# Patient Record
Sex: FEMALE | Race: WHITE | URBAN - METROPOLITAN AREA
[De-identification: names, ages, dates, MRNs, and addresses within clinical notes are randomized per-mention and may not be internally consistent; named-entity substitution may affect disease eponyms.]

---

## 2017-01-09 ENCOUNTER — HOSPITAL ENCOUNTER (OUTPATIENT)
Dept: WOUND CARE | Age: 67
Discharge: HOME | End: 2017-01-09
Attending: SURGERY | Admitting: SURGERY

## 2017-01-09 VITALS
TEMPERATURE: 97.5 F | HEART RATE: 105 BPM | SYSTOLIC BLOOD PRESSURE: 126 MMHG | DIASTOLIC BLOOD PRESSURE: 80 MMHG | RESPIRATION RATE: 20 BRPM

## 2017-01-09 DIAGNOSIS — L97.929 CHRONIC VENOUS HYPERTENSION (IDIOPATHIC) WITH ULCER AND INFLAMMATION OF BILATERAL LOWER EXTREMITY (HCC): ICD-10-CM

## 2017-01-09 DIAGNOSIS — I70.242 ATHEROSCLEROSIS OF NATIVE ARTERY OF BOTH LOWER EXTREMITIES WITH BILATERAL ULCERATION OF CALVES (HCC): ICD-10-CM

## 2017-01-09 DIAGNOSIS — I87.333 CHRONIC VENOUS HYPERTENSION (IDIOPATHIC) WITH ULCER AND INFLAMMATION OF BILATERAL LOWER EXTREMITY (HCC): ICD-10-CM

## 2017-01-09 DIAGNOSIS — L97.919 CHRONIC VENOUS HYPERTENSION (IDIOPATHIC) WITH ULCER AND INFLAMMATION OF BILATERAL LOWER EXTREMITY (HCC): ICD-10-CM

## 2017-01-09 DIAGNOSIS — L03.119 CELLULITIS OF LOWER EXTREMITY, UNSPECIFIED LATERALITY: Primary | ICD-10-CM

## 2017-01-09 DIAGNOSIS — I70.232 ATHEROSCLEROSIS OF NATIVE ARTERY OF BOTH LOWER EXTREMITIES WITH BILATERAL ULCERATION OF CALVES (HCC): ICD-10-CM

## 2017-01-09 PROCEDURE — 11042 DBRDMT SUBQ TIS 1ST 20SQCM/<: CPT | Performed by: SURGERY

## 2017-01-09 PROCEDURE — 11045 DBRDMT SUBQ TISS EACH ADDL: CPT | Performed by: SURGERY

## 2017-01-09 RX ORDER — LIDOCAINE 50 MG/G
OINTMENT TOPICAL ONCE
Status: DISCONTINUED | OUTPATIENT
Start: 2017-01-09 | End: 2017-01-10 | Stop reason: HOSPADM

## 2017-01-16 ENCOUNTER — HOSPITAL ENCOUNTER (OUTPATIENT)
Dept: WOUND CARE | Age: 67
Discharge: HOME | End: 2017-01-16
Attending: NURSE PRACTITIONER | Admitting: NURSE PRACTITIONER

## 2017-01-16 VITALS
DIASTOLIC BLOOD PRESSURE: 75 MMHG | HEART RATE: 102 BPM | SYSTOLIC BLOOD PRESSURE: 134 MMHG | TEMPERATURE: 98.1 F | RESPIRATION RATE: 20 BRPM

## 2017-01-16 DIAGNOSIS — L03.115 CELLULITIS OF BOTH LOWER EXTREMITIES: Primary | ICD-10-CM

## 2017-01-16 DIAGNOSIS — L03.116 CELLULITIS OF BOTH LOWER EXTREMITIES: Primary | ICD-10-CM

## 2017-01-16 PROCEDURE — 99213 OFFICE O/P EST LOW 20 MIN: CPT | Performed by: NURSE PRACTITIONER

## 2017-01-16 RX ORDER — LIDOCAINE 50 MG/G
OINTMENT TOPICAL ONCE
Status: DISCONTINUED | OUTPATIENT
Start: 2017-01-16 | End: 2017-01-17 | Stop reason: HOSPADM

## 2017-01-16 ASSESSMENT — PAIN SCALES - GENERAL: PAINLEVEL_OUTOF10: 10

## 2017-01-16 ASSESSMENT — PAIN DESCRIPTION - PAIN TYPE: TYPE: ACUTE PAIN

## 2017-02-06 ENCOUNTER — HOSPITAL ENCOUNTER (OUTPATIENT)
Dept: WOUND CARE | Age: 67
Discharge: HOME | End: 2017-02-06
Attending: NURSE PRACTITIONER | Admitting: NURSE PRACTITIONER

## 2017-02-06 VITALS
RESPIRATION RATE: 18 BRPM | BODY MASS INDEX: 40.21 KG/M2 | WEIGHT: 191.58 LBS | TEMPERATURE: 98 F | HEIGHT: 58 IN | HEART RATE: 91 BPM | DIASTOLIC BLOOD PRESSURE: 74 MMHG | SYSTOLIC BLOOD PRESSURE: 124 MMHG

## 2017-02-06 DIAGNOSIS — L03.115 CELLULITIS OF BOTH LOWER EXTREMITIES: ICD-10-CM

## 2017-02-06 DIAGNOSIS — I70.232 ATHEROSCLEROSIS OF NATIVE ARTERY OF BOTH LOWER EXTREMITIES WITH BILATERAL ULCERATION OF CALVES (HCC): ICD-10-CM

## 2017-02-06 DIAGNOSIS — L97.929 CHRONIC VENOUS HYPERTENSION (IDIOPATHIC) WITH ULCER AND INFLAMMATION OF BILATERAL LOWER EXTREMITY (HCC): Primary | ICD-10-CM

## 2017-02-06 DIAGNOSIS — L03.116 CELLULITIS OF BOTH LOWER EXTREMITIES: ICD-10-CM

## 2017-02-06 DIAGNOSIS — L97.919 CHRONIC VENOUS HYPERTENSION (IDIOPATHIC) WITH ULCER AND INFLAMMATION OF BILATERAL LOWER EXTREMITY (HCC): Primary | ICD-10-CM

## 2017-02-06 DIAGNOSIS — I70.242 ATHEROSCLEROSIS OF NATIVE ARTERY OF BOTH LOWER EXTREMITIES WITH BILATERAL ULCERATION OF CALVES (HCC): ICD-10-CM

## 2017-02-06 DIAGNOSIS — I87.333 CHRONIC VENOUS HYPERTENSION (IDIOPATHIC) WITH ULCER AND INFLAMMATION OF BILATERAL LOWER EXTREMITY (HCC): Primary | ICD-10-CM

## 2017-02-06 PROCEDURE — 11042 DBRDMT SUBQ TIS 1ST 20SQCM/<: CPT | Performed by: NURSE PRACTITIONER

## 2017-02-06 RX ORDER — BETAMETHASONE DIPROPIONATE 0.05 %
OINTMENT (GRAM) TOPICAL
Qty: 1 TUBE | Refills: 0 | Status: SHIPPED | OUTPATIENT
Start: 2017-02-06 | End: 2018-01-01 | Stop reason: ALTCHOICE

## 2017-02-06 RX ORDER — LIDOCAINE 50 MG/G
OINTMENT TOPICAL PRN
Status: DISCONTINUED | OUTPATIENT
Start: 2017-02-06 | End: 2017-02-07 | Stop reason: HOSPADM

## 2017-02-06 ASSESSMENT — PAIN SCALES - GENERAL: PAINLEVEL_OUTOF10: 0

## 2017-02-13 ENCOUNTER — HOSPITAL ENCOUNTER (OUTPATIENT)
Dept: WOUND CARE | Age: 67
Discharge: HOME | End: 2017-02-13
Attending: SURGERY | Admitting: SURGERY

## 2017-02-13 VITALS
RESPIRATION RATE: 18 BRPM | TEMPERATURE: 99.1 F | HEART RATE: 88 BPM | SYSTOLIC BLOOD PRESSURE: 117 MMHG | DIASTOLIC BLOOD PRESSURE: 71 MMHG

## 2017-02-13 DIAGNOSIS — L97.929 CHRONIC VENOUS HYPERTENSION (IDIOPATHIC) WITH ULCER AND INFLAMMATION OF BILATERAL LOWER EXTREMITY (HCC): Primary | ICD-10-CM

## 2017-02-13 DIAGNOSIS — I87.333 CHRONIC VENOUS HYPERTENSION (IDIOPATHIC) WITH ULCER AND INFLAMMATION OF BILATERAL LOWER EXTREMITY (HCC): Primary | ICD-10-CM

## 2017-02-13 DIAGNOSIS — I70.242 ATHEROSCLEROSIS OF NATIVE ARTERY OF BOTH LOWER EXTREMITIES WITH BILATERAL ULCERATION OF CALVES (HCC): ICD-10-CM

## 2017-02-13 DIAGNOSIS — I70.232 ATHEROSCLEROSIS OF NATIVE ARTERY OF BOTH LOWER EXTREMITIES WITH BILATERAL ULCERATION OF CALVES (HCC): ICD-10-CM

## 2017-02-13 DIAGNOSIS — L97.919 CHRONIC VENOUS HYPERTENSION (IDIOPATHIC) WITH ULCER AND INFLAMMATION OF BILATERAL LOWER EXTREMITY (HCC): Primary | ICD-10-CM

## 2017-02-13 PROCEDURE — 11042 DBRDMT SUBQ TIS 1ST 20SQCM/<: CPT | Performed by: SURGERY

## 2017-02-13 RX ORDER — LIDOCAINE 50 MG/G
OINTMENT TOPICAL PRN
Status: DISCONTINUED | OUTPATIENT
Start: 2017-02-13 | End: 2017-02-14 | Stop reason: HOSPADM

## 2017-02-20 ENCOUNTER — HOSPITAL ENCOUNTER (OUTPATIENT)
Dept: WOUND CARE | Age: 67
Discharge: HOME | End: 2017-02-20
Attending: SURGERY | Admitting: SURGERY

## 2017-02-20 VITALS
TEMPERATURE: 98.3 F | HEART RATE: 89 BPM | DIASTOLIC BLOOD PRESSURE: 77 MMHG | RESPIRATION RATE: 18 BRPM | SYSTOLIC BLOOD PRESSURE: 169 MMHG

## 2017-02-20 DIAGNOSIS — L97.919 CHRONIC VENOUS HYPERTENSION (IDIOPATHIC) WITH ULCER AND INFLAMMATION OF BILATERAL LOWER EXTREMITY (HCC): Primary | ICD-10-CM

## 2017-02-20 DIAGNOSIS — L97.929 CHRONIC VENOUS HYPERTENSION (IDIOPATHIC) WITH ULCER AND INFLAMMATION OF BILATERAL LOWER EXTREMITY (HCC): Primary | ICD-10-CM

## 2017-02-20 DIAGNOSIS — I87.333 CHRONIC VENOUS HYPERTENSION (IDIOPATHIC) WITH ULCER AND INFLAMMATION OF BILATERAL LOWER EXTREMITY (HCC): Primary | ICD-10-CM

## 2017-02-20 PROCEDURE — 99213 OFFICE O/P EST LOW 20 MIN: CPT | Performed by: SURGERY

## 2017-02-27 ENCOUNTER — PROCEDURE VISIT (OUTPATIENT)
Dept: SURGERY | Age: 67
End: 2017-02-27

## 2017-02-27 DIAGNOSIS — I87.333 CHRONIC VENOUS HYPERTENSION (IDIOPATHIC) WITH ULCER AND INFLAMMATION OF BILATERAL LOWER EXTREMITY (HCC): ICD-10-CM

## 2017-02-27 DIAGNOSIS — L97.929 CHRONIC VENOUS HYPERTENSION (IDIOPATHIC) WITH ULCER AND INFLAMMATION OF BILATERAL LOWER EXTREMITY (HCC): ICD-10-CM

## 2017-02-27 DIAGNOSIS — I70.232 ATHEROSCLEROSIS OF NATIVE ARTERY OF BOTH LOWER EXTREMITIES WITH BILATERAL ULCERATION OF CALVES (HCC): Primary | ICD-10-CM

## 2017-02-27 DIAGNOSIS — I70.242 ATHEROSCLEROSIS OF NATIVE ARTERY OF BOTH LOWER EXTREMITIES WITH BILATERAL ULCERATION OF CALVES (HCC): Primary | ICD-10-CM

## 2017-02-27 DIAGNOSIS — L97.919 CHRONIC VENOUS HYPERTENSION (IDIOPATHIC) WITH ULCER AND INFLAMMATION OF BILATERAL LOWER EXTREMITY (HCC): ICD-10-CM

## 2017-02-27 PROCEDURE — 93930 UPPER EXTREMITY STUDY: CPT | Performed by: SURGERY

## 2017-02-27 PROCEDURE — 93970 EXTREMITY STUDY: CPT | Performed by: SURGERY

## 2017-03-10 ENCOUNTER — OFFICE VISIT (OUTPATIENT)
Dept: SURGERY | Age: 67
End: 2017-03-10

## 2017-03-10 VITALS
HEART RATE: 129 BPM | WEIGHT: 185 LBS | BODY MASS INDEX: 38.83 KG/M2 | SYSTOLIC BLOOD PRESSURE: 136 MMHG | HEIGHT: 58 IN | DIASTOLIC BLOOD PRESSURE: 66 MMHG

## 2017-03-10 DIAGNOSIS — I70.232 ATHEROSCLEROSIS OF NATIVE ARTERY OF BOTH LOWER EXTREMITIES WITH BILATERAL ULCERATION OF CALVES (HCC): ICD-10-CM

## 2017-03-10 DIAGNOSIS — I70.242 ATHEROSCLEROSIS OF NATIVE ARTERY OF BOTH LOWER EXTREMITIES WITH BILATERAL ULCERATION OF CALVES (HCC): ICD-10-CM

## 2017-03-10 DIAGNOSIS — E11.8 TYPE 2 DIABETES MELLITUS WITH COMPLICATION, UNSPECIFIED LONG TERM INSULIN USE STATUS: ICD-10-CM

## 2017-03-10 DIAGNOSIS — I10 ESSENTIAL HYPERTENSION: Primary | ICD-10-CM

## 2017-03-10 PROCEDURE — 99213 OFFICE O/P EST LOW 20 MIN: CPT | Performed by: SURGERY

## 2017-03-10 ASSESSMENT — ENCOUNTER SYMPTOMS
GASTROINTESTINAL NEGATIVE: 1
RESPIRATORY NEGATIVE: 1
EYES NEGATIVE: 1

## 2017-03-13 ENCOUNTER — HOSPITAL ENCOUNTER (OUTPATIENT)
Dept: WOUND CARE | Age: 67
Discharge: HOME | End: 2017-03-13
Attending: SURGERY | Admitting: SURGERY

## 2017-03-13 VITALS
SYSTOLIC BLOOD PRESSURE: 144 MMHG | RESPIRATION RATE: 18 BRPM | TEMPERATURE: 98.1 F | DIASTOLIC BLOOD PRESSURE: 78 MMHG | HEART RATE: 98 BPM

## 2017-03-13 DIAGNOSIS — L97.929 CHRONIC VENOUS HYPERTENSION (IDIOPATHIC) WITH ULCER AND INFLAMMATION OF BILATERAL LOWER EXTREMITY (HCC): Primary | ICD-10-CM

## 2017-03-13 DIAGNOSIS — L97.919 CHRONIC VENOUS HYPERTENSION (IDIOPATHIC) WITH ULCER AND INFLAMMATION OF BILATERAL LOWER EXTREMITY (HCC): Primary | ICD-10-CM

## 2017-03-13 DIAGNOSIS — I87.333 CHRONIC VENOUS HYPERTENSION (IDIOPATHIC) WITH ULCER AND INFLAMMATION OF BILATERAL LOWER EXTREMITY (HCC): Primary | ICD-10-CM

## 2017-03-13 PROCEDURE — 11042 DBRDMT SUBQ TIS 1ST 20SQCM/<: CPT | Performed by: SURGERY

## 2017-03-13 PROCEDURE — 11045 DBRDMT SUBQ TISS EACH ADDL: CPT | Performed by: SURGERY

## 2017-03-13 RX ORDER — LIDOCAINE 50 MG/G
OINTMENT TOPICAL PRN
Status: DISCONTINUED | OUTPATIENT
Start: 2017-03-13 | End: 2017-03-14 | Stop reason: HOSPADM

## 2017-03-13 ASSESSMENT — PAIN SCALES - GENERAL: PAINLEVEL_OUTOF10: 9

## 2017-03-13 ASSESSMENT — PAIN DESCRIPTION - PAIN TYPE: TYPE: CHRONIC PAIN

## 2017-03-13 ASSESSMENT — PAIN DESCRIPTION - DESCRIPTORS: DESCRIPTORS: ACHING

## 2017-03-13 ASSESSMENT — PAIN DESCRIPTION - LOCATION: LOCATION: LEG

## 2017-03-13 ASSESSMENT — PAIN DESCRIPTION - ORIENTATION: ORIENTATION: RIGHT

## 2017-03-20 ENCOUNTER — HOSPITAL ENCOUNTER (OUTPATIENT)
Dept: WOUND CARE | Age: 67
Discharge: HOME | End: 2017-03-20
Attending: SURGERY | Admitting: SURGERY

## 2017-03-20 VITALS
TEMPERATURE: 98.6 F | RESPIRATION RATE: 20 BRPM | SYSTOLIC BLOOD PRESSURE: 138 MMHG | HEART RATE: 89 BPM | DIASTOLIC BLOOD PRESSURE: 65 MMHG

## 2017-03-20 DIAGNOSIS — I87.333 CHRONIC VENOUS HYPERTENSION (IDIOPATHIC) WITH ULCER AND INFLAMMATION OF BILATERAL LOWER EXTREMITY (HCC): Primary | ICD-10-CM

## 2017-03-20 DIAGNOSIS — L97.929 CHRONIC VENOUS HYPERTENSION (IDIOPATHIC) WITH ULCER AND INFLAMMATION OF BILATERAL LOWER EXTREMITY (HCC): Primary | ICD-10-CM

## 2017-03-20 DIAGNOSIS — I70.232 ATHEROSCLEROSIS OF NATIVE ARTERY OF BOTH LOWER EXTREMITIES WITH BILATERAL ULCERATION OF CALVES (HCC): ICD-10-CM

## 2017-03-20 DIAGNOSIS — I70.242 ATHEROSCLEROSIS OF NATIVE ARTERY OF BOTH LOWER EXTREMITIES WITH BILATERAL ULCERATION OF CALVES (HCC): ICD-10-CM

## 2017-03-20 DIAGNOSIS — L97.919 CHRONIC VENOUS HYPERTENSION (IDIOPATHIC) WITH ULCER AND INFLAMMATION OF BILATERAL LOWER EXTREMITY (HCC): Primary | ICD-10-CM

## 2017-03-20 PROCEDURE — 11042 DBRDMT SUBQ TIS 1ST 20SQCM/<: CPT | Performed by: SURGERY

## 2017-03-20 RX ORDER — LIDOCAINE 50 MG/G
OINTMENT TOPICAL ONCE
Status: DISCONTINUED | OUTPATIENT
Start: 2017-03-20 | End: 2017-03-21 | Stop reason: HOSPADM

## 2017-03-27 ENCOUNTER — HOSPITAL ENCOUNTER (OUTPATIENT)
Dept: WOUND CARE | Age: 67
Discharge: HOME | End: 2017-03-27
Attending: SURGERY | Admitting: SURGERY

## 2017-03-27 VITALS
RESPIRATION RATE: 18 BRPM | HEART RATE: 72 BPM | TEMPERATURE: 98.5 F | DIASTOLIC BLOOD PRESSURE: 66 MMHG | SYSTOLIC BLOOD PRESSURE: 110 MMHG

## 2017-03-27 DIAGNOSIS — I70.232 ATHEROSCLEROSIS OF NATIVE ARTERY OF BOTH LOWER EXTREMITIES WITH BILATERAL ULCERATION OF CALVES (HCC): ICD-10-CM

## 2017-03-27 DIAGNOSIS — L97.929 CHRONIC VENOUS HYPERTENSION (IDIOPATHIC) WITH ULCER AND INFLAMMATION OF BILATERAL LOWER EXTREMITY (HCC): Primary | ICD-10-CM

## 2017-03-27 DIAGNOSIS — L97.919 CHRONIC VENOUS HYPERTENSION (IDIOPATHIC) WITH ULCER AND INFLAMMATION OF BILATERAL LOWER EXTREMITY (HCC): Primary | ICD-10-CM

## 2017-03-27 DIAGNOSIS — I87.333 CHRONIC VENOUS HYPERTENSION (IDIOPATHIC) WITH ULCER AND INFLAMMATION OF BILATERAL LOWER EXTREMITY (HCC): Primary | ICD-10-CM

## 2017-03-27 DIAGNOSIS — I70.242 ATHEROSCLEROSIS OF NATIVE ARTERY OF BOTH LOWER EXTREMITIES WITH BILATERAL ULCERATION OF CALVES (HCC): ICD-10-CM

## 2017-03-27 PROCEDURE — 11042 DBRDMT SUBQ TIS 1ST 20SQCM/<: CPT | Performed by: SURGERY

## 2017-03-27 RX ORDER — LIDOCAINE 50 MG/G
OINTMENT TOPICAL PRN
Status: DISCONTINUED | OUTPATIENT
Start: 2017-03-27 | End: 2017-03-28 | Stop reason: HOSPADM

## 2017-03-27 ASSESSMENT — PAIN SCALES - GENERAL: PAINLEVEL_OUTOF10: 0

## 2017-04-03 ENCOUNTER — HOSPITAL ENCOUNTER (OUTPATIENT)
Dept: WOUND CARE | Age: 67
Discharge: HOME | End: 2017-04-03
Attending: SURGERY | Admitting: SURGERY

## 2017-04-03 VITALS
SYSTOLIC BLOOD PRESSURE: 151 MMHG | RESPIRATION RATE: 20 BRPM | TEMPERATURE: 98 F | HEART RATE: 85 BPM | DIASTOLIC BLOOD PRESSURE: 76 MMHG | WEIGHT: 183.86 LBS | BODY MASS INDEX: 38.43 KG/M2

## 2017-04-03 DIAGNOSIS — L97.929 CHRONIC VENOUS HYPERTENSION (IDIOPATHIC) WITH ULCER AND INFLAMMATION OF BILATERAL LOWER EXTREMITY (HCC): Primary | ICD-10-CM

## 2017-04-03 DIAGNOSIS — L97.919 CHRONIC VENOUS HYPERTENSION (IDIOPATHIC) WITH ULCER AND INFLAMMATION OF BILATERAL LOWER EXTREMITY (HCC): Primary | ICD-10-CM

## 2017-04-03 DIAGNOSIS — I87.333 CHRONIC VENOUS HYPERTENSION (IDIOPATHIC) WITH ULCER AND INFLAMMATION OF BILATERAL LOWER EXTREMITY (HCC): Primary | ICD-10-CM

## 2017-04-03 PROCEDURE — 11042 DBRDMT SUBQ TIS 1ST 20SQCM/<: CPT | Performed by: SURGERY

## 2017-04-03 RX ORDER — LIDOCAINE 50 MG/G
OINTMENT TOPICAL PRN
Status: DISCONTINUED | OUTPATIENT
Start: 2017-04-03 | End: 2017-04-04 | Stop reason: HOSPADM

## 2017-04-03 ASSESSMENT — PAIN SCALES - GENERAL: PAINLEVEL_OUTOF10: 4

## 2017-04-03 ASSESSMENT — PAIN DESCRIPTION - PROGRESSION: CLINICAL_PROGRESSION: NOT CHANGED

## 2017-04-03 ASSESSMENT — PAIN DESCRIPTION - PAIN TYPE: TYPE: CHRONIC PAIN

## 2017-04-03 ASSESSMENT — PAIN DESCRIPTION - ORIENTATION: ORIENTATION: RIGHT

## 2017-04-03 ASSESSMENT — PAIN DESCRIPTION - FREQUENCY: FREQUENCY: INTERMITTENT

## 2017-04-03 ASSESSMENT — PAIN DESCRIPTION - LOCATION: LOCATION: LEG

## 2017-04-03 ASSESSMENT — PAIN DESCRIPTION - DESCRIPTORS: DESCRIPTORS: ACHING

## 2017-04-03 ASSESSMENT — PAIN DESCRIPTION - ONSET: ONSET: ON-GOING

## 2017-04-10 ENCOUNTER — HOSPITAL ENCOUNTER (OUTPATIENT)
Dept: WOUND CARE | Age: 67
Discharge: HOME | End: 2017-04-10
Attending: SURGERY | Admitting: SURGERY

## 2017-04-13 ENCOUNTER — HOSPITAL ENCOUNTER (OUTPATIENT)
Dept: WOUND CARE | Age: 67
Discharge: HOME | End: 2017-04-13
Attending: SURGERY | Admitting: SURGERY

## 2017-04-13 VITALS
DIASTOLIC BLOOD PRESSURE: 81 MMHG | TEMPERATURE: 98.1 F | SYSTOLIC BLOOD PRESSURE: 174 MMHG | RESPIRATION RATE: 20 BRPM | HEART RATE: 84 BPM

## 2017-04-13 DIAGNOSIS — L97.919 CHRONIC VENOUS HYPERTENSION (IDIOPATHIC) WITH ULCER AND INFLAMMATION OF BILATERAL LOWER EXTREMITY (HCC): ICD-10-CM

## 2017-04-13 DIAGNOSIS — I87.333 CHRONIC VENOUS HYPERTENSION (IDIOPATHIC) WITH ULCER AND INFLAMMATION OF BILATERAL LOWER EXTREMITY (HCC): ICD-10-CM

## 2017-04-13 DIAGNOSIS — I70.242 ATHEROSCLEROSIS OF NATIVE ARTERY OF BOTH LOWER EXTREMITIES WITH BILATERAL ULCERATION OF CALVES (HCC): Primary | ICD-10-CM

## 2017-04-13 DIAGNOSIS — I70.232 ATHEROSCLEROSIS OF NATIVE ARTERY OF BOTH LOWER EXTREMITIES WITH BILATERAL ULCERATION OF CALVES (HCC): Primary | ICD-10-CM

## 2017-04-13 DIAGNOSIS — L97.929 CHRONIC VENOUS HYPERTENSION (IDIOPATHIC) WITH ULCER AND INFLAMMATION OF BILATERAL LOWER EXTREMITY (HCC): ICD-10-CM

## 2017-04-13 PROCEDURE — 99212 OFFICE O/P EST SF 10 MIN: CPT | Performed by: SURGERY

## 2017-04-13 RX ORDER — LIDOCAINE 50 MG/G
OINTMENT TOPICAL PRN
Status: DISCONTINUED | OUTPATIENT
Start: 2017-04-13 | End: 2017-04-14 | Stop reason: HOSPADM

## 2017-04-20 ENCOUNTER — HOSPITAL ENCOUNTER (OUTPATIENT)
Dept: WOUND CARE | Age: 67
Discharge: HOME | End: 2018-02-23
Attending: SURGERY | Admitting: SURGERY

## 2017-04-24 ENCOUNTER — HOSPITAL ENCOUNTER (OUTPATIENT)
Dept: WOUND CARE | Age: 67
Discharge: HOME | End: 2017-04-25
Attending: SURGERY | Admitting: SURGERY

## 2017-04-24 VITALS — RESPIRATION RATE: 18 BRPM | SYSTOLIC BLOOD PRESSURE: 182 MMHG | HEART RATE: 103 BPM | DIASTOLIC BLOOD PRESSURE: 80 MMHG

## 2017-04-24 DIAGNOSIS — L97.929 CHRONIC VENOUS HYPERTENSION (IDIOPATHIC) WITH ULCER AND INFLAMMATION OF BILATERAL LOWER EXTREMITY (HCC): Primary | ICD-10-CM

## 2017-04-24 DIAGNOSIS — I87.333 CHRONIC VENOUS HYPERTENSION (IDIOPATHIC) WITH ULCER AND INFLAMMATION OF BILATERAL LOWER EXTREMITY (HCC): Primary | ICD-10-CM

## 2017-04-24 DIAGNOSIS — L97.919 CHRONIC VENOUS HYPERTENSION (IDIOPATHIC) WITH ULCER AND INFLAMMATION OF BILATERAL LOWER EXTREMITY (HCC): Primary | ICD-10-CM

## 2017-04-24 PROCEDURE — 11042 DBRDMT SUBQ TIS 1ST 20SQCM/<: CPT | Performed by: SURGERY

## 2017-04-24 RX ORDER — LIDOCAINE 50 MG/G
OINTMENT TOPICAL PRN
Status: DISCONTINUED | OUTPATIENT
Start: 2017-04-24 | End: 2017-04-26 | Stop reason: HOSPADM

## 2017-04-24 ASSESSMENT — PAIN SCALES - GENERAL: PAINLEVEL_OUTOF10: 0

## 2017-05-03 ENCOUNTER — TELEPHONE (OUTPATIENT)
Dept: WOUND CARE | Age: 67
End: 2017-05-03

## 2017-05-08 ENCOUNTER — HOSPITAL ENCOUNTER (OUTPATIENT)
Dept: WOUND CARE | Age: 67
Discharge: HOME | End: 2017-05-08
Attending: SURGERY | Admitting: SURGERY

## 2017-05-08 VITALS
HEART RATE: 87 BPM | SYSTOLIC BLOOD PRESSURE: 152 MMHG | TEMPERATURE: 97.4 F | DIASTOLIC BLOOD PRESSURE: 80 MMHG | RESPIRATION RATE: 18 BRPM

## 2017-05-08 DIAGNOSIS — L97.919 CHRONIC VENOUS HYPERTENSION (IDIOPATHIC) WITH ULCER AND INFLAMMATION OF BILATERAL LOWER EXTREMITY (HCC): Primary | ICD-10-CM

## 2017-05-08 DIAGNOSIS — I87.333 CHRONIC VENOUS HYPERTENSION (IDIOPATHIC) WITH ULCER AND INFLAMMATION OF BILATERAL LOWER EXTREMITY (HCC): Primary | ICD-10-CM

## 2017-05-08 DIAGNOSIS — L97.929 CHRONIC VENOUS HYPERTENSION (IDIOPATHIC) WITH ULCER AND INFLAMMATION OF BILATERAL LOWER EXTREMITY (HCC): Primary | ICD-10-CM

## 2017-05-08 PROCEDURE — 99213 OFFICE O/P EST LOW 20 MIN: CPT | Performed by: SURGERY

## 2017-07-14 ENCOUNTER — HOSPITAL ENCOUNTER (OUTPATIENT)
Dept: WOUND CARE | Age: 67
Discharge: HOME | End: 2017-07-14
Attending: NURSE PRACTITIONER | Admitting: NURSE PRACTITIONER

## 2017-07-14 VITALS
HEIGHT: 58 IN | BODY MASS INDEX: 37.11 KG/M2 | HEART RATE: 99 BPM | RESPIRATION RATE: 18 BRPM | TEMPERATURE: 98.7 F | WEIGHT: 176.81 LBS | SYSTOLIC BLOOD PRESSURE: 149 MMHG | DIASTOLIC BLOOD PRESSURE: 84 MMHG

## 2017-07-14 DIAGNOSIS — L97.911 ULCER OF RIGHT LOWER LEG, LIMITED TO BREAKDOWN OF SKIN (HCC): ICD-10-CM

## 2017-07-14 DIAGNOSIS — M79.89 LEG SWELLING: ICD-10-CM

## 2017-07-14 DIAGNOSIS — I87.333 CHRONIC VENOUS HYPERTENSION (IDIOPATHIC) WITH ULCER AND INFLAMMATION OF BILATERAL LOWER EXTREMITY (HCC): Primary | ICD-10-CM

## 2017-07-14 DIAGNOSIS — L97.909: ICD-10-CM

## 2017-07-14 DIAGNOSIS — L97.919 CHRONIC VENOUS HYPERTENSION (IDIOPATHIC) WITH ULCER AND INFLAMMATION OF BILATERAL LOWER EXTREMITY (HCC): Primary | ICD-10-CM

## 2017-07-14 DIAGNOSIS — L97.929 CHRONIC VENOUS HYPERTENSION (IDIOPATHIC) WITH ULCER AND INFLAMMATION OF BILATERAL LOWER EXTREMITY (HCC): Primary | ICD-10-CM

## 2017-07-14 DIAGNOSIS — L97.921 ULCER OF LEFT LOWER LEG, LIMITED TO BREAKDOWN OF SKIN (HCC): ICD-10-CM

## 2017-07-14 PROCEDURE — 97597 DBRDMT OPN WND 1ST 20 CM/<: CPT | Performed by: NURSE PRACTITIONER

## 2017-07-14 RX ORDER — LIDOCAINE HYDROCHLORIDE 40 MG/ML
SOLUTION TOPICAL ONCE
Status: DISCONTINUED | OUTPATIENT
Start: 2017-07-14 | End: 2017-07-15 | Stop reason: HOSPADM

## 2017-07-14 ASSESSMENT — PAIN DESCRIPTION - LOCATION: LOCATION: LEG

## 2017-07-14 ASSESSMENT — PAIN DESCRIPTION - DESCRIPTORS: DESCRIPTORS: BURNING

## 2017-07-14 ASSESSMENT — PAIN DESCRIPTION - PAIN TYPE: TYPE: ACUTE PAIN

## 2017-07-14 ASSESSMENT — PAIN SCALES - GENERAL: PAINLEVEL_OUTOF10: 9

## 2017-07-14 ASSESSMENT — PAIN DESCRIPTION - FREQUENCY: FREQUENCY: INTERMITTENT

## 2017-07-14 ASSESSMENT — PAIN DESCRIPTION - ORIENTATION: ORIENTATION: RIGHT;LEFT

## 2017-07-24 ENCOUNTER — HOSPITAL ENCOUNTER (OUTPATIENT)
Dept: WOUND CARE | Age: 67
Discharge: HOME | End: 2017-07-24
Attending: SURGERY | Admitting: SURGERY

## 2017-07-24 VITALS
TEMPERATURE: 97.8 F | HEART RATE: 73 BPM | DIASTOLIC BLOOD PRESSURE: 84 MMHG | SYSTOLIC BLOOD PRESSURE: 136 MMHG | RESPIRATION RATE: 18 BRPM

## 2017-07-24 DIAGNOSIS — L97.929 CHRONIC VENOUS HYPERTENSION (IDIOPATHIC) WITH ULCER AND INFLAMMATION OF BILATERAL LOWER EXTREMITY (HCC): Primary | ICD-10-CM

## 2017-07-24 DIAGNOSIS — L97.919 CHRONIC VENOUS HYPERTENSION (IDIOPATHIC) WITH ULCER AND INFLAMMATION OF BILATERAL LOWER EXTREMITY (HCC): Primary | ICD-10-CM

## 2017-07-24 DIAGNOSIS — I87.333 CHRONIC VENOUS HYPERTENSION (IDIOPATHIC) WITH ULCER AND INFLAMMATION OF BILATERAL LOWER EXTREMITY (HCC): Primary | ICD-10-CM

## 2017-07-24 PROCEDURE — 99213 OFFICE O/P EST LOW 20 MIN: CPT | Performed by: SURGERY

## 2017-09-05 ENCOUNTER — HOSPITAL ENCOUNTER (OUTPATIENT)
Dept: WOUND CARE | Age: 67
Discharge: HOME | End: 2017-09-05
Attending: INTERNAL MEDICINE | Admitting: INTERNAL MEDICINE

## 2017-09-05 VITALS
HEIGHT: 59 IN | TEMPERATURE: 98.9 F | WEIGHT: 182.1 LBS | DIASTOLIC BLOOD PRESSURE: 77 MMHG | BODY MASS INDEX: 36.71 KG/M2 | SYSTOLIC BLOOD PRESSURE: 142 MMHG | RESPIRATION RATE: 16 BRPM | HEART RATE: 88 BPM

## 2017-09-05 DIAGNOSIS — S80.811A EXCORIATION OF RIGHT LOWER LEG, INITIAL ENCOUNTER: ICD-10-CM

## 2017-09-05 DIAGNOSIS — S80.812A EXCORIATION OF LEFT LOWER LEG, INITIAL ENCOUNTER: ICD-10-CM

## 2017-09-05 DIAGNOSIS — L29.9 PRURITIC CONDITION: ICD-10-CM

## 2017-09-05 ASSESSMENT — PAIN DESCRIPTION - FREQUENCY: FREQUENCY: INTERMITTENT

## 2017-09-05 ASSESSMENT — PAIN SCALES - GENERAL: PAINLEVEL_OUTOF10: 8

## 2017-09-05 ASSESSMENT — PAIN DESCRIPTION - LOCATION: LOCATION: LEG

## 2017-09-05 ASSESSMENT — PAIN DESCRIPTION - PAIN TYPE: TYPE: ACUTE PAIN

## 2017-09-05 ASSESSMENT — PAIN DESCRIPTION - DESCRIPTORS: DESCRIPTORS: SORE

## 2017-09-05 ASSESSMENT — PAIN DESCRIPTION - ORIENTATION: ORIENTATION: RIGHT;LEFT

## 2017-09-12 ENCOUNTER — HOSPITAL ENCOUNTER (OUTPATIENT)
Dept: WOUND CARE | Age: 67
Discharge: HOME | End: 2017-09-12
Attending: INTERNAL MEDICINE | Admitting: INTERNAL MEDICINE

## 2017-09-12 VITALS
HEART RATE: 80 BPM | DIASTOLIC BLOOD PRESSURE: 83 MMHG | TEMPERATURE: 98 F | RESPIRATION RATE: 16 BRPM | SYSTOLIC BLOOD PRESSURE: 177 MMHG

## 2017-09-12 ASSESSMENT — PAIN DESCRIPTION - ORIENTATION: ORIENTATION: RIGHT;LEFT

## 2017-09-12 ASSESSMENT — PAIN DESCRIPTION - ONSET: ONSET: ON-GOING

## 2017-09-12 ASSESSMENT — PAIN SCALES - GENERAL: PAINLEVEL_OUTOF10: 5

## 2017-09-12 ASSESSMENT — PAIN DESCRIPTION - FREQUENCY: FREQUENCY: INTERMITTENT

## 2017-09-12 ASSESSMENT — PAIN DESCRIPTION - PAIN TYPE: TYPE: ACUTE PAIN

## 2017-09-12 ASSESSMENT — PAIN DESCRIPTION - PROGRESSION: CLINICAL_PROGRESSION: NOT CHANGED

## 2017-09-12 ASSESSMENT — PAIN DESCRIPTION - DESCRIPTORS: DESCRIPTORS: ACHING

## 2017-09-12 ASSESSMENT — PAIN DESCRIPTION - LOCATION: LOCATION: LEG

## 2017-09-19 ENCOUNTER — HOSPITAL ENCOUNTER (OUTPATIENT)
Dept: WOUND CARE | Age: 67
Discharge: HOME | End: 2017-09-19
Attending: INTERNAL MEDICINE | Admitting: INTERNAL MEDICINE

## 2017-09-19 VITALS
TEMPERATURE: 98.6 F | RESPIRATION RATE: 18 BRPM | SYSTOLIC BLOOD PRESSURE: 153 MMHG | HEART RATE: 68 BPM | DIASTOLIC BLOOD PRESSURE: 84 MMHG

## 2017-09-19 ASSESSMENT — PAIN SCALES - GENERAL: PAINLEVEL_OUTOF10: 0

## 2017-09-29 ENCOUNTER — HOSPITAL ENCOUNTER (OUTPATIENT)
Dept: WOUND CARE | Age: 67
Discharge: HOME | End: 2017-09-29
Attending: NURSE PRACTITIONER | Admitting: NURSE PRACTITIONER

## 2017-09-29 VITALS
DIASTOLIC BLOOD PRESSURE: 84 MMHG | RESPIRATION RATE: 16 BRPM | TEMPERATURE: 97.9 F | HEART RATE: 96 BPM | SYSTOLIC BLOOD PRESSURE: 157 MMHG

## 2017-09-29 DIAGNOSIS — L97.911 ULCER OF RIGHT LOWER LEG, LIMITED TO BREAKDOWN OF SKIN (HCC): ICD-10-CM

## 2017-09-29 DIAGNOSIS — M79.89 LEG SWELLING: ICD-10-CM

## 2017-09-29 DIAGNOSIS — L03.115 CELLULITIS OF BOTH LOWER EXTREMITIES: Primary | ICD-10-CM

## 2017-09-29 DIAGNOSIS — L03.116 CELLULITIS OF BOTH LOWER EXTREMITIES: Primary | ICD-10-CM

## 2017-09-29 DIAGNOSIS — L97.921 ULCER OF LEFT LOWER LEG, LIMITED TO BREAKDOWN OF SKIN (HCC): ICD-10-CM

## 2017-09-29 PROCEDURE — 99212 OFFICE O/P EST SF 10 MIN: CPT | Performed by: NURSE PRACTITIONER

## 2017-09-29 ASSESSMENT — PAIN DESCRIPTION - PAIN TYPE: TYPE: ACUTE PAIN

## 2017-09-29 ASSESSMENT — PAIN DESCRIPTION - PROGRESSION: CLINICAL_PROGRESSION: NOT CHANGED

## 2017-09-29 ASSESSMENT — PAIN SCALES - GENERAL: PAINLEVEL_OUTOF10: 10

## 2017-09-29 ASSESSMENT — PAIN DESCRIPTION - ORIENTATION: ORIENTATION: RIGHT;LEFT

## 2017-09-29 ASSESSMENT — PAIN DESCRIPTION - ONSET: ONSET: ON-GOING

## 2017-09-29 ASSESSMENT — PAIN DESCRIPTION - DESCRIPTORS: DESCRIPTORS: SORE;ACHING

## 2017-09-29 ASSESSMENT — PAIN DESCRIPTION - LOCATION: LOCATION: LEG

## 2017-09-29 ASSESSMENT — PAIN DESCRIPTION - FREQUENCY: FREQUENCY: INTERMITTENT

## 2017-10-10 ENCOUNTER — HOSPITAL ENCOUNTER (OUTPATIENT)
Dept: WOUND CARE | Age: 67
Discharge: HOME | End: 2017-10-10
Attending: INTERNAL MEDICINE | Admitting: INTERNAL MEDICINE

## 2017-10-10 VITALS
TEMPERATURE: 97.6 F | RESPIRATION RATE: 18 BRPM | DIASTOLIC BLOOD PRESSURE: 77 MMHG | HEART RATE: 88 BPM | SYSTOLIC BLOOD PRESSURE: 155 MMHG

## 2017-10-10 RX ORDER — LIDOCAINE 50 MG/G
OINTMENT TOPICAL ONCE
Status: DISCONTINUED | OUTPATIENT
Start: 2017-10-10 | End: 2017-10-11 | Stop reason: HOSPADM

## 2017-10-10 ASSESSMENT — PAIN DESCRIPTION - LOCATION: LOCATION: LEG

## 2017-10-10 ASSESSMENT — PAIN DESCRIPTION - PROGRESSION: CLINICAL_PROGRESSION: NOT CHANGED

## 2017-10-10 ASSESSMENT — PAIN DESCRIPTION - ONSET: ONSET: ON-GOING

## 2017-10-10 ASSESSMENT — PAIN DESCRIPTION - DESCRIPTORS: DESCRIPTORS: ACHING

## 2017-10-10 ASSESSMENT — PAIN SCALES - GENERAL: PAINLEVEL_OUTOF10: 9

## 2017-10-10 ASSESSMENT — PAIN DESCRIPTION - PAIN TYPE: TYPE: ACUTE PAIN

## 2017-10-10 ASSESSMENT — PAIN DESCRIPTION - ORIENTATION: ORIENTATION: RIGHT;LEFT

## 2017-10-10 ASSESSMENT — PAIN DESCRIPTION - FREQUENCY: FREQUENCY: INTERMITTENT

## 2017-10-10 NOTE — PLAN OF CARE
[employer] Application   Below Knee    NAME:  Sofia Edwards  YOB: 1950  MEDICAL RECORD NUMBER:  [id number]  DATE:  10/10/2017     [] Removed old Gerarda Hoit boot if indicated and wash leg with mild soap and water.  [x] Applied moisturizing agent to dry skin as needed.  [x] Appied primary and secondary dressing as ordered     [x] Applied Unna roll from toes to knee overlapping each time.  [x] Applied ace wrap or coban from toes to below the knee.  [x] Secured with tape and/or metal clips covered with tape.  [x] Instructed patient/caregiver to keep dressing dry and intact. DO NOT REMOVE DRESSING.  [x] Instructed pt/family/caregiver to report excessive draining, loose bandage, wet dressing, severe pain or tingling in toes.  [x] Applied [employer] dressing below the knee to Bilateral lower leg(s)        Unna Boot(s) were applied per  Guidelines.      Electronically signed by Cricket Manriquez RN on 10/10/2017 at 10:33 AM

## 2017-10-10 NOTE — PROGRESS NOTES
[facility]   Progress Note and Procedure Note      Sahil Kelsey RECORD NUMBER:  [id number]  AGE: 75 y.o. GENDER: female  : 1950  EPISODE DATE:  10/10/2017    Subjective:     Chief Complaint   Patient presents with    Wound Check     follow up wounds bilateral lower extremities         HISTORY of PRESENT ILLNESS HPI     Jorge Woody is a 75 y.o. female who presents today for wound/ulcer evaluation. History of Wound Context: Jorge Woody presents to wound care center for follow up evaluation of wound. Patient has not had any complications since last visit. There are no new complaints of increasing pain, fever, chills, increased drainage, rash, odor, swelling or other constitutional symptoms. Patient has been compliant with wound care instructions.         PAST MEDICAL HISTORY        Diagnosis Date    Acid reflux     Arthritis     Balance problem     Blood circulation, collateral     Cellulitis of both lower extremities     CHF (congestive heart failure) (HCC)     Chronic kidney disease     Chronic renal failure    Chronic venous hypertension (idiopathic) with ulcer and inflammation of bilateral lower extremity 2017    Depression     Diabetes mellitus (HCC)     GERD (gastroesophageal reflux disease)     Hyperlipidemia     Hypertension     Movement disorder     Murmur     Neuromuscular disorder (HCC)     Restless leg syndrome     Urinary frequency     Urinary incontinence        PAST SURGICAL HISTORY    Past Surgical History:   Procedure Laterality Date    APPENDECTOMY      BLADDER SUSPENSION      CHOLECYSTECTOMY      COLONOSCOPY      CYSTOSCOPY      EYE SURGERY      HYSTERECTOMY         FAMILY HISTORY    Family History   Problem Relation Age of Onset    Heart Disease Mother     Heart Disease Father     Diabetes Father     Kidney Disease Father        SOCIAL HISTORY    Social History   Substance Use Topics    Smoking status: Former Smoker     Packs/day: 0.50     Years: 10.00     Types: Cigarettes     Quit date: 3/21/1990    Smokeless tobacco: Never Used    Alcohol use No       ALLERGIES    Allergies   Allergen Reactions    Benadryl [Diphenhydramine] Itching       MEDICATIONS    Current Outpatient Prescriptions on File Prior to Encounter   Medication Sig Dispense Refill    hydrOXYzine (ATARAX) 10 MG/5ML syrup Take 5 mLs by mouth 4 times daily as needed for Itching 1 Bottle 0    sodium bicarbonate 650 MG tablet Take 1 tablet by mouth 2 times daily 120 tablet 2    linagliptin (TRADJENTA) 5 MG tablet Take 5 mg by mouth daily      betamethasone dipropionate (DIPROLENE) 0.05 % ointment Apply topically daily. 1 Tube 0    insulin glargine (LANTUS) 100 UNIT/ML injection vial Inject 25 Units into the skin nightly (Patient taking differently: Inject 40 Units into the skin nightly ) 1 vial 3    docusate sodium (COLACE, DULCOLAX) 100 MG CAPS Take 100 mg by mouth daily 30 capsule 0    oxyCODONE-acetaminophen (PERCOCET) 5-325 MG per tablet   Take 1 tablet by mouth every 4 hours as needed for Pain (max 5/day)       hydrALAZINE (APRESOLINE) 25 MG tablet Take 25 mg by mouth 3 times daily      traZODone (DESYREL) 50 MG tablet Take 25-50 mg by mouth nightly as needed for Sleep      oxybutynin (DITROPAN-XL) 5 MG CR tablet Take 5 mg by mouth every evening      rOPINIRole (REQUIP) 1 MG tablet Take 1 mg by mouth nightly      dicyclomine (BENTYL) 20 MG tablet Take 10-20 mg by mouth every 6 hours as needed (spasms)      famotidine (PEPCID) 20 MG tablet   Take 20 mg by mouth daily       simvastatin (ZOCOR) 40 MG tablet   Take 20 mg by mouth nightly       ferrous sulfate 325 (65 FE) MG tablet Take 325 mg by mouth 2 times daily.  aspirin 81 MG tablet Take 81 mg by mouth daily.       diphenhydrAMINE (BENADRYL) 25 MG capsule Take 1 capsule by mouth nightly as needed for Itching 30 capsule 1     No current facility-administered medications on file Non-staged Wound Description Partial thickness 10/10/2017  9:45 AM   [place]%Wound Bed 20 9/29/2017 10:49 AM   Red%Wound Bed 60 9/29/2017 10:49 AM   Yellow%Wound Bed 20 9/29/2017 10:49 AM   Black%Wound Bed 100 10/10/2017  9:45 AM   Drainage Amount None 10/10/2017  9:45 AM   Odor None 10/10/2017  9:45 AM   Culture Taken No 9/29/2017  3:14 PM   Op First Treatment Date 09/29/17 9/29/2017 10:49 AM   Number of days: 11       Percent of Wound/Ulcer Debrided: 0%    Excoriations on legs with edema and chronic inflammatory changes. Will wrap legs with calamine on skin. Follow up 1 week. Plan:     Treatment Note please see attached Discharge Instructions    In my professional opinion this patient would benefit from HBO Therapy: No    Written patient dismissal instructions given to patient and signed by patient or POA. Discharge Instructions            [facility] Physician Orders and Discharge [place] [address]  [address] [address], [place] [address]  Telephone: [phone]      FAX [phone]      NAME: Gil Sotomayor  DATE OF BIRTH:  1950  MEDICAL RECORD NUMBER:  [id number]  DATE:  10/10/2017      Wound Cleansing:   Do not scrub or use excessive force. Cleanse wound prior to applying a clean dressing with:  [] Normal Saline                      [x] Keep Wound Dry in Shower              Topical Treatments:  Do not apply lotions, creams, or ointments to wound bed unless directed. [] Apply moisturizing lotion to skin surrounding the wound prior to dressing change.  [] Apply antifungal ointment to skin surrounding the wound prior to dressing change.  [] Apply thin film of moisture barrier ointment to skin immediately around wound.   [] Other:       Dressings:                            Wound Location BILATERAL LOWER LEGS                   [x] Apply Primary Dressing:                                                                                      [x] Foam with Silver   [] Cover and Secure with:                                           [] Gauze           [] Carol             [] Kerlix                        [] Ace Wrap                 [] Cover Roll Tape                   [] ABD                                                            [] Other:                             Avoid contact of tape with skin.      [] Change dressing:                                                               [x] Do Not Change Dressing                             Edema Control:  Apply:            [] Compression Stocking                     []Right Leg                   []Left Leg                        [] Tubigrip                    []Right Leg Double Layer                    []Left Leg Double Layer                                                                                          []Right Leg Single Layer                     []Left Leg Single Layer                        [] SpandaGrip              []Right Leg                   []Left Leg                                                                    []Low compression 5-10 mm/Hg                                                                                         []Medium compression 10-20 mm/Hg                                                                    []High compression  20-30 mm/Hg                        every morning immediately when getting up should be applied to affected leg(s) from mid foot to knee making sure to cover the heel.  Remove every night before going to bed.                        [x] Elevate leg(s) above the level of the heart when sitting.                                        [x] Avoid prolonged standing in one place.          Compression: CALAMINE CO-FLEX  Apply:            [x] Multilayer Compression Wrap Applied in Clinic                  [x]RightLeg                    [x]Left Leg                        [x] Multi-layer compression.  Do not get leg(s) with wrap wet.  If wraps become too tight information contained in the After Visit Summary has been reviewed with me, the patient and/or responsible adult, by my health care provider(s).  I had the opportunity to ask questions regarding this information.  I have elected to receive;          Patient Signature:_______________________  Danne Epley  Date: ___________ Time:  ____________  Danne Epley  [] Patient unable to sign Discharge Instructions given to ECF/Transportation/NIDIA Edwards  []  After Visit Summary  [x]  Comprehensive Discharge Instruction          Electronically signed by Trisha Bautista MD on 10/10/2017 at 11:13 AM

## 2017-10-17 ENCOUNTER — HOSPITAL ENCOUNTER (OUTPATIENT)
Dept: WOUND CARE | Age: 67
Discharge: HOME | End: 2017-10-17
Attending: INTERNAL MEDICINE | Admitting: INTERNAL MEDICINE

## 2017-10-17 VITALS
DIASTOLIC BLOOD PRESSURE: 81 MMHG | SYSTOLIC BLOOD PRESSURE: 149 MMHG | RESPIRATION RATE: 18 BRPM | HEART RATE: 88 BPM | TEMPERATURE: 98.5 F

## 2017-10-17 ASSESSMENT — PAIN DESCRIPTION - FREQUENCY: FREQUENCY: INTERMITTENT

## 2017-10-17 ASSESSMENT — PAIN DESCRIPTION - ONSET: ONSET: ON-GOING

## 2017-10-17 ASSESSMENT — PAIN SCALES - GENERAL: PAINLEVEL_OUTOF10: 8

## 2017-10-17 ASSESSMENT — PAIN DESCRIPTION - DESCRIPTORS: DESCRIPTORS: THROBBING

## 2017-10-17 ASSESSMENT — PAIN DESCRIPTION - PAIN TYPE: TYPE: ACUTE PAIN

## 2017-10-17 ASSESSMENT — PAIN DESCRIPTION - LOCATION: LOCATION: LEG

## 2017-10-17 ASSESSMENT — PAIN DESCRIPTION - PROGRESSION: CLINICAL_PROGRESSION: NOT CHANGED

## 2017-10-17 ASSESSMENT — PAIN DESCRIPTION - ORIENTATION: ORIENTATION: RIGHT;LEFT

## 2017-10-17 NOTE — PROGRESS NOTES
Use Topics    Smoking status: Former Smoker     Packs/day: 0.50     Years: 10.00     Types: Cigarettes     Quit date: 3/21/1990    Smokeless tobacco: Never Used    Alcohol use No       ALLERGIES    Allergies   Allergen Reactions    Benadryl [Diphenhydramine] Itching       MEDICATIONS    Current Outpatient Prescriptions on File Prior to Encounter   Medication Sig Dispense Refill    diphenhydrAMINE (BENADRYL) 25 MG capsule Take 1 capsule by mouth nightly as needed for Itching 30 capsule 1    hydrOXYzine (ATARAX) 10 MG/5ML syrup Take 5 mLs by mouth 4 times daily as needed for Itching 1 Bottle 0    sodium bicarbonate 650 MG tablet Take 1 tablet by mouth 2 times daily 120 tablet 2    linagliptin (TRADJENTA) 5 MG tablet Take 5 mg by mouth daily      betamethasone dipropionate (DIPROLENE) 0.05 % ointment Apply topically daily. 1 Tube 0    insulin glargine (LANTUS) 100 UNIT/ML injection vial Inject 25 Units into the skin nightly (Patient taking differently: Inject 40 Units into the skin nightly ) 1 vial 3    docusate sodium (COLACE, DULCOLAX) 100 MG CAPS Take 100 mg by mouth daily 30 capsule 0    oxyCODONE-acetaminophen (PERCOCET) 5-325 MG per tablet   Take 1 tablet by mouth every 4 hours as needed for Pain (max 5/day)       hydrALAZINE (APRESOLINE) 25 MG tablet Take 25 mg by mouth 3 times daily      traZODone (DESYREL) 50 MG tablet Take 25-50 mg by mouth nightly as needed for Sleep      oxybutynin (DITROPAN-XL) 5 MG CR tablet Take 5 mg by mouth every evening      rOPINIRole (REQUIP) 1 MG tablet Take 1 mg by mouth nightly      dicyclomine (BENTYL) 20 MG tablet Take 10-20 mg by mouth every 6 hours as needed (spasms)      famotidine (PEPCID) 20 MG tablet   Take 20 mg by mouth daily       simvastatin (ZOCOR) 40 MG tablet   Take 20 mg by mouth nightly       ferrous sulfate 325 (65 FE) MG tablet Take 325 mg by mouth 2 times daily.  aspirin 81 MG tablet Take 81 mg by mouth daily.        No current Dressing/Treatment Other (Comment) 10/10/2017 10:30 AM   Wound Cleansed Rinsed/Irrigated with saline 9/19/2017 11:03 AM   Wound Length (cm) 0 cm 10/17/2017 10:01 AM   Wound Width (cm) 0 cm 10/17/2017 10:01 AM   Wound Depth (cm)  0 10/17/2017 10:01 AM   Calculated Wound Size (cm^2) (l*w) 0 cm^2 10/17/2017 10:01 AM   Change in Wound Size % (l*w) 100 10/17/2017 10:01 AM   Wound Assessment Epithelialization 10/17/2017 10:01 AM   Margins Undefined edges 10/10/2017  9:45 AM   Carol-wound Assessment Edema;Fragile;Pink 10/10/2017  9:45 AM   Non-staged Wound Description Full thickness 10/10/2017  9:45 AM   [place]%Wound Bed 95 10/10/2017  9:45 AM   Red%Wound Bed 95 9/29/2017 10:49 AM   Yellow%Wound Bed 5 10/10/2017  9:45 AM   Drainage Amount None 10/10/2017  9:45 AM   Drainage Description Serosanguinous 9/12/2017 11:01 AM   Odor None 10/10/2017  9:45 AM   Op First Treatment Date 09/05/17 9/5/2017 11:07 AM   Number of days: 41       Wound 09/05/17 Leg Right; Lower #9 noted 8-30-17 (Active)   Wound Type Wound 10/10/2017  9:50 AM   Wound Venous 10/10/2017  9:50 AM   Dressing Status Other (Comment) 9/29/2017 10:49 AM   Dressing Changed Changed/New 10/10/2017 10:30 AM   Dressing/Treatment Other (Comment) 10/10/2017 10:30 AM   Wound Cleansed Rinsed/Irrigated with saline 9/19/2017 11:03 AM   Wound Length (cm) 0 cm 10/17/2017 10:01 AM   Wound Width (cm) 0 cm 10/17/2017 10:01 AM   Wound Depth (cm)  0 10/17/2017 10:01 AM   Calculated Wound Size (cm^2) (l*w) 0 cm^2 10/17/2017 10:01 AM   Change in Wound Size % (l*w) 100 10/17/2017 10:01 AM   Wound Assessment Epithelialization 10/17/2017 10:01 AM   Margins Undefined edges 9/29/2017 10:49 AM   Carol-wound Assessment Dry;Edema;Fragile 10/10/2017  9:45 AM   Non-staged Wound Description Full thickness 10/10/2017  9:45 AM   Red%Wound Bed 20 10/10/2017  9:45 AM   Yellow%Wound Bed 5 9/29/2017 10:49 AM   Black%Wound Bed 80 10/10/2017  9:45 AM   Drainage Amount LING 9/29/2017 10:49 AM   Drainage Dry;Edema 10/10/2017  9:45 AM   Non-staged Wound Description Partial thickness 10/10/2017  9:45 AM   [place]%Wound Bed 20 9/29/2017 10:49 AM   Red%Wound Bed 60 9/29/2017 10:49 AM   Yellow%Wound Bed 20 9/29/2017 10:49 AM   Black%Wound Bed 100 10/10/2017  9:45 AM   Drainage Amount None 10/10/2017  9:45 AM   Odor None 10/10/2017  9:45 AM   Culture Taken No 9/29/2017  3:14 PM   Op First Treatment Date 09/29/17 9/29/2017 10:49 AM   Number of days: 17       Percent of Wound/Ulcer Debrided: 0%  Wounds have healed    Plan:     Treatment Note please see attached Discharge Instructions    In my professional opinion this patient would benefit from HBO Therapy: No    Written patient dismissal instructions given to patient and signed by patient or POA. Discharge Instructions         [address]  Physician Orders   [place] [address], [place] [address]  Telephone: [phone]    NAME:  Sofia Edwards  YOB: 1950  MEDICAL RECORD NUMBER:  [id number]  DATE:  10/17/2017    Congratulations!! You have completed your treatment. 1. Return to your Primary Care Physician for all your health issues. 2. Resume your ordinary activities as tolerated. 3. Take your medications as prescribed by your primary care physician. 4. Check your skin daily for cracks, bruises, sores, or dryness. Use a moisturizer as needed. 5. Clean and dry your skin, using mild soap and warm water (not hot). 6. Avoid alcohol and caffeine and do not smoke. 7. Maintain a nutritious diet; take a multiple vitamin/mineral.   8. Avoid pressure on your wound site. Keep your legs elevated above the level of the heart whenever possible. 9. Continue to use wraps/stockings/compression as prescribed. 10. Replace compression stockings every four to six months as needed to ensure proper fit. 11. Wear well-fitting shoes and leg garments.  THANK YOU FOR ALLOWING US TO SERVE YOU.  PLEASE CALL IF YOU DEVELOP ANOTHER WOUND. [phone])    **SCRIPT GIVEN FOR CIRC-AID COMPRESSION **    Electronically signed by Niranjan Jean RN on 10/17/2017 at 10:21 AM                         Electronically signed by Marycarmen Sorto MD on 10/17/2017 at 10:24 AM

## 2017-10-30 ENCOUNTER — HOSPITAL ENCOUNTER (OUTPATIENT)
Dept: WOUND CARE | Age: 67
Discharge: HOME | End: 2017-10-30
Attending: SURGERY | Admitting: SURGERY

## 2017-10-30 VITALS
RESPIRATION RATE: 20 BRPM | BODY MASS INDEX: 36.44 KG/M2 | SYSTOLIC BLOOD PRESSURE: 110 MMHG | HEART RATE: 64 BPM | TEMPERATURE: 98.1 F | WEIGHT: 180.78 LBS | HEIGHT: 59 IN | DIASTOLIC BLOOD PRESSURE: 71 MMHG

## 2017-10-30 DIAGNOSIS — I70.242 ATHEROSCLEROSIS OF NATIVE ARTERY OF BOTH LOWER EXTREMITIES WITH BILATERAL ULCERATION OF CALVES (HCC): Primary | ICD-10-CM

## 2017-10-30 DIAGNOSIS — I70.232 ATHEROSCLEROSIS OF NATIVE ARTERY OF BOTH LOWER EXTREMITIES WITH BILATERAL ULCERATION OF CALVES (HCC): Primary | ICD-10-CM

## 2017-10-30 PROCEDURE — 11042 DBRDMT SUBQ TIS 1ST 20SQCM/<: CPT | Performed by: SURGERY

## 2017-10-30 PROCEDURE — 11045 DBRDMT SUBQ TISS EACH ADDL: CPT | Performed by: SURGERY

## 2017-10-30 PROCEDURE — 99213 OFFICE O/P EST LOW 20 MIN: CPT | Performed by: SURGERY

## 2017-10-30 RX ORDER — LIDOCAINE HYDROCHLORIDE 40 MG/ML
SOLUTION TOPICAL ONCE
Status: DISCONTINUED | OUTPATIENT
Start: 2017-10-30 | End: 2017-10-31 | Stop reason: HOSPADM

## 2017-10-30 ASSESSMENT — PAIN SCALES - GENERAL: PAINLEVEL_OUTOF10: 8

## 2017-10-30 ASSESSMENT — PAIN DESCRIPTION - FREQUENCY: FREQUENCY: CONTINUOUS

## 2017-10-30 ASSESSMENT — PAIN DESCRIPTION - DESCRIPTORS: DESCRIPTORS: ITCHING

## 2017-10-30 ASSESSMENT — PAIN DESCRIPTION - ORIENTATION: ORIENTATION: RIGHT;LEFT

## 2017-10-30 ASSESSMENT — PAIN DESCRIPTION - PAIN TYPE: TYPE: ACUTE PAIN

## 2017-10-30 ASSESSMENT — PAIN DESCRIPTION - LOCATION: LOCATION: LEG

## 2017-10-30 ASSESSMENT — PAIN DESCRIPTION - PROGRESSION: CLINICAL_PROGRESSION: GRADUALLY WORSENING

## 2017-10-30 NOTE — PLAN OF CARE
[employer] Application   Below Knee    NAME:  Esmer Santiago  YOB: 1950  MEDICAL RECORD NUMBER:  [id number]  DATE:  10/30/2017     [] Removed old Jose Crytsal boot if indicated and wash leg with mild soap and water.  [x] Applied moisturizing agent to dry skin as needed.  [x] Appied primary and secondary dressing as ordered     [x] Applied Unna roll from toes to knee overlapping each time.  [x] Applied ace wrap or coban from toes to below the knee.  [x] Secured with tape and/or metal clips covered with tape.  [x] Instructed patient/caregiver to keep dressing dry and intact. DO NOT REMOVE DRESSING.  [x] Instructed pt/family/caregiver to report excessive draining, loose bandage, wet dressing, severe pain or tingling in toes.  [x] Applied [employer] dressing below the knee to Bilateral lower leg(s)        Unna Boot(s) were applied per  Guidelines.      Electronically signed by Zara Coreas RN on 10/30/2017 at 3:29 PM

## 2017-10-30 NOTE — PLAN OF CARE
Problem: Wound:  Goal: Will show signs of wound healing; wound closure and no evidence of infection  Will show signs of wound healing; wound closure and no evidence of infection  Outcome: Ongoing      Problem: Venous:  Goal: Signs of wound healing will improve  Signs of wound healing will improve  Outcome: Ongoing      Problem: Falls - Risk of:  Goal: Will remain free from falls  Will remain free from falls  Outcome: Ongoing      Problem: Blood Glucose:  Goal: Ability to maintain appropriate glucose levels will improve  Ability to maintain appropriate glucose levels will improve  Outcome: Ongoing

## 2017-10-31 NOTE — PROGRESS NOTES
Elicia Irwin [facility]   Progress Note and Procedure Note      Sahil Kelsey RECORD NUMBER:  [id number]  AGE: 75 y.o. GENDER: female  : 1950  EPISODE DATE:  10/30/2017    Subjective:     Chief Complaint   Patient presents with    Wound Check     Retuning patient- wounds to bilateral legs. States legs were itchy, then wounds opened as a result of scratching         HISTORY of PRESENT ILLNESS HPI     Esmer Santiago is a 75 y.o. female who presents today for wound/ulcer evaluation.    History of Wound Context: bilateral calf ulcers  Wound/Ulcer Pain Timing/Severity: mild  Quality of pain: aching  Severity:  3 / 10   Modifying Factors: Pain is relieved/improved with rest  Associated Signs/Symptoms: none    Ulcer Identification:  Ulcer Type: venous and arterial  Contributing Factors: venous stasis    Wound: N/A        PAST MEDICAL HISTORY        Diagnosis Date    Acid reflux     Arthritis     Balance problem     Blood circulation, collateral     Cellulitis of both lower extremities     CHF (congestive heart failure) (HCC)     Chronic kidney disease     Chronic renal failure    Chronic venous hypertension (idiopathic) with ulcer and inflammation of bilateral lower extremity (HCC) 2017    Depression     Diabetes mellitus (HCC)     GERD (gastroesophageal reflux disease)     Hyperlipidemia     Hypertension     Movement disorder     Murmur     Neuromuscular disorder (HCC)     Restless leg syndrome     Urinary frequency     Urinary incontinence        PAST SURGICAL HISTORY    Past Surgical History:   Procedure Laterality Date    APPENDECTOMY      BLADDER SUSPENSION      CHOLECYSTECTOMY      COLONOSCOPY      CYSTOSCOPY      EYE SURGERY      HYSTERECTOMY         FAMILY HISTORY    Family History   Problem Relation Age of Onset    Heart Disease Mother     Heart Disease Father     Diabetes Father     Kidney Disease Father        SOCIAL HISTORY    Social History Substance Use Topics    Smoking status: Former Smoker     Packs/day: 0.50     Years: 10.00     Types: Cigarettes     Quit date: 3/21/1990    Smokeless tobacco: Never Used    Alcohol use No       ALLERGIES    Allergies   Allergen Reactions    Benadryl [Diphenhydramine] Itching       MEDICATIONS    Current Outpatient Prescriptions on File Prior to Encounter   Medication Sig Dispense Refill    hydrOXYzine (ATARAX) 10 MG/5ML syrup Take 5 mLs by mouth 4 times daily as needed for Itching 1 Bottle 0    sodium bicarbonate 650 MG tablet Take 1 tablet by mouth 2 times daily 120 tablet 2    linagliptin (TRADJENTA) 5 MG tablet Take 5 mg by mouth daily      betamethasone dipropionate (DIPROLENE) 0.05 % ointment Apply topically daily. 1 Tube 0    insulin glargine (LANTUS) 100 UNIT/ML injection vial Inject 25 Units into the skin nightly (Patient taking differently: Inject 40 Units into the skin nightly ) 1 vial 3    oxyCODONE-acetaminophen (PERCOCET) 5-325 MG per tablet   Take 1 tablet by mouth every 4 hours as needed for Pain (max 5/day)       hydrALAZINE (APRESOLINE) 25 MG tablet Take 25 mg by mouth 3 times daily      traZODone (DESYREL) 50 MG tablet Take 25-50 mg by mouth nightly as needed for Sleep      oxybutynin (DITROPAN-XL) 5 MG CR tablet Take 5 mg by mouth every evening      rOPINIRole (REQUIP) 1 MG tablet Take 1 mg by mouth nightly Take 2 tablets nightly      dicyclomine (BENTYL) 20 MG tablet Take 10-20 mg by mouth every 6 hours as needed (spasms)      famotidine (PEPCID) 20 MG tablet   Take 20 mg by mouth daily       simvastatin (ZOCOR) 40 MG tablet   Take 20 mg by mouth nightly       ferrous sulfate 325 (65 FE) MG tablet Take 325 mg by mouth 2 times daily.  aspirin 81 MG tablet Take 81 mg by mouth daily.  docusate sodium (COLACE, DULCOLAX) 100 MG CAPS Take 100 mg by mouth daily 30 capsule 0     No current facility-administered medications on file prior to encounter.         REVIEW OF SYSTEMS    A comprehensive review of systems was negative. Objective:      /71   Pulse 64   Temp 98.1 °F (36.7 °C) (Oral)   Resp 20   Ht 4' 11\" (1.499 m)   Wt 180 lb 12.4 oz (82 kg)   BMI 36.51 kg/m²     Wt Readings from Last 3 Encounters:   10/30/17 180 lb 12.4 oz (82 kg)   09/29/17 182 lb 5.1 oz (82.7 kg)   09/05/17 182 lb 1.6 oz (82.6 kg)       PHYSICAL EXAM    General Appearance: alert and oriented to person, place and time, well developed and well- nourished, in no acute distress  Skin: warm and dry, no rash or erythema  Head: normocephalic and atraumatic  Eyes: pupils equal, round, and reactive to light, extraocular eye movements intact, conjunctivae normal  ENT: , external ear normal bilaterally, nose without deformity  Neck: supple and non-tender without mass, no thyromegaly or thyroid nodules, no cervical lymphadenopathy  Pulmonary/Chest:  normal air movement, no respiratory distress  Cardiovascular: normal rate, regular rhythm,  distal pulses intact  Abdomen: soft, non-tender, non-distended  Extremities: no cyanosis, 2+ leg edema  Musculoskeletal: normal range of motion, no joint swelling, deformity or tenderness  Neurologic: reflexes normal and symmetric, no cranial nerve deficit, gait, coordination and speech normal      Assessment:      Patient Active Problem List   Diagnosis Code    Diastolic CHF ([facility]) F58.04    Cellulitis L03.90    Chest pain R07.9    CKD (chronic kidney disease) stage 4, GFR 15-29 ml/min ([facility]) N18.4    DM (diabetes mellitus) ([facility]) E11.9    HTN (hypertension) I10    Cellulitis of both lower extremities L03.115, L03. 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 ([facility]) I70.232, I70.242    Chronic venous hypertension (idiopathic) with ulcer and inflammation of bilateral lower extremity ([facility]) I87.333    Ulcer of left lower leg ([facility]) L97.929    Ulcer of right lower leg ([facility]) L97.919    Leg swelling M79.89    Pruritic condition L29.9    Excoriation of left lower leg S80.812A    Excoriation of right lower leg S80.811A    Venous stasis dermatitis of both lower extremities I87.2    Lower leg edema R60.0        Procedure Note  Indications:  Based on my examination of this patient's wound(s)/ulcer(s) today, debridement is required to promote healing and evaluate the wound base. Performed by: Jeison Turcios MD    Consent obtained:  Yes    Time out taken:  Yes    Pain Control: Anesthetic  Anesthetic: 4% Topical Xylocaine (7.5ml)       Debridement:Excisional Debridement    Using curette the wound(s)/ulcer(s) was/were sharply debrided down through and including the removal of epidermis, dermis and subcutaneous tissue. Devitalized Tissue Debrided:  fibrin, biofilm and slough    Pre Debridement Measurements:  Are located in the [place]  Documentation Flow Sheet    Wound/Ulcer #: 12 and 13    Post Debridement Measurements:  Wound/Ulcer Descriptions are Pre Debridement except measurements:    Wound 10/30/17 Leg Left; Lower #12 ( states since 10/23/17) (Active)   Wound Image    10/30/2017  2:37 PM   Wound Type Wound 10/30/2017  2:37 PM   Wound Venous 10/30/2017  2:37 PM   Dressing Status Other (Comment) 10/30/2017  2:37 PM   Dressing Changed Changed/New 10/30/2017  3:27 PM   Dressing/Treatment Other (Comment) 10/30/2017  3:27 PM   Wound Length (cm) 13.5 cm 10/30/2017  2:37 PM   Wound Width (cm) 14 cm 10/30/2017  2:37 PM   Wound Depth (cm)  0.2 10/30/2017  2:37 PM   Calculated Wound Size (cm^2) (l*w) 189 cm^2 10/30/2017  2:37 PM   Wound Assessment Black;Granulation tissue 10/30/2017  2:37 PM   Margins Undefined edges 10/30/2017  2:37 PM   Carol-wound Assessment Pink 10/30/2017  2:37 PM   Non-staged Wound Description Full thickness 10/30/2017  2:37 PM   Red%Wound Bed 95 10/30/2017  2:37 PM   Black%Wound Bed 5 10/30/2017  2:37 PM   Odor None 10/30/2017  2:37 PM RECORD NUMBER:  [id number]  DATE:  10/30/2017    Wound Cleansing:   Do not scrub or use excessive force. Cleanse wound prior to applying a clean dressing with:  [x] Normal Saline [x] Keep Wound Dry in Shower    [] Wound Cleanser   [] Cleanse wound with Mild Soap & Water  [] May Shower at Discharge   [] Other:       Topical Treatments:  Do not apply lotions, creams, or ointments to wound bed unless directed. [x] Apply moisturizing lotion to skin surrounding the wound prior to dressing change.  [] Apply antifungal ointment to skin surrounding the wound prior to dressing change.  [] Apply thin film of moisture barrier ointment to skin immediately around wound. [] Other:      Dressings:           Wound Location Left and right lower legs    [x] Apply Primary Dressing:          [x] Silver with Foam           [x] Cover and Secure with:     [x] Gauze [x] Carol [] Kerlix   [] Ace Wrap [] Cover Roll Tape [] ABD     [] Other:    Avoid contact of tape with skin. [x] Change dressing: [] Daily    [] Every Other Day [] Three times per week   [] Once a week [x] Do Not Change Dressing   [] Other:      Edema Control:  Apply: [] Compression Stocking []Right Leg []Left Leg   [] Tubigrip []Right Leg Double Layer []Left Leg Double Layer       []Right Leg Single Layer []Left Leg Single Layer   [] SpandaGrip []Right Leg  []Left Leg      []Low compression 5-10 mm/Hg      []Medium compression 10-20 mm/Hg     []High compression  20-30 mm/Hg   every morning immediately when getting up should be applied to affected leg(s) from mid foot to knee making sure to cover the heel. Remove every night before going to bed. [] Elevate leg(s) above the level of the heart when sitting. [] Avoid prolonged standing in one place. Compression: CoFlex Calamine Lite  Apply: [x] Multilayer Compression Wrap Applied in Clinic [x]RightLeg [x]Left Leg   [x] Multi-layer compression. Do not get leg(s) with wrap wet.   If wraps become too tight call the

## 2017-11-06 ENCOUNTER — HOSPITAL ENCOUNTER (OUTPATIENT)
Dept: WOUND CARE | Age: 67
Discharge: HOME | End: 2017-11-06
Attending: SURGERY | Admitting: SURGERY

## 2017-11-06 VITALS
DIASTOLIC BLOOD PRESSURE: 73 MMHG | RESPIRATION RATE: 20 BRPM | TEMPERATURE: 98.4 F | BODY MASS INDEX: 35 KG/M2 | WEIGHT: 173.28 LBS | SYSTOLIC BLOOD PRESSURE: 133 MMHG

## 2017-11-06 DIAGNOSIS — L97.919 CHRONIC VENOUS HYPERTENSION (IDIOPATHIC) WITH ULCER AND INFLAMMATION OF BILATERAL LOWER EXTREMITY (HCC): Primary | ICD-10-CM

## 2017-11-06 DIAGNOSIS — L97.929 CHRONIC VENOUS HYPERTENSION (IDIOPATHIC) WITH ULCER AND INFLAMMATION OF BILATERAL LOWER EXTREMITY (HCC): Primary | ICD-10-CM

## 2017-11-06 DIAGNOSIS — I87.333 CHRONIC VENOUS HYPERTENSION (IDIOPATHIC) WITH ULCER AND INFLAMMATION OF BILATERAL LOWER EXTREMITY (HCC): Primary | ICD-10-CM

## 2017-11-06 PROCEDURE — 11045 DBRDMT SUBQ TISS EACH ADDL: CPT | Performed by: SURGERY

## 2017-11-06 PROCEDURE — 11042 DBRDMT SUBQ TIS 1ST 20SQCM/<: CPT | Performed by: SURGERY

## 2017-11-06 RX ORDER — LIDOCAINE HYDROCHLORIDE 40 MG/ML
SOLUTION TOPICAL ONCE
Status: DISCONTINUED | OUTPATIENT
Start: 2017-11-06 | End: 2017-11-07 | Stop reason: HOSPADM

## 2017-11-06 ASSESSMENT — PAIN DESCRIPTION - ORIENTATION: ORIENTATION: RIGHT;ANTERIOR

## 2017-11-06 ASSESSMENT — PAIN SCALES - GENERAL: PAINLEVEL_OUTOF10: 8

## 2017-11-06 ASSESSMENT — PAIN DESCRIPTION - ONSET: ONSET: ON-GOING

## 2017-11-06 ASSESSMENT — PAIN DESCRIPTION - FREQUENCY: FREQUENCY: CONTINUOUS

## 2017-11-06 ASSESSMENT — PAIN DESCRIPTION - PAIN TYPE: TYPE: ACUTE PAIN

## 2017-11-06 ASSESSMENT — PAIN DESCRIPTION - LOCATION: LOCATION: LEG

## 2017-11-06 NOTE — PROGRESS NOTES
Elicia Irwin [facility]   Progress Note and Procedure Note      Sahil Kelsey RECORD NUMBER:  [id number]  AGE: 75 y.o. GENDER: female  : 1950  EPISODE DATE:  2017    Subjective:     Chief Complaint   Patient presents with    Wound Check     Follow up visit for right lower leg wound         HISTORY of PRESENT ILLNESS CANDY Betts is a 75 y.o. female who presents today for wound/ulcer evaluation.    History of Wound Context: calf ulcers  Wound/Ulcer Pain Timing/Severity: mild  Quality of pain: dull  Severity:  3 / 10   Modifying Factors: Pain is relieved/improved with rest  Associated Signs/Symptoms: edema    Ulcer Identification:  Ulcer Type: venous  Contributing Factors: venous stasis    Wound: N/A        PAST MEDICAL HISTORY        Diagnosis Date    Acid reflux     Arthritis     Balance problem     Blood circulation, collateral     Cellulitis of both lower extremities     CHF (congestive heart failure) (HCC)     Chronic kidney disease     Chronic renal failure    Chronic venous hypertension (idiopathic) with ulcer and inflammation of bilateral lower extremity ([facility]) 2017    Depression     Diabetes mellitus (HCC)     GERD (gastroesophageal reflux disease)     Hyperlipidemia     Hypertension     Movement disorder     Murmur     Neuromuscular disorder (HCC)     Restless leg syndrome     Urinary frequency     Urinary incontinence        PAST SURGICAL HISTORY    Past Surgical History:   Procedure Laterality Date    APPENDECTOMY      BLADDER SUSPENSION      CHOLECYSTECTOMY      COLONOSCOPY      CYSTOSCOPY      EYE SURGERY      HYSTERECTOMY         FAMILY HISTORY    Family History   Problem Relation Age of Onset    Heart Disease Mother     Heart Disease Father     Diabetes Father     Kidney Disease Father        SOCIAL HISTORY    Social History   Substance Use Topics    Smoking status: Former Smoker     Packs/day: 0.50     Years: 10.00     Types: MD    Consent obtained:  Yes    Time out taken:  Yes    Pain Control: Anesthetic  Anesthetic: 4% Topical Xylocaine (5 ml)       Debridement:Excisional Debridement    Using curette the wound(s)/ulcer(s) was/were sharply debrided down through and including the removal of epidermis, dermis and subcutaneous tissue. Devitalized Tissue Debrided:  fibrin, biofilm and slough    Pre Debridement Measurements:  Are located in the [place]  Documentation Flow Sheet    Wound/Ulcer #: 12 and 13    Post Debridement Measurements:  Wound/Ulcer Descriptions are Pre Debridement except measurements:    Wound 10/30/17 Leg Left; Lower #12 ( states since 10/23/17) (Active)   Wound Image    10/30/2017  2:37 PM   Wound Type Wound 11/6/2017  2:18 PM   Wound Venous 11/6/2017  2:18 PM   Dressing Status Other (Comment) 10/30/2017  2:37 PM   Dressing Changed Changed/New 10/30/2017  3:27 PM   Dressing/Treatment Other (Comment) 10/30/2017  3:27 PM   Wound Length (cm) 10 cm 11/6/2017  2:18 PM   Wound Width (cm) 14 cm 11/6/2017  2:18 PM   Wound Depth (cm)  0.1 11/6/2017  2:18 PM   Calculated Wound Size (cm^2) (l*w) 140 cm^2 11/6/2017  2:18 PM   Change in Wound Size % (l*w) 25.93 11/6/2017  2:18 PM   Wound Assessment Black;Granulation tissue 11/6/2017  2:18 PM   Margins Undefined edges 11/6/2017  2:18 PM   Carol-wound Assessment Dry;Pink 11/6/2017  2:18 PM   Non-staged Wound Description Full thickness 11/6/2017  2:18 PM   Red%Wound Bed 95 11/6/2017  2:18 PM   Black%Wound Bed 5 11/6/2017  2:18 PM   Odor None 11/6/2017  2:18 PM   Op First Treatment Date 10/30/17 10/30/2017  2:37 PM   Number of days: 7       Wound 10/30/17 Leg Right; Lower;Medial #13 ( since 10/23/17 ) (Active)   Wound Image   10/30/2017  2:37 PM   Wound Type Wound 11/6/2017  2:18 PM   Wound Venous 11/6/2017  2:18 PM   Dressing Status Other (Comment) 10/30/2017  2:37 PM   Dressing Changed Changed/New 10/30/2017  3:27 PM   Dressing/Treatment Other (Comment) 10/30/2017  3:27 PM [] Wound Cleanser   [] Cleanse wound with Mild Soap & Water  [] May Shower at Discharge   [] Other:        Topical Treatments:  Do not apply lotions, creams, or ointments to wound bed unless directed. [x] Apply Calamine to skin surrounding the wound prior to dressing change.  [] Apply antifungal ointment to skin surrounding the wound prior to dressing change.  [] Apply thin film of moisture barrier ointment to skin immediately around wound. [] Other:                           Dressings:                            Wound Location Left and right lower legs                       [x] Apply Primary Dressing:                                                                                                              [x] Silver with Foam                                               [x] Cover and Secure with:                                           [x] Gauze           [x] Carol             [] Kerlix                        [] Ace Wrap                 [] Cover Roll Tape                   [] ABD                                                            [] Other:                         Avoid contact of tape with skin.   [x] Change dressing:                 [] Daily                         [] Every Other Day                  [] Three times per week                        [] Once a week            [x] Do Not Change Dressing                   [] Other:        Edema Control:  Apply:            [] Compression Stocking                     []Right Leg                   []Left Leg                        [] Tubigrip                    []Right Leg Double Layer                    []Left Leg Double Layer                                                                                          []Right Leg Single Layer                     []Left Leg Single Layer                        [] SpandaGrip              []Right Leg                   []Left Leg                                                                    []Low compression 5-10 mm/Hg                                                                                         []Medium compression 10-20 mm/Hg                                                                    []High compression  20-30 mm/Hg                        every morning immediately when getting up should be applied to affected leg(s) from mid foot to knee making sure to cover the heel. Remove every night before going to bed. [] Elevate leg(s) above the level of the heart when sitting. [] Avoid prolonged standing in one place.                                                   Compression: CoFlex Calamine Lite  Apply:            [x] Multilayer Compression Wrap Applied in Clinic                  [x]RightLeg                    [x]Left Leg                        [x] Multi-layer compression. Do not get leg(s) with wrap wet. If wraps become too tight call the center or completely remove the wrap. [x] Elevate leg(s) above the level of the heart when sitting.                                         [x] Avoid prolonged standing in one place.     Off-Loading:   [] Off-loading when                  [] walking                     [] in bed           [] sitting  [] Total non-weight bearing  [] Right Leg  [] Left Leg                    [] Assistive Device                   [] Walker                      [] Cane             [] Wheelchair                [] Crutches                        [] Surgical shoe    [] Podus Boot(s)   [] Foam Boot(s)  [] Roll About                        [] Cast Boot                 [] CROW Boot  [] Other:                             Dietary:  [x] Diet as tolerated:                  [] Calorie Diabetic Diet:           [] No Added Salt:  [] Increase Protein:                  [] Other:             Activity:  [x] Activity as tolerated:  [x] Patient has no activity restrictions     [] Strict Bedrest:   [] Remain off Work:     [] May return to full duty work:   [] Return to work with restrictions:                    Return Appointment:  [] Wound and dressing supply provider:   [] ECF or Home Healthcare:  [] Nurse visit:               [] Physician or NP scheduled for Nurse Visit:   [x] Return Appointment: With Dr Lamonte Atkins  in  [facility])  [] Ordered tests:      Nurse Case Manger:  Jojo Gama  Electronically signed by Antonio Hoffmann RN on 11/6/2017 at 2:34 PM  [address] [place] [address] Information: Should you experience any significant changes in your wound(s) or have questions about your wound care, please contact the [address] [place] [address] at [address] 8:30 am - 4:30 pm and Friday 8:30 am - 1:00 pm.  If you need help with your wound outside these hours and cannot wait until we are again available, contact your PCP or go to the hospital emergency room.      Nurse Signature:_______________________     Date: ___________ Time:  ____________        Discharge Nurse Signature        PLEASE NOTE: IF YOU ARE UNABLE TO OBTAIN WOUND SUPPLIES, CONTINUE TO USE THE SUPPLIES YOU HAVE AVAILABLE UNTIL YOU ARE ABLE TO [facility]. IT IS MOST IMPORTANT TO KEEP THE WOUND COVERED AT ALL TIMES. Physician Signature:_______________________     Date: ___________ Time:  ____________                              [] Dr Norman Burrows    [] Dr Walt Parsons   [] Ralston Burkitt CNP                           [x] Dr Onelia Prakash  [] Dr Sammy Meyer   [] Dr Alonso Quintana                     [] Dr Debi Garcia                          [] Dr Lisa Mooney   [] Nikita Ronquillo NP    [] Dr. Darci Dunlap        The information contained in the After Visit Summary has been reviewed with me, the patient and/or responsible adult, by my health care provider(s). I had the opportunity to ask questions regarding this information.   I have elected to receive;        Patient Signature:_______________________     Date: ___________ Time:  ____________     [] Patient unable to sign Discharge Instructions given to ECF/Transportation/POA        [x]  After Visit Summary  []  Comprehensive Discharge Instruction        Electronically signed by Carlos Ugarte MD on 11/6/2017 at 2:42 PM

## 2017-11-13 ENCOUNTER — HOSPITAL ENCOUNTER (OUTPATIENT)
Dept: WOUND CARE | Age: 67
Discharge: HOME | End: 2017-11-13
Attending: SURGERY | Admitting: SURGERY

## 2017-11-13 DIAGNOSIS — I70.242 ATHEROSCLEROSIS OF NATIVE ARTERY OF BOTH LOWER EXTREMITIES WITH BILATERAL ULCERATION OF CALVES (HCC): Primary | ICD-10-CM

## 2017-11-13 DIAGNOSIS — I70.232 ATHEROSCLEROSIS OF NATIVE ARTERY OF BOTH LOWER EXTREMITIES WITH BILATERAL ULCERATION OF CALVES (HCC): Primary | ICD-10-CM

## 2017-11-13 PROCEDURE — 11042 DBRDMT SUBQ TIS 1ST 20SQCM/<: CPT | Performed by: SURGERY

## 2017-11-13 RX ORDER — LIDOCAINE HYDROCHLORIDE 40 MG/ML
SOLUTION TOPICAL ONCE
Status: DISCONTINUED | OUTPATIENT
Start: 2017-11-13 | End: 2017-11-14 | Stop reason: HOSPADM

## 2017-11-14 NOTE — PROGRESS NOTES
Elicia Irwin 36   Progress Note and Procedure Note      Sahil Kelsey RECORD NUMBER:  [id number]  AGE: 75 y.o. GENDER: female  : 1950  EPISODE DATE:  2017    Subjective:     Chief Complaint   Patient presents with    Wound Check     Bilateral lower legs         HISTORY of PRESENT ILLNESS CANDY Keller is a 75 y.o. female who presents today for wound/ulcer evaluation.    History of Wound Context: calf ulcers  Wound/Ulcer Pain Timing/Severity: mild  Quality of pain: dull  Severity:  2 / 10   Modifying Factors: Pain is relieved/improved with rest  Associated Signs/Symptoms: none    Ulcer Identification:  Ulcer Type: venous  Contributing Factors: venous stasis    Wound: N/A        PAST MEDICAL HISTORY        Diagnosis Date    Acid reflux     Arthritis     Balance problem     Blood circulation, collateral     Cellulitis of both lower extremities     CHF (congestive heart failure) (HCC)     Chronic kidney disease     Chronic renal failure    Chronic venous hypertension (idiopathic) with ulcer and inflammation of bilateral lower extremity ([facility]) 2017    Depression     Diabetes mellitus (HCC)     GERD (gastroesophageal reflux disease)     Hyperlipidemia     Hypertension     Movement disorder     Murmur     Neuromuscular disorder (HCC)     Restless leg syndrome     Urinary frequency     Urinary incontinence        PAST SURGICAL HISTORY    Past Surgical History:   Procedure Laterality Date    APPENDECTOMY      BLADDER SUSPENSION      CHOLECYSTECTOMY      COLONOSCOPY      CYSTOSCOPY      EYE SURGERY      HYSTERECTOMY         FAMILY HISTORY    Family History   Problem Relation Age of Onset    Heart Disease Mother     Heart Disease Father     Diabetes Father     Kidney Disease Father        SOCIAL HISTORY    Social History   Substance Use Topics    Smoking status: Former Smoker     Packs/day: 0.50     Years: 10.00     Types: Cigarettes     Quit [] Other:        Topical Treatments:  Do not apply lotions, creams, or ointments to wound bed unless directed. [x] Apply Calamine to skin surrounding the wound prior to dressing change.  [] Apply antifungal ointment to skin surrounding the wound prior to dressing change.  [] Apply thin film of moisture barrier ointment to skin immediately around wound. [] Other:      Dressings:                            Wound Location Left lower leg                       [x] Apply Primary Dressing:                                                                                         [x] Silver with Foam                          [x] Cover and Secure with:                                           [x] Gauze           [x] Carol             [] Kerlix                        [] Ace Wrap                 [] Cover Roll Tape                   [] ABD                                                            [] Other:                         Avoid contact of tape with skin.   [x] Change dressing:                 [] Daily                         [] Every Other Day                  [] Three times per week                        [] Once a week            [x] Do Not Change Dressing                   [] Other:        Edema Control:  Apply:            [] Compression Stocking                     []Right Leg                   []Left Leg                        [] Tubigrip                    []Right Leg Double Layer                    []Left Leg Double Layer                                                                                          []Right Leg Single Layer                     []Left Leg Single Layer                        [] SpandaGrip              []Right Leg                   []Left Leg                                                                    []Low compression 5-10 mm/Hg                                                                                         []Medium compression 10-20 mm/Hg                                                                    []High compression  20-30 mm/Hg                        every morning immediately when getting up should be applied to affected leg(s) from mid foot to knee making sure to cover the heel.  Remove every night before going to bed.                        [] Elevate leg(s) above the level of the heart when sitting.                                        [] Avoid prolonged standing in one place.        Compression: CoFlex Calamine Lite  Apply:            [x] Multilayer Compression Wrap Applied in Clinic                  [x]RightLeg                    [x]Left Leg                        [x] Multi-layer compression.  Do not get leg(s) with wrap wet.  If wraps become too tight call the center or completely remove the wrap.                                     [x] Elevate leg(s) above the level of the heart when sitting.                                        [x] Avoid prolonged standing in one place.     Off-Loading:   [] Off-loading when                  [] walking                     [] in bed           [] sitting  [] Total non-weight bearing  [] Right Leg  [] Left Leg                    [] Assistive Device                   [] Walker                      [] Cane             [] Wheelchair                [] Crutches                        [] Surgical shoe    [] Podus Boot(s)   [] Foam Boot(s)  [] Roll About                        [] Cast Boot                 [] CROW Boot  [] Other:        Dietary:  [x] Diet as tolerated:                  [] Calorie Diabetic Diet:           [] No Added Salt:  [] Increase Protein:                  [] Other:             Activity:  [x] Activity as tolerated:  [x] Patient has no activity restrictions     [] Strict Bedrest:   [] Remain off Work:     [] May return to full duty work:   [] Return to work with [employer]     Return Appointment:  [] Wound and dressing supply provider:   [] ECF or Home Healthcare:  [] Nurse visit:               [] Physician or NP scheduled for Nurse Visit:   [x] Return Appointment: With Dr Daphne Huggins [facility])  [] Ordered tests:      Nurse Case Manger: Tanmartir Harris  Electronically signed by Shila Pate RN on 11/13/2017 at 2:39 PM   [id number] [facility]  N Information: Should you experience any significant changes in your wound(s) or have questions about your wound care, please contact the [address] [place] [address] [phone] [facility] - THURSDAY 8:30 am - 4:30 pm and Friday 8:30 am - 1:00 pm.  If you need help with your wound outside these hours and cannot wait until we are again available, contact your PCP or go to the hospital emergency room.      Nurse Signature:_______________________     Date: ___________ Time:  ____________        Discharge Nurse Signature        PLEASE NOTE: IF YOU ARE UNABLE TO OBTAIN WOUND SUPPLIES, CONTINUE TO USE THE SUPPLIES YOU HAVE AVAILABLE UNTIL YOU ARE ABLE TO [facility].  IT IS MOST IMPORTANT TO KEEP THE WOUND COVERED AT ALL TIMES.     Physician Signature:_______________________     Date: ___________ Time:  ____________                              [] Dr Yazmin Benz    [] Dr Albin Rodriguez   [] Pam Best CNP                           [x] Dr Manolo Dexter  [] Dr Faviola Mccarty   [] Dr Lianet Cueva [id number] [facility]                          [] Dr Mahnaz Mcgill NP    [] Dr. Vinny Ribera        The information contained in the After Visit Summary has been reviewed with me, the patient and/or responsible adult, by my health care provider(s).  I had the opportunity to ask questions regarding this information.  I have elected to receive;        Patient Signature:_______________________     Date: ___________ Time:  ____________     [] Patient unable to sign Discharge Instructions given to ECF/Transportation/POA        [x]  After Visit Summary  []  Comprehensive Discharge Instruction        Electronically signed by Manolo Gonzalez Ena Casanova MD on 11/14/2017 at 6:43 PM

## 2017-11-15 NOTE — PLAN OF CARE
[employer] Application   Below Knee    NAME:  Parviz Murray  YOB: 1950  MEDICAL RECORD NUMBER:  [id number]  DATE:  11/13/2017     [x] Removed old Laura Curia boot if indicated and wash leg with mild soap and water.  [x] Applied moisturizing agent to dry skin as needed.  [x] Appied primary and secondary dressing as ordered     [x] Applied Unna roll from toes to knee overlapping each time.  [x] Applied ace wrap or coban from toes to below the knee.  [x] Secured with tape and/or metal clips covered with tape.  [x] Instructed patient/caregiver to keep dressing dry and intact. DO NOT REMOVE DRESSING.  [x] Instructed pt/family/caregiver to report excessive draining, loose bandage, wet dressing, severe pain or tingling in toes.  [x] Applied [employer] dressing below the knee to Bilateral lower leg(s)        Unna Boot(s) were applied per  Guidelines.      Electronically signed by Link Walker RN on 11/15/2017 at 12:46 PM

## 2017-11-20 ENCOUNTER — HOSPITAL ENCOUNTER (OUTPATIENT)
Dept: WOUND CARE | Age: 67
Discharge: HOME | End: 2017-11-20
Attending: SURGERY | Admitting: SURGERY

## 2017-11-20 VITALS
TEMPERATURE: 98 F | SYSTOLIC BLOOD PRESSURE: 128 MMHG | DIASTOLIC BLOOD PRESSURE: 78 MMHG | HEART RATE: 86 BPM | RESPIRATION RATE: 18 BRPM

## 2017-11-20 DIAGNOSIS — L97.911 SKIN ULCER OF RIGHT LOWER LEG, LIMITED TO BREAKDOWN OF SKIN (HCC): Primary | ICD-10-CM

## 2017-11-20 DIAGNOSIS — L97.921 SKIN ULCER OF LEFT LOWER LEG, LIMITED TO BREAKDOWN OF SKIN (HCC): ICD-10-CM

## 2017-11-20 PROCEDURE — 11042 DBRDMT SUBQ TIS 1ST 20SQCM/<: CPT | Performed by: SURGERY

## 2017-11-20 RX ORDER — LIDOCAINE HYDROCHLORIDE 40 MG/ML
SOLUTION TOPICAL ONCE
Status: DISCONTINUED | OUTPATIENT
Start: 2017-11-20 | End: 2017-11-21 | Stop reason: HOSPADM

## 2017-11-20 ASSESSMENT — PAIN DESCRIPTION - PROGRESSION: CLINICAL_PROGRESSION: NOT CHANGED

## 2017-11-20 ASSESSMENT — PAIN SCALES - GENERAL: PAINLEVEL_OUTOF10: 10

## 2017-11-20 ASSESSMENT — PAIN DESCRIPTION - DESCRIPTORS: DESCRIPTORS: ACHING;DISCOMFORT

## 2017-11-20 ASSESSMENT — PAIN DESCRIPTION - PAIN TYPE: TYPE: ACUTE PAIN

## 2017-11-20 ASSESSMENT — PAIN DESCRIPTION - ORIENTATION: ORIENTATION: LEFT;RIGHT

## 2017-11-20 ASSESSMENT — PAIN DESCRIPTION - ONSET: ONSET: ON-GOING

## 2017-11-20 ASSESSMENT — PAIN DESCRIPTION - LOCATION: LOCATION: LEG

## 2017-11-20 ASSESSMENT — PAIN DESCRIPTION - FREQUENCY: FREQUENCY: CONTINUOUS

## 2017-11-22 PROBLEM — L97.911 SKIN ULCER OF RIGHT LOWER LEG, LIMITED TO BREAKDOWN OF SKIN (HCC): Status: ACTIVE | Noted: 2017-07-14

## 2017-11-22 NOTE — PROGRESS NOTES
Cigarettes     Quit date: 3/21/1990    Smokeless tobacco: Never Used    Alcohol use No       ALLERGIES    Allergies   Allergen Reactions    Benadryl [Diphenhydramine] Itching       MEDICATIONS    Current Outpatient Prescriptions on File Prior to Encounter   Medication Sig Dispense Refill    sodium bicarbonate 650 MG tablet Take 1 tablet by mouth 2 times daily 120 tablet 2    linagliptin (TRADJENTA) 5 MG tablet Take 5 mg by mouth daily      betamethasone dipropionate (DIPROLENE) 0.05 % ointment Apply topically daily. 1 Tube 0    insulin glargine (LANTUS) 100 UNIT/ML injection vial Inject 25 Units into the skin nightly (Patient taking differently: Inject 40 Units into the skin nightly ) 1 vial 3    docusate sodium (COLACE, DULCOLAX) 100 MG CAPS Take 100 mg by mouth daily 30 capsule 0    oxyCODONE-acetaminophen (PERCOCET) 5-325 MG per tablet   Take 1 tablet by mouth every 4 hours as needed for Pain (max 5/day)       hydrALAZINE (APRESOLINE) 25 MG tablet Take 25 mg by mouth 3 times daily      traZODone (DESYREL) 50 MG tablet Take 25-50 mg by mouth nightly as needed for Sleep      oxybutynin (DITROPAN-XL) 5 MG CR tablet Take 5 mg by mouth every evening      rOPINIRole (REQUIP) 1 MG tablet Take 1 mg by mouth nightly Take 2 tablets nightly      famotidine (PEPCID) 20 MG tablet   Take 20 mg by mouth daily       simvastatin (ZOCOR) 40 MG tablet   Take 20 mg by mouth nightly       ferrous sulfate 325 (65 FE) MG tablet Take 325 mg by mouth 2 times daily.  aspirin 81 MG tablet Take 81 mg by mouth daily.  hydrOXYzine (ATARAX) 10 MG/5ML syrup Take 5 mLs by mouth 4 times daily as needed for Itching 1 Bottle 0    dicyclomine (BENTYL) 20 MG tablet Take 10-20 mg by mouth every 6 hours as needed (spasms)       No current facility-administered medications on file prior to encounter. REVIEW OF SYSTEMS    A comprehensive review of systems was negative.     Objective:      /78   Pulse 86   Temp 98 °F (36.7 °C) (Oral)   Resp 18     Wt Readings from Last 3 Encounters:   11/06/17 173 lb 4.5 oz (78.6 kg)   10/30/17 180 lb 12.4 oz (82 kg)   09/29/17 182 lb 5.1 oz (82.7 kg)       PHYSICAL EXAM    General Appearance: alert and oriented to person, place and time, well developed and well- nourished, in no acute distress  Head: normocephalic and atraumatic  Eyes: pupils equal, round, and reactive to light, extraocular eye movements intact, conjunctivae normal  Neck: supple and non-tender without mass, no thyromegaly or thyroid nodules, no cervical lymphadenopathy  Musculoskeletal: normal range of motion, no joint swelling, deformity or tenderness  Neurologic: reflexes normal and symmetric, no cranial nerve deficit, gait, coordination and speech normal      Assessment:      Patient Active Problem List   Diagnosis Code    Diastolic CHF ([facility] R87.92    Cellulitis L03.90    Chest pain R07.9    CKD (chronic kidney disease) stage 4, GFR 15-29 ml/min ([facility]) N18.4    DM (diabetes mellitus) ([facility] E11.9    HTN (hypertension) I10    Cellulitis of both lower extremities L03.115, L03. 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 ([facility]) I70.232, I70.242    Chronic venous hypertension (idiopathic) with ulcer and inflammation of bilateral lower extremity ([facility]) I87.333    Ulcer of left lower leg ([facility]) L97.929    Ulcer of right lower leg ([facility]) L97.919    Leg swelling M79.89    Pruritic condition L29.9    Excoriation of left lower leg S80.812A    Excoriation of right lower leg S80.811A    Venous stasis dermatitis of both lower extremities I87.2    Lower leg edema R60.0        Procedure Note  Indications:  Based on my examination of this patient's wound(s)/ulcer(s) today, debridement is required to promote healing and evaluate the wound base. Performed by:  Shade Cuellar MD    Consent obtained:  Yes    Time out taken:  Yes    Pain Control: Anesthetic  Anesthetic: 4% Lidocaine Liquid Topical (2.5 ml)       Debridement:Excisional Debridement    Using curette the wound(s)/ulcer(s) was/were sharply debrided down through and including the removal of epidermis, dermis and subcutaneous tissue. Devitalized Tissue Debrided:  fibrin, biofilm and slough    Pre Debridement Measurements:  Are located in the [facility]  Documentation Flow Sheet    Wound/Ulcer #: 12 and 13    Post Debridement Measurements:  Wound/Ulcer Descriptions are Pre Debridement except measurements:    Wound 10/30/17 Leg Left; Lower #12 ( states since 10/23/17) (Active)   Wound Image    10/30/2017  2:37 PM   Wound Type Wound 11/20/2017  2:41 PM   Wound Venous 11/20/2017  2:41 PM   Dressing Status Old drainage 11/20/2017  1:57 PM   Dressing Changed Changed/New 10/30/2017  3:27 PM   Dressing/Treatment Other (Comment) 11/20/2017  3:03 PM   Wound Length (cm) 1.9 cm 11/20/2017  2:41 PM   Wound Width (cm) 1.5 cm 11/20/2017  2:41 PM   Wound Depth (cm)  0.1 11/20/2017  2:41 PM   Calculated Wound Size (cm^2) (l*w) 2.85 cm^2 11/20/2017  2:41 PM   Change in Wound Size % (l*w) 98.49 11/20/2017  2:41 PM   Wound Assessment Granulation tissue;Slough 11/20/2017  1:57 PM   Drainage Amount Scant 11/20/2017  1:57 PM   Drainage Description Yellow;Serous 11/20/2017  1:57 PM   Odor None 11/20/2017  1:57 PM   Margins Attached edges 11/20/2017  1:57 PM   Carol-wound Assessment Dry;Pink 11/20/2017  1:57 PM   Non-staged Wound Description Full thickness 11/20/2017  1:57 PM   [place]%Wound Bed 95 11/20/2017  1:57 PM   Red%Wound Bed 90 11/13/2017  2:16 PM   Yellow%Wound Bed 5 11/20/2017  1:57 PM   Black%Wound Bed 5 11/6/2017  2:18 PM   Op First Treatment Date 10/30/17 10/30/2017  2:37 PM   Number of days: 22       Wound 10/30/17 Leg Right; Lower;Medial #13 ( since 10/23/17 ) (Active)   Wound Image   10/30/2017  2:37 PM   Wound Type Wound 11/6/2017  2:18 PM   Wound Venous 11/6/2017  2:18 PM   Dressing Status Other (Comment) 10/30/2017  2:37 PM   Dressing Changed Changed/New 10/30/2017  3:27 PM   Dressing/Treatment Other (Comment) 11/6/2017  3:11 PM   Wound Length (cm) 0 cm 11/13/2017  2:16 PM   Wound Width (cm) 0 cm 11/13/2017  2:16 PM   Wound Depth (cm)  0 11/13/2017  2:16 PM   Calculated Wound Size (cm^2) (l*w) 0 cm^2 11/13/2017  2:16 PM   Change in Wound Size % (l*w) 100 11/13/2017  2:16 PM   Wound Assessment Epithelialization 11/13/2017  2:16 PM   Drainage Amount Small 11/6/2017  2:18 PM   Drainage Description Serosanguinous 11/6/2017  2:18 PM   Odor None 11/6/2017  2:18 PM   Margins Undefined edges 11/6/2017  2:18 PM   Carol-wound Assessment Pink 11/6/2017  2:18 PM   Non-staged Wound Description Full thickness 11/6/2017  2:18 PM   Red%Wound Bed 80 11/6/2017  2:18 PM   Yellow%Wound Bed 10 11/6/2017  2:18 PM   Black%Wound Bed 10 11/6/2017  2:18 PM   Op First Treatment Date 10/30/17 10/30/2017  2:37 PM   Number of days: 22       Percent of Wound(s)/Ulcer(s) Debrided: 100%    Total Surface Area Debrided:  2.85 sq cm       Diabetic/Pressure/Non Pressure Ulcers only:  Ulcer: Non-Pressure ulcer, fat layer exposed      Estimated Blood Loss:  Minimal    Hemostasis Achieved:  not needed    Procedural Pain:  3  / 10     Post Procedural Pain:  0 / 10     Response to treatment:  Well tolerated by patient. Plan:     Treatment Note please see attached Discharge Instructions    Written patient dismissal instructions given to patient and signed by patient or POA. Discharge Instructions       Wound Clinic Physician Orders and Discharge Instructions  [address]   [address] [place] [address], [place] [address]  Telephone: [phone]      FAX [phone]     NAME: Saulo Smith  DATE OF BIRTH:  1950  MEDICAL RECORD NUMBER:  [id number]  DATE:  11/20/2017     Wound Cleansing:   Do not scrub or use excessive force.   Cleanse wound prior to applying a clean dressing with:  [x] Normal Saline                      [x] Keep Wound Dry in Shower    [] Wound Cleanser   [] Cleanse wound with Mild Soap & Water  [] May Shower at Discharge   [] Other:        Topical Treatments:  Do not apply lotions, creams, or ointments to wound bed unless directed. [x] Apply Calamine to skin surrounding the wound prior to dressing change.  [] Apply antifungal ointment to skin surrounding the wound prior to dressing change.  [] Apply thin film of moisture barrier ointment to skin immediately around wound. [] Other:      Dressings:                            Wound Location Left lower leg                       [x] Apply Primary Dressing:                                                                                         [x] Silver with Foam                          [x] Cover and Secure with:                                           [x] Gauze           [x] Carol             [] Kerlix                        [] Ace Wrap                 [] Cover Roll Tape                   [] ABD                                                            [] Other:                         Avoid contact of tape with skin.   [x] Change dressing:                 [] Daily                         [] Every Other Day                  [] Three times per week                        [] Once a week            [x] Do Not Change Dressing                   [] Other:        Edema Control:  Apply:            [] Compression Stocking                     []Right Leg                   []Left Leg                        [] Tubigrip                    []Right Leg Double Layer                    []Left Leg Double Layer                                                                                          []Right Leg Single Layer                     []Left Leg Single Layer                        [] SpandaGrip              []Right Leg                   []Left Leg                                                                    []Low ECF/Transportation/POA        [x]  After Visit Summary  []  Comprehensive Discharge Instruction        Electronically signed by Jessica Saucedo MD on 11/22/2017 at 12:28 PM

## 2017-11-27 ENCOUNTER — HOSPITAL ENCOUNTER (OUTPATIENT)
Dept: WOUND CARE | Age: 67
Discharge: HOME | End: 2017-11-27
Attending: SURGERY | Admitting: SURGERY

## 2017-11-27 VITALS
SYSTOLIC BLOOD PRESSURE: 100 MMHG | HEART RATE: 80 BPM | DIASTOLIC BLOOD PRESSURE: 59 MMHG | TEMPERATURE: 97.8 F | RESPIRATION RATE: 18 BRPM

## 2017-11-27 DIAGNOSIS — L97.929 CHRONIC VENOUS HYPERTENSION (IDIOPATHIC) WITH ULCER AND INFLAMMATION OF BILATERAL LOWER EXTREMITY (HCC): Primary | ICD-10-CM

## 2017-11-27 DIAGNOSIS — I87.333 CHRONIC VENOUS HYPERTENSION (IDIOPATHIC) WITH ULCER AND INFLAMMATION OF BILATERAL LOWER EXTREMITY (HCC): Primary | ICD-10-CM

## 2017-11-27 DIAGNOSIS — L97.919 CHRONIC VENOUS HYPERTENSION (IDIOPATHIC) WITH ULCER AND INFLAMMATION OF BILATERAL LOWER EXTREMITY (HCC): Primary | ICD-10-CM

## 2017-11-27 PROCEDURE — 99213 OFFICE O/P EST LOW 20 MIN: CPT | Performed by: SURGERY

## 2017-11-27 ASSESSMENT — PAIN SCALES - GENERAL: PAINLEVEL_OUTOF10: 0

## 2017-11-27 NOTE — PROGRESS NOTES
(36.6 °C) (Oral)   Resp 18     Wt Readings from Last 3 Encounters:   11/06/17 173 lb 4.5 oz (78.6 kg)   10/30/17 180 lb 12.4 oz (82 kg)   09/29/17 182 lb 5.1 oz (82.7 kg)       PHYSICAL EXAM    General Appearance: alert and oriented to person, place and time, well developed and well- nourished, in no acute distress  Head: normocephalic and atraumatic  Eyes: pupils equal, round, and reactive to light, extraocular eye movements intact, conjunctivae normal  Neck: supple and non-tender without mass, no thyromegaly or thyroid nodules, no cervical lymphadenopathy  Extremities: no cyanosis, clubbing or edema  Musculoskeletal: normal range of motion, no joint swelling, deformity or tenderness  Neurologic: reflexes normal and symmetric, no cranial nerve deficit, gait, coordination and speech normal      Assessment:      Patient Active Problem List   Diagnosis Code    Diastolic CHF ([facility] X18.47    Cellulitis L03.90    Chest pain R07.9    CKD (chronic kidney disease) stage 4, GFR 15-29 ml/min ([facility]) N18.4    DM (diabetes mellitus) ([facility] E11.9    HTN (hypertension) I10    Cellulitis of both lower extremities L03.115, L03. 80    Morbid obesity due to excess calories ([facility]) E66.01    Atherosclerosis of native artery of both lower extremities with bilateral ulceration of calves ([facility]) I70.232, I70.242    Chronic venous hypertension (idiopathic) with ulcer and inflammation of bilateral lower extremity ([facility]) I87.333    Skin ulcer of right lower leg, limited to breakdown of skin ([facility] L97.911    Ulcer of right lower leg ([facility]) L97.919    Leg swelling M79.89    Pruritic condition L29.9    Excoriation of left lower leg S80.812A    Excoriation of right lower leg S80.811A    Venous stasis dermatitis of both lower extremities I87.2    Lower leg edema R60.0          Wound 10/30/17 Leg Left; Lower #12 ( states since 10/23/17) (Active)   Wound Image    10/30/2017  2:37 PM   Wound Type Wound 11/20/2017  2:41 PM   Wound Venous 11/20/2017  2:41 PM   Dressing Status Dry; Intact 11/27/2017  2:38 PM   Dressing Changed Changed/New 10/30/2017  3:27 PM   Dressing/Treatment Other (Comment) 11/20/2017  3:03 PM   Wound Cleansed Rinsed/Irrigated with saline 11/27/2017  2:38 PM   Wound Length (cm) 0 cm 11/27/2017  2:38 PM   Wound Width (cm) 0 cm 11/27/2017  2:38 PM   Wound Depth (cm)  0 11/27/2017  2:38 PM   Calculated Wound Size (cm^2) (l*w) 0 cm^2 11/27/2017  2:38 PM   Change in Wound Size % (l*w) 100 11/27/2017  2:38 PM   Wound Assessment Epithelialization 11/27/2017  2:38 PM   Drainage Amount None 11/27/2017  2:38 PM   Drainage Description Yellow;Serous 11/20/2017  1:57 PM   Odor None 11/20/2017  1:57 PM   Margins Attached edges 11/20/2017  1:57 PM   Carol-wound Assessment Dry 11/27/2017  2:38 PM   Non-staged Wound Description Full thickness 11/20/2017  1:57 PM   Troutman%Wound Bed 95 11/20/2017  1:57 PM   Red%Wound Bed 90 11/13/2017  2:16 PM   Yellow%Wound Bed 5 11/20/2017  1:57 PM   Black%Wound Bed 5 11/6/2017  2:18 PM   Op First Treatment Date 10/30/17 10/30/2017  2:37 PM   Number of days: 28       Wound 10/30/17 Leg Right; Lower;Medial #13 ( since 10/23/17 ) (Active)   Wound Image   10/30/2017  2:37 PM   Wound Type Wound 11/6/2017  2:18 PM   Wound Venous 11/6/2017  2:18 PM   Dressing Status Other (Comment) 10/30/2017  2:37 PM   Dressing Changed Changed/New 10/30/2017  3:27 PM   Dressing/Treatment Other (Comment) 11/6/2017  3:11 PM   Wound Length (cm) 0 cm 11/13/2017  2:16 PM   Wound Width (cm) 0 cm 11/13/2017  2:16 PM   Wound Depth (cm)  0 11/13/2017  2:16 PM   Calculated Wound Size (cm^2) (l*w) 0 cm^2 11/13/2017  2:16 PM   Change in Wound Size % (l*w) 100 11/13/2017  2:16 PM   Wound Assessment Epithelialization 11/13/2017  2:16 PM   Drainage Amount Small 11/6/2017  2:18 PM   Drainage Description Serosanguinous 11/6/2017  2:18 PM   Odor None 11/6/2017  2:18 PM   Margins Undefined edges 11/6/2017  2:18 PM   Carol-wound Assessment Pink 11/6/2017  2:18 PM   Non-staged Wound Description Full thickness 11/6/2017  2:18 PM   Red%Wound Bed 80 11/6/2017  2:18 PM   Yellow%Wound Bed 10 11/6/2017  2:18 PM   Black%Wound Bed 10 11/6/2017  2:18 PM   Op First Treatment Date 10/30/17 10/30/2017  2:37 PM   Number of days: 28         Plan:     Rx given for new support stockings providing 15-20 mm Hg. Patient to elevate leg(s) with the ankle at or above the level of the heart as needed to relieve leg pain and swelling. Patient to participate in exercise as tolerated with focus on the leg(s) including, daily walking, repetitive toe pointing, and calve muscle pumping/stretching as tolerated. Treatment Note please see attached Discharge Instructions    Written patient dismissal instructions given to patient and signed by patient or POA. Discharge [facility] Discharge Instructions  [facility]  [address] [place] [address] [place] [address], [place] [address]                                   Telephone: [phone]  NAME:  Carlo Keller  YOB: 1950  MEDICAL RECORD NUMBER:  [id number]  DATE:  11/27/2017    Congratulations! You have completed your treatment. 1. Return to your Primary Care Physician for all your health issues. 2. Resume your ordinary activities as tolerated. 3. Take your medications as prescribed by your primary care physician. 4. Check your skin daily for cracks, bruises, sores, or dryness. Use a moisturizer as needed. 5. Clean and dry your skin, using mild soap and warm water (not hot). 6. Avoid alcohol and caffeine and do not smoke. 7. Maintain a nutritious diet. [x] Avoid pressure on your wound site. Keep your legs elevated above the level of the heart whenever possible. [x] Continue to use wraps/stockings/compression as prescribed. [x] Replace compression stockings every four to six months as needed to ensure proper fit.  Script for stockings   [x] Wear well-fitting shoes and leg garments. THANK YOU FOR ALLOWING US TO SERVE YOU.   PLEASE CALL IF YOU DEVELOP ANOTHER WOUND. [phone])     Electronically signed by Isaac Drake RN on 11/27/2017 at 2:51 PM         Electronically signed by Raegan Granados MD on 11/27/2017 at 2:58 PM

## 2017-12-11 ENCOUNTER — HOSPITAL ENCOUNTER (OUTPATIENT)
Dept: WOUND CARE | Age: 67
Discharge: HOME | End: 2017-12-11
Attending: SURGERY | Admitting: SURGERY

## 2017-12-11 VITALS
HEART RATE: 99 BPM | TEMPERATURE: 98 F | DIASTOLIC BLOOD PRESSURE: 80 MMHG | SYSTOLIC BLOOD PRESSURE: 147 MMHG | RESPIRATION RATE: 20 BRPM

## 2017-12-11 DIAGNOSIS — L97.919 CHRONIC VENOUS HYPERTENSION (IDIOPATHIC) WITH ULCER AND INFLAMMATION OF BILATERAL LOWER EXTREMITY (HCC): Primary | ICD-10-CM

## 2017-12-11 DIAGNOSIS — I87.333 CHRONIC VENOUS HYPERTENSION (IDIOPATHIC) WITH ULCER AND INFLAMMATION OF BILATERAL LOWER EXTREMITY (HCC): Primary | ICD-10-CM

## 2017-12-11 DIAGNOSIS — L97.929 CHRONIC VENOUS HYPERTENSION (IDIOPATHIC) WITH ULCER AND INFLAMMATION OF BILATERAL LOWER EXTREMITY (HCC): Primary | ICD-10-CM

## 2017-12-11 PROCEDURE — 99213 OFFICE O/P EST LOW 20 MIN: CPT | Performed by: SURGERY

## 2017-12-11 ASSESSMENT — PAIN SCALES - GENERAL: PAINLEVEL_OUTOF10: 8

## 2017-12-11 ASSESSMENT — PAIN DESCRIPTION - PROGRESSION: CLINICAL_PROGRESSION: NOT CHANGED

## 2017-12-11 ASSESSMENT — PAIN DESCRIPTION - FREQUENCY: FREQUENCY: CONTINUOUS

## 2017-12-11 ASSESSMENT — PAIN DESCRIPTION - DESCRIPTORS: DESCRIPTORS: ACHING;DISCOMFORT;ITCHING

## 2017-12-11 ASSESSMENT — PAIN DESCRIPTION - ONSET: ONSET: ON-GOING

## 2017-12-11 ASSESSMENT — PAIN DESCRIPTION - LOCATION: LOCATION: LEG

## 2017-12-11 ASSESSMENT — PAIN DESCRIPTION - PAIN TYPE: TYPE: ACUTE PAIN

## 2017-12-11 ASSESSMENT — PAIN DESCRIPTION - ORIENTATION: ORIENTATION: LEFT;RIGHT

## 2017-12-11 NOTE — PROGRESS NOTES
extremities I87.2    Lower leg edema R60.0          Wound 10/30/17 Leg Right; Lower;Medial #13 ( since 10/23/17 ) (Active)   Wound Image   10/30/2017  2:37 PM   Wound Type Wound 11/6/2017  2:18 PM   Wound Venous 11/6/2017  2:18 PM   Dressing Status Other (Comment) 10/30/2017  2:37 PM   Dressing Changed Changed/New 10/30/2017  3:27 PM   Dressing/Treatment Other (Comment) 11/6/2017  3:11 PM   Wound Length (cm) 0 cm 11/13/2017  2:16 PM   Wound Width (cm) 0 cm 11/13/2017  2:16 PM   Wound Depth (cm)  0 11/13/2017  2:16 PM   Calculated Wound Size (cm^2) (l*w) 0 cm^2 11/13/2017  2:16 PM   Change in Wound Size % (l*w) 100 11/13/2017  2:16 PM   Wound Assessment Epithelialization 11/13/2017  2:16 PM   Drainage Amount Small 11/6/2017  2:18 PM   Drainage Description Serosanguinous 11/6/2017  2:18 PM   Odor None 11/6/2017  2:18 PM   Margins Undefined edges 11/6/2017  2:18 PM   Carol-wound Assessment Pink 11/6/2017  2:18 PM   Non-staged Wound Description Full thickness 11/6/2017  2:18 PM   Red%Wound Bed 80 11/6/2017  2:18 PM   Yellow%Wound Bed 10 11/6/2017  2:18 PM   Black%Wound Bed 10 11/6/2017  2:18 PM   Op First Treatment Date 10/30/17 10/30/2017  2:37 PM   Number of days: 42       Plan:     Treatment Note please see attached Discharge Instructions    Written patient dismissal instructions given to patient and signed by patient or POA. Discharge Instructions         [address] [facility] AT [address] [address], [facility] 24  Telephone: [phone]    NAME:  Jorge Woody  YOB: 1950  MEDICAL RECORD NUMBER:  [id number]  DATE:  12/11/2017    Congratulations!! You have completed your treatment. 1. Return to your Primary Care Physician for all your health issues. 2. Resume your ordinary activities as tolerated. 3. Take your medications as prescribed by your primary care physician.    4. Check your skin

## 2018-01-01 ENCOUNTER — HOSPITAL ENCOUNTER (OUTPATIENT)
Dept: WOUND CARE | Age: 68
Discharge: HOME | End: 2018-07-23
Attending: SURGERY | Admitting: SURGERY

## 2018-01-01 ENCOUNTER — HOSPITAL ENCOUNTER (INPATIENT)
Age: 68
LOS: 3 days | Discharge: ANOTHER HOSPITAL | DRG: 689 | End: 2018-11-12
Attending: EMERGENCY MEDICINE | Admitting: INTERNAL MEDICINE
Payer: MEDICARE

## 2018-01-01 ENCOUNTER — HOSPITAL ENCOUNTER (OUTPATIENT)
Dept: WOUND CARE | Age: 68
Discharge: HOME | End: 2018-09-05
Attending: PODIATRIST | Admitting: PODIATRIST

## 2018-01-01 ENCOUNTER — APPOINTMENT (OUTPATIENT)
Dept: GENERAL RADIOLOGY | Age: 68
DRG: 689 | End: 2018-01-01
Payer: MEDICARE

## 2018-01-01 ENCOUNTER — APPOINTMENT (OUTPATIENT)
Dept: GENERAL RADIOLOGY | Age: 68
DRG: 640 | End: 2018-01-01
Payer: MEDICARE

## 2018-01-01 ENCOUNTER — HOSPITAL ENCOUNTER (OUTPATIENT)
Dept: WOUND CARE | Age: 68
Discharge: HOME | End: 2018-10-08
Payer: MEDICARE

## 2018-01-01 ENCOUNTER — APPOINTMENT (OUTPATIENT)
Dept: CT IMAGING | Age: 68
DRG: 689 | End: 2018-01-01
Payer: MEDICARE

## 2018-01-01 ENCOUNTER — APPOINTMENT (OUTPATIENT)
Dept: GENERAL RADIOLOGY | Age: 68
DRG: 100 | End: 2018-01-01
Attending: INTERNAL MEDICINE
Payer: MEDICARE

## 2018-01-01 ENCOUNTER — SCHEDULED TELEPHONE ENCOUNTER (OUTPATIENT)
Dept: PHARMACY | Age: 68
End: 2018-01-01

## 2018-01-01 ENCOUNTER — HOSPITAL ENCOUNTER (OUTPATIENT)
Dept: OTHER | Age: 68
Discharge: HOME | End: 2018-09-01
Attending: INTERNAL MEDICINE | Admitting: INTERNAL MEDICINE

## 2018-01-01 ENCOUNTER — HOSPITAL ENCOUNTER (OUTPATIENT)
Dept: WOUND CARE | Age: 68
Discharge: HOME | End: 2018-08-28
Admitting: NURSE PRACTITIONER

## 2018-01-01 ENCOUNTER — HOSPITAL ENCOUNTER (OUTPATIENT)
Dept: OTHER | Age: 68
Discharge: HOME | End: 2018-08-31
Attending: INTERNAL MEDICINE | Admitting: INTERNAL MEDICINE

## 2018-01-01 ENCOUNTER — HOSPITAL ENCOUNTER (OUTPATIENT)
Dept: WOUND CARE | Age: 68
Discharge: HOME | End: 2018-07-09
Attending: SURGERY | Admitting: SURGERY

## 2018-01-01 ENCOUNTER — HOSPITAL ENCOUNTER (OUTPATIENT)
Dept: WOUND CARE | Age: 68
Discharge: HOME | End: 2018-09-24
Payer: MEDICARE

## 2018-01-01 ENCOUNTER — APPOINTMENT (OUTPATIENT)
Dept: GENERAL RADIOLOGY | Age: 68
End: 2018-01-01
Payer: MEDICARE

## 2018-01-01 ENCOUNTER — HOSPITAL ENCOUNTER (OUTPATIENT)
Dept: WOUND CARE | Age: 68
Discharge: HOME | End: 2018-06-25
Attending: SURGERY | Admitting: SURGERY

## 2018-01-01 ENCOUNTER — HOSPITAL ENCOUNTER (OUTPATIENT)
Dept: WOUND CARE | Age: 68
Discharge: HOME | End: 2018-08-20
Attending: NURSE PRACTITIONER | Admitting: NURSE PRACTITIONER

## 2018-01-01 ENCOUNTER — OFFICE VISIT (OUTPATIENT)
Dept: CARDIOLOGY CLINIC | Age: 68
End: 2018-01-01
Payer: MEDICARE

## 2018-01-01 ENCOUNTER — TELEPHONE (OUTPATIENT)
Dept: PHARMACY | Facility: CLINIC | Age: 68
End: 2018-01-01

## 2018-01-01 ENCOUNTER — APPOINTMENT (OUTPATIENT)
Dept: CT IMAGING | Age: 68
DRG: 100 | End: 2018-01-01
Attending: INTERNAL MEDICINE
Payer: MEDICARE

## 2018-01-01 ENCOUNTER — HOSPITAL ENCOUNTER (INPATIENT)
Age: 68
LOS: 4 days | Discharge: NURSING, REHAB OR LONG-TERM CARE | DRG: 640 | End: 2018-11-28
Attending: EMERGENCY MEDICINE | Admitting: INTERNAL MEDICINE
Payer: MEDICARE

## 2018-01-01 ENCOUNTER — HOSPITAL ENCOUNTER (OUTPATIENT)
Dept: WOUND CARE | Age: 68
Discharge: HOME | End: 2018-11-12

## 2018-01-01 ENCOUNTER — TELEPHONE (OUTPATIENT)
Dept: OTHER | Facility: CLINIC | Age: 68
End: 2018-01-01

## 2018-01-01 ENCOUNTER — APPOINTMENT (OUTPATIENT)
Dept: MRI IMAGING | Age: 68
DRG: 689 | End: 2018-01-01
Payer: MEDICARE

## 2018-01-01 ENCOUNTER — SCHEDULED TELEPHONE ENCOUNTER (OUTPATIENT)
Dept: PHARMACY | Age: 68
End: 2018-01-01
Payer: MEDICARE

## 2018-01-01 ENCOUNTER — TELEPHONE (OUTPATIENT)
Dept: CARDIOLOGY CLINIC | Age: 68
End: 2018-01-01

## 2018-01-01 ENCOUNTER — HOSPITAL ENCOUNTER (OUTPATIENT)
Dept: WOUND CARE | Age: 68
Discharge: HOME | End: 2018-07-17
Attending: SURGERY | Admitting: SURGERY

## 2018-01-01 ENCOUNTER — HOSPITAL ENCOUNTER (OUTPATIENT)
Dept: WOUND CARE | Age: 68
Discharge: HOME | End: 2018-12-31
Payer: MEDICARE

## 2018-01-01 ENCOUNTER — APPOINTMENT (OUTPATIENT)
Dept: CT IMAGING | Age: 68
End: 2018-01-01
Payer: MEDICARE

## 2018-01-01 ENCOUNTER — HOSPITAL ENCOUNTER (OUTPATIENT)
Dept: WOUND CARE | Age: 68
Discharge: HOME | End: 2018-10-01
Payer: MEDICARE

## 2018-01-01 ENCOUNTER — HOSPITAL ENCOUNTER (OUTPATIENT)
Dept: WOUND CARE | Age: 68
Discharge: HOME | End: 2018-08-27
Attending: NURSE PRACTITIONER | Admitting: NURSE PRACTITIONER

## 2018-01-01 ENCOUNTER — OFFICE VISIT (OUTPATIENT)
Dept: CARDIOLOGY CLINIC | Age: 68
End: 2018-01-01

## 2018-01-01 ENCOUNTER — HOSPITAL ENCOUNTER (EMERGENCY)
Age: 68
Discharge: HOME | End: 2018-11-23
Attending: EMERGENCY MEDICINE
Payer: MEDICARE

## 2018-01-01 ENCOUNTER — APPOINTMENT (OUTPATIENT)
Dept: MRI IMAGING | Age: 68
DRG: 100 | End: 2018-01-01
Attending: INTERNAL MEDICINE
Payer: MEDICARE

## 2018-01-01 ENCOUNTER — HOSPITAL ENCOUNTER (OUTPATIENT)
Dept: WOUND CARE | Age: 68
Discharge: HOME | End: 2018-10-15
Payer: MEDICARE

## 2018-01-01 ENCOUNTER — HOSPITAL ENCOUNTER (OUTPATIENT)
Dept: WOUND CARE | Age: 68
Discharge: HOME | End: 2018-07-30
Attending: SURGERY | Admitting: SURGERY

## 2018-01-01 ENCOUNTER — HOSPITAL ENCOUNTER (OUTPATIENT)
Dept: WOUND CARE | Age: 68
Discharge: HOME | End: 2018-08-13
Attending: NURSE PRACTITIONER | Admitting: NURSE PRACTITIONER

## 2018-01-01 ENCOUNTER — HOSPITAL ENCOUNTER (OUTPATIENT)
Dept: WOUND CARE | Age: 68
Discharge: HOME | End: 2018-07-02
Attending: NURSE PRACTITIONER | Admitting: NURSE PRACTITIONER

## 2018-01-01 ENCOUNTER — HOSPITAL ENCOUNTER (OUTPATIENT)
Dept: WOUND CARE | Age: 68
Discharge: HOME | End: 2018-09-10
Attending: NURSE PRACTITIONER | Admitting: NURSE PRACTITIONER

## 2018-01-01 ENCOUNTER — HOSPITAL ENCOUNTER (OUTPATIENT)
Dept: WOUND CARE | Age: 68
Discharge: HOME | DRG: 689 | End: 2018-11-06
Payer: MEDICARE

## 2018-01-01 ENCOUNTER — HOSPITAL ENCOUNTER (OUTPATIENT)
Dept: WOUND CARE | Age: 68
Discharge: HOME | End: 2018-08-06
Attending: SURGERY | Admitting: SURGERY

## 2018-01-01 ENCOUNTER — HOSPITAL ENCOUNTER (OUTPATIENT)
Dept: WOUND CARE | Age: 68
Discharge: HOME | End: 2018-06-19
Attending: SURGERY | Admitting: SURGERY

## 2018-01-01 ENCOUNTER — HOSPITAL ENCOUNTER (OUTPATIENT)
Dept: WOUND CARE | Age: 68
Discharge: HOME | End: 2018-09-17
Attending: NURSE PRACTITIONER | Admitting: NURSE PRACTITIONER

## 2018-01-01 ENCOUNTER — APPOINTMENT (OUTPATIENT)
Dept: CT IMAGING | Age: 68
DRG: 640 | End: 2018-01-01
Payer: MEDICARE

## 2018-01-01 ENCOUNTER — HOSPITAL ENCOUNTER (INPATIENT)
Age: 68
LOS: 9 days | Discharge: NURSING, REHAB OR LONG-TERM CARE | DRG: 100 | End: 2018-11-21
Attending: INTERNAL MEDICINE | Admitting: INTERNAL MEDICINE
Payer: MEDICARE

## 2018-01-01 ENCOUNTER — OFFICE VISIT (OUTPATIENT)
Dept: PHARMACY | Facility: CLINIC | Age: 68
End: 2018-01-01

## 2018-01-01 ENCOUNTER — HOSPITAL ENCOUNTER (OUTPATIENT)
Dept: WOUND CARE | Age: 68
Discharge: HOME | End: 2018-12-10

## 2018-01-01 ENCOUNTER — HOSPITAL ENCOUNTER (OUTPATIENT)
Dept: WOUND CARE | Age: 68
Discharge: HOME | End: 2018-10-22
Payer: MEDICARE

## 2018-01-01 ENCOUNTER — HOSPITAL ENCOUNTER (OUTPATIENT)
Dept: WOUND CARE | Age: 68
Discharge: HOME | End: 2018-07-12
Attending: PODIATRIST | Admitting: PODIATRIST

## 2018-01-01 VITALS
HEART RATE: 96 BPM | DIASTOLIC BLOOD PRESSURE: 75 MMHG | OXYGEN SATURATION: 95 % | RESPIRATION RATE: 19 BRPM | TEMPERATURE: 99 F | SYSTOLIC BLOOD PRESSURE: 157 MMHG

## 2018-01-01 VITALS
RESPIRATION RATE: 18 BRPM | HEART RATE: 87 BPM | TEMPERATURE: 98 F | DIASTOLIC BLOOD PRESSURE: 79 MMHG | SYSTOLIC BLOOD PRESSURE: 161 MMHG

## 2018-01-01 VITALS
DIASTOLIC BLOOD PRESSURE: 58 MMHG | BODY MASS INDEX: 33.08 KG/M2 | TEMPERATURE: 97.9 F | HEIGHT: 59 IN | RESPIRATION RATE: 18 BRPM | SYSTOLIC BLOOD PRESSURE: 109 MMHG | HEART RATE: 74 BPM | OXYGEN SATURATION: 95 % | WEIGHT: 164.1 LBS

## 2018-01-01 VITALS
RESPIRATION RATE: 20 BRPM | HEART RATE: 76 BPM | TEMPERATURE: 98.4 F | SYSTOLIC BLOOD PRESSURE: 147 MMHG | DIASTOLIC BLOOD PRESSURE: 80 MMHG

## 2018-01-01 VITALS
WEIGHT: 177.25 LBS | DIASTOLIC BLOOD PRESSURE: 73 MMHG | HEART RATE: 87 BPM | SYSTOLIC BLOOD PRESSURE: 150 MMHG | BODY MASS INDEX: 37.05 KG/M2 | RESPIRATION RATE: 18 BRPM | TEMPERATURE: 97.8 F

## 2018-01-01 VITALS
RESPIRATION RATE: 18 BRPM | SYSTOLIC BLOOD PRESSURE: 143 MMHG | HEART RATE: 93 BPM | TEMPERATURE: 99.1 F | RESPIRATION RATE: 18 BRPM | DIASTOLIC BLOOD PRESSURE: 73 MMHG | SYSTOLIC BLOOD PRESSURE: 176 MMHG | TEMPERATURE: 98.4 F | DIASTOLIC BLOOD PRESSURE: 71 MMHG | HEART RATE: 89 BPM

## 2018-01-01 VITALS
HEART RATE: 84 BPM | DIASTOLIC BLOOD PRESSURE: 91 MMHG | OXYGEN SATURATION: 99 % | WEIGHT: 176.81 LBS | TEMPERATURE: 98.9 F | BODY MASS INDEX: 34.71 KG/M2 | HEIGHT: 60 IN | RESPIRATION RATE: 16 BRPM | SYSTOLIC BLOOD PRESSURE: 170 MMHG

## 2018-01-01 VITALS
HEART RATE: 84 BPM | DIASTOLIC BLOOD PRESSURE: 73 MMHG | SYSTOLIC BLOOD PRESSURE: 121 MMHG | RESPIRATION RATE: 18 BRPM | TEMPERATURE: 98.5 F

## 2018-01-01 VITALS
HEART RATE: 81 BPM | DIASTOLIC BLOOD PRESSURE: 78 MMHG | TEMPERATURE: 97.7 F | RESPIRATION RATE: 18 BRPM | SYSTOLIC BLOOD PRESSURE: 124 MMHG

## 2018-01-01 VITALS
HEART RATE: 97 BPM | TEMPERATURE: 98.4 F | WEIGHT: 195.77 LBS | HEIGHT: 59 IN | DIASTOLIC BLOOD PRESSURE: 74 MMHG | SYSTOLIC BLOOD PRESSURE: 156 MMHG | BODY MASS INDEX: 39.47 KG/M2 | RESPIRATION RATE: 18 BRPM

## 2018-01-01 VITALS
RESPIRATION RATE: 18 BRPM | TEMPERATURE: 98 F | DIASTOLIC BLOOD PRESSURE: 68 MMHG | HEART RATE: 78 BPM | SYSTOLIC BLOOD PRESSURE: 125 MMHG

## 2018-01-01 VITALS
BODY MASS INDEX: 36.93 KG/M2 | RESPIRATION RATE: 18 BRPM | TEMPERATURE: 98.8 F | WEIGHT: 175.93 LBS | HEART RATE: 77 BPM | DIASTOLIC BLOOD PRESSURE: 75 MMHG | HEIGHT: 58 IN | SYSTOLIC BLOOD PRESSURE: 141 MMHG

## 2018-01-01 VITALS
DIASTOLIC BLOOD PRESSURE: 69 MMHG | RESPIRATION RATE: 18 BRPM | TEMPERATURE: 98.5 F | HEART RATE: 69 BPM | SYSTOLIC BLOOD PRESSURE: 126 MMHG

## 2018-01-01 VITALS
DIASTOLIC BLOOD PRESSURE: 80 MMHG | TEMPERATURE: 98.3 F | RESPIRATION RATE: 20 BRPM | HEART RATE: 81 BPM | SYSTOLIC BLOOD PRESSURE: 164 MMHG

## 2018-01-01 VITALS
TEMPERATURE: 98.7 F | SYSTOLIC BLOOD PRESSURE: 149 MMHG | DIASTOLIC BLOOD PRESSURE: 82 MMHG | HEART RATE: 82 BPM | BODY MASS INDEX: 40.55 KG/M2 | RESPIRATION RATE: 20 BRPM | WEIGHT: 194 LBS

## 2018-01-01 VITALS
SYSTOLIC BLOOD PRESSURE: 184 MMHG | TEMPERATURE: 97.6 F | HEART RATE: 97 BPM | DIASTOLIC BLOOD PRESSURE: 99 MMHG | RESPIRATION RATE: 20 BRPM

## 2018-01-01 VITALS
SYSTOLIC BLOOD PRESSURE: 146 MMHG | DIASTOLIC BLOOD PRESSURE: 83 MMHG | WEIGHT: 192.24 LBS | TEMPERATURE: 99.2 F | HEART RATE: 121 BPM | HEIGHT: 59 IN | RESPIRATION RATE: 20 BRPM | OXYGEN SATURATION: 96 % | BODY MASS INDEX: 38.76 KG/M2

## 2018-01-01 VITALS
HEART RATE: 92 BPM | SYSTOLIC BLOOD PRESSURE: 181 MMHG | TEMPERATURE: 98.2 F | WEIGHT: 198.85 LBS | BODY MASS INDEX: 40.16 KG/M2 | DIASTOLIC BLOOD PRESSURE: 90 MMHG | RESPIRATION RATE: 20 BRPM

## 2018-01-01 VITALS
HEART RATE: 80 BPM | OXYGEN SATURATION: 98 % | SYSTOLIC BLOOD PRESSURE: 129 MMHG | BODY MASS INDEX: 37.54 KG/M2 | DIASTOLIC BLOOD PRESSURE: 70 MMHG | WEIGHT: 179.6 LBS

## 2018-01-01 VITALS
OXYGEN SATURATION: 96 % | DIASTOLIC BLOOD PRESSURE: 74 MMHG | HEART RATE: 74 BPM | SYSTOLIC BLOOD PRESSURE: 140 MMHG | HEIGHT: 58 IN | WEIGHT: 186 LBS | BODY MASS INDEX: 39.04 KG/M2

## 2018-01-01 VITALS
SYSTOLIC BLOOD PRESSURE: 184 MMHG | RESPIRATION RATE: 18 BRPM | DIASTOLIC BLOOD PRESSURE: 70 MMHG | TEMPERATURE: 97.3 F | HEART RATE: 79 BPM

## 2018-01-01 VITALS
BODY MASS INDEX: 40.87 KG/M2 | HEIGHT: 58 IN | HEART RATE: 72 BPM | SYSTOLIC BLOOD PRESSURE: 118 MMHG | OXYGEN SATURATION: 99 % | DIASTOLIC BLOOD PRESSURE: 58 MMHG

## 2018-01-01 VITALS
RESPIRATION RATE: 18 BRPM | SYSTOLIC BLOOD PRESSURE: 143 MMHG | TEMPERATURE: 97.5 F | DIASTOLIC BLOOD PRESSURE: 75 MMHG | HEART RATE: 82 BPM

## 2018-01-01 VITALS
TEMPERATURE: 98.3 F | HEART RATE: 89 BPM | RESPIRATION RATE: 18 BRPM | SYSTOLIC BLOOD PRESSURE: 133 MMHG | DIASTOLIC BLOOD PRESSURE: 85 MMHG

## 2018-01-01 DIAGNOSIS — L97.921 ULCER OF LEFT LOWER LEG, LIMITED TO BREAKDOWN OF SKIN (HCC): ICD-10-CM

## 2018-01-01 DIAGNOSIS — L97.911 SKIN ULCER OF RIGHT LOWER LEG, LIMITED TO BREAKDOWN OF SKIN (HCC): Primary | ICD-10-CM

## 2018-01-01 DIAGNOSIS — L97.921 ULCER OF LEFT LOWER LEG, LIMITED TO BREAKDOWN OF SKIN (HCC): Primary | ICD-10-CM

## 2018-01-01 DIAGNOSIS — L97.929 CHRONIC VENOUS HYPERTENSION (IDIOPATHIC) WITH ULCER AND INFLAMMATION OF BILATERAL LOWER EXTREMITY (HCC): ICD-10-CM

## 2018-01-01 DIAGNOSIS — S80.811D EXCORIATION OF RIGHT LOWER LEG, SUBSEQUENT ENCOUNTER: Primary | ICD-10-CM

## 2018-01-01 DIAGNOSIS — L97.909 VENOUS ULCER (HCC): ICD-10-CM

## 2018-01-01 DIAGNOSIS — L97.919 CHRONIC VENOUS HYPERTENSION (IDIOPATHIC) WITH ULCER AND INFLAMMATION OF BILATERAL LOWER EXTREMITY (HCC): Primary | ICD-10-CM

## 2018-01-01 DIAGNOSIS — I87.333 CHRONIC VENOUS HYPERTENSION (IDIOPATHIC) WITH ULCER AND INFLAMMATION OF BILATERAL LOWER EXTREMITY (HCC): Primary | ICD-10-CM

## 2018-01-01 DIAGNOSIS — L97.929 CHRONIC VENOUS HYPERTENSION (IDIOPATHIC) WITH ULCER AND INFLAMMATION OF BILATERAL LOWER EXTREMITY (HCC): Primary | ICD-10-CM

## 2018-01-01 DIAGNOSIS — L97.911 SKIN ULCER OF RIGHT LOWER LEG, LIMITED TO BREAKDOWN OF SKIN (HCC): ICD-10-CM

## 2018-01-01 DIAGNOSIS — I50.32 CHRONIC DIASTOLIC CONGESTIVE HEART FAILURE (HCC): Primary | ICD-10-CM

## 2018-01-01 DIAGNOSIS — R60.0 LOWER LEG EDEMA: ICD-10-CM

## 2018-01-01 DIAGNOSIS — N18.30 CKD (CHRONIC KIDNEY DISEASE), STAGE III (HCC): ICD-10-CM

## 2018-01-01 DIAGNOSIS — E86.0 DEHYDRATION: ICD-10-CM

## 2018-01-01 DIAGNOSIS — I83.009 VENOUS ULCER (HCC): ICD-10-CM

## 2018-01-01 DIAGNOSIS — I50.32 CHRONIC DIASTOLIC HF (HEART FAILURE) (HCC): Primary | ICD-10-CM

## 2018-01-01 DIAGNOSIS — R06.00 DYSPNEA, UNSPECIFIED TYPE: Primary | ICD-10-CM

## 2018-01-01 DIAGNOSIS — S80.821A: ICD-10-CM

## 2018-01-01 DIAGNOSIS — I87.333 CHRONIC VENOUS HYPERTENSION (IDIOPATHIC) WITH ULCER AND INFLAMMATION OF BILATERAL LOWER EXTREMITY (HCC): ICD-10-CM

## 2018-01-01 DIAGNOSIS — E78.5 DYSLIPIDEMIA: ICD-10-CM

## 2018-01-01 DIAGNOSIS — I10 ESSENTIAL HYPERTENSION: ICD-10-CM

## 2018-01-01 DIAGNOSIS — S80.811A EXCORIATION OF RIGHT LOWER LEG, INITIAL ENCOUNTER: ICD-10-CM

## 2018-01-01 DIAGNOSIS — L03.90 CELLULITIS, UNSPECIFIED CELLULITIS SITE: ICD-10-CM

## 2018-01-01 DIAGNOSIS — D50.9 IRON DEFICIENCY ANEMIA, UNSPECIFIED IRON DEFICIENCY ANEMIA TYPE: ICD-10-CM

## 2018-01-01 DIAGNOSIS — R77.8 ELEVATED TROPONIN: ICD-10-CM

## 2018-01-01 DIAGNOSIS — G40.901 STATUS EPILEPTICUS (HCC): Primary | ICD-10-CM

## 2018-01-01 DIAGNOSIS — L97.919 CHRONIC VENOUS HYPERTENSION (IDIOPATHIC) WITH ULCER AND INFLAMMATION OF BILATERAL LOWER EXTREMITY (HCC): ICD-10-CM

## 2018-01-01 DIAGNOSIS — N10 ACUTE PYELONEPHRITIS: ICD-10-CM

## 2018-01-01 DIAGNOSIS — R41.82 ALTERED MENTAL STATUS, UNSPECIFIED ALTERED MENTAL STATUS TYPE: Primary | ICD-10-CM

## 2018-01-01 DIAGNOSIS — N10 ACUTE PYELONEPHRITIS: Primary | ICD-10-CM

## 2018-01-01 DIAGNOSIS — R41.82 ALTERED MENTAL STATUS, UNSPECIFIED ALTERED MENTAL STATUS TYPE: ICD-10-CM

## 2018-01-01 LAB
A/G RATIO: 0.7 (ref 1.1–2.2)
A/G RATIO: 0.8 (ref 1.1–2.2)
ACETAMINOPHEN LEVEL: <5 UG/ML (ref 10–30)
ALBUMIN CSF: 28.8 MG/DL (ref 10–30)
ALBUMIN SERPL-MCNC: 2.5 G/DL (ref 3.4–5)
ALBUMIN SERPL-MCNC: 2.7 G/DL (ref 3.4–5)
ALBUMIN SERPL-MCNC: 2.8 G/DL (ref 3.4–5)
ALBUMIN SERPL-MCNC: 2.8 G/DL (ref 3.4–5)
ALBUMIN SERPL-MCNC: 2.9 G/DL (ref 3.4–5)
ALBUMIN SERPL-MCNC: 2.9 G/DL (ref 3.4–5)
ALBUMIN SERPL-MCNC: 2903 MG/DL (ref 3400–5000)
ALBUMIN SERPL-MCNC: 3 G/DL (ref 3.4–5)
ALBUMIN SERPL-MCNC: 3.3 G/DL (ref 3.4–5)
ALBUMIN SERPL-MCNC: 3.4 G/DL (ref 3.4–5)
ALBUMIN SERPL-MCNC: 3.5 G/DL (ref 3.4–5)
ALBUMIN SERPL-MCNC: 3.8 G/DL (ref 3.4–5)
ALBUMIN SERPL-MCNC: 4.1 G/DL (ref 3.4–5)
ALP BLD-CCNC: 115 U/L (ref 40–129)
ALP BLD-CCNC: 71 U/L (ref 40–129)
ALP BLD-CCNC: 83 U/L (ref 40–129)
ALP BLD-CCNC: 93 U/L (ref 40–129)
ALT SERPL-CCNC: 11 U/L (ref 10–40)
ALT SERPL-CCNC: 13 U/L (ref 10–40)
ALT SERPL-CCNC: 16 U/L (ref 10–40)
ALT SERPL-CCNC: 9 U/L (ref 10–40)
AMMONIA: 20 UMOL/L (ref 11–51)
AMMONIA: 27 UMOL/L (ref 11–51)
AMMONIA: 47 UMOL/L (ref 11–51)
AMPHETAMINE SCREEN, URINE: NORMAL
ANION GAP SERPL CALCULATED.3IONS-SCNC: 10 MMOL/L (ref 3–16)
ANION GAP SERPL CALCULATED.3IONS-SCNC: 11 MMOL/L (ref 3–16)
ANION GAP SERPL CALCULATED.3IONS-SCNC: 12 MMOL/L (ref 3–16)
ANION GAP SERPL CALCULATED.3IONS-SCNC: 13 MMOL/L (ref 3–16)
ANION GAP SERPL CALCULATED.3IONS-SCNC: 14 MMOL/L (ref 3–16)
ANION GAP SERPL CALCULATED.3IONS-SCNC: 15 MMOL/L (ref 3–16)
ANION GAP SERPL CALCULATED.3IONS-SCNC: 17 MMOL/L (ref 3–16)
ANION GAP SERPL CALCULATED.3IONS-SCNC: 17 MMOL/L (ref 3–16)
ANION GAP SERPL CALCULATED.3IONS-SCNC: 18 MMOL/L (ref 3–16)
ANION GAP SERPL CALCULATED.3IONS-SCNC: 20 MMOL/L (ref 3–16)
APPEARANCE CSF: CLEAR
AST SERPL-CCNC: 19 U/L (ref 15–37)
AST SERPL-CCNC: 21 U/L (ref 15–37)
AST SERPL-CCNC: 23 U/L (ref 15–37)
AST SERPL-CCNC: 43 U/L (ref 15–37)
BACTERIA: ABNORMAL /HPF
BARBITURATE SCREEN URINE: NORMAL
BASE EXCESS ARTERIAL: -6.2 MMOL/L (ref -3–3)
BASE EXCESS ARTERIAL: -7.3 MMOL/L (ref -3–3)
BASE EXCESS ARTERIAL: -8 MMOL/L (ref -3–3)
BASE EXCESS ARTERIAL: 0.9 MMOL/L (ref -3–3)
BASE EXCESS ARTERIAL: 1.3 MMOL/L (ref -3–3)
BASE EXCESS ARTERIAL: 1.5 MMOL/L (ref -3–3)
BASE EXCESS VENOUS: -0.7 MMOL/L
BASOPHILS ABSOLUTE: 0 K/UL (ref 0–0.2)
BASOPHILS ABSOLUTE: 0.1 K/UL (ref 0–0.2)
BASOPHILS RELATIVE PERCENT: 0.3 %
BASOPHILS RELATIVE PERCENT: 0.5 %
BASOPHILS RELATIVE PERCENT: 0.6 %
BASOPHILS RELATIVE PERCENT: 0.7 %
BASOPHILS RELATIVE PERCENT: 0.7 %
BASOPHILS RELATIVE PERCENT: 0.8 %
BASOPHILS RELATIVE PERCENT: 0.9 %
BASOPHILS RELATIVE PERCENT: 1 %
BASOPHILS RELATIVE PERCENT: 1 %
BASOPHILS RELATIVE PERCENT: 1.2 %
BENZODIAZEPINE SCREEN, URINE: NORMAL
BILIRUB SERPL-MCNC: 0.3 MG/DL (ref 0–1)
BILIRUB SERPL-MCNC: 0.3 MG/DL (ref 0–1)
BILIRUB SERPL-MCNC: 0.5 MG/DL (ref 0–1)
BILIRUB SERPL-MCNC: 0.6 MG/DL (ref 0–1)
BILIRUBIN DIRECT: <0.2 MG/DL (ref 0–0.3)
BILIRUBIN DIRECT: <0.2 MG/DL (ref 0–0.3)
BILIRUBIN URINE: NEGATIVE
BILIRUBIN, INDIRECT: ABNORMAL MG/DL (ref 0–1)
BILIRUBIN, INDIRECT: NORMAL MG/DL (ref 0–1)
BLOOD, URINE: ABNORMAL
BLOOD, URINE: NEGATIVE
BUN BLDV-MCNC: 10 MG/DL (ref 7–20)
BUN BLDV-MCNC: 13 MG/DL (ref 7–20)
BUN BLDV-MCNC: 18 MG/DL (ref 7–20)
BUN BLDV-MCNC: 19 MG/DL (ref 7–20)
BUN BLDV-MCNC: 23 MG/DL (ref 7–20)
BUN BLDV-MCNC: 24 MG/DL (ref 7–20)
BUN BLDV-MCNC: 25 MG/DL (ref 7–20)
BUN BLDV-MCNC: 26 MG/DL (ref 7–20)
BUN BLDV-MCNC: 28 MG/DL (ref 7–20)
BUN BLDV-MCNC: 28 MG/DL (ref 7–20)
BUN BLDV-MCNC: 29 MG/DL (ref 7–20)
BUN BLDV-MCNC: 29 MG/DL (ref 7–20)
BUN BLDV-MCNC: 31 MG/DL (ref 7–20)
BUN BLDV-MCNC: 33 MG/DL (ref 7–20)
BUN BLDV-MCNC: 38 MG/DL (ref 7–20)
BUN BLDV-MCNC: 39 MG/DL (ref 7–20)
BUN BLDV-MCNC: 42 MG/DL (ref 7–20)
BUN BLDV-MCNC: 49 MG/DL (ref 7–20)
BUN BLDV-MCNC: 52 MG/DL (ref 7–20)
C DIFFICILE TOXIN, EIA: NORMAL
C DIFFICILE TOXIN, EIA: NORMAL
CALCIUM SERPL-MCNC: 7.6 MG/DL (ref 8.3–10.6)
CALCIUM SERPL-MCNC: 7.7 MG/DL (ref 8.3–10.6)
CALCIUM SERPL-MCNC: 8 MG/DL (ref 8.3–10.6)
CALCIUM SERPL-MCNC: 8 MG/DL (ref 8.3–10.6)
CALCIUM SERPL-MCNC: 8.1 MG/DL (ref 8.3–10.6)
CALCIUM SERPL-MCNC: 8.2 MG/DL (ref 8.3–10.6)
CALCIUM SERPL-MCNC: 8.3 MG/DL (ref 8.3–10.6)
CALCIUM SERPL-MCNC: 8.5 MG/DL (ref 8.3–10.6)
CALCIUM SERPL-MCNC: 8.5 MG/DL (ref 8.3–10.6)
CALCIUM SERPL-MCNC: 8.7 MG/DL (ref 8.3–10.6)
CALCIUM SERPL-MCNC: 8.7 MG/DL (ref 8.3–10.6)
CALCIUM SERPL-MCNC: 8.8 MG/DL (ref 8.3–10.6)
CALCIUM SERPL-MCNC: 8.8 MG/DL (ref 8.3–10.6)
CALCIUM SERPL-MCNC: 8.9 MG/DL (ref 8.3–10.6)
CALCIUM SERPL-MCNC: 8.9 MG/DL (ref 8.3–10.6)
CALCIUM SERPL-MCNC: 9 MG/DL (ref 8.3–10.6)
CALCIUM SERPL-MCNC: 9.1 MG/DL (ref 8.3–10.6)
CALCIUM SERPL-MCNC: 9.4 MG/DL (ref 8.3–10.6)
CALCIUM SERPL-MCNC: 9.5 MG/DL (ref 8.3–10.6)
CANNABINOID SCREEN URINE: NORMAL
CARBOXYHEMOGLOBIN ARTERIAL: 1.5 % (ref 0–1.5)
CARBOXYHEMOGLOBIN ARTERIAL: 1.5 % (ref 0–1.5)
CARBOXYHEMOGLOBIN ARTERIAL: 1.6 % (ref 0–1.5)
CARBOXYHEMOGLOBIN ARTERIAL: 1.6 % (ref 0–1.5)
CARBOXYHEMOGLOBIN ARTERIAL: 1.7 % (ref 0–1.5)
CARBOXYHEMOGLOBIN ARTERIAL: 2.5 % (ref 0–1.5)
CARBOXYHEMOGLOBIN: 3.6 %
CASTS: ABNORMAL /LPF
CHLORIDE BLD-SCNC: 102 MMOL/L (ref 99–110)
CHLORIDE BLD-SCNC: 104 MMOL/L (ref 99–110)
CHLORIDE BLD-SCNC: 105 MMOL/L (ref 99–110)
CHLORIDE BLD-SCNC: 106 MMOL/L (ref 99–110)
CHLORIDE BLD-SCNC: 106 MMOL/L (ref 99–110)
CHLORIDE BLD-SCNC: 108 MMOL/L (ref 99–110)
CHLORIDE BLD-SCNC: 108 MMOL/L (ref 99–110)
CHLORIDE BLD-SCNC: 109 MMOL/L (ref 99–110)
CHLORIDE BLD-SCNC: 110 MMOL/L (ref 99–110)
CHLORIDE BLD-SCNC: 111 MMOL/L (ref 99–110)
CHLORIDE BLD-SCNC: 111 MMOL/L (ref 99–110)
CHLORIDE BLD-SCNC: 112 MMOL/L (ref 99–110)
CHLORIDE BLD-SCNC: 112 MMOL/L (ref 99–110)
CHLORIDE BLD-SCNC: 113 MMOL/L (ref 99–110)
CHLORIDE BLD-SCNC: 114 MMOL/L (ref 99–110)
CLARITY: ABNORMAL
CLARITY: CLEAR
CLARITY: CLEAR
CLOSTRIDIUM DIFFICILE DNA AMPLIFICATION: NORMAL
CLOT EVALUATION CSF: ABNORMAL
CO2: 14 MMOL/L (ref 21–32)
CO2: 15 MMOL/L (ref 21–32)
CO2: 16 MMOL/L (ref 21–32)
CO2: 16 MMOL/L (ref 21–32)
CO2: 18 MMOL/L (ref 21–32)
CO2: 19 MMOL/L (ref 21–32)
CO2: 20 MMOL/L (ref 21–32)
CO2: 21 MMOL/L (ref 21–32)
CO2: 23 MMOL/L (ref 21–32)
CO2: 23 MMOL/L (ref 21–32)
CO2: 24 MMOL/L (ref 21–32)
CO2: 25 MMOL/L (ref 21–32)
CO2: 27 MMOL/L (ref 21–32)
CO2: 28 MMOL/L (ref 21–32)
CO2: 29 MMOL/L (ref 21–32)
COCAINE METABOLITE SCREEN URINE: NORMAL
COLOR CSF: COLORLESS
COLOR: ABNORMAL
COLOR: YELLOW
COMMENT UA: ABNORMAL
CREAT SERPL-MCNC: 1.2 MG/DL (ref 0.6–1.2)
CREAT SERPL-MCNC: 1.3 MG/DL (ref 0.6–1.2)
CREAT SERPL-MCNC: 1.4 MG/DL (ref 0.6–1.2)
CREAT SERPL-MCNC: 1.5 MG/DL (ref 0.6–1.2)
CREAT SERPL-MCNC: 1.6 MG/DL (ref 0.6–1.2)
CREAT SERPL-MCNC: 1.6 MG/DL (ref 0.6–1.2)
CREAT SERPL-MCNC: 1.7 MG/DL (ref 0.6–1.2)
CREAT SERPL-MCNC: 1.9 MG/DL (ref 0.6–1.2)
CREAT SERPL-MCNC: 2 MG/DL (ref 0.6–1.2)
CREAT SERPL-MCNC: 2.1 MG/DL (ref 0.6–1.2)
CREAT SERPL-MCNC: 2.3 MG/DL (ref 0.6–1.2)
CREATININE URINE: 93.3 MG/DL (ref 28–259)
CSF CULTURE: NORMAL
CSF INTERPRETATION: NORMAL
EKG ATRIAL RATE: 109 BPM
EKG ATRIAL RATE: 92 BPM
EKG ATRIAL RATE: 97 BPM
EKG DIAGNOSIS: NORMAL
EKG P AXIS: 59 DEGREES
EKG P AXIS: 68 DEGREES
EKG P AXIS: 85 DEGREES
EKG P-R INTERVAL: 154 MS
EKG P-R INTERVAL: 158 MS
EKG P-R INTERVAL: 160 MS
EKG Q-T INTERVAL: 366 MS
EKG Q-T INTERVAL: 372 MS
EKG Q-T INTERVAL: 388 MS
EKG QRS DURATION: 82 MS
EKG QRS DURATION: 84 MS
EKG QRS DURATION: 86 MS
EKG QTC CALCULATION (BAZETT): 460 MS
EKG QTC CALCULATION (BAZETT): 492 MS
EKG QTC CALCULATION (BAZETT): 492 MS
EKG R AXIS: -34 DEGREES
EKG R AXIS: -35 DEGREES
EKG R AXIS: -41 DEGREES
EKG T AXIS: 111 DEGREES
EKG T AXIS: 78 DEGREES
EKG T AXIS: 94 DEGREES
EKG VENTRICULAR RATE: 109 BPM
EKG VENTRICULAR RATE: 92 BPM
EKG VENTRICULAR RATE: 97 BPM
EOSINOPHILS ABSOLUTE: 0 K/UL (ref 0–0.6)
EOSINOPHILS ABSOLUTE: 0.1 K/UL (ref 0–0.6)
EOSINOPHILS ABSOLUTE: 0.2 K/UL (ref 0–0.6)
EOSINOPHILS ABSOLUTE: 0.2 K/UL (ref 0–0.6)
EOSINOPHILS ABSOLUTE: 0.3 K/UL (ref 0–0.6)
EOSINOPHILS ABSOLUTE: 0.4 K/UL (ref 0–0.6)
EOSINOPHILS ABSOLUTE: 0.5 K/UL (ref 0–0.6)
EOSINOPHILS RELATIVE PERCENT: 0.4 %
EOSINOPHILS RELATIVE PERCENT: 0.8 %
EOSINOPHILS RELATIVE PERCENT: 1.2 %
EOSINOPHILS RELATIVE PERCENT: 1.4 %
EOSINOPHILS RELATIVE PERCENT: 2.2 %
EOSINOPHILS RELATIVE PERCENT: 4.2 %
EOSINOPHILS RELATIVE PERCENT: 5.1 %
EOSINOPHILS RELATIVE PERCENT: 6.1 %
EOSINOPHILS RELATIVE PERCENT: 6.3 %
EOSINOPHILS RELATIVE PERCENT: 6.3 %
EOSINOPHILS RELATIVE PERCENT: 6.4 %
EOSINOPHILS RELATIVE PERCENT: 6.5 %
EOSINOPHILS RELATIVE PERCENT: 6.9 %
EOSINOPHILS RELATIVE PERCENT: 7 %
EOSINOPHILS RELATIVE PERCENT: 7.4 %
EOSINOPHILS RELATIVE PERCENT: 7.6 %
EPITHELIAL CELLS, UA: 2 /HPF (ref 0–5)
EPITHELIAL CELLS, UA: 3 /HPF (ref 0–5)
EPITHELIAL CELLS, UA: 5 /HPF (ref 0–5)
EPITHELIAL CELLS, UA: 6 /HPF (ref 0–5)
EPITHELIAL CELLS, UA: 8 /HPF (ref 0–5)
ESTIMATED AVERAGE GLUCOSE: 111.2 MG/DL
ESTIMATED AVERAGE GLUCOSE: 125.5 MG/DL
GFR AFRICAN AMERICAN: 25
GFR AFRICAN AMERICAN: 28
GFR AFRICAN AMERICAN: 30
GFR AFRICAN AMERICAN: 32
GFR AFRICAN AMERICAN: 36
GFR AFRICAN AMERICAN: 39
GFR AFRICAN AMERICAN: 39
GFR AFRICAN AMERICAN: 42
GFR AFRICAN AMERICAN: 45
GFR AFRICAN AMERICAN: 49
GFR AFRICAN AMERICAN: 54
GFR NON-AFRICAN AMERICAN: 21
GFR NON-AFRICAN AMERICAN: 23
GFR NON-AFRICAN AMERICAN: 25
GFR NON-AFRICAN AMERICAN: 26
GFR NON-AFRICAN AMERICAN: 30
GFR NON-AFRICAN AMERICAN: 32
GFR NON-AFRICAN AMERICAN: 32
GFR NON-AFRICAN AMERICAN: 34
GFR NON-AFRICAN AMERICAN: 37
GFR NON-AFRICAN AMERICAN: 41
GFR NON-AFRICAN AMERICAN: 44
GLOBULIN: 3.9 G/DL
GLOBULIN: 5 G/DL
GLUCOSE BLD-MCNC: 102 MG/DL (ref 70–99)
GLUCOSE BLD-MCNC: 103 MG/DL (ref 70–99)
GLUCOSE BLD-MCNC: 107 MG/DL (ref 70–99)
GLUCOSE BLD-MCNC: 107 MG/DL (ref 70–99)
GLUCOSE BLD-MCNC: 109 MG/DL (ref 70–99)
GLUCOSE BLD-MCNC: 109 MG/DL (ref 70–99)
GLUCOSE BLD-MCNC: 110 MG/DL (ref 70–99)
GLUCOSE BLD-MCNC: 113 MG/DL (ref 70–99)
GLUCOSE BLD-MCNC: 113 MG/DL (ref 70–99)
GLUCOSE BLD-MCNC: 114 MG/DL (ref 70–99)
GLUCOSE BLD-MCNC: 115 MG/DL (ref 70–99)
GLUCOSE BLD-MCNC: 117 MG/DL (ref 70–99)
GLUCOSE BLD-MCNC: 118 MG/DL (ref 70–99)
GLUCOSE BLD-MCNC: 118 MG/DL (ref 70–99)
GLUCOSE BLD-MCNC: 119 MG/DL (ref 70–99)
GLUCOSE BLD-MCNC: 122 MG/DL (ref 70–99)
GLUCOSE BLD-MCNC: 122 MG/DL (ref 70–99)
GLUCOSE BLD-MCNC: 124 MG/DL (ref 70–99)
GLUCOSE BLD-MCNC: 124 MG/DL (ref 70–99)
GLUCOSE BLD-MCNC: 125 MG/DL (ref 70–99)
GLUCOSE BLD-MCNC: 125 MG/DL (ref 70–99)
GLUCOSE BLD-MCNC: 126 MG/DL (ref 70–99)
GLUCOSE BLD-MCNC: 127 MG/DL (ref 70–99)
GLUCOSE BLD-MCNC: 128 MG/DL (ref 70–99)
GLUCOSE BLD-MCNC: 129 MG/DL (ref 70–99)
GLUCOSE BLD-MCNC: 130 MG/DL (ref 70–99)
GLUCOSE BLD-MCNC: 131 MG/DL (ref 70–99)
GLUCOSE BLD-MCNC: 131 MG/DL (ref 70–99)
GLUCOSE BLD-MCNC: 132 MG/DL (ref 70–99)
GLUCOSE BLD-MCNC: 132 MG/DL (ref 70–99)
GLUCOSE BLD-MCNC: 133 MG/DL (ref 70–99)
GLUCOSE BLD-MCNC: 134 MG/DL (ref 70–99)
GLUCOSE BLD-MCNC: 134 MG/DL (ref 70–99)
GLUCOSE BLD-MCNC: 136 MG/DL (ref 70–99)
GLUCOSE BLD-MCNC: 137 MG/DL (ref 70–99)
GLUCOSE BLD-MCNC: 138 MG/DL (ref 70–99)
GLUCOSE BLD-MCNC: 139 MG/DL (ref 70–99)
GLUCOSE BLD-MCNC: 139 MG/DL (ref 70–99)
GLUCOSE BLD-MCNC: 142 MG/DL (ref 70–99)
GLUCOSE BLD-MCNC: 142 MG/DL (ref 70–99)
GLUCOSE BLD-MCNC: 143 MG/DL (ref 70–99)
GLUCOSE BLD-MCNC: 144 MG/DL (ref 70–99)
GLUCOSE BLD-MCNC: 145 MG/DL (ref 70–99)
GLUCOSE BLD-MCNC: 145 MG/DL (ref 70–99)
GLUCOSE BLD-MCNC: 146 MG/DL (ref 70–99)
GLUCOSE BLD-MCNC: 147 MG/DL (ref 70–99)
GLUCOSE BLD-MCNC: 148 MG/DL (ref 70–99)
GLUCOSE BLD-MCNC: 149 MG/DL (ref 70–99)
GLUCOSE BLD-MCNC: 151 MG/DL (ref 70–99)
GLUCOSE BLD-MCNC: 152 MG/DL (ref 70–99)
GLUCOSE BLD-MCNC: 154 MG/DL (ref 70–99)
GLUCOSE BLD-MCNC: 154 MG/DL (ref 70–99)
GLUCOSE BLD-MCNC: 158 MG/DL (ref 70–99)
GLUCOSE BLD-MCNC: 160 MG/DL (ref 70–99)
GLUCOSE BLD-MCNC: 160 MG/DL (ref 70–99)
GLUCOSE BLD-MCNC: 163 MG/DL (ref 70–99)
GLUCOSE BLD-MCNC: 167 MG/DL (ref 70–99)
GLUCOSE BLD-MCNC: 173 MG/DL (ref 70–99)
GLUCOSE BLD-MCNC: 173 MG/DL (ref 70–99)
GLUCOSE BLD-MCNC: 176 MG/DL (ref 70–99)
GLUCOSE BLD-MCNC: 179 MG/DL (ref 70–99)
GLUCOSE BLD-MCNC: 179 MG/DL (ref 70–99)
GLUCOSE BLD-MCNC: 182 MG/DL (ref 70–99)
GLUCOSE BLD-MCNC: 183 MG/DL (ref 70–99)
GLUCOSE BLD-MCNC: 189 MG/DL (ref 70–99)
GLUCOSE BLD-MCNC: 195 MG/DL (ref 70–99)
GLUCOSE BLD-MCNC: 199 MG/DL (ref 70–99)
GLUCOSE BLD-MCNC: 210 MG/DL (ref 70–99)
GLUCOSE BLD-MCNC: 99 MG/DL (ref 70–99)
GLUCOSE URINE: NEGATIVE MG/DL
GLUCOSE, CSF: 93 MG/DL (ref 40–80)
GRAM STAIN RESULT: NORMAL
HBA1C MFR BLD: 5.5 %
HBA1C MFR BLD: 6 %
HCO3 ARTERIAL: 16 MMOL/L (ref 21–29)
HCO3 ARTERIAL: 16.1 MMOL/L (ref 21–29)
HCO3 ARTERIAL: 18 MMOL/L (ref 21–29)
HCO3 ARTERIAL: 25 MMOL/L (ref 21–29)
HCO3 ARTERIAL: 25 MMOL/L (ref 21–29)
HCO3 ARTERIAL: 25.4 MMOL/L (ref 21–29)
HCO3 VENOUS: 24 MMOL/L (ref 23–29)
HCT VFR BLD CALC: 24.8 % (ref 36–48)
HCT VFR BLD CALC: 25.7 % (ref 36–48)
HCT VFR BLD CALC: 25.9 % (ref 36–48)
HCT VFR BLD CALC: 27.9 % (ref 36–48)
HCT VFR BLD CALC: 28.9 % (ref 36–48)
HCT VFR BLD CALC: 29.6 % (ref 36–48)
HCT VFR BLD CALC: 29.6 % (ref 36–48)
HCT VFR BLD CALC: 29.7 % (ref 36–48)
HCT VFR BLD CALC: 30.3 % (ref 36–48)
HCT VFR BLD CALC: 30.6 % (ref 36–48)
HCT VFR BLD CALC: 31.3 % (ref 36–48)
HCT VFR BLD CALC: 31.6 % (ref 36–48)
HCT VFR BLD CALC: 32.8 % (ref 36–48)
HCT VFR BLD CALC: 33.8 % (ref 36–48)
HCT VFR BLD CALC: 34.5 % (ref 36–48)
HCT VFR BLD CALC: 35 % (ref 36–48)
HEMOGLOBIN, ART, EXTENDED: 10.6 G/DL
HEMOGLOBIN, ART, EXTENDED: 11.5 G/DL (ref 12–16)
HEMOGLOBIN, ART, EXTENDED: 8.4 G/DL
HEMOGLOBIN, ART, EXTENDED: 8.5 G/DL
HEMOGLOBIN, ART, EXTENDED: 9.5 G/DL
HEMOGLOBIN, ART, EXTENDED: 9.5 G/DL (ref 12–16)
HEMOGLOBIN: 10 G/DL (ref 12–16)
HEMOGLOBIN: 10.2 G/DL (ref 12–16)
HEMOGLOBIN: 10.3 G/DL (ref 12–16)
HEMOGLOBIN: 10.6 G/DL (ref 12–16)
HEMOGLOBIN: 11 G/DL (ref 12–16)
HEMOGLOBIN: 11.2 G/DL (ref 12–16)
HEMOGLOBIN: 11.3 G/DL (ref 12–16)
HEMOGLOBIN: 8.2 G/DL (ref 12–16)
HEMOGLOBIN: 8.6 G/DL (ref 12–16)
HEMOGLOBIN: 8.6 G/DL (ref 12–16)
HEMOGLOBIN: 9.2 G/DL (ref 12–16)
HEMOGLOBIN: 9.6 G/DL (ref 12–16)
HEMOGLOBIN: 9.7 G/DL (ref 12–16)
HEMOGLOBIN: 9.9 G/DL (ref 12–16)
HERPES SIMPLEX VIRUS BY PCR: NOT DETECTED
HSV SOURCE: NORMAL
HYALINE CASTS: 2 /LPF (ref 0–8)
HYALINE CASTS: 4 /LPF (ref 0–8)
HYALINE CASTS: 5 /LPF (ref 0–8)
IGG CSF: 7.8 MG/DL (ref 0–6)
IGG INDEX: 0.4 (ref 0.2–0.7)
IGG SYNTHESIS RATE CSF: -11.2 MG/DAY (ref -9.9–3.3)
IGG: 1966 MG/DL (ref 700–1600)
INR BLD: 1.22 (ref 0.86–1.14)
KEPPRA DOSE AMT: NORMAL
KEPPRA: 38.7 UG/ML (ref 6–46)
KETONES, URINE: 15 MG/DL
KETONES, URINE: 40 MG/DL
KETONES, URINE: ABNORMAL MG/DL
KETONES, URINE: ABNORMAL MG/DL
KETONES, URINE: NEGATIVE MG/DL
LACTATE: 0.81 MMOL/L (ref 0.4–2)
LACTIC ACID: 0.8 MMOL/L (ref 0.4–2)
LACTIC ACID: 1.1 MMOL/L (ref 0.4–2)
LACTIC ACID: 1.4 MMOL/L (ref 0.4–2)
LACTIC ACID: 1.5 MMOL/L (ref 0.4–2)
LACTIC ACID: 1.8 MMOL/L (ref 0.4–2)
LACTIC ACID: 3.4 MMOL/L (ref 0.4–2)
LEUKOCYTE ESTERASE, URINE: ABNORMAL
LEUKOCYTE ESTERASE, URINE: NEGATIVE
LEUKOCYTE ESTERASE, URINE: NEGATIVE
LYMPHOCYTES ABSOLUTE: 0.4 K/UL (ref 1–5.1)
LYMPHOCYTES ABSOLUTE: 0.6 K/UL (ref 1–5.1)
LYMPHOCYTES ABSOLUTE: 0.7 K/UL (ref 1–5.1)
LYMPHOCYTES ABSOLUTE: 0.8 K/UL (ref 1–5.1)
LYMPHOCYTES ABSOLUTE: 0.8 K/UL (ref 1–5.1)
LYMPHOCYTES ABSOLUTE: 0.9 K/UL (ref 1–5.1)
LYMPHOCYTES ABSOLUTE: 0.9 K/UL (ref 1–5.1)
LYMPHOCYTES ABSOLUTE: 1.1 K/UL (ref 1–5.1)
LYMPHOCYTES ABSOLUTE: 1.2 K/UL (ref 1–5.1)
LYMPHOCYTES ABSOLUTE: 1.2 K/UL (ref 1–5.1)
LYMPHOCYTES ABSOLUTE: 1.3 K/UL (ref 1–5.1)
LYMPHOCYTES ABSOLUTE: 1.4 K/UL (ref 1–5.1)
LYMPHOCYTES RELATIVE PERCENT: 11 %
LYMPHOCYTES RELATIVE PERCENT: 12.6 %
LYMPHOCYTES RELATIVE PERCENT: 14 %
LYMPHOCYTES RELATIVE PERCENT: 14.1 %
LYMPHOCYTES RELATIVE PERCENT: 14.5 %
LYMPHOCYTES RELATIVE PERCENT: 15.2 %
LYMPHOCYTES RELATIVE PERCENT: 17.5 %
LYMPHOCYTES RELATIVE PERCENT: 18.7 %
LYMPHOCYTES RELATIVE PERCENT: 18.8 %
LYMPHOCYTES RELATIVE PERCENT: 20.2 %
LYMPHOCYTES RELATIVE PERCENT: 20.7 %
LYMPHOCYTES RELATIVE PERCENT: 21 %
LYMPHOCYTES RELATIVE PERCENT: 21.1 %
LYMPHOCYTES RELATIVE PERCENT: 3.9 %
LYMPHOCYTES RELATIVE PERCENT: 6 %
LYMPHOCYTES RELATIVE PERCENT: 7.6 %
Lab: NORMAL
MAGNESIUM: 1.8 MG/DL (ref 1.8–2.4)
MAGNESIUM: 1.9 MG/DL (ref 1.8–2.4)
MAGNESIUM: 2 MG/DL (ref 1.8–2.4)
MAGNESIUM: 2.1 MG/DL (ref 1.8–2.4)
MAGNESIUM: 2.1 MG/DL (ref 1.8–2.4)
MAGNESIUM: 2.2 MG/DL (ref 1.8–2.4)
MAGNESIUM: 2.3 MG/DL (ref 1.8–2.4)
MCH RBC QN AUTO: 29.1 PG (ref 26–34)
MCH RBC QN AUTO: 29.3 PG (ref 26–34)
MCH RBC QN AUTO: 29.5 PG (ref 26–34)
MCH RBC QN AUTO: 29.5 PG (ref 26–34)
MCH RBC QN AUTO: 29.6 PG (ref 26–34)
MCH RBC QN AUTO: 29.7 PG (ref 26–34)
MCH RBC QN AUTO: 29.8 PG (ref 26–34)
MCH RBC QN AUTO: 29.9 PG (ref 26–34)
MCH RBC QN AUTO: 29.9 PG (ref 26–34)
MCH RBC QN AUTO: 30 PG (ref 26–34)
MCH RBC QN AUTO: 30.1 PG (ref 26–34)
MCH RBC QN AUTO: 30.5 PG (ref 26–34)
MCHC RBC AUTO-ENTMCNC: 32.3 G/DL (ref 31–36)
MCHC RBC AUTO-ENTMCNC: 32.3 G/DL (ref 31–36)
MCHC RBC AUTO-ENTMCNC: 32.4 G/DL (ref 31–36)
MCHC RBC AUTO-ENTMCNC: 32.5 G/DL (ref 31–36)
MCHC RBC AUTO-ENTMCNC: 32.7 G/DL (ref 31–36)
MCHC RBC AUTO-ENTMCNC: 32.8 G/DL (ref 31–36)
MCHC RBC AUTO-ENTMCNC: 32.9 G/DL (ref 31–36)
MCHC RBC AUTO-ENTMCNC: 32.9 G/DL (ref 31–36)
MCHC RBC AUTO-ENTMCNC: 33 G/DL (ref 31–36)
MCHC RBC AUTO-ENTMCNC: 33.1 G/DL (ref 31–36)
MCHC RBC AUTO-ENTMCNC: 33.1 G/DL (ref 31–36)
MCHC RBC AUTO-ENTMCNC: 33.6 G/DL (ref 31–36)
MCV RBC AUTO: 90 FL (ref 80–100)
MCV RBC AUTO: 90.3 FL (ref 80–100)
MCV RBC AUTO: 90.5 FL (ref 80–100)
MCV RBC AUTO: 90.6 FL (ref 80–100)
MCV RBC AUTO: 90.7 FL (ref 80–100)
MCV RBC AUTO: 90.8 FL (ref 80–100)
MCV RBC AUTO: 90.8 FL (ref 80–100)
MCV RBC AUTO: 91.1 FL (ref 80–100)
MCV RBC AUTO: 91.2 FL (ref 80–100)
MCV RBC AUTO: 91.2 FL (ref 80–100)
MCV RBC AUTO: 91.5 FL (ref 80–100)
MCV RBC AUTO: 91.5 FL (ref 80–100)
MCV RBC AUTO: 91.6 FL (ref 80–100)
MCV RBC AUTO: 91.6 FL (ref 80–100)
MENINGITIS ENCEPHALITIS PANEL: NORMAL
METHADONE SCREEN, URINE: NORMAL
METHEMOGLOBIN ARTERIAL: 0.2 % (ref 0–1.4)
METHEMOGLOBIN ARTERIAL: 0.4 % (ref 0–1.4)
METHEMOGLOBIN ARTERIAL: 0.6 % (ref 0–1.4)
METHEMOGLOBIN ARTERIAL: 0.6 % (ref 0–1.4)
METHEMOGLOBIN ARTERIAL: 0.7 %
METHEMOGLOBIN ARTERIAL: 0.9 %
METHEMOGLOBIN VENOUS: 0.8 %
MICROSCOPIC EXAMINATION: YES
MONOCYTES ABSOLUTE: 0.4 K/UL (ref 0–1.3)
MONOCYTES ABSOLUTE: 0.5 K/UL (ref 0–1.3)
MONOCYTES ABSOLUTE: 0.6 K/UL (ref 0–1.3)
MONOCYTES ABSOLUTE: 0.7 K/UL (ref 0–1.3)
MONOCYTES ABSOLUTE: 0.7 K/UL (ref 0–1.3)
MONOCYTES ABSOLUTE: 1.1 K/UL (ref 0–1.3)
MONOCYTES RELATIVE PERCENT: 10 %
MONOCYTES RELATIVE PERCENT: 10.1 %
MONOCYTES RELATIVE PERCENT: 10.9 %
MONOCYTES RELATIVE PERCENT: 11.5 %
MONOCYTES RELATIVE PERCENT: 12.2 %
MONOCYTES RELATIVE PERCENT: 12.2 %
MONOCYTES RELATIVE PERCENT: 12.6 %
MONOCYTES RELATIVE PERCENT: 12.7 %
MONOCYTES RELATIVE PERCENT: 13.2 %
MONOCYTES RELATIVE PERCENT: 13.9 %
MONOCYTES RELATIVE PERCENT: 4.2 %
MONOCYTES RELATIVE PERCENT: 6.8 %
MONOCYTES RELATIVE PERCENT: 6.9 %
MONOCYTES RELATIVE PERCENT: 7.2 %
MONOCYTES RELATIVE PERCENT: 9.1 %
MONOCYTES RELATIVE PERCENT: 9.1 %
NEUTROPHILS ABSOLUTE: 2.3 K/UL (ref 1.7–7.7)
NEUTROPHILS ABSOLUTE: 2.8 K/UL (ref 1.7–7.7)
NEUTROPHILS ABSOLUTE: 2.8 K/UL (ref 1.7–7.7)
NEUTROPHILS ABSOLUTE: 3 K/UL (ref 1.7–7.7)
NEUTROPHILS ABSOLUTE: 3.4 K/UL (ref 1.7–7.7)
NEUTROPHILS ABSOLUTE: 3.7 K/UL (ref 1.7–7.7)
NEUTROPHILS ABSOLUTE: 3.8 K/UL (ref 1.7–7.7)
NEUTROPHILS ABSOLUTE: 4 K/UL (ref 1.7–7.7)
NEUTROPHILS ABSOLUTE: 4 K/UL (ref 1.7–7.7)
NEUTROPHILS ABSOLUTE: 4.1 K/UL (ref 1.7–7.7)
NEUTROPHILS ABSOLUTE: 4.3 K/UL (ref 1.7–7.7)
NEUTROPHILS ABSOLUTE: 5.2 K/UL (ref 1.7–7.7)
NEUTROPHILS ABSOLUTE: 5.8 K/UL (ref 1.7–7.7)
NEUTROPHILS ABSOLUTE: 7.6 K/UL (ref 1.7–7.7)
NEUTROPHILS ABSOLUTE: 7.9 K/UL (ref 1.7–7.7)
NEUTROPHILS ABSOLUTE: 8.7 K/UL (ref 1.7–7.7)
NEUTROPHILS RELATIVE PERCENT: 59.6 %
NEUTROPHILS RELATIVE PERCENT: 60.7 %
NEUTROPHILS RELATIVE PERCENT: 60.8 %
NEUTROPHILS RELATIVE PERCENT: 60.8 %
NEUTROPHILS RELATIVE PERCENT: 62.1 %
NEUTROPHILS RELATIVE PERCENT: 63.6 %
NEUTROPHILS RELATIVE PERCENT: 65.2 %
NEUTROPHILS RELATIVE PERCENT: 66.7 %
NEUTROPHILS RELATIVE PERCENT: 67.1 %
NEUTROPHILS RELATIVE PERCENT: 68.3 %
NEUTROPHILS RELATIVE PERCENT: 70.1 %
NEUTROPHILS RELATIVE PERCENT: 70.4 %
NEUTROPHILS RELATIVE PERCENT: 77.2 %
NEUTROPHILS RELATIVE PERCENT: 83.7 %
NEUTROPHILS RELATIVE PERCENT: 85.3 %
NEUTROPHILS RELATIVE PERCENT: 91.2 %
NITRITE, URINE: NEGATIVE
NITRITE, URINE: POSITIVE
NITRITE, URINE: POSITIVE
NO DIFFERENTIAL CSF: ABNORMAL
O2 CONTENT ARTERIAL: 13 ML/DL
O2 CONTENT ARTERIAL: 15 ML/DL
O2 CONTENT, VEN: 15 ML/DL
O2 SAT, ARTERIAL: 100 % (ref 93–100)
O2 SAT, ARTERIAL: 96.4 %
O2 SAT, ARTERIAL: 96.8 %
O2 SAT, ARTERIAL: 97 % (ref 93–100)
O2 SAT, ARTERIAL: 99 % (ref 93–100)
O2 SAT, ARTERIAL: 99 % (ref 93–100)
O2 SAT, VEN: 98 %
O2 THERAPY: ABNORMAL
O2 THERAPY: ABNORMAL
O2 THERAPY: NORMAL
OLIGOCLONAL BANDS CSF: 0
OLIGOCLONAL BANDS: 0
OPIATE SCREEN URINE: NORMAL
ORGANISM: ABNORMAL
ORGANISM: ABNORMAL
OSMOLALITY URINE: 342 MOSM/KG (ref 390–1070)
OSMOLALITY: 325 MOSM/KG (ref 278–305)
OXYCODONE URINE: NORMAL
PCO2 ARTERIAL: 28.7 MMHG (ref 35–45)
PCO2 ARTERIAL: 29.9 MMHG (ref 35–45)
PCO2 ARTERIAL: 34.7 MMHG (ref 35–45)
PCO2 ARTERIAL: 38.7 MMHG (ref 35–45)
PCO2 ARTERIAL: 39 MMHG (ref 35–45)
PCO2 ARTERIAL: 42 MMHG (ref 35–45)
PCO2, VEN: 40.5 MMHG (ref 40–50)
PDW BLD-RTO: 15.7 % (ref 12.4–15.4)
PDW BLD-RTO: 15.7 % (ref 12.4–15.4)
PDW BLD-RTO: 15.8 % (ref 12.4–15.4)
PDW BLD-RTO: 16.1 % (ref 12.4–15.4)
PDW BLD-RTO: 16.4 % (ref 12.4–15.4)
PDW BLD-RTO: 16.5 % (ref 12.4–15.4)
PDW BLD-RTO: 16.6 % (ref 12.4–15.4)
PDW BLD-RTO: 16.7 % (ref 12.4–15.4)
PDW BLD-RTO: 16.7 % (ref 12.4–15.4)
PDW BLD-RTO: 17.5 % (ref 12.4–15.4)
PERFORMED ON: ABNORMAL
PERFORMED ON: NORMAL
PERFORMED ON: NORMAL
PH ARTERIAL: 7.34 (ref 7.35–7.45)
PH ARTERIAL: 7.35 (ref 7.35–7.45)
PH ARTERIAL: 7.38 (ref 7.35–7.45)
PH ARTERIAL: 7.4 (ref 7.35–7.45)
PH ARTERIAL: 7.43 (ref 7.35–7.45)
PH ARTERIAL: 7.43 (ref 7.35–7.45)
PH UA: 5
PH UA: 5.5
PH UA: 6
PH UA: 6.5
PH VENOUS: 7.38 (ref 7.35–7.45)
PHENCYCLIDINE SCREEN URINE: NORMAL
PHOSPHORUS: 2.8 MG/DL (ref 2.5–4.9)
PHOSPHORUS: 3 MG/DL (ref 2.5–4.9)
PHOSPHORUS: 3.1 MG/DL (ref 2.5–4.9)
PHOSPHORUS: 3.3 MG/DL (ref 2.5–4.9)
PHOSPHORUS: 3.3 MG/DL (ref 2.5–4.9)
PHOSPHORUS: 3.5 MG/DL (ref 2.5–4.9)
PHOSPHORUS: 3.5 MG/DL (ref 2.5–4.9)
PHOSPHORUS: 3.7 MG/DL (ref 2.5–4.9)
PHOSPHORUS: 4 MG/DL (ref 2.5–4.9)
PHOSPHORUS: 4.1 MG/DL (ref 2.5–4.9)
PLATELET # BLD: 127 K/UL (ref 135–450)
PLATELET # BLD: 130 K/UL (ref 135–450)
PLATELET # BLD: 135 K/UL (ref 135–450)
PLATELET # BLD: 164 K/UL (ref 135–450)
PLATELET # BLD: 173 K/UL (ref 135–450)
PLATELET # BLD: 175 K/UL (ref 135–450)
PLATELET # BLD: 188 K/UL (ref 135–450)
PLATELET # BLD: 189 K/UL (ref 135–450)
PLATELET # BLD: 198 K/UL (ref 135–450)
PLATELET # BLD: 199 K/UL (ref 135–450)
PLATELET # BLD: 202 K/UL (ref 135–450)
PLATELET # BLD: 204 K/UL (ref 135–450)
PLATELET # BLD: 207 K/UL (ref 135–450)
PLATELET # BLD: 229 K/UL (ref 135–450)
PLATELET # BLD: 242 K/UL (ref 135–450)
PLATELET # BLD: 250 K/UL (ref 135–450)
PMV BLD AUTO: 8.2 FL (ref 5–10.5)
PMV BLD AUTO: 8.3 FL (ref 5–10.5)
PMV BLD AUTO: 8.3 FL (ref 5–10.5)
PMV BLD AUTO: 8.4 FL (ref 5–10.5)
PMV BLD AUTO: 8.5 FL (ref 5–10.5)
PMV BLD AUTO: 8.6 FL (ref 5–10.5)
PMV BLD AUTO: 8.8 FL (ref 5–10.5)
PMV BLD AUTO: 8.9 FL (ref 5–10.5)
PMV BLD AUTO: 9.2 FL (ref 5–10.5)
PMV BLD AUTO: 9.4 FL (ref 5–10.5)
PMV BLD AUTO: 9.4 FL (ref 5–10.5)
PO2 ARTERIAL: 113 MMHG (ref 75–108)
PO2 ARTERIAL: 128 MMHG (ref 75–108)
PO2 ARTERIAL: 179 MMHG (ref 75–108)
PO2 ARTERIAL: 76.3 MMHG (ref 75–108)
PO2 ARTERIAL: 87.9 MMHG (ref 75–108)
PO2 ARTERIAL: 98.3 MMHG (ref 75–108)
PO2, VEN: 113 MMHG
POC SAMPLE TYPE: NORMAL
POTASSIUM REFLEX MAGNESIUM: 3.5 MMOL/L (ref 3.5–5.1)
POTASSIUM REFLEX MAGNESIUM: 4 MMOL/L (ref 3.5–5.1)
POTASSIUM REFLEX MAGNESIUM: 4.2 MMOL/L (ref 3.5–5.1)
POTASSIUM REFLEX MAGNESIUM: 4.5 MMOL/L (ref 3.5–5.1)
POTASSIUM REFLEX MAGNESIUM: 4.6 MMOL/L (ref 3.5–5.1)
POTASSIUM SERPL-SCNC: 3 MMOL/L (ref 3.5–5.1)
POTASSIUM SERPL-SCNC: 3 MMOL/L (ref 3.5–5.1)
POTASSIUM SERPL-SCNC: 3.2 MMOL/L (ref 3.5–5.1)
POTASSIUM SERPL-SCNC: 3.3 MMOL/L (ref 3.5–5.1)
POTASSIUM SERPL-SCNC: 3.5 MMOL/L (ref 3.5–5.1)
POTASSIUM SERPL-SCNC: 3.6 MMOL/L (ref 3.5–5.1)
POTASSIUM SERPL-SCNC: 3.6 MMOL/L (ref 3.5–5.1)
POTASSIUM SERPL-SCNC: 3.7 MMOL/L (ref 3.5–5.1)
POTASSIUM SERPL-SCNC: 3.8 MMOL/L (ref 3.5–5.1)
POTASSIUM SERPL-SCNC: 3.9 MMOL/L (ref 3.5–5.1)
POTASSIUM SERPL-SCNC: 4 MMOL/L (ref 3.5–5.1)
POTASSIUM SERPL-SCNC: 4 MMOL/L (ref 3.5–5.1)
POTASSIUM SERPL-SCNC: 4.1 MMOL/L (ref 3.5–5.1)
POTASSIUM SERPL-SCNC: 4.3 MMOL/L (ref 3.5–5.1)
PRO-BNP: 2725 PG/ML (ref 0–124)
PRO-BNP: 3120 PG/ML (ref 0–124)
PRO-BNP: ABNORMAL PG/ML (ref 0–124)
PROCALCITONIN: 0.09 NG/ML (ref 0–0.15)
PROPOXYPHENE SCREEN: NORMAL
PROTEIN CSF: 64 MG/DL (ref 15–45)
PROTEIN CSF: 64 MG/DL (ref 15–45)
PROTEIN UA: >=300 MG/DL
PROTHROMBIN TIME: 13.9 SEC (ref 9.8–13)
RAPID INFLUENZA  B AGN: NEGATIVE
RAPID INFLUENZA A AGN: NEGATIVE
RBC # BLD: 2.72 M/UL (ref 4–5.2)
RBC # BLD: 2.84 M/UL (ref 4–5.2)
RBC # BLD: 2.85 M/UL (ref 4–5.2)
RBC # BLD: 3.07 M/UL (ref 4–5.2)
RBC # BLD: 3.2 M/UL (ref 4–5.2)
RBC # BLD: 3.23 M/UL (ref 4–5.2)
RBC # BLD: 3.24 M/UL (ref 4–5.2)
RBC # BLD: 3.28 M/UL (ref 4–5.2)
RBC # BLD: 3.35 M/UL (ref 4–5.2)
RBC # BLD: 3.35 M/UL (ref 4–5.2)
RBC # BLD: 3.45 M/UL (ref 4–5.2)
RBC # BLD: 3.45 M/UL (ref 4–5.2)
RBC # BLD: 3.64 M/UL (ref 4–5.2)
RBC # BLD: 3.74 M/UL (ref 4–5.2)
RBC # BLD: 3.78 M/UL (ref 4–5.2)
RBC # BLD: 3.87 M/UL (ref 4–5.2)
RBC CSF: 3 /CUMM
RBC UA: 6 /HPF (ref 0–4)
RBC UA: 7 /HPF (ref 0–4)
RBC UA: >900 /HPF (ref 0–4)
RBC UA: ABNORMAL /HPF (ref 0–2)
RBC UA: ABNORMAL /HPF (ref 0–2)
REASON FOR REJECTION: NORMAL
REASON FOR REJECTION: NORMAL
REJECTED TEST: NORMAL
REJECTED TEST: NORMAL
REPORT: NORMAL
REPORT: NORMAL
SALICYLATE, SERUM: <0.3 MG/DL (ref 15–30)
SODIUM BLD-SCNC: 136 MMOL/L (ref 136–145)
SODIUM BLD-SCNC: 138 MMOL/L (ref 136–145)
SODIUM BLD-SCNC: 140 MMOL/L (ref 136–145)
SODIUM BLD-SCNC: 140 MMOL/L (ref 136–145)
SODIUM BLD-SCNC: 141 MMOL/L (ref 136–145)
SODIUM BLD-SCNC: 142 MMOL/L (ref 136–145)
SODIUM BLD-SCNC: 142 MMOL/L (ref 136–145)
SODIUM BLD-SCNC: 143 MMOL/L (ref 136–145)
SODIUM BLD-SCNC: 144 MMOL/L (ref 136–145)
SODIUM BLD-SCNC: 144 MMOL/L (ref 136–145)
SODIUM BLD-SCNC: 145 MMOL/L (ref 136–145)
SODIUM BLD-SCNC: 146 MMOL/L (ref 136–145)
SODIUM BLD-SCNC: 146 MMOL/L (ref 136–145)
SODIUM BLD-SCNC: 147 MMOL/L (ref 136–145)
SODIUM BLD-SCNC: 149 MMOL/L (ref 136–145)
SODIUM BLD-SCNC: 152 MMOL/L (ref 136–145)
SODIUM URINE: 108 MMOL/L
SPECIFIC GRAVITY UA: 1.02
TCO2 ARTERIAL: 17 MMOL/L
TCO2 ARTERIAL: 17 MMOL/L
TCO2 ARTERIAL: 19 MMOL/L
TCO2 ARTERIAL: 26 MMOL/L
TCO2 ARTERIAL: 26.6 MMOL/L
TCO2 ARTERIAL: 27 MMOL/L
TCO2 CALC VENOUS: 25 MMOL/L
TOTAL CK: 314 U/L (ref 26–192)
TOTAL CK: 552 U/L (ref 26–192)
TOTAL CK: 594 U/L (ref 26–192)
TOTAL CK: 61 U/L (ref 26–192)
TOTAL CK: 662 U/L (ref 26–192)
TOTAL CK: 809 U/L (ref 26–192)
TOTAL PROTEIN: 6.7 G/DL (ref 6.4–8.2)
TOTAL PROTEIN: 7.7 G/DL (ref 6.4–8.2)
TOTAL PROTEIN: 8.3 G/DL (ref 6.4–8.2)
TOTAL PROTEIN: 9.1 G/DL (ref 6.4–8.2)
TROPONIN: 0.09 NG/ML
TROPONIN: 0.12 NG/ML
TSH REFLEX: 0.47 UIU/ML (ref 0.27–4.2)
TUBE NUMBER CSF: ABNORMAL
URINE CULTURE, ROUTINE: ABNORMAL
URINE CULTURE, ROUTINE: NORMAL
URINE REFLEX TO CULTURE: YES
URINE TYPE: ABNORMAL
UROBILINOGEN, URINE: 0.2 E.U./DL
UROBILINOGEN, URINE: 1 E.U./DL
VALPROIC ACID LEVEL: 46.5 UG/ML (ref 50–100)
VALPROIC ACID, FREE: 8.1 UG/ML (ref 7–23)
WBC # BLD: 3.7 K/UL (ref 4–11)
WBC # BLD: 4.4 K/UL (ref 4–11)
WBC # BLD: 4.5 K/UL (ref 4–11)
WBC # BLD: 5.1 K/UL (ref 4–11)
WBC # BLD: 5.2 K/UL (ref 4–11)
WBC # BLD: 5.7 K/UL (ref 4–11)
WBC # BLD: 5.7 K/UL (ref 4–11)
WBC # BLD: 5.8 K/UL (ref 4–11)
WBC # BLD: 6.1 K/UL (ref 4–11)
WBC # BLD: 6.5 K/UL (ref 4–11)
WBC # BLD: 6.6 K/UL (ref 4–11)
WBC # BLD: 6.7 K/UL (ref 4–11)
WBC # BLD: 8.3 K/UL (ref 4–11)
WBC # BLD: 9 K/UL (ref 4–11)
WBC # BLD: 9.3 K/UL (ref 4–11)
WBC # BLD: 9.5 K/UL (ref 4–11)
WBC CSF: 1 /CUMM (ref 0–5)
WBC UA: 20 /HPF (ref 0–5)
WBC UA: 26 /HPF (ref 0–5)
WBC UA: 418 /HPF (ref 0–5)
WBC UA: 6 /HPF (ref 0–5)
WBC UA: >900 /HPF (ref 0–5)
YEAST: PRESENT /HPF

## 2018-01-01 PROCEDURE — 2700000000 HC OXYGEN THERAPY PER DAY

## 2018-01-01 PROCEDURE — 0BH17EZ INSERTION OF ENDOTRACHEAL AIRWAY INTO TRACHEA, VIA NATURAL OR ARTIFICIAL OPENING: ICD-10-PCS | Performed by: FAMILY MEDICINE

## 2018-01-01 PROCEDURE — 2580000003 HC RX 258: Performed by: INTERNAL MEDICINE

## 2018-01-01 PROCEDURE — 83519 RIA NONANTIBODY: CPT

## 2018-01-01 PROCEDURE — 1200000000 HC SEMI PRIVATE

## 2018-01-01 PROCEDURE — 2580000003 HC RX 258: Performed by: STUDENT IN AN ORGANIZED HEALTH CARE EDUCATION/TRAINING PROGRAM

## 2018-01-01 PROCEDURE — 6370000000 HC RX 637 (ALT 250 FOR IP): Performed by: INTERNAL MEDICINE

## 2018-01-01 PROCEDURE — 6360000002 HC RX W HCPCS: Performed by: STUDENT IN AN ORGANIZED HEALTH CARE EDUCATION/TRAINING PROGRAM

## 2018-01-01 PROCEDURE — 85025 COMPLETE CBC W/AUTO DIFF WBC: CPT

## 2018-01-01 PROCEDURE — 82140 ASSAY OF AMMONIA: CPT

## 2018-01-01 PROCEDURE — G0480 DRUG TEST DEF 1-7 CLASSES: HCPCS

## 2018-01-01 PROCEDURE — 99212 OFFICE O/P EST SF 10 MIN: CPT

## 2018-01-01 PROCEDURE — 84145 PROCALCITONIN (PCT): CPT

## 2018-01-01 PROCEDURE — S0028 INJECTION, FAMOTIDINE, 20 MG: HCPCS | Performed by: INTERNAL MEDICINE

## 2018-01-01 PROCEDURE — 2500000003 HC RX 250 WO HCPCS: Performed by: INTERNAL MEDICINE

## 2018-01-01 PROCEDURE — 99212 OFFICE O/P EST SF 10 MIN: CPT | Performed by: NURSE PRACTITIONER

## 2018-01-01 PROCEDURE — 6370000000 HC RX 637 (ALT 250 FOR IP): Performed by: STUDENT IN AN ORGANIZED HEALTH CARE EDUCATION/TRAINING PROGRAM

## 2018-01-01 PROCEDURE — 82040 ASSAY OF SERUM ALBUMIN: CPT

## 2018-01-01 PROCEDURE — 94664 DEMO&/EVAL PT USE INHALER: CPT

## 2018-01-01 PROCEDURE — 82803 BLOOD GASES ANY COMBINATION: CPT

## 2018-01-01 PROCEDURE — 1090F PRES/ABSN URINE INCON ASSESS: CPT | Performed by: NURSE PRACTITIONER

## 2018-01-01 PROCEDURE — 82550 ASSAY OF CK (CPK): CPT

## 2018-01-01 PROCEDURE — 29580 STRAPPING UNNA BOOT: CPT

## 2018-01-01 PROCEDURE — 1036F TOBACCO NON-USER: CPT | Performed by: NURSE PRACTITIONER

## 2018-01-01 PROCEDURE — 2500000003 HC RX 250 WO HCPCS: Performed by: STUDENT IN AN ORGANIZED HEALTH CARE EDUCATION/TRAINING PROGRAM

## 2018-01-01 PROCEDURE — 87324 CLOSTRIDIUM AG IA: CPT

## 2018-01-01 PROCEDURE — 6360000002 HC RX W HCPCS: Performed by: INTERNAL MEDICINE

## 2018-01-01 PROCEDURE — 89050 BODY FLUID CELL COUNT: CPT

## 2018-01-01 PROCEDURE — 85610 PROTHROMBIN TIME: CPT

## 2018-01-01 PROCEDURE — 94760 N-INVAS EAR/PLS OXIMETRY 1: CPT

## 2018-01-01 PROCEDURE — 71045 X-RAY EXAM CHEST 1 VIEW: CPT

## 2018-01-01 PROCEDURE — 84300 ASSAY OF URINE SODIUM: CPT

## 2018-01-01 PROCEDURE — 95951 HC EEG MONITOR/VIDEO LESS THAN 24: CPT

## 2018-01-01 PROCEDURE — 94750 HC PULMONARY COMPLIANCE STUDY: CPT

## 2018-01-01 PROCEDURE — 96365 THER/PROPH/DIAG IV INF INIT: CPT

## 2018-01-01 PROCEDURE — G8987 SELF CARE CURRENT STATUS: HCPCS

## 2018-01-01 PROCEDURE — 92526 ORAL FUNCTION THERAPY: CPT

## 2018-01-01 PROCEDURE — 83935 ASSAY OF URINE OSMOLALITY: CPT

## 2018-01-01 PROCEDURE — 74176 CT ABD & PELVIS W/O CONTRAST: CPT

## 2018-01-01 PROCEDURE — G8979 MOBILITY GOAL STATUS: HCPCS

## 2018-01-01 PROCEDURE — 71250 CT THORAX DX C-: CPT

## 2018-01-01 PROCEDURE — 3017F COLORECTAL CA SCREEN DOC REV: CPT | Performed by: NURSE PRACTITIONER

## 2018-01-01 PROCEDURE — 83036 HEMOGLOBIN GLYCOSYLATED A1C: CPT

## 2018-01-01 PROCEDURE — 36600 WITHDRAWAL OF ARTERIAL BLOOD: CPT

## 2018-01-01 PROCEDURE — 11042 DBRDMT SUBQ TIS 1ST 20SQCM/<: CPT | Performed by: SURGERY

## 2018-01-01 PROCEDURE — 62270 DX LMBR SPI PNXR: CPT | Performed by: INTERNAL MEDICINE

## 2018-01-01 PROCEDURE — 94761 N-INVAS EAR/PLS OXIMETRY MLT: CPT

## 2018-01-01 PROCEDURE — 87086 URINE CULTURE/COLONY COUNT: CPT

## 2018-01-01 PROCEDURE — 6370000000 HC RX 637 (ALT 250 FOR IP): Performed by: FAMILY MEDICINE

## 2018-01-01 PROCEDURE — 83735 ASSAY OF MAGNESIUM: CPT

## 2018-01-01 PROCEDURE — G8598 ASA/ANTIPLAT THER USED: HCPCS | Performed by: NURSE PRACTITIONER

## 2018-01-01 PROCEDURE — 83605 ASSAY OF LACTIC ACID: CPT

## 2018-01-01 PROCEDURE — 80069 RENAL FUNCTION PANEL: CPT

## 2018-01-01 PROCEDURE — S0028 INJECTION, FAMOTIDINE, 20 MG: HCPCS | Performed by: STUDENT IN AN ORGANIZED HEALTH CARE EDUCATION/TRAINING PROGRAM

## 2018-01-01 PROCEDURE — 2500000003 HC RX 250 WO HCPCS

## 2018-01-01 PROCEDURE — 94640 AIRWAY INHALATION TREATMENT: CPT

## 2018-01-01 PROCEDURE — 97166 OT EVAL MOD COMPLEX 45 MIN: CPT

## 2018-01-01 PROCEDURE — G8978 MOBILITY CURRENT STATUS: HCPCS

## 2018-01-01 PROCEDURE — 6360000002 HC RX W HCPCS

## 2018-01-01 PROCEDURE — 80048 BASIC METABOLIC PNL TOTAL CA: CPT

## 2018-01-01 PROCEDURE — 99223 1ST HOSP IP/OBS HIGH 75: CPT | Performed by: INTERNAL MEDICINE

## 2018-01-01 PROCEDURE — 74018 RADEX ABDOMEN 1 VIEW: CPT

## 2018-01-01 PROCEDURE — C9113 INJ PANTOPRAZOLE SODIUM, VIA: HCPCS | Performed by: STUDENT IN AN ORGANIZED HEALTH CARE EDUCATION/TRAINING PROGRAM

## 2018-01-01 PROCEDURE — G8417 CALC BMI ABV UP PARAM F/U: HCPCS | Performed by: NURSE PRACTITIONER

## 2018-01-01 PROCEDURE — 2000000000 HC ICU R&B

## 2018-01-01 PROCEDURE — 29581 APPL MULTLAYER CMPRN SYS LEG: CPT

## 2018-01-01 PROCEDURE — 6360000002 HC RX W HCPCS: Performed by: EMERGENCY MEDICINE

## 2018-01-01 PROCEDURE — 72125 CT NECK SPINE W/O DYE: CPT

## 2018-01-01 PROCEDURE — 97535 SELF CARE MNGMENT TRAINING: CPT

## 2018-01-01 PROCEDURE — 81001 URINALYSIS AUTO W/SCOPE: CPT

## 2018-01-01 PROCEDURE — 4040F PNEUMOC VAC/ADMIN/RCVD: CPT | Performed by: NURSE PRACTITIONER

## 2018-01-01 PROCEDURE — 93010 ELECTROCARDIOGRAM REPORT: CPT | Performed by: INTERNAL MEDICINE

## 2018-01-01 PROCEDURE — 97530 THERAPEUTIC ACTIVITIES: CPT

## 2018-01-01 PROCEDURE — 36415 COLL VENOUS BLD VENIPUNCTURE: CPT

## 2018-01-01 PROCEDURE — 80076 HEPATIC FUNCTION PANEL: CPT

## 2018-01-01 PROCEDURE — 6360000002 HC RX W HCPCS: Performed by: PSYCHIATRY & NEUROLOGY

## 2018-01-01 PROCEDURE — 1101F PT FALLS ASSESS-DOCD LE1/YR: CPT | Performed by: NURSE PRACTITIONER

## 2018-01-01 PROCEDURE — G8988 SELF CARE GOAL STATUS: HCPCS

## 2018-01-01 PROCEDURE — 6370000000 HC RX 637 (ALT 250 FOR IP): Performed by: NURSE PRACTITIONER

## 2018-01-01 PROCEDURE — 82784 ASSAY IGA/IGD/IGG/IGM EACH: CPT

## 2018-01-01 PROCEDURE — 87070 CULTURE OTHR SPECIMN AEROBIC: CPT

## 2018-01-01 PROCEDURE — 97602 WOUND(S) CARE NON-SELECTIVE: CPT | Performed by: NURSE PRACTITIONER

## 2018-01-01 PROCEDURE — 80053 COMPREHEN METABOLIC PANEL: CPT

## 2018-01-01 PROCEDURE — 94003 VENT MGMT INPAT SUBQ DAY: CPT

## 2018-01-01 PROCEDURE — 87449 NOS EACH ORGANISM AG IA: CPT

## 2018-01-01 PROCEDURE — 86255 FLUORESCENT ANTIBODY SCREEN: CPT

## 2018-01-01 PROCEDURE — 80307 DRUG TEST PRSMV CHEM ANLYZR: CPT

## 2018-01-01 PROCEDURE — 99285 EMERGENCY DEPT VISIT HI MDM: CPT

## 2018-01-01 PROCEDURE — 36556 INSERT NON-TUNNEL CV CATH: CPT

## 2018-01-01 PROCEDURE — 6360000002 HC RX W HCPCS: Performed by: NURSE PRACTITIONER

## 2018-01-01 PROCEDURE — 11042 DBRDMT SUBQ TIS 1ST 20SQCM/<: CPT | Performed by: NURSE PRACTITIONER

## 2018-01-01 PROCEDURE — 87483 CNS DNA AMP PROBE TYPE 12-25: CPT

## 2018-01-01 PROCEDURE — 94799 UNLISTED PULMONARY SVC/PX: CPT

## 2018-01-01 PROCEDURE — 95816 EEG AWAKE AND DROWSY: CPT

## 2018-01-01 PROCEDURE — 36592 COLLECT BLOOD FROM PICC: CPT

## 2018-01-01 PROCEDURE — 84443 ASSAY THYROID STIM HORMONE: CPT

## 2018-01-01 PROCEDURE — 99291 CRITICAL CARE FIRST HOUR: CPT | Performed by: INTERNAL MEDICINE

## 2018-01-01 PROCEDURE — 80165 DIPROPYLACETIC ACID FREE: CPT

## 2018-01-01 PROCEDURE — 2500000003 HC RX 250 WO HCPCS: Performed by: EMERGENCY MEDICINE

## 2018-01-01 PROCEDURE — 94770 HC ETCO2 MONITOR DAILY: CPT

## 2018-01-01 PROCEDURE — 99213 OFFICE O/P EST LOW 20 MIN: CPT | Performed by: NURSE PRACTITIONER

## 2018-01-01 PROCEDURE — 93005 ELECTROCARDIOGRAM TRACING: CPT | Performed by: EMERGENCY MEDICINE

## 2018-01-01 PROCEDURE — 6370000000 HC RX 637 (ALT 250 FOR IP): Performed by: EMERGENCY MEDICINE

## 2018-01-01 PROCEDURE — 82945 GLUCOSE OTHER FLUID: CPT

## 2018-01-01 PROCEDURE — 1111F DSCHRG MED/CURRENT MED MERGE: CPT | Performed by: NURSE PRACTITIONER

## 2018-01-01 PROCEDURE — 2580000003 HC RX 258

## 2018-01-01 PROCEDURE — 1123F ACP DISCUSS/DSCN MKR DOCD: CPT | Performed by: NURSE PRACTITIONER

## 2018-01-01 PROCEDURE — 2500000003 HC RX 250 WO HCPCS: Performed by: PSYCHIATRY & NEUROLOGY

## 2018-01-01 PROCEDURE — 99211 OFF/OP EST MAY X REQ PHY/QHP: CPT | Performed by: NURSE PRACTITIONER

## 2018-01-01 PROCEDURE — G8400 PT W/DXA NO RESULTS DOC: HCPCS | Performed by: NURSE PRACTITIONER

## 2018-01-01 PROCEDURE — 83930 ASSAY OF BLOOD OSMOLALITY: CPT

## 2018-01-01 PROCEDURE — 99231 SBSQ HOSP IP/OBS SF/LOW 25: CPT | Performed by: INTERNAL MEDICINE

## 2018-01-01 PROCEDURE — 11042 DBRDMT SUBQ TIS 1ST 20SQCM/<: CPT

## 2018-01-01 PROCEDURE — 96375 TX/PRO/DX INJ NEW DRUG ADDON: CPT

## 2018-01-01 PROCEDURE — 83916 OLIGOCLONAL BANDS: CPT

## 2018-01-01 PROCEDURE — G8996 SWALLOW CURRENT STATUS: HCPCS

## 2018-01-01 PROCEDURE — 94002 VENT MGMT INPAT INIT DAY: CPT

## 2018-01-01 PROCEDURE — 51702 INSERT TEMP BLADDER CATH: CPT

## 2018-01-01 PROCEDURE — 74230 X-RAY XM SWLNG FUNCJ C+: CPT

## 2018-01-01 PROCEDURE — 87077 CULTURE AEROBIC IDENTIFY: CPT

## 2018-01-01 PROCEDURE — 2580000003 HC RX 258: Performed by: PSYCHIATRY & NEUROLOGY

## 2018-01-01 PROCEDURE — 97162 PT EVAL MOD COMPLEX 30 MIN: CPT

## 2018-01-01 PROCEDURE — 70450 CT HEAD/BRAIN W/O DYE: CPT

## 2018-01-01 PROCEDURE — 83880 ASSAY OF NATRIURETIC PEPTIDE: CPT

## 2018-01-01 PROCEDURE — 5A1955Z RESPIRATORY VENTILATION, GREATER THAN 96 CONSECUTIVE HOURS: ICD-10-PCS | Performed by: FAMILY MEDICINE

## 2018-01-01 PROCEDURE — 2580000003 HC RX 258: Performed by: EMERGENCY MEDICINE

## 2018-01-01 PROCEDURE — 99214 OFFICE O/P EST MOD 30 MIN: CPT | Performed by: NURSE PRACTITIONER

## 2018-01-01 PROCEDURE — 87186 SC STD MICRODIL/AGAR DIL: CPT

## 2018-01-01 PROCEDURE — 97597 DBRDMT OPN WND 1ST 20 CM/<: CPT

## 2018-01-01 PROCEDURE — 99213 OFFICE O/P EST LOW 20 MIN: CPT

## 2018-01-01 PROCEDURE — 87804 INFLUENZA ASSAY W/OPTIC: CPT

## 2018-01-01 PROCEDURE — G8997 SWALLOW GOAL STATUS: HCPCS

## 2018-01-01 PROCEDURE — 2500000003 HC RX 250 WO HCPCS: Performed by: FAMILY MEDICINE

## 2018-01-01 PROCEDURE — 82042 OTHER SOURCE ALBUMIN QUAN EA: CPT

## 2018-01-01 PROCEDURE — 80164 ASSAY DIPROPYLACETIC ACD TOT: CPT

## 2018-01-01 PROCEDURE — 87205 SMEAR GRAM STAIN: CPT

## 2018-01-01 PROCEDURE — 02HV33Z INSERTION OF INFUSION DEVICE INTO SUPERIOR VENA CAVA, PERCUTANEOUS APPROACH: ICD-10-PCS | Performed by: FAMILY MEDICINE

## 2018-01-01 PROCEDURE — G8484 FLU IMMUNIZE NO ADMIN: HCPCS | Performed by: NURSE PRACTITIONER

## 2018-01-01 PROCEDURE — 97597 DBRDMT OPN WND 1ST 20 CM/<: CPT | Performed by: NURSE PRACTITIONER

## 2018-01-01 PROCEDURE — 86341 ISLET CELL ANTIBODY: CPT

## 2018-01-01 PROCEDURE — 96361 HYDRATE IV INFUSION ADD-ON: CPT

## 2018-01-01 PROCEDURE — 94150 VITAL CAPACITY TEST: CPT

## 2018-01-01 PROCEDURE — G8427 DOCREV CUR MEDS BY ELIG CLIN: HCPCS | Performed by: NURSE PRACTITIONER

## 2018-01-01 PROCEDURE — 92611 MOTION FLUOROSCOPY/SWALLOW: CPT

## 2018-01-01 PROCEDURE — 2580000003 HC RX 258: Performed by: NURSE PRACTITIONER

## 2018-01-01 PROCEDURE — 009U3ZX DRAINAGE OF SPINAL CANAL, PERCUTANEOUS APPROACH, DIAGNOSTIC: ICD-10-PCS | Performed by: INTERNAL MEDICINE

## 2018-01-01 PROCEDURE — 84484 ASSAY OF TROPONIN QUANT: CPT

## 2018-01-01 PROCEDURE — 97110 THERAPEUTIC EXERCISES: CPT

## 2018-01-01 PROCEDURE — 80177 DRUG SCRN QUAN LEVETIRACETAM: CPT

## 2018-01-01 PROCEDURE — 70551 MRI BRAIN STEM W/O DYE: CPT

## 2018-01-01 PROCEDURE — 99232 SBSQ HOSP IP/OBS MODERATE 35: CPT | Performed by: INTERNAL MEDICINE

## 2018-01-01 PROCEDURE — 92610 EVALUATE SWALLOWING FUNCTION: CPT

## 2018-01-01 PROCEDURE — 87493 C DIFF AMPLIFIED PROBE: CPT

## 2018-01-01 PROCEDURE — 84157 ASSAY OF PROTEIN OTHER: CPT

## 2018-01-01 PROCEDURE — 94762 N-INVAS EAR/PLS OXIMTRY CONT: CPT

## 2018-01-01 PROCEDURE — 82570 ASSAY OF URINE CREATININE: CPT

## 2018-01-01 RX ORDER — HYDRALAZINE HYDROCHLORIDE 50 MG/1
50 TABLET, FILM COATED ORAL EVERY 8 HOURS SCHEDULED
Status: DISCONTINUED | OUTPATIENT
Start: 2018-01-01 | End: 2018-01-01 | Stop reason: HOSPADM

## 2018-01-01 RX ORDER — LEVETIRACETAM 5 MG/ML
500 INJECTION INTRAVASCULAR ONCE
Status: COMPLETED | OUTPATIENT
Start: 2018-01-01 | End: 2018-01-01

## 2018-01-01 RX ORDER — SODIUM BICARBONATE 650 MG/1
650 TABLET ORAL 2 TIMES DAILY
Status: DISCONTINUED | OUTPATIENT
Start: 2018-01-01 | End: 2018-01-01 | Stop reason: HOSPADM

## 2018-01-01 RX ORDER — LABETALOL HYDROCHLORIDE 5 MG/ML
10 INJECTION, SOLUTION INTRAVENOUS EVERY 4 HOURS PRN
Status: DISCONTINUED | OUTPATIENT
Start: 2018-01-01 | End: 2018-01-01

## 2018-01-01 RX ORDER — ASPIRIN 81 MG/1
81 TABLET, CHEWABLE ORAL DAILY
Status: DISCONTINUED | OUTPATIENT
Start: 2018-01-01 | End: 2018-01-01 | Stop reason: HOSPADM

## 2018-01-01 RX ORDER — SODIUM CHLORIDE 0.9 % (FLUSH) 0.9 %
10 SYRINGE (ML) INJECTION EVERY 12 HOURS SCHEDULED
Status: DISCONTINUED | OUTPATIENT
Start: 2018-01-01 | End: 2018-01-01 | Stop reason: HOSPADM

## 2018-01-01 RX ORDER — HYDRALAZINE HYDROCHLORIDE 50 MG/1
50 TABLET, FILM COATED ORAL 3 TIMES DAILY
Status: DISCONTINUED | OUTPATIENT
Start: 2018-01-01 | End: 2018-01-01 | Stop reason: HOSPADM

## 2018-01-01 RX ORDER — PANTOPRAZOLE SODIUM 40 MG/1
40 TABLET, DELAYED RELEASE ORAL
Status: DISCONTINUED | OUTPATIENT
Start: 2018-01-01 | End: 2018-01-01 | Stop reason: HOSPADM

## 2018-01-01 RX ORDER — INSULIN GLARGINE 100 [IU]/ML
25 INJECTION, SOLUTION SUBCUTANEOUS NIGHTLY
Status: DISCONTINUED | OUTPATIENT
Start: 2018-01-01 | End: 2018-01-01 | Stop reason: HOSPADM

## 2018-01-01 RX ORDER — HYDRALAZINE HYDROCHLORIDE 20 MG/ML
INJECTION INTRAMUSCULAR; INTRAVENOUS
Status: COMPLETED
Start: 2018-01-01 | End: 2018-01-01

## 2018-01-01 RX ORDER — PANTOPRAZOLE SODIUM 40 MG/10ML
40 INJECTION, POWDER, LYOPHILIZED, FOR SOLUTION INTRAVENOUS DAILY
Status: DISCONTINUED | OUTPATIENT
Start: 2018-01-01 | End: 2018-01-01

## 2018-01-01 RX ORDER — SODIUM CHLORIDE 450 MG/100ML
INJECTION, SOLUTION INTRAVENOUS CONTINUOUS
Status: DISCONTINUED | OUTPATIENT
Start: 2018-01-01 | End: 2018-01-01

## 2018-01-01 RX ORDER — LIDOCAINE HYDROCHLORIDE 40 MG/ML
SOLUTION TOPICAL ONCE
Status: DISCONTINUED | OUTPATIENT
Start: 2018-01-01 | End: 2018-01-01 | Stop reason: HOSPADM

## 2018-01-01 RX ORDER — ACETAMINOPHEN 650 MG/1
650 SUPPOSITORY RECTAL EVERY 4 HOURS PRN
Status: DISCONTINUED | OUTPATIENT
Start: 2018-01-01 | End: 2018-01-01 | Stop reason: HOSPADM

## 2018-01-01 RX ORDER — CARVEDILOL 12.5 MG/1
12.5 TABLET ORAL 2 TIMES DAILY WITH MEALS
Status: DISCONTINUED | OUTPATIENT
Start: 2018-01-01 | End: 2018-01-01 | Stop reason: HOSPADM

## 2018-01-01 RX ORDER — LABETALOL HYDROCHLORIDE 5 MG/ML
20 INJECTION, SOLUTION INTRAVENOUS ONCE
Status: COMPLETED | OUTPATIENT
Start: 2018-01-01 | End: 2018-01-01

## 2018-01-01 RX ORDER — ALBUTEROL SULFATE 2.5 MG/3ML
2.5 SOLUTION RESPIRATORY (INHALATION) EVERY 4 HOURS PRN
Status: DISCONTINUED | OUTPATIENT
Start: 2018-01-01 | End: 2018-01-01 | Stop reason: HOSPADM

## 2018-01-01 RX ORDER — AMLODIPINE BESYLATE 5 MG/1
5 TABLET ORAL EVERY EVENING
Status: DISCONTINUED | OUTPATIENT
Start: 2018-01-01 | End: 2018-01-01

## 2018-01-01 RX ORDER — INSULIN GLARGINE 100 [IU]/ML
10 INJECTION, SOLUTION SUBCUTANEOUS NIGHTLY
COMMUNITY

## 2018-01-01 RX ORDER — HYDRALAZINE HYDROCHLORIDE 20 MG/ML
10 INJECTION INTRAMUSCULAR; INTRAVENOUS EVERY 4 HOURS PRN
Status: DISCONTINUED | OUTPATIENT
Start: 2018-01-01 | End: 2018-01-01 | Stop reason: HOSPADM

## 2018-01-01 RX ORDER — PROPOFOL 10 MG/ML
10 INJECTION, EMULSION INTRAVENOUS
Status: DISCONTINUED | OUTPATIENT
Start: 2018-01-01 | End: 2018-01-01

## 2018-01-01 RX ORDER — LIDOCAINE HYDROCHLORIDE 40 MG/ML
SOLUTION TOPICAL PRN
Status: DISCONTINUED | OUTPATIENT
Start: 2018-01-01 | End: 2018-01-01 | Stop reason: HOSPADM

## 2018-01-01 RX ORDER — FENTANYL CITRATE 50 UG/ML
INJECTION, SOLUTION INTRAMUSCULAR; INTRAVENOUS
Status: COMPLETED
Start: 2018-01-01 | End: 2018-01-01

## 2018-01-01 RX ORDER — HYDRALAZINE HYDROCHLORIDE 50 MG/1
50 TABLET, FILM COATED ORAL 3 TIMES DAILY
Status: DISCONTINUED | OUTPATIENT
Start: 2018-01-01 | End: 2018-01-01

## 2018-01-01 RX ORDER — FENTANYL CITRATE 50 UG/ML
50 INJECTION, SOLUTION INTRAMUSCULAR; INTRAVENOUS ONCE
Status: COMPLETED | OUTPATIENT
Start: 2018-01-01 | End: 2018-01-01

## 2018-01-01 RX ORDER — LEVETIRACETAM 500 MG/1
1000 TABLET ORAL 2 TIMES DAILY
Status: DISCONTINUED | OUTPATIENT
Start: 2018-01-01 | End: 2018-01-01 | Stop reason: HOSPADM

## 2018-01-01 RX ORDER — CHLORHEXIDINE GLUCONATE 0.12 MG/ML
15 RINSE ORAL 2 TIMES DAILY
Status: DISCONTINUED | OUTPATIENT
Start: 2018-01-01 | End: 2018-01-01 | Stop reason: HOSPADM

## 2018-01-01 RX ORDER — OLANZAPINE 10 MG/1
2.5 INJECTION, POWDER, LYOPHILIZED, FOR SOLUTION INTRAMUSCULAR
Status: DISPENSED | OUTPATIENT
Start: 2018-01-01 | End: 2018-01-01

## 2018-01-01 RX ORDER — LORAZEPAM 2 MG/ML
2 INJECTION INTRAMUSCULAR ONCE
Status: COMPLETED | OUTPATIENT
Start: 2018-01-01 | End: 2018-01-01

## 2018-01-01 RX ORDER — HYDRALAZINE HYDROCHLORIDE 20 MG/ML
10 INJECTION INTRAMUSCULAR; INTRAVENOUS ONCE
Status: COMPLETED | OUTPATIENT
Start: 2018-01-01 | End: 2018-01-01

## 2018-01-01 RX ORDER — DEXTROSE MONOHYDRATE 25 G/50ML
12.5 INJECTION, SOLUTION INTRAVENOUS PRN
Status: DISCONTINUED | OUTPATIENT
Start: 2018-01-01 | End: 2018-01-01 | Stop reason: HOSPADM

## 2018-01-01 RX ORDER — LIDOCAINE HYDROCHLORIDE 40 MG/ML
SOLUTION TOPICAL ONCE
Status: DISCONTINUED | OUTPATIENT
Start: 2018-01-01 | End: 2019-01-01 | Stop reason: HOSPADM

## 2018-01-01 RX ORDER — NICOTINE POLACRILEX 4 MG
15 LOZENGE BUCCAL PRN
Status: DISCONTINUED | OUTPATIENT
Start: 2018-01-01 | End: 2018-01-01 | Stop reason: HOSPADM

## 2018-01-01 RX ORDER — POTASSIUM CHLORIDE 7.45 MG/ML
10 INJECTION INTRAVENOUS ONCE
Status: COMPLETED | OUTPATIENT
Start: 2018-01-01 | End: 2018-01-01

## 2018-01-01 RX ORDER — METOPROLOL TARTRATE 5 MG/5ML
7.5 INJECTION INTRAVENOUS EVERY 6 HOURS
Status: DISCONTINUED | OUTPATIENT
Start: 2018-01-01 | End: 2018-01-01

## 2018-01-01 RX ORDER — POTASSIUM CHLORIDE 7.45 MG/ML
10 INJECTION INTRAVENOUS
Status: COMPLETED | OUTPATIENT
Start: 2018-01-01 | End: 2018-01-01

## 2018-01-01 RX ORDER — TORSEMIDE 20 MG/1
10 TABLET ORAL DAILY
Status: DISCONTINUED | OUTPATIENT
Start: 2018-01-01 | End: 2018-01-01

## 2018-01-01 RX ORDER — LORAZEPAM 2 MG/ML
INJECTION INTRAMUSCULAR
Status: COMPLETED
Start: 2018-01-01 | End: 2018-01-01

## 2018-01-01 RX ORDER — VALPROIC ACID 250 MG/1
500 CAPSULE, LIQUID FILLED ORAL 2 TIMES DAILY
Qty: 120 CAPSULE | Refills: 3 | Status: ON HOLD | OUTPATIENT
Start: 2018-01-01 | End: 2019-01-01

## 2018-01-01 RX ORDER — SODIUM CHLORIDE 9 MG/ML
INJECTION, SOLUTION INTRAVENOUS
Status: DISPENSED
Start: 2018-01-01 | End: 2018-01-01

## 2018-01-01 RX ORDER — SODIUM CHLORIDE, SODIUM LACTATE, POTASSIUM CHLORIDE, CALCIUM CHLORIDE 600; 310; 30; 20 MG/100ML; MG/100ML; MG/100ML; MG/100ML
INJECTION, SOLUTION INTRAVENOUS CONTINUOUS
Status: ACTIVE | OUTPATIENT
Start: 2018-01-01 | End: 2018-01-01

## 2018-01-01 RX ORDER — SODIUM CHLORIDE 9 MG/ML
INJECTION, SOLUTION INTRAVENOUS CONTINUOUS
Status: DISCONTINUED | OUTPATIENT
Start: 2018-01-01 | End: 2018-01-01

## 2018-01-01 RX ORDER — TORSEMIDE 20 MG/1
10 TABLET ORAL DAILY
Status: ON HOLD | COMMUNITY
End: 2018-01-01 | Stop reason: HOSPADM

## 2018-01-01 RX ORDER — POTASSIUM CHLORIDE 7.45 MG/ML
10 INJECTION INTRAVENOUS
Status: DISPENSED | OUTPATIENT
Start: 2018-01-01 | End: 2018-01-01

## 2018-01-01 RX ORDER — LIDOCAINE 50 MG/G
OINTMENT TOPICAL PRN
Status: DISCONTINUED | OUTPATIENT
Start: 2018-01-01 | End: 2018-01-01 | Stop reason: HOSPADM

## 2018-01-01 RX ORDER — DEXTROSE MONOHYDRATE 50 MG/ML
100 INJECTION, SOLUTION INTRAVENOUS PRN
Status: DISCONTINUED | OUTPATIENT
Start: 2018-01-01 | End: 2018-01-01 | Stop reason: HOSPADM

## 2018-01-01 RX ORDER — LORAZEPAM 2 MG/ML
4 INJECTION INTRAMUSCULAR ONCE
Status: COMPLETED | OUTPATIENT
Start: 2018-01-01 | End: 2018-01-01

## 2018-01-01 RX ORDER — SODIUM CHLORIDE 0.9 % (FLUSH) 0.9 %
10 SYRINGE (ML) INJECTION PRN
Status: DISCONTINUED | OUTPATIENT
Start: 2018-01-01 | End: 2018-01-01 | Stop reason: HOSPADM

## 2018-01-01 RX ORDER — ALBUTEROL SULFATE 2.5 MG/3ML
2.5 SOLUTION RESPIRATORY (INHALATION) 2 TIMES DAILY
Status: DISCONTINUED | OUTPATIENT
Start: 2018-01-01 | End: 2018-01-01

## 2018-01-01 RX ORDER — FAMOTIDINE 20 MG/1
20 TABLET, FILM COATED ORAL 2 TIMES DAILY
Status: DISCONTINUED | OUTPATIENT
Start: 2018-01-01 | End: 2018-01-01

## 2018-01-01 RX ORDER — RANITIDINE 300 MG/1
300 TABLET ORAL NIGHTLY
Status: ON HOLD | COMMUNITY
End: 2018-01-01 | Stop reason: HOSPADM

## 2018-01-01 RX ORDER — HALOPERIDOL 5 MG/ML
0.5 INJECTION INTRAMUSCULAR ONCE
Status: DISCONTINUED | OUTPATIENT
Start: 2018-01-01 | End: 2018-01-01

## 2018-01-01 RX ORDER — AMLODIPINE BESYLATE 10 MG/1
10 TABLET ORAL DAILY
Status: DISCONTINUED | OUTPATIENT
Start: 2018-01-01 | End: 2018-01-01

## 2018-01-01 RX ORDER — DEXTROSE MONOHYDRATE 50 MG/ML
INJECTION, SOLUTION INTRAVENOUS CONTINUOUS
Status: DISCONTINUED | OUTPATIENT
Start: 2018-01-01 | End: 2018-01-01

## 2018-01-01 RX ORDER — PREDNISONE 20 MG/1
60 TABLET ORAL ONCE
Status: DISCONTINUED | OUTPATIENT
Start: 2018-01-01 | End: 2018-01-01

## 2018-01-01 RX ORDER — VALPROIC ACID 250 MG/1
500 CAPSULE, LIQUID FILLED ORAL 2 TIMES DAILY
Qty: 120 CAPSULE | Refills: 3 | Status: ON HOLD | OUTPATIENT
Start: 2018-01-01 | End: 2018-01-01 | Stop reason: HOSPADM

## 2018-01-01 RX ORDER — ETOMIDATE 2 MG/ML
0.3 INJECTION INTRAVENOUS ONCE
Status: COMPLETED | OUTPATIENT
Start: 2018-01-01 | End: 2018-01-01

## 2018-01-01 RX ORDER — HYDRALAZINE HYDROCHLORIDE 20 MG/ML
10 INJECTION INTRAMUSCULAR; INTRAVENOUS EVERY 6 HOURS PRN
Status: DISCONTINUED | OUTPATIENT
Start: 2018-01-01 | End: 2018-01-01

## 2018-01-01 RX ORDER — METOPROLOL TARTRATE 5 MG/5ML
5 INJECTION INTRAVENOUS EVERY 6 HOURS
Status: DISCONTINUED | OUTPATIENT
Start: 2018-01-01 | End: 2018-01-01

## 2018-01-01 RX ORDER — HEPARIN SODIUM 5000 [USP'U]/ML
5000 INJECTION, SOLUTION INTRAVENOUS; SUBCUTANEOUS EVERY 8 HOURS SCHEDULED
Status: ACTIVE | OUTPATIENT
Start: 2018-01-01 | End: 2018-01-01

## 2018-01-01 RX ORDER — LEVETIRACETAM 500 MG/1
500 TABLET ORAL 2 TIMES DAILY
Status: DISCONTINUED | OUTPATIENT
Start: 2018-01-01 | End: 2018-01-01 | Stop reason: HOSPADM

## 2018-01-01 RX ORDER — LACTOBACILLUS RHAMNOSUS GG 10B CELL
2 CAPSULE ORAL 2 TIMES DAILY WITH MEALS
Status: DISCONTINUED | OUTPATIENT
Start: 2018-01-01 | End: 2018-01-01 | Stop reason: HOSPADM

## 2018-01-01 RX ORDER — HEPARIN SODIUM 5000 [USP'U]/ML
5000 INJECTION, SOLUTION INTRAVENOUS; SUBCUTANEOUS EVERY 8 HOURS SCHEDULED
Status: COMPLETED | OUTPATIENT
Start: 2018-01-01 | End: 2018-01-01

## 2018-01-01 RX ORDER — CALCIUM CARBONATE 200(500)MG
500 TABLET,CHEWABLE ORAL 3 TIMES DAILY PRN
Status: DISCONTINUED | OUTPATIENT
Start: 2018-01-01 | End: 2018-01-01

## 2018-01-01 RX ORDER — ENALAPRILAT 2.5 MG/2ML
1.25 INJECTION INTRAVENOUS EVERY 8 HOURS
Status: DISCONTINUED | OUTPATIENT
Start: 2018-01-01 | End: 2018-01-01

## 2018-01-01 RX ORDER — ALBUTEROL SULFATE 2.5 MG/3ML
2.5 SOLUTION RESPIRATORY (INHALATION) EVERY 4 HOURS PRN
Status: DISCONTINUED | OUTPATIENT
Start: 2018-01-01 | End: 2018-01-01

## 2018-01-01 RX ORDER — ONDANSETRON 2 MG/ML
4 INJECTION INTRAMUSCULAR; INTRAVENOUS EVERY 6 HOURS PRN
Status: DISCONTINUED | OUTPATIENT
Start: 2018-01-01 | End: 2018-01-01 | Stop reason: HOSPADM

## 2018-01-01 RX ORDER — FERROUS SULFATE TAB EC 324 MG (65 MG FE EQUIVALENT) 324 (65 FE) MG
325 TABLET DELAYED RESPONSE ORAL 2 TIMES DAILY
Status: DISCONTINUED | OUTPATIENT
Start: 2018-01-01 | End: 2018-01-01 | Stop reason: HOSPADM

## 2018-01-01 RX ORDER — IPRATROPIUM BROMIDE AND ALBUTEROL SULFATE 2.5; .5 MG/3ML; MG/3ML
1 SOLUTION RESPIRATORY (INHALATION) ONCE
Status: COMPLETED | OUTPATIENT
Start: 2018-01-01 | End: 2018-01-01

## 2018-01-01 RX ORDER — ACETAMINOPHEN 325 MG/1
650 TABLET ORAL EVERY 6 HOURS PRN
COMMUNITY

## 2018-01-01 RX ORDER — LEVETIRACETAM 1000 MG/1
1000 TABLET ORAL 2 TIMES DAILY
Qty: 60 TABLET | Refills: 3 | Status: SHIPPED | OUTPATIENT
Start: 2018-01-01

## 2018-01-01 RX ORDER — CARVEDILOL 6.25 MG/1
12.5 TABLET ORAL 2 TIMES DAILY WITH MEALS
Status: DISCONTINUED | OUTPATIENT
Start: 2018-01-01 | End: 2018-01-01

## 2018-01-01 RX ORDER — ETOMIDATE 2 MG/ML
INJECTION INTRAVENOUS
Status: DISPENSED
Start: 2018-01-01 | End: 2018-01-01

## 2018-01-01 RX ORDER — LABETALOL HYDROCHLORIDE 5 MG/ML
10 INJECTION, SOLUTION INTRAVENOUS EVERY 4 HOURS PRN
Status: DISCONTINUED | OUTPATIENT
Start: 2018-01-01 | End: 2018-01-01 | Stop reason: HOSPADM

## 2018-01-01 RX ORDER — 0.9 % SODIUM CHLORIDE 0.9 %
1000 INTRAVENOUS SOLUTION INTRAVENOUS ONCE
Status: COMPLETED | OUTPATIENT
Start: 2018-01-01 | End: 2018-01-01

## 2018-01-01 RX ORDER — AMLODIPINE BESYLATE 5 MG/1
5 TABLET ORAL 2 TIMES DAILY
Status: DISCONTINUED | OUTPATIENT
Start: 2018-01-01 | End: 2018-01-01 | Stop reason: HOSPADM

## 2018-01-01 RX ORDER — POLYETHYLENE GLYCOL 3350 17 G/17G
17 POWDER, FOR SOLUTION ORAL DAILY PRN
COMMUNITY

## 2018-01-01 RX ORDER — AMLODIPINE BESYLATE 5 MG/1
5 TABLET ORAL 2 TIMES DAILY
Qty: 60 TABLET | Refills: 3 | Status: ON HOLD | OUTPATIENT
Start: 2018-01-01 | End: 2019-01-01

## 2018-01-01 RX ORDER — LEVETIRACETAM 500 MG/1
500 TABLET ORAL 2 TIMES DAILY
Qty: 60 TABLET | Refills: 3 | Status: ON HOLD | OUTPATIENT
Start: 2018-01-01 | End: 2018-01-01 | Stop reason: HOSPADM

## 2018-01-01 RX ORDER — HYDRALAZINE HYDROCHLORIDE 20 MG/ML
10 INJECTION INTRAMUSCULAR; INTRAVENOUS ONCE
Status: DISCONTINUED | OUTPATIENT
Start: 2018-01-01 | End: 2018-01-01

## 2018-01-01 RX ORDER — FUROSEMIDE 10 MG/ML
10 INJECTION INTRAMUSCULAR; INTRAVENOUS ONCE
Status: COMPLETED | OUTPATIENT
Start: 2018-01-01 | End: 2018-01-01

## 2018-01-01 RX ORDER — SODIUM CHLORIDE 9 MG/ML
INJECTION, SOLUTION INTRAVENOUS
Status: COMPLETED
Start: 2018-01-01 | End: 2018-01-01

## 2018-01-01 RX ORDER — LEVETIRACETAM 500 MG/1
500 TABLET ORAL 2 TIMES DAILY
Status: DISCONTINUED | OUTPATIENT
Start: 2018-01-01 | End: 2018-01-01

## 2018-01-01 RX ORDER — DEXTROSE, SODIUM CHLORIDE, AND POTASSIUM CHLORIDE 5; .45; .15 G/100ML; G/100ML; G/100ML
INJECTION INTRAVENOUS CONTINUOUS
Status: DISCONTINUED | OUTPATIENT
Start: 2018-01-01 | End: 2018-01-01 | Stop reason: HOSPADM

## 2018-01-01 RX ORDER — SODIUM CHLORIDE, SODIUM LACTATE, POTASSIUM CHLORIDE, AND CALCIUM CHLORIDE .6; .31; .03; .02 G/100ML; G/100ML; G/100ML; G/100ML
500 INJECTION, SOLUTION INTRAVENOUS ONCE
Status: COMPLETED | OUTPATIENT
Start: 2018-01-01 | End: 2018-01-01

## 2018-01-01 RX ORDER — LEVETIRACETAM 5 MG/ML
500 INJECTION INTRAVASCULAR EVERY 12 HOURS
Status: DISCONTINUED | OUTPATIENT
Start: 2018-01-01 | End: 2018-01-01 | Stop reason: HOSPADM

## 2018-01-01 RX ORDER — VALPROIC ACID 250 MG/1
500 CAPSULE, LIQUID FILLED ORAL 2 TIMES DAILY
Status: DISCONTINUED | OUTPATIENT
Start: 2018-01-01 | End: 2018-01-01 | Stop reason: HOSPADM

## 2018-01-01 RX ORDER — LIDOCAINE HYDROCHLORIDE 20 MG/ML
5 INJECTION, SOLUTION INFILTRATION; PERINEURAL ONCE
Status: COMPLETED | OUTPATIENT
Start: 2018-01-01 | End: 2018-01-01

## 2018-01-01 RX ORDER — ETOMIDATE 2 MG/ML
INJECTION INTRAVENOUS
Status: COMPLETED
Start: 2018-01-01 | End: 2018-01-01

## 2018-01-01 RX ORDER — NIFEDIPINE 30 MG/1
30 TABLET, EXTENDED RELEASE ORAL DAILY
Status: DISCONTINUED | OUTPATIENT
Start: 2018-01-01 | End: 2018-01-01 | Stop reason: HOSPADM

## 2018-01-01 RX ORDER — POTASSIUM CHLORIDE 20 MEQ/1
40 TABLET, EXTENDED RELEASE ORAL ONCE
Status: COMPLETED | OUTPATIENT
Start: 2018-01-01 | End: 2018-01-01

## 2018-01-01 RX ORDER — ROPINIROLE 1 MG/1
2 TABLET, FILM COATED ORAL NIGHTLY
Status: DISCONTINUED | OUTPATIENT
Start: 2018-01-01 | End: 2018-01-01 | Stop reason: HOSPADM

## 2018-01-01 RX ORDER — METOPROLOL TARTRATE 5 MG/5ML
5 INJECTION INTRAVENOUS EVERY 6 HOURS
Status: DISCONTINUED | OUTPATIENT
Start: 2018-01-01 | End: 2018-01-01 | Stop reason: HOSPADM

## 2018-01-01 RX ORDER — METOPROLOL TARTRATE 5 MG/5ML
5 INJECTION INTRAVENOUS EVERY 6 HOURS PRN
Status: DISCONTINUED | OUTPATIENT
Start: 2018-01-01 | End: 2018-01-01

## 2018-01-01 RX ORDER — ONDANSETRON 4 MG/1
4-8 TABLET, FILM COATED ORAL EVERY 8 HOURS PRN
COMMUNITY
End: 2019-01-01

## 2018-01-01 RX ORDER — DIPHENHYDRAMINE HCL 25 MG
25 CAPSULE ORAL NIGHTLY PRN
COMMUNITY
End: 2018-01-01

## 2018-01-01 RX ORDER — LANOLIN ALCOHOL/MO/W.PET/CERES
6 CREAM (GRAM) TOPICAL NIGHTLY PRN
Status: DISCONTINUED | OUTPATIENT
Start: 2018-01-01 | End: 2018-01-01

## 2018-01-01 RX ORDER — TORSEMIDE 20 MG/1
10 TABLET ORAL 2 TIMES DAILY
Status: DISCONTINUED | OUTPATIENT
Start: 2018-01-01 | End: 2018-01-01

## 2018-01-01 RX ORDER — HYDRALAZINE HYDROCHLORIDE 20 MG/ML
10 INJECTION INTRAMUSCULAR; INTRAVENOUS EVERY 6 HOURS PRN
Status: DISCONTINUED | OUTPATIENT
Start: 2018-01-01 | End: 2018-01-01 | Stop reason: HOSPADM

## 2018-01-01 RX ORDER — LABETALOL HYDROCHLORIDE 5 MG/ML
10 INJECTION, SOLUTION INTRAVENOUS EVERY 6 HOURS PRN
Status: DISCONTINUED | OUTPATIENT
Start: 2018-01-01 | End: 2018-01-01 | Stop reason: HOSPADM

## 2018-01-01 RX ORDER — ALBUTEROL SULFATE 2.5 MG/3ML
2.5 SOLUTION RESPIRATORY (INHALATION) EVERY 6 HOURS PRN
Status: DISCONTINUED | OUTPATIENT
Start: 2018-01-01 | End: 2018-01-01 | Stop reason: HOSPADM

## 2018-01-01 RX ORDER — HEPARIN SODIUM 5000 [USP'U]/ML
5000 INJECTION, SOLUTION INTRAVENOUS; SUBCUTANEOUS EVERY 8 HOURS SCHEDULED
Status: DISCONTINUED | OUTPATIENT
Start: 2018-01-01 | End: 2018-01-01

## 2018-01-01 RX ORDER — OXYBUTYNIN CHLORIDE 5 MG/1
5 TABLET, EXTENDED RELEASE ORAL EVERY EVENING
Status: DISCONTINUED | OUTPATIENT
Start: 2018-01-01 | End: 2018-01-01 | Stop reason: HOSPADM

## 2018-01-01 RX ORDER — METOPROLOL TARTRATE 5 MG/5ML
INJECTION INTRAVENOUS
Status: COMPLETED
Start: 2018-01-01 | End: 2018-01-01

## 2018-01-01 RX ADMIN — DEXMEDETOMIDINE HYDROCHLORIDE 1.1 MCG/KG/HR: 100 INJECTION, SOLUTION INTRAVENOUS at 13:48

## 2018-01-01 RX ADMIN — Medication 15 ML: at 21:00

## 2018-01-01 RX ADMIN — LINAGLIPTIN 5 MG: 5 TABLET, FILM COATED ORAL at 09:47

## 2018-01-01 RX ADMIN — POTASSIUM CHLORIDE 10 MEQ: 7.46 INJECTION, SOLUTION INTRAVENOUS at 09:47

## 2018-01-01 RX ADMIN — LEVETIRACETAM 500 MG: 100 INJECTION, SOLUTION INTRAVENOUS at 10:23

## 2018-01-01 RX ADMIN — AMPICILLIN SODIUM 2 G: 2 INJECTION, POWDER, FOR SOLUTION INTRAMUSCULAR; INTRAVENOUS at 23:34

## 2018-01-01 RX ADMIN — HYDRALAZINE HYDROCHLORIDE 50 MG: 50 TABLET, FILM COATED ORAL at 09:05

## 2018-01-01 RX ADMIN — LEVETIRACETAM 500 MG: 500 TABLET, FILM COATED ORAL at 09:31

## 2018-01-01 RX ADMIN — CARVEDILOL 12.5 MG: 6.25 TABLET, FILM COATED ORAL at 08:05

## 2018-01-01 RX ADMIN — POTASSIUM CHLORIDE 10 MEQ: 7.46 INJECTION, SOLUTION INTRAVENOUS at 17:48

## 2018-01-01 RX ADMIN — FAMOTIDINE 40 MG: 10 INJECTION, SOLUTION INTRAVENOUS at 09:18

## 2018-01-01 RX ADMIN — HYDRALAZINE HYDROCHLORIDE 50 MG: 50 TABLET, FILM COATED ORAL at 08:45

## 2018-01-01 RX ADMIN — ASPIRIN 81 MG 81 MG: 81 TABLET ORAL at 09:47

## 2018-01-01 RX ADMIN — LEVETIRACETAM 500 MG: 100 INJECTION, SOLUTION INTRAVENOUS at 10:20

## 2018-01-01 RX ADMIN — INSULIN LISPRO 2 UNITS: 100 INJECTION, SOLUTION INTRAVENOUS; SUBCUTANEOUS at 11:58

## 2018-01-01 RX ADMIN — SODIUM BICARBONATE: 84 INJECTION, SOLUTION INTRAVENOUS at 08:02

## 2018-01-01 RX ADMIN — HYDRALAZINE HYDROCHLORIDE 50 MG: 50 TABLET, FILM COATED ORAL at 20:13

## 2018-01-01 RX ADMIN — AMLODIPINE BESYLATE 5 MG: 5 TABLET ORAL at 09:07

## 2018-01-01 RX ADMIN — HYDRALAZINE HYDROCHLORIDE 50 MG: 50 TABLET, FILM COATED ORAL at 14:45

## 2018-01-01 RX ADMIN — AMPICILLIN SODIUM 2 G: 2 INJECTION, POWDER, FOR SOLUTION INTRAMUSCULAR; INTRAVENOUS at 17:20

## 2018-01-01 RX ADMIN — VALPROIC ACID 500 MG: 250 CAPSULE, LIQUID FILLED ORAL at 21:40

## 2018-01-01 RX ADMIN — AMPICILLIN SODIUM 2 G: 2 INJECTION, POWDER, FOR SOLUTION INTRAMUSCULAR; INTRAVENOUS at 12:02

## 2018-01-01 RX ADMIN — HYDRALAZINE HYDROCHLORIDE 10 MG: 20 INJECTION INTRAMUSCULAR; INTRAVENOUS at 21:32

## 2018-01-01 RX ADMIN — HYDRALAZINE HYDROCHLORIDE 50 MG: 50 TABLET, FILM COATED ORAL at 20:07

## 2018-01-01 RX ADMIN — POTASSIUM CHLORIDE, DEXTROSE MONOHYDRATE AND SODIUM CHLORIDE: 150; 5; 450 INJECTION, SOLUTION INTRAVENOUS at 06:20

## 2018-01-01 RX ADMIN — ALBUTEROL SULFATE 2.5 MG: 2.5 SOLUTION RESPIRATORY (INHALATION) at 04:50

## 2018-01-01 RX ADMIN — LEVETIRACETAM 500 MG: 100 INJECTION, SOLUTION INTRAVENOUS at 22:41

## 2018-01-01 RX ADMIN — CARVEDILOL 12.5 MG: 6.25 TABLET, FILM COATED ORAL at 17:36

## 2018-01-01 RX ADMIN — VALPROATE SODIUM 1310 MG: 100 INJECTION, SOLUTION INTRAVENOUS at 08:51

## 2018-01-01 RX ADMIN — LORAZEPAM 2 MG: 2 INJECTION INTRAMUSCULAR; INTRAVENOUS at 13:39

## 2018-01-01 RX ADMIN — INSULIN GLARGINE 25 UNITS: 100 INJECTION, SOLUTION SUBCUTANEOUS at 22:23

## 2018-01-01 RX ADMIN — LABETALOL HYDROCHLORIDE 10 MG: 5 INJECTION, SOLUTION INTRAVENOUS at 08:47

## 2018-01-01 RX ADMIN — LABETALOL HYDROCHLORIDE 10 MG: 5 INJECTION, SOLUTION INTRAVENOUS at 00:22

## 2018-01-01 RX ADMIN — AMLODIPINE BESYLATE 5 MG: 5 TABLET ORAL at 20:13

## 2018-01-01 RX ADMIN — VALPROIC ACID 500 MG: 250 CAPSULE, LIQUID FILLED ORAL at 09:07

## 2018-01-01 RX ADMIN — FAMOTIDINE 20 MG: 10 INJECTION, SOLUTION INTRAVENOUS at 08:05

## 2018-01-01 RX ADMIN — METOPROLOL TARTRATE 7.5 MG: 1 INJECTION, SOLUTION INTRAVENOUS at 00:44

## 2018-01-01 RX ADMIN — LEVETIRACETAM 500 MG: 500 TABLET, FILM COATED ORAL at 09:44

## 2018-01-01 RX ADMIN — LEVETIRACETAM 500 MG: 500 TABLET, FILM COATED ORAL at 21:04

## 2018-01-01 RX ADMIN — VITAMIN D, TAB 1000IU (100/BT) 1000 UNITS: 25 TAB at 09:06

## 2018-01-01 RX ADMIN — Medication 10 ML: at 10:00

## 2018-01-01 RX ADMIN — LIDOCAINE HYDROCHLORIDE 5 ML: 20 INJECTION, SOLUTION INFILTRATION; PERINEURAL at 15:53

## 2018-01-01 RX ADMIN — CARVEDILOL 12.5 MG: 12.5 TABLET, FILM COATED ORAL at 16:04

## 2018-01-01 RX ADMIN — Medication 10 ML: at 21:51

## 2018-01-01 RX ADMIN — CARVEDILOL 12.5 MG: 12.5 TABLET, FILM COATED ORAL at 09:31

## 2018-01-01 RX ADMIN — LEVETIRACETAM 500 MG: 100 INJECTION, SOLUTION INTRAVENOUS at 21:53

## 2018-01-01 RX ADMIN — POTASSIUM CHLORIDE 10 MEQ: 7.46 INJECTION, SOLUTION INTRAVENOUS at 16:36

## 2018-01-01 RX ADMIN — Medication 10 ML: at 09:30

## 2018-01-01 RX ADMIN — Medication 10 ML: at 09:41

## 2018-01-01 RX ADMIN — Medication 10 ML: at 22:06

## 2018-01-01 RX ADMIN — HYDRALAZINE HYDROCHLORIDE 50 MG: 50 TABLET, FILM COATED ORAL at 21:39

## 2018-01-01 RX ADMIN — HYDRALAZINE HYDROCHLORIDE 50 MG: 50 TABLET, FILM COATED ORAL at 08:55

## 2018-01-01 RX ADMIN — TORSEMIDE 10 MG: 20 TABLET ORAL at 09:02

## 2018-01-01 RX ADMIN — LEVETIRACETAM 500 MG: 5 INJECTION INTRAVENOUS at 23:07

## 2018-01-01 RX ADMIN — TORSEMIDE 10 MG: 20 TABLET ORAL at 08:05

## 2018-01-01 RX ADMIN — INSULIN LISPRO 1 UNITS: 100 INJECTION, SOLUTION INTRAVENOUS; SUBCUTANEOUS at 08:56

## 2018-01-01 RX ADMIN — DEXMEDETOMIDINE HYDROCHLORIDE 0.5 MCG/KG/HR: 100 INJECTION, SOLUTION INTRAVENOUS at 10:09

## 2018-01-01 RX ADMIN — VITAMIN D, TAB 1000IU (100/BT) 1000 UNITS: 25 TAB at 08:45

## 2018-01-01 RX ADMIN — ENOXAPARIN SODIUM 40 MG: 40 INJECTION SUBCUTANEOUS at 11:00

## 2018-01-01 RX ADMIN — ROPINIROLE HYDROCHLORIDE 2 MG: 1 TABLET, FILM COATED ORAL at 20:14

## 2018-01-01 RX ADMIN — ENOXAPARIN SODIUM 40 MG: 40 INJECTION SUBCUTANEOUS at 08:45

## 2018-01-01 RX ADMIN — Medication 15 ML: at 20:12

## 2018-01-01 RX ADMIN — POTASSIUM CHLORIDE: 2 INJECTION, SOLUTION, CONCENTRATE INTRAVENOUS at 10:21

## 2018-01-01 RX ADMIN — CEFEPIME HYDROCHLORIDE 2 G: 2 INJECTION, POWDER, FOR SOLUTION INTRAVENOUS at 22:39

## 2018-01-01 RX ADMIN — HYDRALAZINE HYDROCHLORIDE 50 MG: 50 TABLET, FILM COATED ORAL at 13:46

## 2018-01-01 RX ADMIN — ASPIRIN 81 MG 81 MG: 81 TABLET ORAL at 08:45

## 2018-01-01 RX ADMIN — INSULIN LISPRO 1 UNITS: 100 INJECTION, SOLUTION INTRAVENOUS; SUBCUTANEOUS at 21:41

## 2018-01-01 RX ADMIN — Medication 10 ML: at 07:52

## 2018-01-01 RX ADMIN — LEVETIRACETAM 500 MG: 100 INJECTION, SOLUTION INTRAVENOUS at 21:31

## 2018-01-01 RX ADMIN — PROPOFOL 40 MCG/KG/MIN: 10 INJECTION, EMULSION INTRAVENOUS at 05:50

## 2018-01-01 RX ADMIN — METOPROLOL TARTRATE 7.5 MG: 1 INJECTION, SOLUTION INTRAVENOUS at 19:04

## 2018-01-01 RX ADMIN — ALBUTEROL SULFATE 2.5 MG: 2.5 SOLUTION RESPIRATORY (INHALATION) at 09:23

## 2018-01-01 RX ADMIN — CEFEPIME HYDROCHLORIDE 1 G: 1 INJECTION, POWDER, FOR SOLUTION INTRAMUSCULAR; INTRAVENOUS at 12:10

## 2018-01-01 RX ADMIN — METOPROLOL TARTRATE 5 MG: 1 INJECTION, SOLUTION INTRAVENOUS at 16:17

## 2018-01-01 RX ADMIN — FUROSEMIDE 10 MG: 10 INJECTION, SOLUTION INTRAMUSCULAR; INTRAVENOUS at 00:27

## 2018-01-01 RX ADMIN — Medication 15 ML: at 20:07

## 2018-01-01 RX ADMIN — HYDRALAZINE HYDROCHLORIDE 50 MG: 50 TABLET, FILM COATED ORAL at 14:53

## 2018-01-01 RX ADMIN — CARVEDILOL 12.5 MG: 6.25 TABLET, FILM COATED ORAL at 17:46

## 2018-01-01 RX ADMIN — VALPROATE SODIUM 1310 MG: 100 INJECTION, SOLUTION INTRAVENOUS at 13:50

## 2018-01-01 RX ADMIN — AMLODIPINE BESYLATE 5 MG: 5 TABLET ORAL at 09:31

## 2018-01-01 RX ADMIN — TORSEMIDE 10 MG: 20 TABLET ORAL at 13:33

## 2018-01-01 RX ADMIN — INSULIN LISPRO 2 UNITS: 100 INJECTION, SOLUTION INTRAVENOUS; SUBCUTANEOUS at 13:48

## 2018-01-01 RX ADMIN — HYDRALAZINE HYDROCHLORIDE 50 MG: 50 TABLET, FILM COATED ORAL at 13:33

## 2018-01-01 RX ADMIN — LEVETIRACETAM 2000 MG: 100 INJECTION, SOLUTION INTRAVENOUS at 12:31

## 2018-01-01 RX ADMIN — LEVETIRACETAM 500 MG: 100 INJECTION, SOLUTION INTRAVENOUS at 10:01

## 2018-01-01 RX ADMIN — FAMOTIDINE 20 MG: 10 INJECTION, SOLUTION INTRAVENOUS at 11:55

## 2018-01-01 RX ADMIN — SODIUM CHLORIDE: 9 INJECTION, SOLUTION INTRAVENOUS at 16:18

## 2018-01-01 RX ADMIN — PROPOFOL 10 MCG/KG/MIN: 10 INJECTION, EMULSION INTRAVENOUS at 05:13

## 2018-01-01 RX ADMIN — Medication 10 ML: at 21:32

## 2018-01-01 RX ADMIN — AMPICILLIN SODIUM 2 G: 2 INJECTION, POWDER, FOR SOLUTION INTRAMUSCULAR; INTRAVENOUS at 00:22

## 2018-01-01 RX ADMIN — SODIUM CHLORIDE 1000 ML: 9 INJECTION, SOLUTION INTRAVENOUS at 04:23

## 2018-01-01 RX ADMIN — LEVETIRACETAM 500 MG: 100 INJECTION, SOLUTION INTRAVENOUS at 10:21

## 2018-01-01 RX ADMIN — DEXTROSE MONOHYDRATE: 50 INJECTION, SOLUTION INTRAVENOUS at 15:33

## 2018-01-01 RX ADMIN — SODIUM CHLORIDE, POTASSIUM CHLORIDE, SODIUM LACTATE AND CALCIUM CHLORIDE 500 ML: 600; 310; 30; 20 INJECTION, SOLUTION INTRAVENOUS at 17:02

## 2018-01-01 RX ADMIN — Medication 10 ML: at 22:57

## 2018-01-01 RX ADMIN — AMLODIPINE BESYLATE 5 MG: 5 TABLET ORAL at 21:42

## 2018-01-01 RX ADMIN — DEXMEDETOMIDINE HYDROCHLORIDE 0.9 MCG/KG/HR: 100 INJECTION, SOLUTION INTRAVENOUS at 21:48

## 2018-01-01 RX ADMIN — VALPROIC ACID 500 MG: 250 CAPSULE, LIQUID FILLED ORAL at 23:05

## 2018-01-01 RX ADMIN — Medication 10 ML: at 09:31

## 2018-01-01 RX ADMIN — VALPROATE SODIUM 1745 MG: 100 INJECTION, SOLUTION INTRAVENOUS at 15:21

## 2018-01-01 RX ADMIN — VALPROIC ACID 500 MG: 250 CAPSULE, LIQUID FILLED ORAL at 08:44

## 2018-01-01 RX ADMIN — METOPROLOL TARTRATE 5 MG: 1 INJECTION, SOLUTION INTRAVENOUS at 04:22

## 2018-01-01 RX ADMIN — ALBUTEROL SULFATE 2.5 MG: 2.5 SOLUTION RESPIRATORY (INHALATION) at 22:30

## 2018-01-01 RX ADMIN — AMLODIPINE BESYLATE 5 MG: 5 TABLET ORAL at 12:11

## 2018-01-01 RX ADMIN — ALBUTEROL SULFATE 2.5 MG: 2.5 SOLUTION RESPIRATORY (INHALATION) at 08:29

## 2018-01-01 RX ADMIN — INSULIN LISPRO 2 UNITS: 100 INJECTION, SOLUTION INTRAVENOUS; SUBCUTANEOUS at 08:46

## 2018-01-01 RX ADMIN — VALPROIC ACID 500 MG: 250 CAPSULE, LIQUID FILLED ORAL at 21:04

## 2018-01-01 RX ADMIN — HYDRALAZINE HYDROCHLORIDE 10 MG: 20 INJECTION INTRAMUSCULAR; INTRAVENOUS at 04:45

## 2018-01-01 RX ADMIN — Medication 10 ML: at 09:19

## 2018-01-01 RX ADMIN — ENOXAPARIN SODIUM 40 MG: 40 INJECTION SUBCUTANEOUS at 09:32

## 2018-01-01 RX ADMIN — INSULIN LISPRO 1 UNITS: 100 INJECTION, SOLUTION INTRAVENOUS; SUBCUTANEOUS at 20:53

## 2018-01-01 RX ADMIN — HYDRALAZINE HYDROCHLORIDE 50 MG: 50 TABLET, FILM COATED ORAL at 14:42

## 2018-01-01 RX ADMIN — Medication 15 ML: at 10:24

## 2018-01-01 RX ADMIN — AMLODIPINE BESYLATE 5 MG: 5 TABLET ORAL at 09:22

## 2018-01-01 RX ADMIN — METOPROLOL TARTRATE 5 MG: 1 INJECTION, SOLUTION INTRAVENOUS at 12:03

## 2018-01-01 RX ADMIN — Medication 10 ML: at 09:05

## 2018-01-01 RX ADMIN — ASPIRIN 81 MG 81 MG: 81 TABLET ORAL at 09:22

## 2018-01-01 RX ADMIN — PANTOPRAZOLE SODIUM 40 MG: 40 TABLET, DELAYED RELEASE ORAL at 06:23

## 2018-01-01 RX ADMIN — VITAMIN D, TAB 1000IU (100/BT) 1000 UNITS: 25 TAB at 09:23

## 2018-01-01 RX ADMIN — ENALAPRILAT 1.25 MG: 2.5 INJECTION INTRAVENOUS at 09:39

## 2018-01-01 RX ADMIN — POTASSIUM CHLORIDE 10 MEQ: 7.46 INJECTION, SOLUTION INTRAVENOUS at 10:39

## 2018-01-01 RX ADMIN — LEVETIRACETAM 500 MG: 500 TABLET ORAL at 09:23

## 2018-01-01 RX ADMIN — ACYCLOVIR SODIUM 480 MG: 50 INJECTION, SOLUTION INTRAVENOUS at 07:52

## 2018-01-01 RX ADMIN — HYDRALAZINE HYDROCHLORIDE 50 MG: 50 TABLET, FILM COATED ORAL at 10:03

## 2018-01-01 RX ADMIN — POTASSIUM CHLORIDE 40 MEQ: 20 TABLET, EXTENDED RELEASE ORAL at 09:45

## 2018-01-01 RX ADMIN — CARVEDILOL 12.5 MG: 12.5 TABLET, FILM COATED ORAL at 08:45

## 2018-01-01 RX ADMIN — SODIUM BICARBONATE 650 MG: 650 TABLET ORAL at 08:45

## 2018-01-01 RX ADMIN — ALBUTEROL SULFATE 2.5 MG: 2.5 SOLUTION RESPIRATORY (INHALATION) at 20:15

## 2018-01-01 RX ADMIN — Medication: at 14:23

## 2018-01-01 RX ADMIN — CARVEDILOL 12.5 MG: 12.5 TABLET, FILM COATED ORAL at 09:47

## 2018-01-01 RX ADMIN — HYDRALAZINE HYDROCHLORIDE 50 MG: 50 TABLET, FILM COATED ORAL at 21:40

## 2018-01-01 RX ADMIN — Medication 10 ML: at 08:47

## 2018-01-01 RX ADMIN — PROPOFOL 40 MCG/KG/MIN: 10 INJECTION, EMULSION INTRAVENOUS at 00:40

## 2018-01-01 RX ADMIN — ENOXAPARIN SODIUM 40 MG: 40 INJECTION SUBCUTANEOUS at 09:07

## 2018-01-01 RX ADMIN — NIFEDIPINE 30 MG: 30 TABLET, FILM COATED, EXTENDED RELEASE ORAL at 11:56

## 2018-01-01 RX ADMIN — Medication 15 ML: at 08:45

## 2018-01-01 RX ADMIN — ENOXAPARIN SODIUM 40 MG: 40 INJECTION SUBCUTANEOUS at 09:47

## 2018-01-01 RX ADMIN — ENALAPRILAT 1.25 MG: 2.5 INJECTION INTRAVENOUS at 03:39

## 2018-01-01 RX ADMIN — PROPOFOL 15 MCG/KG/MIN: 10 INJECTION, EMULSION INTRAVENOUS at 18:15

## 2018-01-01 RX ADMIN — FERROUS SULFATE TAB EC 324 MG (65 MG FE EQUIVALENT) 325 MG: 324 (65 FE) TABLET DELAYED RESPONSE at 09:47

## 2018-01-01 RX ADMIN — SODIUM CHLORIDE 1000 ML: 9 INJECTION, SOLUTION INTRAVENOUS at 10:41

## 2018-01-01 RX ADMIN — PANTOPRAZOLE SODIUM 40 MG: 40 INJECTION, POWDER, FOR SOLUTION INTRAVENOUS at 08:43

## 2018-01-01 RX ADMIN — Medication 10 ML: at 21:40

## 2018-01-01 RX ADMIN — FAMOTIDINE 20 MG: 20 TABLET ORAL at 08:55

## 2018-01-01 RX ADMIN — CARVEDILOL 12.5 MG: 12.5 TABLET, FILM COATED ORAL at 17:27

## 2018-01-01 RX ADMIN — PROPOFOL 25 MCG/KG/MIN: 10 INJECTION, EMULSION INTRAVENOUS at 12:25

## 2018-01-01 RX ADMIN — LORAZEPAM 4 MG: 2 INJECTION INTRAMUSCULAR; INTRAVENOUS at 14:48

## 2018-01-01 RX ADMIN — CARVEDILOL 12.5 MG: 12.5 TABLET, FILM COATED ORAL at 18:03

## 2018-01-01 RX ADMIN — DEXTROSE MONOHYDRATE: 50 INJECTION, SOLUTION INTRAVENOUS at 04:53

## 2018-01-01 RX ADMIN — METOPROLOL TARTRATE 5 MG: 1 INJECTION, SOLUTION INTRAVENOUS at 05:06

## 2018-01-01 RX ADMIN — Medication 10 ML: at 20:07

## 2018-01-01 RX ADMIN — Medication 15 ML: at 21:31

## 2018-01-01 RX ADMIN — FAMOTIDINE 20 MG: 10 INJECTION, SOLUTION INTRAVENOUS at 10:00

## 2018-01-01 RX ADMIN — VALPROIC ACID 500 MG: 250 CAPSULE, LIQUID FILLED ORAL at 20:13

## 2018-01-01 RX ADMIN — METOPROLOL TARTRATE 5 MG: 1 INJECTION, SOLUTION INTRAVENOUS at 22:15

## 2018-01-01 RX ADMIN — HYDRALAZINE HYDROCHLORIDE 50 MG: 50 TABLET, FILM COATED ORAL at 08:06

## 2018-01-01 RX ADMIN — SODIUM BICARBONATE: 84 INJECTION, SOLUTION INTRAVENOUS at 10:16

## 2018-01-01 RX ADMIN — PROPOFOL 40 MCG/KG/MIN: 10 INJECTION, EMULSION INTRAVENOUS at 20:58

## 2018-01-01 RX ADMIN — VALPROIC ACID 500 MG: 250 CAPSULE, LIQUID FILLED ORAL at 09:22

## 2018-01-01 RX ADMIN — Medication 15 ML: at 10:20

## 2018-01-01 RX ADMIN — Medication 15 ML: at 07:46

## 2018-01-01 RX ADMIN — CARVEDILOL 12.5 MG: 6.25 TABLET, FILM COATED ORAL at 08:55

## 2018-01-01 RX ADMIN — LEVETIRACETAM 500 MG: 100 INJECTION, SOLUTION INTRAVENOUS at 23:52

## 2018-01-01 RX ADMIN — INSULIN LISPRO 2 UNITS: 100 INJECTION, SOLUTION INTRAVENOUS; SUBCUTANEOUS at 11:50

## 2018-01-01 RX ADMIN — HYDRALAZINE HYDROCHLORIDE 10 MG: 20 INJECTION INTRAMUSCULAR; INTRAVENOUS at 15:24

## 2018-01-01 RX ADMIN — AMPICILLIN SODIUM 2 G: 2 INJECTION, POWDER, FOR SOLUTION INTRAMUSCULAR; INTRAVENOUS at 05:23

## 2018-01-01 RX ADMIN — METOPROLOL TARTRATE 5 MG: 5 INJECTION, SOLUTION INTRAVENOUS at 09:32

## 2018-01-01 RX ADMIN — FOLIC ACID: 5 INJECTION, SOLUTION INTRAMUSCULAR; INTRAVENOUS; SUBCUTANEOUS at 05:33

## 2018-01-01 RX ADMIN — DEXMEDETOMIDINE HYDROCHLORIDE 0.4 MCG/KG/HR: 100 INJECTION, SOLUTION INTRAVENOUS at 16:49

## 2018-01-01 RX ADMIN — Medication 10 ML: at 08:45

## 2018-01-01 RX ADMIN — POTASSIUM CHLORIDE 10 MEQ: 7.46 INJECTION, SOLUTION INTRAVENOUS at 11:51

## 2018-01-01 RX ADMIN — AMPICILLIN SODIUM 2 G: 2 INJECTION, POWDER, FOR SOLUTION INTRAMUSCULAR; INTRAVENOUS at 11:58

## 2018-01-01 RX ADMIN — FERROUS SULFATE TAB EC 324 MG (65 MG FE EQUIVALENT) 324 MG: 324 (65 FE) TABLET DELAYED RESPONSE at 22:19

## 2018-01-01 RX ADMIN — METOPROLOL TARTRATE 5 MG: 1 INJECTION, SOLUTION INTRAVENOUS at 10:00

## 2018-01-01 RX ADMIN — INSULIN LISPRO 1 UNITS: 100 INJECTION, SOLUTION INTRAVENOUS; SUBCUTANEOUS at 17:39

## 2018-01-01 RX ADMIN — METOPROLOL TARTRATE 5 MG: 1 INJECTION, SOLUTION INTRAVENOUS at 21:31

## 2018-01-01 RX ADMIN — VALPROATE SODIUM 3490 MG: 100 INJECTION, SOLUTION INTRAVENOUS at 15:28

## 2018-01-01 RX ADMIN — CARVEDILOL 12.5 MG: 12.5 TABLET, FILM COATED ORAL at 17:01

## 2018-01-01 RX ADMIN — SODIUM CHLORIDE: 9 INJECTION, SOLUTION INTRAVENOUS at 05:52

## 2018-01-01 RX ADMIN — METOPROLOL TARTRATE 5 MG: 1 INJECTION, SOLUTION INTRAVENOUS at 17:06

## 2018-01-01 RX ADMIN — CEFEPIME HYDROCHLORIDE 2 G: 2 INJECTION, POWDER, FOR SOLUTION INTRAVENOUS at 11:56

## 2018-01-01 RX ADMIN — SODIUM BICARBONATE: 84 INJECTION, SOLUTION INTRAVENOUS at 19:56

## 2018-01-01 RX ADMIN — LORAZEPAM 2 MG: 2 INJECTION INTRAMUSCULAR; INTRAVENOUS at 12:03

## 2018-01-01 RX ADMIN — PROPOFOL 10 MCG/KG/MIN: 10 INJECTION, EMULSION INTRAVENOUS at 16:32

## 2018-01-01 RX ADMIN — Medication 10 ML: at 22:23

## 2018-01-01 RX ADMIN — TORSEMIDE 10 MG: 20 TABLET ORAL at 20:07

## 2018-01-01 RX ADMIN — SODIUM CHLORIDE 500 ML: 9 INJECTION, SOLUTION INTRAVENOUS at 10:10

## 2018-01-01 RX ADMIN — DEXMEDETOMIDINE HYDROCHLORIDE 1.2 MCG/KG/HR: 100 INJECTION, SOLUTION INTRAVENOUS at 02:54

## 2018-01-01 RX ADMIN — LEVETIRACETAM 1000 MG: 500 TABLET ORAL at 22:53

## 2018-01-01 RX ADMIN — POTASSIUM CHLORIDE 10 MEQ: 7.46 INJECTION, SOLUTION INTRAVENOUS at 14:28

## 2018-01-01 RX ADMIN — PANTOPRAZOLE SODIUM 40 MG: 40 INJECTION, POWDER, FOR SOLUTION INTRAVENOUS at 07:52

## 2018-01-01 RX ADMIN — ROPINIROLE HYDROCHLORIDE 2 MG: 1 TABLET, FILM COATED ORAL at 22:53

## 2018-01-01 RX ADMIN — LEVETIRACETAM 500 MG: 500 TABLET ORAL at 20:13

## 2018-01-01 RX ADMIN — AMLODIPINE BESYLATE 5 MG: 5 TABLET ORAL at 17:46

## 2018-01-01 RX ADMIN — Medication 2 CAPSULE: at 09:06

## 2018-01-01 RX ADMIN — CARVEDILOL 12.5 MG: 6.25 TABLET, FILM COATED ORAL at 09:19

## 2018-01-01 RX ADMIN — SODIUM BICARBONATE 650 MG: 650 TABLET ORAL at 21:40

## 2018-01-01 RX ADMIN — ACYCLOVIR SODIUM 480 MG: 50 INJECTION, SOLUTION INTRAVENOUS at 00:14

## 2018-01-01 RX ADMIN — MICONAZOLE NITRATE: 20 POWDER TOPICAL at 12:33

## 2018-01-01 RX ADMIN — CEFTRIAXONE 1 G: 1 INJECTION, POWDER, FOR SOLUTION INTRAMUSCULAR; INTRAVENOUS at 13:41

## 2018-01-01 RX ADMIN — VALPROATE SODIUM 1310 MG: 100 INJECTION, SOLUTION INTRAVENOUS at 09:18

## 2018-01-01 RX ADMIN — LEVETIRACETAM 500 MG: 100 INJECTION, SOLUTION INTRAVENOUS at 21:46

## 2018-01-01 RX ADMIN — INSULIN LISPRO 1 UNITS: 100 INJECTION, SOLUTION INTRAVENOUS; SUBCUTANEOUS at 11:49

## 2018-01-01 RX ADMIN — CEFEPIME HYDROCHLORIDE 1 G: 1 INJECTION, POWDER, FOR SOLUTION INTRAMUSCULAR; INTRAVENOUS at 12:33

## 2018-01-01 RX ADMIN — SODIUM BICARBONATE 650 MG: 650 TABLET ORAL at 09:06

## 2018-01-01 RX ADMIN — VALPROATE SODIUM 1310 MG: 100 INJECTION, SOLUTION INTRAVENOUS at 08:27

## 2018-01-01 RX ADMIN — CARVEDILOL 12.5 MG: 12.5 TABLET, FILM COATED ORAL at 09:06

## 2018-01-01 RX ADMIN — HYDRALAZINE HYDROCHLORIDE 50 MG: 50 TABLET, FILM COATED ORAL at 09:18

## 2018-01-01 RX ADMIN — VALPROIC ACID 500 MG: 250 CAPSULE, LIQUID FILLED ORAL at 09:32

## 2018-01-01 RX ADMIN — LORAZEPAM 2 MG: 2 INJECTION INTRAMUSCULAR; INTRAVENOUS at 13:40

## 2018-01-01 RX ADMIN — SODIUM BICARBONATE: 84 INJECTION, SOLUTION INTRAVENOUS at 13:25

## 2018-01-01 RX ADMIN — INSULIN LISPRO 1 UNITS: 100 INJECTION, SOLUTION INTRAVENOUS; SUBCUTANEOUS at 09:24

## 2018-01-01 RX ADMIN — VALPROATE SODIUM 1310 MG: 100 INJECTION, SOLUTION INTRAVENOUS at 09:15

## 2018-01-01 RX ADMIN — POTASSIUM CHLORIDE 10 MEQ: 7.46 INJECTION, SOLUTION INTRAVENOUS at 17:37

## 2018-01-01 RX ADMIN — HYDRALAZINE HYDROCHLORIDE 10 MG: 20 INJECTION INTRAMUSCULAR; INTRAVENOUS at 14:28

## 2018-01-01 RX ADMIN — CEFEPIME HYDROCHLORIDE 2 G: 2 INJECTION, POWDER, FOR SOLUTION INTRAVENOUS at 23:06

## 2018-01-01 RX ADMIN — HYDRALAZINE HYDROCHLORIDE 50 MG: 50 TABLET, FILM COATED ORAL at 17:41

## 2018-01-01 RX ADMIN — HEPARIN SODIUM 5000 UNITS: 5000 INJECTION INTRAVENOUS; SUBCUTANEOUS at 22:55

## 2018-01-01 RX ADMIN — AMLODIPINE BESYLATE 5 MG: 5 TABLET ORAL at 08:45

## 2018-01-01 RX ADMIN — OXYBUTYNIN CHLORIDE 5 MG: 5 TABLET, EXTENDED RELEASE ORAL at 22:39

## 2018-01-01 RX ADMIN — VALPROIC ACID 500 MG: 250 CAPSULE, LIQUID FILLED ORAL at 10:18

## 2018-01-01 RX ADMIN — METOPROLOL TARTRATE 5 MG: 5 INJECTION, SOLUTION INTRAVENOUS at 12:03

## 2018-01-01 RX ADMIN — ASPIRIN 81 MG 81 MG: 81 TABLET ORAL at 09:07

## 2018-01-01 RX ADMIN — FAMOTIDINE 20 MG: 20 TABLET ORAL at 20:07

## 2018-01-01 RX ADMIN — Medication 15 ML: at 22:04

## 2018-01-01 RX ADMIN — LEVETIRACETAM 500 MG: 100 INJECTION, SOLUTION INTRAVENOUS at 10:51

## 2018-01-01 RX ADMIN — LEVETIRACETAM 500 MG: 100 INJECTION, SOLUTION INTRAVENOUS at 22:04

## 2018-01-01 RX ADMIN — CARVEDILOL 12.5 MG: 12.5 TABLET, FILM COATED ORAL at 09:43

## 2018-01-01 RX ADMIN — LABETALOL HYDROCHLORIDE 10 MG: 5 INJECTION, SOLUTION INTRAVENOUS at 14:12

## 2018-01-01 RX ADMIN — ENOXAPARIN SODIUM 40 MG: 40 INJECTION SUBCUTANEOUS at 08:47

## 2018-01-01 RX ADMIN — HYDRALAZINE HYDROCHLORIDE 50 MG: 50 TABLET, FILM COATED ORAL at 06:31

## 2018-01-01 RX ADMIN — HYDRALAZINE HYDROCHLORIDE 50 MG: 50 TABLET, FILM COATED ORAL at 09:22

## 2018-01-01 RX ADMIN — SODIUM BICARBONATE 650 MG: 650 TABLET ORAL at 22:53

## 2018-01-01 RX ADMIN — DEXMEDETOMIDINE HYDROCHLORIDE 0.8 MCG/KG/HR: 100 INJECTION, SOLUTION INTRAVENOUS at 18:37

## 2018-01-01 RX ADMIN — LEVETIRACETAM 500 MG: 100 INJECTION, SOLUTION INTRAVENOUS at 22:06

## 2018-01-01 RX ADMIN — LEVETIRACETAM 1000 MG: 500 TABLET ORAL at 09:06

## 2018-01-01 RX ADMIN — FENTANYL CITRATE 50 MCG: 50 INJECTION INTRAMUSCULAR; INTRAVENOUS at 22:25

## 2018-01-01 RX ADMIN — LEVETIRACETAM 500 MG: 100 INJECTION, SOLUTION INTRAVENOUS at 10:14

## 2018-01-01 RX ADMIN — FAMOTIDINE 20 MG: 10 INJECTION, SOLUTION INTRAVENOUS at 21:39

## 2018-01-01 RX ADMIN — Medication 10 ML: at 08:06

## 2018-01-01 RX ADMIN — SODIUM BICARBONATE 650 MG: 650 TABLET ORAL at 09:23

## 2018-01-01 RX ADMIN — FAMOTIDINE 20 MG: 10 INJECTION, SOLUTION INTRAVENOUS at 09:30

## 2018-01-01 RX ADMIN — Medication 15 ML: at 10:04

## 2018-01-01 RX ADMIN — CARVEDILOL 12.5 MG: 6.25 TABLET, FILM COATED ORAL at 18:00

## 2018-01-01 RX ADMIN — Medication 15 ML: at 07:51

## 2018-01-01 RX ADMIN — POTASSIUM CHLORIDE, DEXTROSE MONOHYDRATE AND SODIUM CHLORIDE: 150; 5; 450 INJECTION, SOLUTION INTRAVENOUS at 13:43

## 2018-01-01 RX ADMIN — ALBUTEROL SULFATE 2.5 MG: 2.5 SOLUTION RESPIRATORY (INHALATION) at 09:03

## 2018-01-01 RX ADMIN — LEVETIRACETAM 500 MG: 100 INJECTION, SOLUTION INTRAVENOUS at 10:30

## 2018-01-01 RX ADMIN — Medication 10 ML: at 20:03

## 2018-01-01 RX ADMIN — HYDRALAZINE HYDROCHLORIDE 10 MG: 20 INJECTION INTRAMUSCULAR; INTRAVENOUS at 12:28

## 2018-01-01 RX ADMIN — ENALAPRILAT 1.25 MG: 2.5 INJECTION INTRAVENOUS at 19:08

## 2018-01-01 RX ADMIN — METOPROLOL TARTRATE 5 MG: 5 INJECTION, SOLUTION INTRAVENOUS at 19:08

## 2018-01-01 RX ADMIN — FENTANYL CITRATE 25 MCG/HR: 50 INJECTION INTRAVENOUS at 23:22

## 2018-01-01 RX ADMIN — ENALAPRILAT 1.25 MG: 2.5 INJECTION INTRAVENOUS at 03:54

## 2018-01-01 RX ADMIN — CEFEPIME HYDROCHLORIDE 2 G: 2 INJECTION, POWDER, FOR SOLUTION INTRAVENOUS at 11:01

## 2018-01-01 RX ADMIN — CEFEPIME HYDROCHLORIDE 2 G: 2 INJECTION, POWDER, FOR SOLUTION INTRAVENOUS at 23:25

## 2018-01-01 RX ADMIN — ETOMIDATE 26.2 MG: 2 INJECTION INTRAVENOUS at 22:25

## 2018-01-01 RX ADMIN — PROPOFOL 40 MCG/KG/MIN: 10 INJECTION, EMULSION INTRAVENOUS at 15:57

## 2018-01-01 RX ADMIN — AMPICILLIN SODIUM 2 G: 2 INJECTION, POWDER, FOR SOLUTION INTRAMUSCULAR; INTRAVENOUS at 17:59

## 2018-01-01 RX ADMIN — DEXTROSE MONOHYDRATE 100 ML/HR: 50 INJECTION, SOLUTION INTRAVENOUS at 17:01

## 2018-01-01 RX ADMIN — Medication 15 ML: at 20:59

## 2018-01-01 RX ADMIN — Medication 400 MG: at 04:23

## 2018-01-01 RX ADMIN — DEXMEDETOMIDINE HYDROCHLORIDE 0.2 MCG/KG/HR: 100 INJECTION, SOLUTION INTRAVENOUS at 22:46

## 2018-01-01 RX ADMIN — Medication 10 ML: at 21:45

## 2018-01-01 RX ADMIN — AMPICILLIN SODIUM 2 G: 2 INJECTION, POWDER, FOR SOLUTION INTRAMUSCULAR; INTRAVENOUS at 04:53

## 2018-01-01 RX ADMIN — ALBUTEROL SULFATE 2.5 MG: 2.5 SOLUTION RESPIRATORY (INHALATION) at 06:44

## 2018-01-01 RX ADMIN — FAMOTIDINE 20 MG: 10 INJECTION, SOLUTION INTRAVENOUS at 09:41

## 2018-01-01 RX ADMIN — AMLODIPINE BESYLATE 5 MG: 5 TABLET ORAL at 21:04

## 2018-01-01 RX ADMIN — SODIUM CHLORIDE, POTASSIUM CHLORIDE, SODIUM LACTATE AND CALCIUM CHLORIDE: 600; 310; 30; 20 INJECTION, SOLUTION INTRAVENOUS at 18:51

## 2018-01-01 RX ADMIN — FENTANYL CITRATE 50 MCG: 50 INJECTION, SOLUTION INTRAMUSCULAR; INTRAVENOUS at 22:25

## 2018-01-01 RX ADMIN — Medication 10 ML: at 10:25

## 2018-01-01 RX ADMIN — ENOXAPARIN SODIUM 40 MG: 40 INJECTION SUBCUTANEOUS at 09:24

## 2018-01-01 RX ADMIN — CARVEDILOL 12.5 MG: 12.5 TABLET, FILM COATED ORAL at 09:23

## 2018-01-01 RX ADMIN — SODIUM BICARBONATE: 84 INJECTION, SOLUTION INTRAVENOUS at 22:42

## 2018-01-01 RX ADMIN — LABETALOL HYDROCHLORIDE 20 MG: 5 INJECTION INTRAVENOUS at 12:52

## 2018-01-01 RX ADMIN — LEVETIRACETAM 500 MG: 500 TABLET ORAL at 21:41

## 2018-01-01 RX ADMIN — ETOMIDATE 26.2 MG: 20 INJECTION, SOLUTION INTRAVENOUS at 22:25

## 2018-01-01 RX ADMIN — VALPROATE SODIUM 1310 MG: 100 INJECTION, SOLUTION INTRAVENOUS at 08:41

## 2018-01-01 RX ADMIN — Medication 15 ML: at 10:11

## 2018-01-01 RX ADMIN — HYDRALAZINE HYDROCHLORIDE 50 MG: 50 TABLET, FILM COATED ORAL at 23:32

## 2018-01-01 RX ADMIN — CEFTRIAXONE 1 G: 1 INJECTION, POWDER, FOR SOLUTION INTRAMUSCULAR; INTRAVENOUS at 13:48

## 2018-01-01 RX ADMIN — SODIUM BICARBONATE 650 MG: 650 TABLET ORAL at 20:13

## 2018-01-01 RX ADMIN — CARVEDILOL 12.5 MG: 12.5 TABLET, FILM COATED ORAL at 17:04

## 2018-01-01 RX ADMIN — LORAZEPAM 2 MG: 2 INJECTION INTRAMUSCULAR; INTRAVENOUS at 21:40

## 2018-01-01 RX ADMIN — LEVETIRACETAM 500 MG: 100 INJECTION, SOLUTION INTRAVENOUS at 21:39

## 2018-01-01 RX ADMIN — IPRATROPIUM BROMIDE AND ALBUTEROL SULFATE 1 AMPULE: .5; 3 SOLUTION RESPIRATORY (INHALATION) at 11:10

## 2018-01-01 RX ADMIN — FAMOTIDINE 20 MG: 10 INJECTION, SOLUTION INTRAVENOUS at 21:31

## 2018-01-01 RX ADMIN — ROPINIROLE HYDROCHLORIDE 2 MG: 1 TABLET, FILM COATED ORAL at 21:40

## 2018-01-01 RX ADMIN — HYDRALAZINE HYDROCHLORIDE 50 MG: 50 TABLET, FILM COATED ORAL at 22:54

## 2018-01-01 RX ADMIN — Medication 10 ML: at 08:48

## 2018-01-01 RX ADMIN — LABETALOL HYDROCHLORIDE 10 MG: 5 INJECTION, SOLUTION INTRAVENOUS at 12:51

## 2018-01-01 RX ADMIN — ACYCLOVIR SODIUM 430 MG: 50 INJECTION, SOLUTION INTRAVENOUS at 08:38

## 2018-01-01 RX ADMIN — AMLODIPINE BESYLATE 5 MG: 5 TABLET ORAL at 22:53

## 2018-01-01 RX ADMIN — LEVETIRACETAM 500 MG: 500 TABLET ORAL at 08:44

## 2018-01-01 RX ADMIN — HYDRALAZINE HYDROCHLORIDE 10 MG: 20 INJECTION INTRAMUSCULAR; INTRAVENOUS at 05:54

## 2018-01-01 ASSESSMENT — PAIN SCALES - PAIN ASSESSMENT IN ADVANCED DEMENTIA (PAINAD)
FACIALEXPRESSION: 0
BODYLANGUAGE: 0
NEGVOCALIZATION: 0
BODYLANGUAGE: 0
NEGVOCALIZATION: 0
BODYLANGUAGE: 0
NEGVOCALIZATION: 0
FACIALEXPRESSION: 0
NEGVOCALIZATION: 0
BREATHING: 0
CONSOLABILITY: 0
CONSOLABILITY: 0
FACIALEXPRESSION: 0
NEGVOCALIZATION: 0
BREATHING: 0
CONSOLABILITY: 0
TOTALSCORE: 0
CONSOLABILITY: 0
CONSOLABILITY: 0
BREATHING: 0
BODYLANGUAGE: 0
CONSOLABILITY: 0
NEGVOCALIZATION: 0
TOTALSCORE: 0
NEGVOCALIZATION: 0
BREATHING: 0
TOTALSCORE: 0
FACIALEXPRESSION: 0
CONSOLABILITY: 0
FACIALEXPRESSION: 0
NEGVOCALIZATION: 0
BREATHING: 0
TOTALSCORE: 0
BODYLANGUAGE: 0
TOTALSCORE: 0
CONSOLABILITY: 0
BREATHING: 0
BREATHING: 0
TOTALSCORE: 0
FACIALEXPRESSION: 0
NEGVOCALIZATION: 0
BODYLANGUAGE: 0
TOTALSCORE: 1
BREATHING: 0
CONSOLABILITY: 0
NEGVOCALIZATION: 0
FACIALEXPRESSION: 0
BREATHING: 0
FACIALEXPRESSION: 0
BODYLANGUAGE: 0
FACIALEXPRESSION: 0
CONSOLABILITY: 0
NEGVOCALIZATION: 0
BODYLANGUAGE: 0
BREATHING: 0
TOTALSCORE: 0
BREATHING: 0
FACIALEXPRESSION: 0
NEGVOCALIZATION: 0
BREATHING: 0
NEGVOCALIZATION: 0
BODYLANGUAGE: 1
BREATHING: 0
CONSOLABILITY: 0
NEGVOCALIZATION: 0
BODYLANGUAGE: 0
BREATHING: 0
TOTALSCORE: 1
FACIALEXPRESSION: 0
BREATHING: 0
BODYLANGUAGE: 0
CONSOLABILITY: 0
TOTALSCORE: 0
NEGVOCALIZATION: 0
FACIALEXPRESSION: 0
TOTALSCORE: 0
CONSOLABILITY: 0
TOTALSCORE: 0
BODYLANGUAGE: 1
BODYLANGUAGE: 0
FACIALEXPRESSION: 0
CONSOLABILITY: 0
FACIALEXPRESSION: 0
BODYLANGUAGE: 0
CONSOLABILITY: 0
FACIALEXPRESSION: 0
CONSOLABILITY: 0
CONSOLABILITY: 0
BREATHING: 0
BODYLANGUAGE: 0
BREATHING: 0
TOTALSCORE: 0

## 2018-01-01 ASSESSMENT — PULMONARY FUNCTION TESTS
PIF_VALUE: 37
PIF_VALUE: 26
PIF_VALUE: 27
PIF_VALUE: 25
PIF_VALUE: 21
PIF_VALUE: 27
PIF_VALUE: 32
PIF_VALUE: 23
PIF_VALUE: 33
PIF_VALUE: 37
PIF_VALUE: 14
PIF_VALUE: 13
PIF_VALUE: 22
PIF_VALUE: 22
PIF_VALUE: 29
PIF_VALUE: 30
PIF_VALUE: 21
PIF_VALUE: 29
PIF_VALUE: 13
PIF_VALUE: 28
PIF_VALUE: 35
PIF_VALUE: 28
PIF_VALUE: 26
PIF_VALUE: 24
PIF_VALUE: 32
PIF_VALUE: 29
PIF_VALUE: 29
PIF_VALUE: 24
PIF_VALUE: 21
PIF_VALUE: 25
PIF_VALUE: 21
PIF_VALUE: 26
PIF_VALUE: 15
PIF_VALUE: 23
PIF_VALUE: 27
PIF_VALUE: 13
PIF_VALUE: 24
PIF_VALUE: 29
PIF_VALUE: 24
PIF_VALUE: 21
PIF_VALUE: 19
PIF_VALUE: 22
PIF_VALUE: 25
PIF_VALUE: 22
PIF_VALUE: 21
PIF_VALUE: 27
PIF_VALUE: 33
PIF_VALUE: 24
PIF_VALUE: 21
PIF_VALUE: 26
PIF_VALUE: 14
PIF_VALUE: 34
PIF_VALUE: 24
PIF_VALUE: 22
PIF_VALUE: 27
PIF_VALUE: 24

## 2018-01-01 ASSESSMENT — PAIN SCALES - GENERAL
PAINLEVEL_OUTOF10: 0
PAINLEVEL_OUTOF10: 9
PAINLEVEL_OUTOF10: 0
PAINLEVEL_OUTOF10: 9
PAINLEVEL_OUTOF10: 9
PAINLEVEL_OUTOF10: 0
PAINLEVEL_OUTOF10: 9
PAINLEVEL_OUTOF10: 0
PAINLEVEL_OUTOF10: 9
PAINLEVEL_OUTOF10: 0
PAINLEVEL_OUTOF10: 7
PAINLEVEL_OUTOF10: 0

## 2018-01-01 ASSESSMENT — PAIN DESCRIPTION - LOCATION
LOCATION: LEG
LOCATION: RECTUM
LOCATION: LEG

## 2018-01-01 ASSESSMENT — PAIN SCALES - WONG BAKER
WONGBAKER_NUMERICALRESPONSE: 0
WONGBAKER_NUMERICALRESPONSE: 8
WONGBAKER_NUMERICALRESPONSE: 0

## 2018-01-01 ASSESSMENT — PAIN DESCRIPTION - PROGRESSION
CLINICAL_PROGRESSION: NOT CHANGED

## 2018-01-01 ASSESSMENT — PAIN DESCRIPTION - PAIN TYPE
TYPE: ACUTE PAIN

## 2018-01-01 ASSESSMENT — PAIN DESCRIPTION - FREQUENCY
FREQUENCY: INTERMITTENT
FREQUENCY: CONTINUOUS

## 2018-01-01 ASSESSMENT — ENCOUNTER SYMPTOMS
EYE DISCHARGE: 0
EYE ITCHING: 0
WHEEZING: 0
COUGH: 0
CHOKING: 0
CHEST TIGHTNESS: 0
ALLERGIC/IMMUNOLOGIC NEGATIVE: 1
SHORTNESS OF BREATH: 0
EYE REDNESS: 0
EYES NEGATIVE: 1
PHOTOPHOBIA: 0
GASTROINTESTINAL NEGATIVE: 1
ABDOMINAL DISTENTION: 0
RESPIRATORY NEGATIVE: 1
STRIDOR: 0
EYE PAIN: 0
APNEA: 0

## 2018-01-01 ASSESSMENT — PAIN DESCRIPTION - DESCRIPTORS
DESCRIPTORS: DISCOMFORT;ACHING
DESCRIPTORS: ACHING;DISCOMFORT
DESCRIPTORS: ACHING;DISCOMFORT
DESCRIPTORS: THROBBING
DESCRIPTORS: BURNING;DISCOMFORT
DESCRIPTORS: BURNING

## 2018-01-01 ASSESSMENT — PAIN DESCRIPTION - ORIENTATION
ORIENTATION: RIGHT;LEFT
ORIENTATION: RIGHT;LEFT
ORIENTATION: LEFT;RIGHT
ORIENTATION: LEFT
ORIENTATION: LEFT;LOWER
ORIENTATION: LEFT;RIGHT;LOWER

## 2018-01-01 ASSESSMENT — PAIN DESCRIPTION - ONSET
ONSET: ON-GOING

## 2018-03-15 ENCOUNTER — HOSPITAL ENCOUNTER (OUTPATIENT)
Dept: MRI IMAGING | Age: 68
Discharge: HOME | End: 2018-03-15
Attending: PHYSICAL MEDICINE & REHABILITATION | Admitting: PHYSICAL MEDICINE & REHABILITATION

## 2018-03-15 DIAGNOSIS — M79.602 PAIN OF LEFT ARM: ICD-10-CM

## 2018-03-15 DIAGNOSIS — M79.602 LEFT ARM PAIN: ICD-10-CM

## 2018-03-15 DIAGNOSIS — M50.31 OTHER CERVICAL DISC DEGENERATION, HIGH CERVICAL REGION: ICD-10-CM

## 2018-04-16 ENCOUNTER — HOSPITAL ENCOUNTER (OUTPATIENT)
Dept: WOUND CARE | Age: 68
Discharge: HOME | End: 2018-04-16
Attending: SURGERY | Admitting: SURGERY

## 2018-04-16 VITALS
RESPIRATION RATE: 18 BRPM | HEART RATE: 93 BPM | HEIGHT: 59 IN | SYSTOLIC BLOOD PRESSURE: 165 MMHG | DIASTOLIC BLOOD PRESSURE: 77 MMHG | TEMPERATURE: 97.6 F

## 2018-04-16 DIAGNOSIS — L97.929 CHRONIC VENOUS HYPERTENSION (IDIOPATHIC) WITH ULCER AND INFLAMMATION OF BILATERAL LOWER EXTREMITY (HCC): ICD-10-CM

## 2018-04-16 DIAGNOSIS — I87.333 CHRONIC VENOUS HYPERTENSION (IDIOPATHIC) WITH ULCER AND INFLAMMATION OF BILATERAL LOWER EXTREMITY (HCC): ICD-10-CM

## 2018-04-16 DIAGNOSIS — I87.2 VENOUS STASIS DERMATITIS OF BOTH LOWER EXTREMITIES: Primary | ICD-10-CM

## 2018-04-16 DIAGNOSIS — L97.919 CHRONIC VENOUS HYPERTENSION (IDIOPATHIC) WITH ULCER AND INFLAMMATION OF BILATERAL LOWER EXTREMITY (HCC): ICD-10-CM

## 2018-04-16 PROCEDURE — 11042 DBRDMT SUBQ TIS 1ST 20SQCM/<: CPT | Performed by: SURGERY

## 2018-04-16 PROCEDURE — 99213 OFFICE O/P EST LOW 20 MIN: CPT | Performed by: SURGERY

## 2018-04-16 RX ORDER — LIDOCAINE HYDROCHLORIDE 40 MG/ML
SOLUTION TOPICAL PRN
Status: DISCONTINUED | OUTPATIENT
Start: 2018-04-16 | End: 2018-04-17 | Stop reason: HOSPADM

## 2018-04-16 ASSESSMENT — PAIN DESCRIPTION - ONSET: ONSET: ON-GOING

## 2018-04-16 ASSESSMENT — PAIN SCALES - GENERAL: PAINLEVEL_OUTOF10: 8

## 2018-04-16 ASSESSMENT — PAIN DESCRIPTION - PROGRESSION: CLINICAL_PROGRESSION: NOT CHANGED

## 2018-04-16 ASSESSMENT — PAIN DESCRIPTION - FREQUENCY: FREQUENCY: INTERMITTENT

## 2018-04-16 ASSESSMENT — PAIN DESCRIPTION - DESCRIPTORS: DESCRIPTORS: ACHING;DISCOMFORT

## 2018-04-16 ASSESSMENT — PAIN DESCRIPTION - PAIN TYPE: TYPE: ACUTE PAIN

## 2018-04-16 ASSESSMENT — PAIN DESCRIPTION - ORIENTATION: ORIENTATION: LEFT;RIGHT

## 2018-04-16 ASSESSMENT — PAIN DESCRIPTION - LOCATION: LOCATION: LEG

## 2018-04-23 ENCOUNTER — HOSPITAL ENCOUNTER (OUTPATIENT)
Dept: WOUND CARE | Age: 68
Discharge: HOME | End: 2018-04-23
Attending: SURGERY | Admitting: SURGERY

## 2018-04-23 VITALS
RESPIRATION RATE: 18 BRPM | TEMPERATURE: 98.7 F | DIASTOLIC BLOOD PRESSURE: 76 MMHG | HEART RATE: 83 BPM | SYSTOLIC BLOOD PRESSURE: 143 MMHG

## 2018-04-23 DIAGNOSIS — L97.929 CHRONIC VENOUS HYPERTENSION (IDIOPATHIC) WITH ULCER AND INFLAMMATION OF BILATERAL LOWER EXTREMITY (HCC): Primary | ICD-10-CM

## 2018-04-23 DIAGNOSIS — I87.333 CHRONIC VENOUS HYPERTENSION (IDIOPATHIC) WITH ULCER AND INFLAMMATION OF BILATERAL LOWER EXTREMITY (HCC): Primary | ICD-10-CM

## 2018-04-23 DIAGNOSIS — L97.919 CHRONIC VENOUS HYPERTENSION (IDIOPATHIC) WITH ULCER AND INFLAMMATION OF BILATERAL LOWER EXTREMITY (HCC): Primary | ICD-10-CM

## 2018-04-23 PROCEDURE — 11042 DBRDMT SUBQ TIS 1ST 20SQCM/<: CPT | Performed by: SURGERY

## 2018-04-23 ASSESSMENT — PAIN SCALES - GENERAL: PAINLEVEL_OUTOF10: 0

## 2018-04-30 ENCOUNTER — HOSPITAL ENCOUNTER (OUTPATIENT)
Dept: WOUND CARE | Age: 68
Discharge: HOME | End: 2018-04-30
Attending: SURGERY | Admitting: SURGERY

## 2018-04-30 VITALS
TEMPERATURE: 98.9 F | SYSTOLIC BLOOD PRESSURE: 176 MMHG | HEART RATE: 102 BPM | RESPIRATION RATE: 18 BRPM | DIASTOLIC BLOOD PRESSURE: 88 MMHG

## 2018-04-30 DIAGNOSIS — I87.333 CHRONIC VENOUS HYPERTENSION (IDIOPATHIC) WITH ULCER AND INFLAMMATION OF BILATERAL LOWER EXTREMITY (HCC): Primary | ICD-10-CM

## 2018-04-30 DIAGNOSIS — L97.919 CHRONIC VENOUS HYPERTENSION (IDIOPATHIC) WITH ULCER AND INFLAMMATION OF BILATERAL LOWER EXTREMITY (HCC): Primary | ICD-10-CM

## 2018-04-30 DIAGNOSIS — L97.929 CHRONIC VENOUS HYPERTENSION (IDIOPATHIC) WITH ULCER AND INFLAMMATION OF BILATERAL LOWER EXTREMITY (HCC): Primary | ICD-10-CM

## 2018-04-30 PROCEDURE — 99212 OFFICE O/P EST SF 10 MIN: CPT | Performed by: SURGERY

## 2018-05-21 ENCOUNTER — HOSPITAL ENCOUNTER (OUTPATIENT)
Dept: WOUND CARE | Age: 68
Discharge: HOME | End: 2018-05-21
Attending: SURGERY | Admitting: SURGERY

## 2018-05-21 VITALS
HEART RATE: 93 BPM | TEMPERATURE: 98.6 F | DIASTOLIC BLOOD PRESSURE: 83 MMHG | RESPIRATION RATE: 18 BRPM | SYSTOLIC BLOOD PRESSURE: 151 MMHG

## 2018-05-21 DIAGNOSIS — L97.919 CHRONIC VENOUS HYPERTENSION (IDIOPATHIC) WITH ULCER AND INFLAMMATION OF BILATERAL LOWER EXTREMITY (HCC): ICD-10-CM

## 2018-05-21 DIAGNOSIS — L97.929 CHRONIC VENOUS HYPERTENSION (IDIOPATHIC) WITH ULCER AND INFLAMMATION OF BILATERAL LOWER EXTREMITY (HCC): ICD-10-CM

## 2018-05-21 DIAGNOSIS — I87.333 CHRONIC VENOUS HYPERTENSION (IDIOPATHIC) WITH ULCER AND INFLAMMATION OF BILATERAL LOWER EXTREMITY (HCC): ICD-10-CM

## 2018-05-21 DIAGNOSIS — L97.911 SKIN ULCER OF RIGHT LOWER LEG, LIMITED TO BREAKDOWN OF SKIN (HCC): Primary | ICD-10-CM

## 2018-05-21 PROCEDURE — 99213 OFFICE O/P EST LOW 20 MIN: CPT | Performed by: SURGERY

## 2018-05-29 ENCOUNTER — HOSPITAL ENCOUNTER (OUTPATIENT)
Dept: WOUND CARE | Age: 68
Discharge: HOME | End: 2018-05-29
Attending: INTERNAL MEDICINE | Admitting: INTERNAL MEDICINE

## 2018-05-29 VITALS
RESPIRATION RATE: 20 BRPM | TEMPERATURE: 98.7 F | DIASTOLIC BLOOD PRESSURE: 64 MMHG | HEART RATE: 85 BPM | SYSTOLIC BLOOD PRESSURE: 148 MMHG

## 2018-05-29 RX ORDER — LIDOCAINE HYDROCHLORIDE 40 MG/ML
SOLUTION TOPICAL PRN
Status: DISCONTINUED | OUTPATIENT
Start: 2018-05-29 | End: 2018-05-30 | Stop reason: HOSPADM

## 2018-06-04 ENCOUNTER — HOSPITAL ENCOUNTER (OUTPATIENT)
Dept: WOUND CARE | Age: 68
Discharge: HOME | End: 2018-06-04
Attending: SURGERY | Admitting: SURGERY

## 2018-06-04 VITALS
WEIGHT: 194.67 LBS | SYSTOLIC BLOOD PRESSURE: 152 MMHG | DIASTOLIC BLOOD PRESSURE: 84 MMHG | TEMPERATURE: 98 F | BODY MASS INDEX: 39.32 KG/M2 | HEART RATE: 94 BPM | RESPIRATION RATE: 20 BRPM

## 2018-06-04 PROCEDURE — 11042 DBRDMT SUBQ TIS 1ST 20SQCM/<: CPT | Performed by: SURGERY

## 2018-06-11 ENCOUNTER — HOSPITAL ENCOUNTER (OUTPATIENT)
Dept: WOUND CARE | Age: 68
Discharge: HOME | End: 2018-06-11
Attending: NURSE PRACTITIONER | Admitting: NURSE PRACTITIONER

## 2018-06-11 VITALS
HEART RATE: 96 BPM | SYSTOLIC BLOOD PRESSURE: 184 MMHG | TEMPERATURE: 98.8 F | DIASTOLIC BLOOD PRESSURE: 84 MMHG | RESPIRATION RATE: 24 BRPM

## 2018-06-11 PROCEDURE — 99212 OFFICE O/P EST SF 10 MIN: CPT | Performed by: NURSE PRACTITIONER

## 2018-07-02 PROBLEM — L97.921 ULCER OF LEFT LOWER LEG, LIMITED TO BREAKDOWN OF SKIN (HCC): Status: ACTIVE | Noted: 2017-07-14

## 2018-07-02 NOTE — PLAN OF CARE
[employer] Application   Below Knee    NAME:  Kanika Reyes  YOB: 1950  MEDICAL RECORD NUMBER:  [id number]  DATE:  7/2/2018     [x] Applied moisturizing agent to dry skin as needed.  [x] Appied primary and secondary dressing as ordered     [x] Applied Unna roll from toes to knee overlapping each time.  [x] Applied ace wrap or coban from toes to below the knee.  [x] Secured with tape and/or metal clips covered with tape.  [x] Instructed patient/caregiver to keep dressing dry and intact. DO NOT REMOVE DRESSING.  [x] Instructed pt/family/caregiver to report excessive draining, loose bandage, wet dressing, severe pain or tingling in toes.  [x] Applied [place] dressing below the knee to Bilateral lower leg(s)        Unna Boot(s) were applied per  Guidelines.      Electronically signed by Cyndy Ferrara RN on 7/2/2018 at 3:13 PM

## 2018-07-02 NOTE — PROGRESS NOTES
Instruments Used:  curette     Pre Debridement Measurements:  Are located in the [place]  Documentation Flow Sheet  Wound/Ulcer #: 23     Post  Debridement Measurements:  Wound 06/18/18 Leg Left; Lower; Anterior;Proximal #17 - left lower anterior proximal leg - pt. noted 6/16/2018 (Active)   Wound Image   6/18/2018  3:01 PM   Wound Type Wound 6/18/2018  3:01 PM   Wound Venous 6/25/2018  2:35 PM   Dressing Status Old drainage 6/25/2018  1:54 PM   Dressing Changed Changed/New 6/25/2018  2:55 PM   Dressing/Treatment Other (Comment) 6/25/2018  2:55 PM   Wound Cleansed Rinsed/Irrigated with saline 6/18/2018  3:01 PM   Wound Length (cm) 0 cm 7/2/2018  1:49 PM   Wound Width (cm) 0 cm 7/2/2018  1:49 PM   Wound Depth (cm)  0 7/2/2018  1:49 PM   Calculated Wound Size (cm^2) (l*w) 0 cm^2 7/2/2018  1:49 PM   Change in Wound Size % (l*w) 100 7/2/2018  1:49 PM   Wound Assessment Epithelialization 7/2/2018  1:49 PM   Drainage Amount Scant 6/25/2018  1:54 PM   Drainage Description Serosanguinous 6/25/2018  1:54 PM   Odor None 6/25/2018  1:54 PM   Margins Attached edges 6/25/2018  1:54 PM   Carol-wound Assessment Dry;Pink 6/25/2018  1:54 PM   Non-staged Wound Description Full thickness 6/25/2018  1:54 PM   Red%Wound Bed 95 6/25/2018  1:54 PM   Yellow%Wound Bed 5 6/25/2018  1:54 PM   Op First Treatment Date 06/18/18 6/18/2018  3:01 PM   Number of days: 14       Wound 06/18/18 Leg Left; Lower; Lateral #20 - left lower lateral leg - pt. noted 6/16/2018 (Active)   Wound Image   6/18/2018  3:01 PM   Wound Type Wound 6/18/2018  3:01 PM   Wound Venous 6/25/2018  2:35 PM   Dressing Status Old drainage 6/25/2018  1:54 PM   Dressing Changed Changed/New 6/25/2018  2:55 PM   Dressing/Treatment Other (Comment) 6/25/2018  2:55 PM   Wound Cleansed Rinsed/Irrigated with saline 6/18/2018  3:01 PM   Wound Length (cm) 0 cm 7/2/2018  1:49 PM   Wound Width (cm) 0 cm 7/2/2018  1:49 PM   Wound Depth (cm)  0 7/2/2018  1:49 PM   Calculated Wound Size [id number]  Telephone: [phone]      FAX [phone]     NAME: Joselito Camara  DATE OF BIRTH:  1950  MEDICAL RECORD NUMBER:  [id number]  DATE:  6/25/2018     Wound Cleansing:   Do not scrub or use excessive force. Cleanse wound prior to applying a clean dressing with:  [] Normal Saline            [] Keep Wound Dry in Shower    [] Wound Cleanser   [] Cleanse wound with Mild Soap & Water  [] May Shower at Discharge   [] Other:        Topical Treatments:  Do not apply lotions, creams, or ointments to wound bed unless directed. [x] Apply moisturizing lotion to skin surrounding the wound prior to dressing change.  [] Apply antifungal ointment to skin surrounding the wound prior to dressing change.  [] Apply thin film of moisture barrier ointment to skin immediately around wound. [] Other:       Dressings:                  Wound Location RIGHT      [x] Apply Primary Dressing:                                              [] MediHoney Gel          [] Alginate with Silver    [x] Alginate              [] Collagen         [] Collagen with Silver   [] Santyl with Moisten saline gauze                [] Hydrocolloid     [] MediHoney Alginate [] Foam with Silver              [] Foam   [] Hydrofera Blue         [] Mepilex Border                         [] Moisten with Saline    [] Hydrogel         [] Mepitel                          [] Bactroban/Mupirocin  [] Polysporin                  [] Other:       [x] Cover and Secure with:                       [] Gauze [] Carol   [] Kerlix              [] Ace Wrap       [] Cover Roll Tape         [] ABD                              [x] Other: OPTILOCKS              Avoid contact of tape with skin.   [x] Change dressing:       [] Daily               [] Every Other Day        [] Three times per week              [] Once a week  [x] Do Not Change Dressing         [] Other:        Compression: CO-FLEX WITH ZINC  Apply:  [x] Multilayer Compression Wrap Applied in Clinic    [x]RightLeg          [x]Left Leg              [x] Multi-layer compression.  Do not get leg(s) with wrap wet.  If wraps become too tight call the center or completely remove the wrap.                 [x] Elevate leg(s) above the level of the heart when sitting.                    [x] Avoid prolonged standing in one place.     Off-Loading:   [] Off-loading when        [] walking           [] in bed [] sitting  [] Total non-weight bearing  [] Right Leg  [] Left Leg          [x] Assistive Device         [] Walker            [] Cane   [] Wheelchair      [] Crutches              [] Surgical shoe    [] Podus Boot(s)   [] Foam Boot(s)  [] Roll About              [] Cast Boot       [] CROW Boot  [] Other:        Dietary:  [] Diet as tolerated:        [x] Calorie Diabetic Diet: [] No Added Salt:  [x] Increase Protein:        [] Other:   Activity:  [x] Activity as tolerated:  [] Patient has no activity restrictions     [] Strict Bedrest:            [] Remain off Work:     [] May return to full duty work:                                       [] [facility] to work with [employer]     Return Appointment:  [] Wound and dressing supply provider:   [x]  Home [address] [place] [address],[address] visit:      [] Physician or NP scheduled for Nurse Visit:   [x] Return Appointment: With DR Mann Izquierdo  in  1 Week(s)  [] Ordered tests    **PLEASE BRING YOUR COMPRESSION HOSE WITH YOU NEXT WEEK**     Nurse Case Manger: [id number] [facility]     Electronically signed by Niko Adams RN on 7/2/2018 at 2:31 PM          [address] [place] [address] Information: Should you experience any significant changes in your wound(s) or have questions about your wound care, please contact the [address] [place] [address] [phone] [facility] - THURSDAY 8:30 am - 4:30 pm and Friday 8:30 am - 1:00 pm.  If you need help with your wound outside these hours and cannot wait until we are again available, contact your PCP or go to the hospital emergency room.    Nurse Signature:_______________________     Date: ___________ Time:  ____________        Discharge Nurse Signature        PLEASE NOTE: IF YOU ARE UNABLE TO OBTAIN WOUND SUPPLIES, CONTINUE TO USE THE SUPPLIES YOU HAVE AVAILABLE UNTIL YOU ARE ABLE TO REACH US.  IT IS MOST IMPORTANT TO KEEP THE WOUND COVERED AT ALL TIMES.     Physician Signature:_______________________     Date: ___________ Time:  ____________                    [] Dr Kim Crane    [] Dr No Baumann   [] Pam Best CNP                 [] Dr Sacha Michael  [] Dr Estephania Sosa   [] Dr Nupur Neff             [] Dr Jefferson Hubbard                [] Dr Madison Greenberg   [x] Mati Bryan NP            The information contained in the After Visit Summary has been reviewed with me, the patient and/or responsible adult, by my health care provider(s).  I had the opportunity to ask questions regarding this information.  I have elected to receive;        Patient Signature:_______________________     Date: ___________ Time:  ____________     [] Patient unable to sign Discharge Instructions given to ECF/Transportation/POA        [x]  After Visit Summary  []  Comprehensive Discharge Instruction        Electronically signed by HOLLIE Fisher CNP on 7/2/2018 at 6:25 PM    '

## 2018-07-12 NOTE — PROGRESS NOTES
[employer] Application   Below Knee    NAME:  Delon Lockwood  YOB: 1950  MEDICAL RECORD NUMBER:  [id number]  DATE:  7/12/2018     [x] Applied moisturizing agent to dry skin as needed.  [x] Appied primary and secondary dressing as ordered     [x] Applied Unna roll from toes to knee overlapping each time.  [x] Applied ace wrap or coban from toes to below the knee.  [x] Secured with tape and/or metal clips covered with tape.  [x] Instructed patient/caregiver to keep dressing dry and intact. DO NOT REMOVE DRESSING.  [x] Instructed pt/family/caregiver to report excessive draining, loose bandage, wet dressing, severe pain or tingling in toes.  [x] Applied [place] dressing below the knee to Bilateral lower leg(s)        Unna Boot(s) were applied per  Guidelines.      Electronically signed by Tone Webb RN on 7/12/2018 at 11:27 AM

## 2018-07-12 NOTE — PROGRESS NOTES
Quit date: 3/21/1990    Smokeless tobacco: Never Used    Alcohol use No       ALLERGIES    Allergies   Allergen Reactions    Benadryl [Diphenhydramine] Itching       MEDICATIONS    Current Outpatient Prescriptions on File Prior to Encounter   Medication Sig Dispense Refill    Mirabegron ER (MYRBETRIQ) 25 MG TB24 Take by mouth daily      linagliptin (TRADJENTA) 5 MG tablet Take 5 mg by mouth daily      betamethasone dipropionate (DIPROLENE) 0.05 % ointment Apply topically daily. 1 Tube 0    insulin glargine (LANTUS) 100 UNIT/ML injection vial Inject 25 Units into the skin nightly (Patient taking differently: Inject 40 Units into the skin nightly ) 1 vial 3    oxyCODONE-acetaminophen (PERCOCET) 5-325 MG per tablet   Take 1 tablet by mouth every 4 hours as needed for Pain (max 5/day)       hydrALAZINE (APRESOLINE) 25 MG tablet Take 50 mg by mouth 3 times daily       traZODone (DESYREL) 50 MG tablet Take 25-50 mg by mouth nightly as needed for Sleep      rOPINIRole (REQUIP) 1 MG tablet Take 1 mg by mouth nightly Take 2 tablets nightly      dicyclomine (BENTYL) 20 MG tablet Take 10-20 mg by mouth every 6 hours as needed (spasms)      famotidine (PEPCID) 20 MG tablet   Take 20 mg by mouth daily       simvastatin (ZOCOR) 40 MG tablet   Take 20 mg by mouth nightly       ferrous sulfate 325 (65 FE) MG tablet Take 325 mg by mouth 2 times daily.  aspirin 81 MG tablet Take 81 mg by mouth daily.  hydrOXYzine (ATARAX) 10 MG/5ML syrup Take 5 mLs by mouth 4 times daily as needed for Itching 1 Bottle 0     No current facility-administered medications on file prior to encounter. REVIEW OF SYSTEMS    Pertinent items are noted in HPI.     Objective:      BP (!) 156/74   Pulse 97   Temp 98.4 °F (36.9 °C) (Oral)   Resp 18   Ht 4' 11\" (1.499 m)   Wt 195 lb 12.3 oz (88.8 kg)   BMI 39.54 kg/m²     Wt Readings from Last 3 Encounters:   07/12/18 195 lb 12.3 oz (88.8 kg)   06/04/18 194 lb 10.7 oz (88.3 kg) 11/06/17 173 lb 4.5 oz (78.6 kg)       PHYSICAL EXAM    Wounds noted on the bilateral lower extremities. Wound has fibrotic and nonviable tissue that extends down through and includes the subcutaneous tissue. After debridement the wound has a granular base. There is no surrounding erythema, edema, warmth or malodor noted. The wound does not probe or track to bone. Assessment:      Patient Active Problem List   Diagnosis Code    Diastolic CHF ([facility] Q81.27    Cellulitis L03.90    Chest pain R07.9    CKD (chronic kidney disease) stage 4, GFR 15-29 ml/min ([facility]) N18.4    DM (diabetes mellitus) ([facility] E11.9    HTN (hypertension) I10    Cellulitis of both lower extremities L03.115, L03. 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 ([facility]) I70.232, I70.242    Chronic venous hypertension (idiopathic) with ulcer and inflammation of bilateral lower extremity ([facility]) I87.333    Ulcer of left lower leg, limited to breakdown of skin ([facility]) L97.921    Ulcer of right lower leg ([facility]) L97.919    Leg swelling M79.89    Pruritic condition L29.9    Excoriation of left lower leg S80.812A    Excoriation of right lower leg S80.811A    Venous stasis dermatitis of both lower extremities I87.2    Lower leg edema R60.0    Venous ulcer I87.8    Venous hypertension, chronic, with ulcer, left ([facility] I87.312          Non-Excisional Debridement Procedure Note    Performed by: Betina Alvarado DPM    Consent obtained:  Yes    Time out taken:  Yes     Pain Control: Anesthetic: 5% Lidocaine Ointment Topical (0.5 cm)     Debridement: Non-excisional Debridement    Devitalized Tissue Debrided: slough and necrotic/eschar      Instruments Used:  curette     Pre Debridement Measurements:  Are located in the [facility]  Documentation Flow Sheet  Wound/Ulcer #: 23     Post  Debridement Measurements:    Wound 07/02/18 Leg Right; Anterior; Lower #23; Noted 7/2/18 (Active)   Wound Image   7/12/2018 10:40 AM   Wound Type Wound 7/2/2018  2:48 PM   Wound Venous 7/2/2018  2:48 PM   Dressing Status Old drainage 7/12/2018 10:40 AM   Dressing Changed Changed/New 7/12/2018 11:39 AM   Dressing/Treatment Other (Comment) 7/12/2018 11:39 AM   Wound Length (cm) 0.5 cm 7/12/2018 11:39 AM   Wound Width (cm) 0.6 cm 7/12/2018 11:39 AM   Wound Depth (cm)  0.1 7/12/2018 11:39 AM   Calculated Wound Size (cm^2) (l*w) 0.3 cm^2 7/12/2018 11:39 AM   Change in Wound Size % (l*w) 75 7/12/2018 11:39 AM   Wound Assessment Granulation tissue;Slough 7/12/2018 10:40 AM   Drainage Amount Scant 7/12/2018 10:40 AM   Drainage Description Serous; Yellow 7/12/2018 10:40 AM   Odor None 7/12/2018 10:40 AM   Margins Attached edges 7/12/2018 10:40 AM   Carol-wound Assessment Dry 7/12/2018 10:40 AM   Non-staged Wound Description Full thickness 7/12/2018 10:40 AM   [place]%Wound Bed 90 7/12/2018 10:40 AM   Yellow%Wound Bed 10 7/12/2018 10:40 AM   Op First Treatment Date 07/02/18 7/2/2018  2:48 PM   Number of days: 10   Percent of Wound/Ulcer Debrided:  100%    Total Surface Area Debrided:  0.3 sq cm    Diabetic/Pressure/Non Pressure Ulcers only:  Ulcer: Non-Pressure ulcer, limited to breakdown of skin    Bleeding:  Minimal    Hemostasis Achieved:  by pressure    Procedural Pain: 9  / 10     Post Procedural Pain:  5 / 10     Response to treatment:  With complaints of pain. PATIENT EDUCATION focused on need for continued Unna boot therapy for compression. It supports vascular problems, helps to heal leg ulcers and controls leg swelling. The dressing can be worn up to a week before it needs changed. Patient must keep the Unna boot dry. Plan:     Treatment Note please see attached Discharge Instructions    In my professional opinion this patient would benefit from HBO Therapy: No    Written patient dismissal instructions given to patient and signed by patient or POA. week              [] Once a week  [x] Do Not Change Dressing         [] Other:        Compression: CO-FLEX WITH ZINC  Apply:  [x] Multilayer Compression Wrap Applied in Clinic     [x]RightLeg          [x]Left Leg              [x] Multi-layer compression.  Do not get leg(s) with wrap wet.  If wraps become too tight call the center or completely remove the wrap.                 [x] Elevate leg(s) above the level of the heart when sitting.                    [x] Avoid prolonged standing in one place.     Off-Loading:   [] Off-loading when        [] walking           [] in bed [] sitting  [] Total non-weight bearing  [] Right Leg  [] Left Leg          [x] Assistive Device         [] Walker            [] Cane   [] Wheelchair      [] Crutches              [] Surgical shoe    [] Podus Boot(s)   [] Foam Boot(s)  [] Roll About              [] Cast Boot       [] CROW Boot  [] Other:        Dietary:  [] Diet as tolerated:        [x] Calorie Diabetic Diet: [] No Added Salt:  [x] Increase Protein:        [] Other:   Activity:  [x] Activity as tolerated:  [] Patient has no activity restrictions     [] Strict Bedrest:            [] Remain off Work:     [] May return to full duty work:                                       [] [employer] to work with [address]     Return Appointment:  [] Wound and dressing supply provider:   [x]  Home [address] [place] [address],[address] visit:      [] Physician or NP scheduled for Nurse Visit:   [x] Return Appointment: With DR Casey  in  1 Week(s)  [] Ordered tests     **PLEASE BRING YOUR COMPRESSION HOSE WITH YOU NEXT WEEK**     Nurse Case Manger: [id number] [facility]     Electronically signed by Erika Wolf RN on 7/12/2018 at [phone] Information: Should you experience any significant changes in your wound(s) or have questions about your wound care, please contact the [address] [place], [place] [address]K [phone] [facility] - THURSDAY 8:30 am - 4:30 pm and Friday 8:30 am - 1:00 pm.  If you need help with your wound outside these hours and cannot wait until we are again available, contact your PCP or go to the hospital emergency room.      Nurse Signature:_______________________     Date: ___________ Time:  ____________        Discharge Nurse Signature        PLEASE NOTE: IF YOU ARE UNABLE TO OBTAIN WOUND SUPPLIES, CONTINUE TO USE THE SUPPLIES YOU HAVE AVAILABLE UNTIL YOU ARE [facility].  IT IS MOST IMPORTANT TO KEEP THE WOUND COVERED AT ALL TIMES.     Physician Signature:_______________________     Date: ___________ Time:  ____________                    [x] Dr Jackie Vargas    [] Dr Jessica Navarrete   [] Pam Best CNP                 [] Dr Maribel Bailey  [] Dr Catrachita Sorenson   [] Dr Bishnu Neff             [] Dr Olga Diehl                [] Dr Josue Robb   [] Mati Lance NP            The information contained in the After Visit Summary has been reviewed with me, the patient and/or responsible adult, by my health care provider(s).  I had the opportunity to ask questions regarding this information.  I have elected to receive;        Patient Signature:_______________________     Date: ___________ Time:  ____________     [] Patient unable to sign Discharge Instructions given to ECF/Transportation/POA        [x]  After Visit Summary  []  Comprehensive Discharge Instruction        Electronically signed by Jackie Vargas DPM on 7/12/2018 at 3:22 PM    '

## 2018-07-17 NOTE — PROGRESS NOTES
Elicia Irwin [facility]   Progress Note and Procedure Note      [facility] RECORD NUMBER:  [id number]  AGE: 70 y.o. GENDER: female  : 1950  EPISODE DATE:  2018    Subjective:     Chief Complaint   Patient presents with    Wound Check     Follow Up Wounds Bilateral Lower Extremities         HISTORY of PRESENT ILLNESS HPI  Jos Baltazar is a 70 y.o. female who presents today for wound/ulcer evaluation.    History of Wound Context: venous  Wound/Ulcer Pain Timing/Severity: intermittent  Quality of pain: aching  Severity:  5 / 10   Modifying Factors: Pain worsens with local pressure  Associated Signs/Symptoms: edema     Ulcer Identification:  Ulcer Type: venous  Contributing Factors: edema     Wound: N/A  PAST MEDICAL HISTORY        Diagnosis Date    Acid reflux     Arthritis     Balance problem     Blood circulation, collateral     Cellulitis of both lower extremities     CHF (congestive heart failure) (HCC)     Chronic kidney disease     Chronic renal failure    Chronic venous hypertension (idiopathic) with ulcer and inflammation of bilateral lower extremity ([facility] 2017    Depression     Diabetes mellitus (HCC)     GERD (gastroesophageal reflux disease)     Hyperlipidemia     Hypertension     Movement disorder     Murmur     Neuromuscular disorder (HCC)     Restless leg syndrome     Urinary frequency     Urinary incontinence        PAST SURGICAL HISTORY    Past Surgical History:   Procedure Laterality Date    APPENDECTOMY      BLADDER SUSPENSION      CHOLECYSTECTOMY      COLONOSCOPY      CYSTOSCOPY      EYE SURGERY      HYSTERECTOMY         FAMILY HISTORY    Family History   Problem Relation Age of Onset    Heart Disease Mother     Heart Disease Father     Diabetes Father     Kidney Disease Father        SOCIAL HISTORY    Social History   Substance Use Topics    Smoking status: Former Smoker     Packs/day: 0.50     Years: 10.00     Types: EXAM    General Appearance: alert and oriented to person, place and time, well-developed and well-nourished, in no acute distress  Skin: warm and dry  Head: normocephalic and atraumatic  Pulmonary/Chest:  normal air movement, no respiratory distress  Cardiovascular: normal rate, regular rhythm and intact distal pulses  Extremities: no cyanosis and no clubbing  Wound: superficial wound base, moist and red      Assessment:      Patient Active Problem List   Diagnosis Code    Diastolic CHF ([facility] B58.73    Cellulitis L03.90    Chest pain R07.9    CKD (chronic kidney disease) stage 4, GFR 15-29 ml/min ([facility]) N18.4    DM (diabetes mellitus) ([facility]) E11.9    HTN (hypertension) I10    Cellulitis of both lower extremities L03.115, L03. 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 ([facility]) I70.232, I70.242    Chronic venous hypertension (idiopathic) with ulcer and inflammation of bilateral lower extremity ([facility]) I87.333    Ulcer of left lower leg, limited to breakdown of skin ([facility]) L97.921    Ulcer of right lower leg ([facility]) L97.919    Leg swelling M79.89    Pruritic condition L29.9    Excoriation of left lower leg S80.812A    Excoriation of right lower leg S80.811A    Venous stasis dermatitis of both lower extremities I87.2    Lower leg edema R60.0    Venous ulcer I87.8    Venous hypertension, chronic, with ulcer, left ([facility] I87.312        Procedure Note  Indications:  Based on my examination of this patient's wound(s)/ulcer(s) today, debridement is required to promote healing and evaluate the wound base.     Performed by: HOLLIE Galvez CNP    Consent obtained:  Yes    Time out taken:  Yes    Pain Control: Anesthetic  Anesthetic: 4% Lidocaine Liquid Topical (2.5mL's)       Debridement:Non-excisional Debridement    Using curette the wound(s)/ulcer(s) was/were sharply debrided down through and including the removal of epidermis and dermis. Devitalized Tissue Debrided:  fibrin and biofilm    Pre Debridement Measurements:  Are located in the [facility]  Documentation Flow Sheet    Wound/Ulcer #: 23    Post Debridement Measurements:  Wound/Ulcer Descriptions are Pre Debridement except measurements:    Wound 07/02/18 Leg Right; Anterior; Lower #23; Noted 7/2/18 (Active)   Wound Image   7/12/2018 10:40 AM   Wound Type Wound 7/2/2018  2:48 PM   Wound Venous 7/2/2018  2:48 PM   Dressing Status Old drainage 7/17/2018  8:22 AM   Dressing Changed Changed/New 7/17/2018  9:04 AM   Dressing/Treatment Other (Comment) 7/12/2018 11:39 AM   Wound Length (cm) 0.8 cm 7/17/2018  8:50 AM   Wound Width (cm) 1 cm 7/17/2018  8:50 AM   Wound Depth (cm)  0.1 7/17/2018  8:50 AM   Calculated Wound Size (cm^2) (l*w) 0.8 cm^2 7/17/2018  8:50 AM   Change in Wound Size % (l*w) 33.33 7/17/2018  8:50 AM   Wound Assessment Granulation tissue;Slough 7/17/2018  8:22 AM   Drainage Amount Scant 7/17/2018  8:22 AM   Drainage Description Yellow;Brown 7/17/2018  8:22 AM   Odor None 7/17/2018  8:22 AM   Margins Attached edges 7/17/2018  8:22 AM   Carol-wound Assessment Dry;Pink 7/17/2018  8:22 AM   Non-staged Wound Description Full thickness 7/17/2018  8:22 AM   [place]%Wound Bed 80 7/17/2018  8:22 AM   Yellow%Wound Bed 20 7/17/2018  8:22 AM   Op First Treatment Date 07/02/18 7/2/2018  2:48 PM   Number of days: 14       Percent of Wound(s)/Ulcer(s) Debrided: 100%    Total Surface Area Debrided:  0.8 sq cm       Diabetic/Pressure/Non Pressure Ulcers only:  Ulcer: Non-Pressure ulcer, limited to breakdown of skin      Estimated Blood Loss:  Minimal    Hemostasis Achieved:  not needed    Procedural Pain:  2  / 10     Post Procedural Pain:  0 / 10     Response to treatment:  Well tolerated by patient.        Plan:   Pt education related to progress to wound healing, pt complained of itching under unna boot, explained use of calamine unna boot to                [] Dr Tawanda Hunt   [] Mati Jose NP            The information contained in the After Visit Summary has been reviewed with me, the patient and/or responsible adult, by my health care provider(s).  I had the opportunity to ask questions regarding this information.  I have elected to receive;        Patient Signature:_______________________     Date: ___________ Time:  ____________     [] Patient unable to sign Discharge Instructions given to ECF/Transportation/POA        [x]  After Visit Summary  []  Comprehensive Discharge Instruction        Electronically signed by HOLLIE Ponce CNP on 7/17/2018 at 1:34 PM

## 2018-07-23 NOTE — PROGRESS NOTES
10. 00     Types: Cigarettes     Quit date: 3/21/1990    Smokeless tobacco: Never Used    Alcohol use No       ALLERGIES    Allergies   Allergen Reactions    Benadryl [Diphenhydramine] Itching       MEDICATIONS    Current Outpatient Prescriptions on File Prior to Encounter   Medication Sig Dispense Refill    linagliptin (TRADJENTA) 5 MG tablet Take 5 mg by mouth daily      insulin glargine (LANTUS) 100 UNIT/ML injection vial Inject 25 Units into the skin nightly (Patient taking differently: Inject 40 Units into the skin nightly ) 1 vial 3    oxyCODONE-acetaminophen (PERCOCET) 5-325 MG per tablet   Take 1 tablet by mouth every 4 hours as needed for Pain (max 5/day)       hydrALAZINE (APRESOLINE) 25 MG tablet Take 50 mg by mouth 3 times daily       traZODone (DESYREL) 50 MG tablet Take 25-50 mg by mouth nightly as needed for Sleep      rOPINIRole (REQUIP) 1 MG tablet Take 1 mg by mouth nightly Take 2 tablets nightly      dicyclomine (BENTYL) 20 MG tablet Take 10-20 mg by mouth every 6 hours as needed (spasms)      famotidine (PEPCID) 20 MG tablet   Take 20 mg by mouth daily       simvastatin (ZOCOR) 40 MG tablet   Take 20 mg by mouth nightly       ferrous sulfate 325 (65 FE) MG tablet Take 325 mg by mouth 2 times daily.  aspirin 81 MG tablet Take 81 mg by mouth daily.  Mirabegron ER (MYRBETRIQ) 25 MG TB24 Take by mouth daily      hydrOXYzine (ATARAX) 10 MG/5ML syrup Take 5 mLs by mouth 4 times daily as needed for Itching 1 Bottle 0    betamethasone dipropionate (DIPROLENE) 0.05 % ointment Apply topically daily. 1 Tube 0     No current facility-administered medications on file prior to encounter. REVIEW OF SYSTEMS    A comprehensive review of systems was negative.     Objective:      BP (!) 164/80   Pulse 81   Temp 98.3 °F (36.8 °C) (Oral)   Resp 20     Wt Readings from Last 3 Encounters:   07/12/18 195 lb 12.3 oz (88.8 kg)   06/04/18 194 lb 10.7 oz (88.3 kg)   11/06/17 173 lb 4.5 oz Post Procedural Pain:  0 / 10     Response to treatment:  Well tolerated by patient. Plan:     Treatment Note please see attached Discharge Instructions    Written patient dismissal instructions given to patient and signed by patient or POA. Discharge Instructions               Wound Clinic Physician Orders and Discharge Instructions  [address]   [address]  Telephone: [phone]      FAX [phone]     NAME: Tsering Acosta  DATE OF BIRTH:  1950  MEDICAL RECORD NUMBER:  [id number]  DATE:  7/23/2018     Wound Cleansing:   Do not scrub or use excessive force. Cleanse wound prior to applying a clean dressing with:  [] Normal Saline            [x] Keep Wound Dry in Shower            Topical Treatments:  Do not apply lotions, creams, or ointments to wound bed unless directed. [x] Apply moisturizing lotion to skin surrounding the wound prior to dressing change.  [] Apply antifungal ointment to skin surrounding the wound prior to dressing change.  [] Apply thin film of moisture barrier ointment to skin immediately around wound. [] Other:       Dressings:                  Wound Location RIGHT  LOWER LEG  [x] Apply Primary Dressing:                                                  [x]  Kacey  MOISTEN WITH SALINE                    [x] Cover and Secure with:                       [x] Gauze                                            [] Other:                 Avoid contact of tape with skin.     [x] Change dressing:                       [x] Do Not Change Dressing                 Compression: CO-FLEX WITH CALAMINE  Apply:  [x] Multilayer Compression Wrap Applied in Clinic     [x]RightLeg          []Left Leg              [x] Multi-layer compression.  Do not get leg(s) with wrap wet.  If wraps become too tight call the center or completely remove the wrap.                 [x] Elevate leg(s) above the level of the heart when                [] Dr Dahiana Berg   [] Mati Addison NP            The information contained in the After Visit Summary has been reviewed with me, the patient and/or responsible adult, by my health care provider(s).  I had the opportunity to ask questions regarding this information.  I have elected to receive;        Patient Signature:_______________________     Date: ___________ Time:  ____________     [] Patient unable to sign Discharge Instructions given to ECF/Transportation/POA        [x]  After Visit Summary  []  Comprehensive Discharge Instruction        Electronically signed by Brian Fierro MD on 7/23/2018 at 2:59 PM

## 2018-07-26 PROBLEM — J15.9 COMMUNITY ACQUIRED BACTERIAL PNEUMONIA: Status: ACTIVE | Noted: 2018-01-01

## 2018-08-06 NOTE — PROGRESS NOTES
Elicia [address]   Progress Note and Procedure Note      Sahil Kelsey RECORD NUMBER:  [id number]  AGE: 70 y.o. GENDER: female  : 1950  EPISODE DATE:  2018    Subjective:     Chief Complaint   Patient presents with    Wound Check     Follow Up Wounds Left and Right Lower Extremities         HISTORY of PRESENT ILLNESS CANDY Hsieh is a 70 y.o. female who presents today for wound/ulcer evaluation.    History of Wound Context: nonhealing leg ulcers  Wound/Ulcer Pain Timing/Severity: mild  Quality of pain: dull  Severity:  2 / 10   Modifying Factors: Pain is relieved/improved with rest  Associated Signs/Symptoms: none    Ulcer Identification:  Ulcer Type: venous  Contributing Factors: venous stasis    Wound: N/A        PAST MEDICAL HISTORY        Diagnosis Date    Acid reflux     Arthritis     Balance problem     Blood circulation, collateral     Cellulitis of both lower extremities     CHF (congestive heart failure) (HCC)     Chronic kidney disease     Chronic renal failure    Chronic venous hypertension (idiopathic) with ulcer and inflammation of bilateral lower extremity ([address] 2017    Community acquired bacterial pneumonia 2018    Depression     Diabetes mellitus (HCC)     GERD (gastroesophageal reflux disease)     Hyperlipidemia     Hypertension     Movement disorder     Murmur     Neuromuscular disorder (HCC)     Restless leg syndrome     Urinary frequency     Urinary incontinence        PAST SURGICAL HISTORY    Past Surgical History:   Procedure Laterality Date    APPENDECTOMY      BLADDER SUSPENSION      CHOLECYSTECTOMY      COLONOSCOPY      CYSTOSCOPY      EYE SURGERY      HYSTERECTOMY         FAMILY HISTORY    Family History   Problem Relation Age of Onset    Heart Disease Mother     Heart Disease Father     Diabetes Father     Kidney Disease Father        SOCIAL HISTORY    Social History   Substance Use Topics    Smoking status: Former Smoker     Packs/day: 0.50     Years: 10.00     Types: Cigarettes     Quit date: 3/21/1990    Smokeless tobacco: Never Used    Alcohol use No       ALLERGIES    Allergies   Allergen Reactions    Benadryl [Diphenhydramine] Itching       MEDICATIONS    Current Outpatient Prescriptions on File Prior to Encounter   Medication Sig Dispense Refill    carvedilol (COREG) 12.5 MG tablet Take 1 tablet by mouth 2 times daily (with meals) 60 tablet 3    rifaximin (XIFAXAN) 550 MG tablet Take 1 tablet by mouth 3 times daily 42 tablet 0    amoxicillin-clavulanate (AUGMENTIN) 875-125 MG per tablet Take 1 tablet by mouth 2 times daily for 5 days 10 tablet 0    oxybutynin (DITROPAN-XL) 5 MG extended release tablet Take 5 mg by mouth every evening      gabapentin (NEURONTIN) 300 MG capsule Take 300 mg by mouth 3 times daily. [place] pantoprazole (PROTONIX) 40 MG tablet Take 40 mg by mouth daily      loperamide (IMODIUM) 2 MG capsule Take 2 mg by mouth 4 times daily as needed for Diarrhea      sodium bicarbonate 650 MG tablet Take 650 mg by mouth 2 times daily      metoclopramide (REGLAN) 5 MG tablet Take 5 mg by mouth 4 times daily (before meals and nightly)      linagliptin (TRADJENTA) 5 MG tablet Take 5 mg by mouth daily      insulin glargine (LANTUS) 100 UNIT/ML injection vial Inject 25 Units into the skin nightly (Patient taking differently: Inject 40 Units into the skin nightly ) 1 vial 3    oxyCODONE-acetaminophen (PERCOCET) 5-325 MG per tablet   Take 1 tablet by mouth every 4 hours as needed for Pain (max 5/day)       hydrALAZINE (APRESOLINE) 50 MG tablet Take 50 mg by mouth 3 times daily       traZODone (DESYREL) 100 MG tablet Take 100 mg by mouth nightly       rOPINIRole (REQUIP) 1 MG tablet Take 2 mg by mouth nightly Take 2 tablets nightly      dicyclomine (BENTYL) 20 MG tablet Take 20 mg by mouth 4 times daily (with meals and nightly)       simvastatin (ZOCOR) 20 MG tablet   Take 20 mg by mouth nightly       ferrous sulfate 325 (65 FE) MG tablet Take 325 mg by mouth 2 times daily.  aspirin 81 MG tablet Take 81 mg by mouth daily. No current facility-administered medications on file prior to encounter. REVIEW OF SYSTEMS    A comprehensive review of systems was negative. Objective:      BP (!) 181/90   Pulse 92   Temp 98.2 °F (36.8 °C) (Oral)   Resp 20   Wt 198 lb 13.7 oz (90.2 kg)   BMI 40.16 kg/m²     Wt Readings from Last 3 Encounters:   08/06/18 198 lb 13.7 oz (90.2 kg)   08/01/18 193 lb 5.5 oz (87.7 kg)   07/12/18 195 lb 12.3 oz (88.8 kg)       PHYSICAL EXAM    General Appearance: alert and oriented to person, place and time, well developed and well- nourished, in no acute distress  Head: normocephalic and atraumatic  Eyes: pupils equal, round, and reactive to light, extraocular eye movements intact, conjunctivae normal  Neck: supple and non-tender without mass, no thyromegaly or thyroid nodules, no cervical lymphadenopathy  Musculoskeletal: normal range of motion, no joint swelling, deformity or tenderness  Neurologic: reflexes normal and symmetric, no cranial nerve deficit, gait, coordination and speech normal      Assessment:      Patient Active Problem List   Diagnosis Code    Diastolic CHF ([facility] D83.50    Cellulitis L03.90    Chest pain R07.9    CKD (chronic kidney disease) stage 4, GFR 15-29 ml/min ([facility]) N18.4    DM (diabetes mellitus) ([facility] E11.9    HTN (hypertension) I10    Cellulitis of both lower extremities L03.115, L03. 80    Morbid obesity due to excess calories ([facility]) E66.01    Atherosclerosis of native artery of both lower extremities with bilateral ulceration of calves ([facility]) I70.232, I70.242    Chronic venous hypertension (idiopathic) with ulcer and inflammation of bilateral lower extremity ([facility]) I87.333, L97.919, L97.929    Ulcer of left lower leg, limited to breakdown of skin ([facility] L97.921    Ulcer of right lower leg ([facility]) L97.919    Leg swelling M79.89    Pruritic condition L29.9    Excoriation of left lower leg S80.812A    Excoriation of right lower leg S80.811A    Venous stasis dermatitis of both lower extremities I87.2    Lower leg edema R60.0    Venous ulcer (HCC) I83.009, L97.909    Venous hypertension, chronic, with ulcer, left ([address] I87.312, L97.929    Community acquired bacterial pneumonia J15.9    Elevated troponin R74.8    Hypertensive urgency I16.0    Stage 3 chronic kidney disease N18.3    Pneumonia due to organism J18.9        Procedure Note  Indications:  Based on my examination of this patient's wound(s)/ulcer(s) today, debridement is required to promote healing and evaluate the wound base. Performed by: Sukumar Hawley MD    Consent obtained:  Yes    Time out taken:  Yes    Pain Control: Anesthetic  Anesthetic: 4% Lidocaine Liquid Topical (2.5mL's)       Debridement:Excisional Debridement    Using curette the wound(s)/ulcer(s) was/were sharply debrided down through and including the removal of epidermis, dermis and subcutaneous tissue. Devitalized Tissue Debrided:  fibrin, biofilm and slough    Pre Debridement Measurements:  Are located in the [place]  Documentation Flow Sheet    Wound/Ulcer #: 23    Post Debridement Measurements:  Wound/Ulcer Descriptions are Pre Debridement except measurements:    Wound 07/02/18 Leg Right; Anterior; Lower #23; Noted 7/2/18 (Active)   Wound Image   8/6/2018  2:52 PM   Wound Type Wound 8/1/2018  8:52 AM   Wound Venous 8/1/2018  8:52 AM   Dressing Status Old drainage 8/6/2018  2:52 PM   Dressing Changed Changed/New 8/6/2018  3:51 PM   Dressing/Treatment Other (Comment) 8/6/2018  3:51 PM   Wound Cleansed Wound cleanser 7/30/2018  9:28 AM   Dressing Change Due 08/01/18 8/1/2018  8:44 AM   Wound Length (cm) 0.6 cm 8/6/2018  2:52 PM   Wound Width (cm) 0.3 cm 8/6/2018  2:52 PM   Wound Depth (cm)  0.1 8/6/2018  2:52 PM   Calculated Wound Size (cm^2) (l*w) 0.18 cm^2 8/6/2018  2:52 PM Change in Wound Size % (l*w) 85 8/6/2018  2:52 PM   Wound Assessment Granulation tissue;Slough 8/6/2018  2:52 PM   Drainage Amount Scant 8/6/2018  2:52 PM   Drainage Description Serosanguinous 8/6/2018  2:52 PM   Odor None 8/6/2018  2:52 PM   Margins Attached edges 8/6/2018  2:52 PM   Carol-wound Assessment Dry;Pink 8/6/2018  2:52 PM   Non-staged Wound Description Full thickness 8/6/2018  2:52 PM   [place]%Wound Bed 90 8/6/2018  2:52 PM   Red%Wound Bed 100 7/30/2018  9:28 AM   Yellow%Wound Bed 10 8/6/2018  2:52 PM   Op First Treatment Date 07/02/18 7/2/2018  2:48 PM   Number of days: 35       Percent of Wound(s)/Ulcer(s) Debrided: 100%    Total Surface Area Debrided:  0.18 sq cm       Diabetic/Pressure/Non Pressure Ulcers only:  Ulcer: Non-Pressure ulcer, fat layer exposed      Estimated Blood Loss:  None    Hemostasis Achieved:  not needed    Procedural Pain:  2  / 10     Post Procedural Pain:  0 / 10     Response to treatment:  Well tolerated by patient. Plan:     Treatment Note please see attached Discharge Instructions    Written patient dismissal instructions given to patient and signed by patient or POA. Discharge Instructions       Wound Clinic Physician Orders and Discharge Instructions  [place] [address], [place] [address]  Telephone: [phone]    NAME:  Beba Tejeda  YOB: 1950  MEDICAL RECORD NUMBER:  [id number]  DATE:  8/6/2018    Wound Cleansing:   Do not scrub or use excessive force. Cleanse wound prior to applying a clean dressing with:  [x] Normal Saline [x] Keep Wound Dry in Shower    [] Wound Cleanser   [] Cleanse wound with Mild Soap & Water  [] May Shower at Discharge   [] Other:       Topical Treatments:  Do not apply lotions, creams, or ointments to wound bed unless directed.    [x] Apply moisturizing lotion to skin surrounding the wound prior to dressing change.  [] Apply antifungal [] Return to work with restrictions:     Return Appointment:  [] Wound and dressing supply provider:   [] ECF or Home Healthcare:  [] Nurse visit:  [] Physician or NP scheduled for Nurse Visit:   [x] Return Appointment: With Brittnee Rao CNP  in  [address] [place] [address])  [] Ordered tests:     Nurse Case Manger:  Iraida   Electronically signed by Radha Beltre RN on 8/6/2018 at 3:25 PM     Melissa Ngo [address]: Should you experience any significant changes in your wound(s) or have questions about your wound care, please contact the [address] [place] [address] at [address] 8:30 am - 4:30 pm and Friday 8:30 am - 1:00 pm.  If you need help with your wound outside these hours and cannot wait until we are again available, contact your PCP or go to the hospital emergency room. Nurse Signature:_______________________    Date: ___________ Time:  ____________      Discharge Nurse Signature      PLEASE NOTE: IF YOU ARE UNABLE TO OBTAIN WOUND SUPPLIES, CONTINUE TO USE THE SUPPLIES YOU HAVE AVAILABLE UNTIL YOU ARE ABLE TO [facility]. IT IS MOST IMPORTANT TO KEEP THE WOUND COVERED AT ALL TIMES. Physician Signature:_______________________    Date: ___________ Time:  ____________       [] Dr Anat Breaux    [] Dr Celina Roberson   [x] Brittnee Rao CNP     [] Dr Cheli Isidro  [] Dr Nabila Oconnor   [] Dr Marcia Shay  [] Dr Kwame Cesar     [] Dr Jeny Damon   [] Sylvia Drew NP          The information contained in the After Visit Summary has been reviewed with me, the patient and/or responsible adult, by my health care provider(s). I had the opportunity to ask questions regarding this information. I have elected to receive;       Patient Signature:_______________________    Date: ___________ Time:  ____________    [] Patient unable to sign Discharge Instructions given to ECF/Transportation/POA      []  After Visit Summary  []  Comprehensive Discharge Instruction                Electronically signed by Sonia Anthony

## 2018-08-13 NOTE — TELEPHONE ENCOUNTER
Writer attempted to contact ED provider unsuccessfully. Dr Isaac Stuarta in with another pt at this time.

## 2018-08-13 NOTE — PLAN OF CARE
[employer] Application   Below Knee    NAME:  Kanika Reyes  YOB: 1950  MEDICAL RECORD NUMBER:  [id number]  DATE:  8/13/2018     [x] Applied moisturizing agent to dry skin as needed.  [x] Appied primary and secondary dressing as ordered     [x] Applied Unna roll from toes to knee overlapping each time.  [x] Applied ace wrap or coban from toes to below the knee.  [x] Secured with tape and/or metal clips covered with tape.  [x] Instructed patient/caregiver to keep dressing dry and intact. DO NOT REMOVE DRESSING.  [x] Instructed pt/family/caregiver to report excessive draining, loose bandage, wet dressing, severe pain or tingling in toes.  [x] Applied [employer] dressing below the knee to the left lower leg(s)   Zoë Huntington Boot(s) were applied per  Guidelines.      Electronically signed by Nely Vides RN on 8/13/2018 at 4:26 PM

## 2018-08-15 NOTE — PROGRESS NOTES
Kidney Disease Father        SOCIAL HISTORY    Social History   Substance Use Topics    Smoking status: Former Smoker     Packs/day: 0.50     Years: 10.00     Types: Cigarettes     Quit date: 3/21/1990    Smokeless tobacco: Never Used    Alcohol use No       ALLERGIES    Allergies   Allergen Reactions    Benadryl [Diphenhydramine] Itching       MEDICATIONS    No current facility-administered medications on file prior to encounter. Current Outpatient Prescriptions on File Prior to Encounter   Medication Sig Dispense Refill    carvedilol (COREG) 12.5 MG tablet Take 1 tablet by mouth 2 times daily (with meals) 60 tablet 3    rifaximin (XIFAXAN) 550 MG tablet Take 1 tablet by mouth 3 times daily 42 tablet 0    oxybutynin (DITROPAN-XL) 5 MG extended release tablet Take 5 mg by mouth every evening      gabapentin (NEURONTIN) 300 MG capsule Take 300 mg by mouth 3 times daily. [place] pantoprazole (PROTONIX) 40 MG tablet Take 40 mg by mouth daily      loperamide (IMODIUM) 2 MG capsule Take 2 mg by mouth 4 times daily as needed for Diarrhea      sodium bicarbonate 650 MG tablet Take 650 mg by mouth 2 times daily      metoclopramide (REGLAN) 5 MG tablet Take 5 mg by mouth 4 times daily as needed (nausea)       linagliptin (TRADJENTA) 5 MG tablet Take 5 mg by mouth every morning       insulin glargine (LANTUS) 100 UNIT/ML injection vial Inject 25 Units into the skin nightly 1 vial 3    oxyCODONE-acetaminophen (PERCOCET) 5-325 MG per tablet Take 1 tablet by mouth 5 times daily.  [place] hydrALAZINE (APRESOLINE) 50 MG tablet Take 50 mg by mouth 3 times daily       traZODone (DESYREL) 100 MG tablet Take 100 mg by mouth nightly       rOPINIRole (REQUIP) 1 MG tablet Take 2 mg by mouth nightly       dicyclomine (BENTYL) 20 MG tablet Take 20 mg by mouth 4 times daily (with meals and nightly)       simvastatin (ZOCOR) 20 MG tablet   Take 20 mg by mouth nightly       ferrous sulfate 325 (65 FE) MG tablet Take 325 Pt education per provider related to woundhealing progress. Pt became very SOB oxygen applied and 911 transported pt to ED. Treatment Note please see attached Discharge Instructions    Written patient dismissal instructions given to patient and signed by patient or POA. Discharge Instructions       Wound Clinic Physician Orders and Discharge Instructions  [address]   [address] [place], [place]  Telephone: [phone]    NAME:  Gina Hogan  YOB: 1950  MEDICAL RECORD NUMBER:  [id number]  DATE:  8/13/18    Wound Cleansing:   Do not scrub or use excessive force. Cleanse wound prior to applying a clean dressing with:  [x] Normal Saline [x] Keep Wound Dry in Shower    [] Wound Cleanser   [] Cleanse wound with Mild Soap & Water  [] May Shower at Discharge   [] Other:       Topical Treatments:  Do not apply lotions, creams, or ointments to wound bed unless directed. [] Apply moisturizing lotion to skin surrounding the wound prior to dressing change.  [] Apply antifungal ointment to skin surrounding the wound prior to dressing change.  [] Apply thin film of moisture barrier ointment to skin immediately around wound. [] Other:       Dressings:           Wound Location LEFT LOWER LEG   [] Apply Primary Dressing:       [] MediHoney Gel [] Alginate with Silver [] Alginate   [] Collagen [x] Collagen with Silver   [] Santyl with Moisten saline gauze     [] Hydrocolloid   [] MediHoney Alginate [] Foam with Silver   [] Foam   [] Hydrofera Blue    [] Mepilex Border    [x] Moisten with Saline [] Hydrogel [] Mepitel     [] Bactroban/Mupirocin [] Polysporin  [] Other:    [] Pack wound loosely with  [] Iodoform   [] Plain Packing  [] Other   [x] Cover and Secure with:     [x] Gauze [] Carol [] Kerlix   [] Ace Wrap [] Cover Roll Tape [] ABD     [] Other:    Avoid contact of tape with skin.   [] Change dressing: [] Daily    [] Every Signature:_______________________    Date: ___________ Time:  ____________      Discharge Nurse Signature      PLEASE NOTE: IF YOU ARE UNABLE TO OBTAIN WOUND SUPPLIES, CONTINUE TO USE THE SUPPLIES YOU HAVE AVAILABLE UNTIL YOU ARE ABLE TO REACH US. IT IS MOST IMPORTANT TO KEEP THE WOUND COVERED AT ALL TIMES. Physician Signature:_______________________    Date: ___________ Time:  ____________       [] Dr Viet Blair    [] Dr Alexei Brownlee   [x] Jacqueline Mendoza CNP     [] Dr Francisco Javier Sorensen  [] Dr Katheryn Astorga   [] Dr Nas Junior  [] Dr Lily Alves     [] Dr Terrance Triana   [] Thomas Perdomo NP          The information contained in the After Visit Summary has been reviewed with me, the patient and/or responsible adult, by my health care provider(s). I had the opportunity to ask questions regarding this information. I have elected to receive;       Patient Signature:_______________________    Date: ___________ Time:  ____________    [] Patient unable to sign Discharge Instructions given to ECF/Transportation/POA      []  After Visit Summary  []  Comprehensive Discharge Instruction        Electronically signed by HOLLIE Chavira CNP on 8/15/2018 at 8:25 AM

## 2018-08-21 NOTE — TELEPHONE ENCOUNTER
I called patient and scheduled her an appointment in the [facility] for HF on 8/30

## 2018-08-22 NOTE — LETTER
-suspect secondary to renal disease and demand ischemia secondary to hypoxia on admission  -Lexiscan-Myoview in 7/2018 normal  -continue with  medical management  -no angina    Plan:    Continue torsemide, carvedilol, simvastatin, hydralazine and ASA  Emphasized and discussed low-fat/low sodium diet, monitoring of daily weights, fluid restriction, worsening signs and symptoms of heart failure and when to call, and the importance of regular exercise and activity. Discussed R heart cath with Dr. Das Born:  Given her compensated status and her choice to continue with conservative therapy, will continue with medical management. Approximately 25 minutes spent with pt and > 50% of time spent on pt education. Follow up with D. Enzweiler, CNP in 4-6 weeks with D. Enzweiler, CNP    Return in about 4 weeks (around 9/19/2018) for 4-6 weeks with D. Enzweiler, CNP. Thanks for allowing me to participate in the care of this patient. If you have questions, please do not hesitate to call me. I look forward to following Mercedes Esquivel along with you.     Sincerely,        HOLLIE Palm CNP

## 2018-08-22 NOTE — PROGRESS NOTES
[address]  Office Visit    Adelita Casas  1950 August 22, 2018    CC:   Chief Complaint   Patient presents with    Follow-Up from Hospital     cht,htn,dm/no  cardiac complaints at this time. HPI:  The patient is 70 y.o. female with a past medical history significant for past medical history significant for CKD stage 3, type 2 DM and essential hypertension who presented to [facility] recently with shortness of breath. She was admitted and hospitalized from 8/13/2018-8/17/2018 with SOB and HTN urgency. Meds were adjusted and her BP improved. She continued to have a productive cough and having been recently dx with HCAP, she was placed on further antibiotics. She was discharged with follow up today. Overall feeling well. Has been monitoring sodium intake, calories and fluids closely. Continues to lose ~ 1# daily. Cough improving (nonproductive). Denies chest pain/discomfort, SOB, orthopnea/PND, palpitations, dizziness, syncope, edema , weight change or claudication. No regular exercise. Following wound clinic for healing ulcer L lower leg (much improved)    Review of Systems:  Constitutional: Denies  fatigue, weakness, night sweats or fever. HEENT: Denies new visual changes, ringing in ears, nosebleeds, nasal congestion  Respiratory: Denies new or change in SOB, PND, orthopnea or cough. Cardiovascular: see HPI  GI: Denies N/V, diarrhea, constipation, abdominal pain, change in bowel habits, melena or hematochezia  : Denies urinary frequency, urgency, incontinence, hematuria or dysuria. Skin: Denies rash, hives, or cyanosis  Musculoskeletal: Denies joint or muscle aches/pain  Neurological: Denies syncope or TIA-like symptoms.   Psychiatric: Denies anxiety, insomnia or depression     Past Medical History:   Diagnosis Date    Acid reflux     Arthritis     Balance problem     Blood circulation, collateral     Cellulitis of both lower extremities     CHF (congestive tablet Take 5 mg by mouth every morning       insulin glargine (LANTUS) 100 UNIT/ML injection vial Inject 25 Units into the skin nightly 1 vial 3    oxyCODONE-acetaminophen (PERCOCET) 5-325 MG per tablet Take 1 tablet by mouth 5 times daily. [place] hydrALAZINE (APRESOLINE) 50 MG tablet Take 50 mg by mouth 3 times daily       traZODone (DESYREL) 100 MG tablet Take 100 mg by mouth nightly       rOPINIRole (REQUIP) 1 MG tablet Take 2 mg by mouth nightly       dicyclomine (BENTYL) 20 MG tablet Take 20 mg by mouth 4 times daily (with meals and nightly)       simvastatin (ZOCOR) 20 MG tablet   Take 20 mg by mouth nightly       ferrous sulfate 325 (65 FE) MG tablet Take 325 mg by mouth 2 times daily.  aspirin 81 MG tablet Take 81 mg by mouth daily. No current facility-administered medications for this visit. Physical Exam:   BP (!) 118/58 (Site: Right Arm, Position: Sitting, Cuff Size: Large Adult)   Pulse 72   Ht 4' 10\" (1.473 m)   SpO2 99%   BMI 40.87 kg/m²   Wt Readings from Last 2 Encounters:   08/17/18 195 lb 8.8 oz (88.7 kg)   08/06/18 198 lb 13.7 oz (90.2 kg)     Constitutional: She is oriented to person, place, and time. She appears well-developed and well-nourished. In no acute distress. HEENT: Normocephalic and atraumatic. Sclerae anicteric. No xanthelasmas. EOM's intact. Neck: Neck supple. No JVD present. Carotids without bruits. No  thyromegaly present. Cardiovascular: RRR, normal S1 and S2; II/VI Aortic stenosis murmur  Pulmonary/Chest: Effort normal.  Lungs clear to auscultation. Chest wall nontender  Abdominal: soft, nontender, nondistended. + bowel sounds; no hepatomegaly  Extremities: No edema or cyanosis. Pulses are 2+ radial and 1+ pedal bilaterally. Cap refill brisk. Neurological: No focal deficit. Skin: Skin is warm and dry. Psychiatric: She has a normal mood and affect.  Her speech is normal and behavior is normal.     Lab Review:   No results found for: TRIG,

## 2018-08-28 NOTE — PROGRESS NOTES
Elicia Irwin 36   Progress Note and Procedure Note      Sahil Kelsey RECORD NUMBER:  [id number]  AGE: 70 y.o. GENDER: female  : 1950  EPISODE DATE:  2018    Subjective:     Chief Complaint   Patient presents with    Wound Check     Follow up Wounds Bilateral Lower Legs         HISTORY of PRESENT ILLNESS HPI  Heaven Salazar is a 70 y.o. female who presents today for wound/ulcer evaluation. Pt denies constitutional issues, remaining wound left lateral lower leg now closed.     History of Wound Context: nonhealing leg ulcers  Wound/Ulcer Pain Timing/Severity: mild  Quality of pain: dull  Severity:  0 / 10   Modifying Factors: Pain is relieved/improved with rest  Associated Signs/Symptoms: none     Ulcer Identification:  Ulcer Type: venous  Contributing Factors: venous stasis     Wound: N/A  PAST MEDICAL HISTORY        Diagnosis Date    Acid reflux     Arthritis     Balance problem     Blood circulation, collateral     Cellulitis of both lower extremities     CHF (congestive heart failure) (HCC)     Chronic kidney disease     Chronic renal failure    Chronic venous hypertension (idiopathic) with ulcer and inflammation of bilateral lower extremity (HCC) 2017    Community acquired bacterial pneumonia 2018    Depression     Diabetes mellitus (HCC)     GERD (gastroesophageal reflux disease)     Hyperlipidemia     Hypertension     Movement disorder     Murmur     Neuromuscular disorder (HCC)     Restless leg syndrome     Urinary frequency     Urinary incontinence        PAST SURGICAL HISTORY    Past Surgical History:   Procedure Laterality Date    APPENDECTOMY      BLADDER SUSPENSION      CHOLECYSTECTOMY      COLONOSCOPY      CYSTOSCOPY      EYE SURGERY      HYSTERECTOMY         FAMILY HISTORY    Family History   Problem Relation Age of Onset    Heart Disease Mother     Heart Disease Father     Diabetes Father     Kidney Disease Father [address] Physician Orders   [facility] AT [address] [place], [place]  Telephone: [phone]    NAME:  Polo Estimable  YOB: 1950  MEDICAL RECORD NUMBER:  [id number]  DATE:  8/27/2018    Congratulations! You have completed your treatment. 1. Return to your Primary Care Physician for all your health issues. 2. Resume your ordinary activities as tolerated. 3. Take your medications as prescribed by your primary care physician. 4. Check your skin daily for cracks, bruises, sores, or dryness. Use a moisturizer as needed. 5. Clean and dry your skin, using mild soap and warm water (not hot). 6. Avoid alcohol and caffeine and do not smoke. 7. Maintain a nutritious diet. [x] Avoid pressure on your wound site. Keep your legs elevated above the level of the heart whenever possible. [x] Continue to use wraps/stockings/compression as prescribed. [x] Replace compression stockings every 4 to 6 months as needed to ensure proper fit. [x] Wear well-fitting shoes and leg garments. THANK YOU FOR ALLOWING US TO SERVE YOU.   PLEASE CALL IF YOU DEVELOP ANOTHER WOUND. [phone])    Physician Signature:_______________________    Date: ___________ Time:  ____________       [] Dr Virgil Roy    [] Dr Carter Uribe   [x] Isaias Bingham CNP     [] Dr Chauncey Deras  [] Dr Laura Merritt   [] Dr Rosalio Nichols  [] Dr Pieter Paulino     [] Dr Rashi Plascencia   [] Tarik Carrero NP         Electronically signed by Girish Sher RN on 8/27/2018 at 2:00 PM                          Electronically signed by HOLLIE Dunn CNP on 8/28/2018 at 1:58 PM

## 2018-08-30 NOTE — PATIENT INSTRUCTIONS
1. Due to get blood work for a Basic Metabolic Panel. * Know your dry weight 173-177    * Call any time you have questions about anything. Do not wait. * It is very important to take your medications as prescribed, do not miss any doses. Call for refills before you run out. Typically when you have one week left. If you have trouble getting your medications for any reason, call us at [phone]. * Avoid NSAIDs (non steroidal anti inflammatory drugs) like Aleve (naproxen), Advil and Motrin (ibuprofen), Mobic (diclofenac), Celebrex (celecoxib) and aspirin. A daily aspirin is okay if recommended by your physician. It is okay to take Tylenol (acetaminophen) unless your physician has told you    otherwise. * Limit fluid intake. Typically this is no more than 1,500-2,000 milliliters (mL) per day. This is a liter and a half to two liters. This is equal to approximately 48-64 ounces (oz) per day    * Limit sodium intake. Typically this is no more than 2,000-2,400 milligrams (mg) per day. You will need to add up the sodium content found on the nutrition label for the                  foods you eat each day. * Weigh yourself every day. Weigh yourself before breakfast and after you go to the          restroom. Wear the same thing each time you weigh yourself. Keep a log and bring it to each visit. Call if you gain more than 3 pounds in one day. [phone]   Call if you gain more than 5 pounds in one week. [phone]   Call if your weight goes up above your dry weight. [phone]    * If you have hypertension (high blood pressure), you may want to check your blood pressure daily. Keep your blood pressure at your goal or below. Keep a log and bring it to each visit. Goal < 130/80    * Be sure to keep good control of your Diabetes, your A1c was great 7/27/18 at 6.0     * Be sure to keep good control of your Cholesterol, your cholesterol was good 1/2018.     * Eat a Heart healthy

## 2018-08-30 NOTE — PROGRESS NOTES
torsemide to be 10mg daily. This was changed after her BMP 8/23/18. Current medications:  Outpatient Prescriptions Marked as Taking for the 8/30/18 encounter (Office Visit) with Jesus Gonzalez [facility]   Medication Sig Dispense Refill    vitamin D (CHOLECALCIFEROL) 1000 UNIT TABS tablet Take 1,000 Units by mouth daily      ranitidine (ZANTAC) 300 MG tablet Take 300 mg by mouth nightly      diphenhydrAMINE (DIPHENHIST) 25 MG capsule Take 25 mg by mouth nightly as needed for Itching      torsemide (DEMADEX) 20 MG tablet Take 10 mg by mouth daily      ondansetron (ZOFRAN) 4 MG tablet Take 4-8 mg by mouth every 8 hours as needed for Nausea or Vomiting      carvedilol (COREG) 12.5 MG tablet Take 1 tablet by mouth 2 times daily (with meals) 60 tablet 3    rifaximin (XIFAXAN) 550 MG tablet Take 1 tablet by mouth 3 times daily 42 tablet 0    oxybutynin (DITROPAN-XL) 5 MG extended release tablet Take 5 mg by mouth every evening      pantoprazole (PROTONIX) 40 MG tablet Take 40 mg by mouth every morning (before breakfast)       loperamide (IMODIUM) 2 MG capsule See Admin Instructions 1-2 tablets after loose bowel movement. May repeat 1 tablet after subsequent loose stools up to a maximum of 8 tablets or 16 mg      sodium bicarbonate 650 MG tablet Take 650 mg by mouth 2 times daily      metoclopramide (REGLAN) 5 MG tablet Take 5 mg by mouth 4 times daily Before meals and nightly      linagliptin (TRADJENTA) 5 MG tablet Take 5 mg by mouth every morning       insulin glargine (LANTUS) 100 UNIT/ML injection vial Inject 25 Units into the skin nightly 1 vial 3    oxyCODONE-acetaminophen (PERCOCET) 5-325 MG per tablet Take 1 tablet by mouth every 4 hours as needed for Pain (max 5/day).  Deysi Louis hydrALAZINE (APRESOLINE) 50 MG tablet Take 50 mg by mouth 3 times daily       traZODone (DESYREL) 100 MG tablet Take 100 mg by mouth nightly       rOPINIRole (REQUIP) 1 MG tablet Take 2 mg by mouth nightly       dicyclomine (BENTYL) 20 MG tablet Take 20 mg by mouth 4 times daily (with meals and nightly)       simvastatin (ZOCOR) 20 MG tablet   Take 20 mg by mouth nightly       ferrous sulfate 325 (65 FE) MG tablet Take 325 mg by mouth 2 times daily.  aspirin 81 MG tablet Take 81 mg by mouth daily. Reviewed heart failure medications including how they work and potential side effects. Get With The Guidelines    Ms. Cherelle Wong is on a Diuretic   Ms. Cherelle Wong is on a Statin         Is the patient taking medications that should be avoided   with Heart Failure such as those listed below?: No: \"I'm not allowed to take NSAIDs. I can take Tylenol. \"    NSAIDs:           Thiazolidinediones (pioglitazone):        Cilostazol (Pletal):           Saxagliptin (Onglyza) or Alogliptin (Torres Rust):     Aliskiren (Tekturna) which is contraindication with an ACE Inh or ARB or Entresto in patients with Diabetes    Reviewed need for labs: Due for BMP, her home nurse is coming to draw this per Ms. Cherelle Wong. Addressed co-morbidities significant to Heart Failure:  1. Diabetes - A1C = 6 on 8/27/18  2. Hyperlipidemia - WNL 1/2018. Diabetes with a statin. Addressed Heart Failure Team needs:   1. Meeting w Dietitian next visit  2. Receiving home care including nurse and PT care. Additional assessment:   Ms. Cherelle Wong came to her appointment today by herself. She reports she is feeling well. No signs or symptoms of heart failure. She seems to have a good understanding of the information provided. She was somewhat quiet though. There may be gaps in understanding that are identified after another visit and she opens up more. I gave a heart failure binder and provided extensive education including the information noted above. She has appropriate f/u with her PCP and Cardiology. She is also following with wound care and has home care at this time. She is having trouble with diarrhea.   She asked if I could call to get her Imodium filled here. Her Pharmacy has not yet delivered this to her. I will reach out to [facility]. Plan     Daily weights, sodium and fluid restrictions. Report any unusual signs or symptoms including those of heart failure. Report weight gain of 3 pounds in one day or 5 pounds in one week. Hypertension goal <130/80, unless otherwise instructed. Smoking Cessation  Alcohol abstinence    Action taken:  [facility] to see if they would transfer the Imodium to [facility].  She reached out to [facility] and they will deliver the Imodium today to her home between 5-6pm.  Ms. Aponte is okay with this. The [address] [place] [address] Avenue has referred the patient to the following:  [] Cardiac Rehab [phone]) (see guidelines below)  [] [facility] ([phone])  [] [address] ([phone])  [] Social Work ([phone])   [] Yanick R[facility] ([phone])  [] Other  [x] None at this time    Recommendations to the physician:  None    Patient Instructions:  Just gave her a reminder to her blood work for a Basic Metabolic Panel with her home RN. If not, please reach out to have this completed. [facility] Heart Failure Service Follow-up: Yes, 9/19/18.     Follow-up visit will include:   [] Nurse visit, patient assessment  [x] Heart Failure Diet Plan with Dietitian  [] BMP  [] BNP  [] Other Labs  [x] Labs as needed, to be determined during the visit  [] Physical activity plan  [] Spiritual Care, Advanced Directives  [] Comprehensive Medication Review with the Pharmacist  [] Medication Review  [] Review Patient's Log Booklet    [x] Self Management Skills including daily weights and sodium and fluid restrictions  [] Smoking Cessation  [] Compliance  [] Other          Electronically signed by Dale Gomes PharmD on 8/30/2018 at 1:31 PM

## 2018-09-05 NOTE — PLAN OF CARE
[employer] Application   Below Knee    NAME:  Yaya Armstrong  YOB: 1950  MEDICAL RECORD NUMBER:  [id number]  DATE:  9/5/2018     [x] Applied moisturizing agent to dry skin as needed.  [x] Appied primary and secondary dressing as ordered     [x] Applied Unna roll from toes to knee overlapping each time.  [x] Applied ace wrap or coban from toes to below the knee.  [x] Secured with tape and/or metal clips covered with tape.  [x] Instructed patient/caregiver to keep dressing dry and intact. DO NOT REMOVE DRESSING.  [x] Instructed pt/family/caregiver to report excessive draining, loose bandage, wet dressing, severe pain or tingling in toes.  [x] Applied [employer] dressing below the knee to Bilateral lower leg(s)        Unna Boot(s) were applied per  Guidelines.      Electronically signed by Shon Wakefield RN on 9/5/2018 at 3:37 PM

## 2018-09-05 NOTE — PROGRESS NOTES
Urinary frequency     Urinary incontinence        PAST SURGICAL HISTORY    Past Surgical History:   Procedure Laterality Date    APPENDECTOMY      BLADDER SUSPENSION      CHOLECYSTECTOMY      COLONOSCOPY      CYSTOSCOPY      EYE SURGERY      HYSTERECTOMY         FAMILY HISTORY    Family History   Problem Relation Age of Onset    Heart Disease Mother     Heart Disease Father     Diabetes Father     Kidney Disease Father        SOCIAL HISTORY    Social History   Substance Use Topics    Smoking status: Former Smoker     Packs/day: 0.50     Years: 10.00     Types: Cigarettes     Quit date: 3/21/1990    Smokeless tobacco: Never Used    Alcohol use No       ALLERGIES    Allergies   Allergen Reactions    Benadryl [Diphenhydramine] Itching       MEDICATIONS    Current Outpatient Prescriptions on File Prior to Encounter   Medication Sig Dispense Refill    vitamin D (CHOLECALCIFEROL) 1000 UNIT TABS tablet Take 1,000 Units by mouth daily      ranitidine (ZANTAC) 300 MG tablet Take 300 mg by mouth nightly      diphenhydrAMINE (DIPHENHIST) 25 MG capsule Take 25 mg by mouth nightly as needed for Itching      torsemide (DEMADEX) 20 MG tablet Take 10 mg by mouth daily      carvedilol (COREG) 12.5 MG tablet Take 1 tablet by mouth 2 times daily (with meals) 60 tablet 3    rifaximin (XIFAXAN) 550 MG tablet Take 1 tablet by mouth 3 times daily 42 tablet 0    oxybutynin (DITROPAN-XL) 5 MG extended release tablet Take 5 mg by mouth every evening      pantoprazole (PROTONIX) 40 MG tablet Take 40 mg by mouth every morning (before breakfast)       sodium bicarbonate 650 MG tablet Take 650 mg by mouth 2 times daily      metoclopramide (REGLAN) 5 MG tablet Take 5 mg by mouth 4 times daily Before meals and nightly      linagliptin (TRADJENTA) 5 MG tablet Take 5 mg by mouth every morning       insulin glargine (LANTUS) 100 UNIT/ML injection vial Inject 25 Units into the skin nightly 1 vial 3    Musculoskeletal: Muscle strength 5/5 with PF/DF/I and E of both feet. Decreased but stable ROM of 1st MTPJ bilateral without pain or crepitus. Assessment:      Patient Active Problem List   Diagnosis Code    Diastolic CHF ([facility] X30.47    Cellulitis L03.90    Chest pain R07.9    CKD (chronic kidney disease) stage 4, GFR 15-29 ml/min ([facility]) N18.4    DM (diabetes mellitus) ([facility] E11.9    HTN (hypertension) I10    Cellulitis of both lower extremities L03.115, L03. 80    Morbid obesity due to excess calories ([facility]) E66.01    Atherosclerosis of native artery of both lower extremities with bilateral ulceration of calves ([facility]) I70.232, I70.242    Chronic venous hypertension (idiopathic) with ulcer and inflammation of bilateral lower extremity ([facility]) I87.333, L97.919, L97.929    Ulcer of left lower leg, limited to breakdown of skin ([facility]) L97.921    Ulcer of right lower leg ([facility]) L97.919    Leg swelling M79.89    Pruritic condition L29.9    Excoriation of left lower leg S80.812A    Excoriation of right lower leg S80.811A    Venous stasis dermatitis of both lower extremities I87.2    Lower leg edema R60.0    Venous ulcer ([facility] I83.009, L97.909    Venous hypertension, chronic, with ulcer, left ([facility] I87.312, L97.929    Community acquired bacterial pneumonia J15.9    Hypertensive urgency I16.0    Stage 3 chronic kidney disease N18.3    Pneumonia due to organism J18.9    Acute pulmonary edema ([facility]) J81.0    Acute respiratory failure with hypoxia ([facility]) J96.01        Procedure Note  Indications:  Based on my examination of this patient's wound(s)/ulcer(s) today, debridement is required to promote healing and evaluate the wound base.     Performed by: John Karimi DPM    Consent obtained:  Yes    Time out taken:  Yes    Pain Control: Anesthetic  Anesthetic: 4% Lidocaine Liquid Topical (2.5 ml)       Debridement:Excisional Debridement    Using curette the wound(s)/ulcer(s) was/were sharply debrided down 2:30 PM   Drainage Amount Scant 9/5/2018  2:30 PM   Drainage Description Serosanguinous 9/5/2018  2:30 PM   Odor None 9/5/2018  2:30 PM   Margins Attached edges 9/5/2018  2:30 PM   Carol-wound Assessment Dry 9/5/2018  2:30 PM   Non-staged Wound Description Full thickness 9/5/2018  2:30 PM   Red%Wound Bed 75 9/5/2018  2:30 PM   Yellow%Wound Bed 20 9/5/2018  2:30 PM   Black%Wound Bed 5 9/5/2018  2:30 PM   Op First Treatment Date 09/05/18 9/5/2018  2:30 PM   Number of days: 0       Wound 09/05/18 Leg Left;Posterior; Lower #27; 9/3/18 (Active)   Wound Image   9/5/2018  2:30 PM   Dressing Status Old drainage 9/5/2018  2:30 PM   Dressing Changed Changed/New 9/5/2018  3:32 PM   Dressing/Treatment Other (Comment) 9/5/2018  3:32 PM   Wound Length (cm) 2.4 cm 9/5/2018  3:23 PM   Wound Width (cm) 1 cm 9/5/2018  3:23 PM   Wound Depth (cm)  0.2 9/5/2018  3:23 PM   Calculated Wound Size (cm^2) (l*w) 2.4 cm^2 9/5/2018  3:23 PM   Change in Wound Size % (l*w) -15.94 9/5/2018  3:23 PM   Wound Assessment Granulation tissue;Slough 9/5/2018  2:30 PM   Drainage Amount Scant 9/5/2018  2:30 PM   Drainage Description Serosanguinous 9/5/2018  2:30 PM   Odor None 9/5/2018  2:30 PM   Margins Attached edges 9/5/2018  2:30 PM   Carol-wound Assessment Dry 9/5/2018  2:30 PM   Non-staged Wound Description Full thickness 9/5/2018  2:30 PM   [place]%Wound Bed 85 9/5/2018  2:30 PM   Yellow%Wound Bed 10 9/5/2018  2:30 PM   Black%Wound Bed 5 9/5/2018  2:30 PM   Op First Treatment Date 09/05/18 9/5/2018  2:30 PM   Number of days: 0       Percent of Wound(s)/Ulcer(s) Debrided: 100%    Total Surface Area Debrided:  4.53 sq cm       Diabetic/Pressure/Non Pressure Ulcers only:  Ulcer: Non-Pressure ulcer, fat layer exposed      Estimated Blood Loss:  Minimal    Hemostasis Achieved:  by pressure    Procedural Pain:  2  / 10     Post Procedural Pain:  0 / 10     Response to treatment:  Well tolerated by patient.        Plan:   - Sharply debrided toenails 1-5 bilateral in thickness and in length to help reduce pain and chances of infection. Treatment Note please see attached Discharge Instructions    Written patient dismissal instructions given to patient and signed by patient or POA. Discharge Instructions       Wound Clinic Physician Orders and Discharge Instructions  [address]   [address] [place]  Telephone: [phone]     NAME:  Delon Lockwood  YOB: 1950  MEDICAL RECORD NUMBER:  [id number]  DATE:  8/13/18     Wound Cleansing:   Do not scrub or use excessive force. Cleanse wound prior to applying a clean dressing with:  [x] Normal Saline            [x] Keep Wound Dry in Shower    [] Wound Cleanser   [] Cleanse wound with Mild Soap & Water  [] May Shower at Discharge   [] Other:        Topical Treatments:  Do not apply lotions, creams, or ointments to wound bed unless directed. [] Apply moisturizing lotion to skin surrounding the wound prior to dressing change.  [] Apply antifungal ointment to skin surrounding the wound prior to dressing change.  [] Apply thin film of moisture barrier ointment to skin immediately around wound.   [] Other:                  Dressings:                  Wound Location LEFT LOWER LEG           [] Apply Primary Dressing:                                              [] MediHoney Gel          [] Alginate with Silver    [] Alginate              [] Collagen         [x] Collagen with Silver (Do Not Moisten)  [] Santyl with Moisten saline gauze                [] Hydrocolloid     [] MediHoney Alginate [] Foam with Silver              [] Foam   [] Hydrofera Blue         [] Mepilex Border                         [] Moisten with Saline    [] Hydrogel         [] Mepitel                          [] Bactroban/Mupirocin  [] Polysporin                  [] Other:    [] Pack wound loosely with       [] Iodoform   [] Plain Packing       [] Other   [x] Cover and Secure with:                       [x] Gauze [] Carol   [] Kerlix              [] Ace Wrap       [] Cover Roll Tape         [] ABD                              [] Other:               Avoid contact of tape with skin. [] Change dressing:       [] Daily               [] Every Other Day        [] Three times per week              [] Once a week  [x] Do Not Change Dressing         [] Other:                          Compression:  Apply:  [x] Multilayer Compression Wrap Applied in [facility]          [x]RightLeg          [x]Left Leg              [x] Multi-layer compression. Do not get leg(s) with wrap wet. If wraps become too tight call the center or completely remove the wrap. [x] Elevate leg(s) above the level of the heart when sitting. [x] Avoid prolonged standing in one place.   COMPRESSION STOCKING TO RIGHT LEG        Off-Loading:   [] Off-loading when        [] walking           [] in bed [] sitting  [] Total non-weight bearing  [] Right Leg  [] Left Leg          [x] Assistive Device         [x] Walker            [] Cane   [] Wheelchair      [] Crutches              [] Surgical shoe    [] Podus Boot(s)   [] Foam Boot(s)  [] Roll About              [] Cast Boot       [] CROW Boot  [] Other:         Dietary:  [] Diet as tolerated:        [x] Calorie Diabetic Diet: [] No Added Salt:  [x] Increase Protein:        [] Other:   Activity:  [x] Activity as tolerated:  [] Patient has no activity restrictions     [] Strict Bedrest:            [] Remain off Work:     [] May return to full duty work:                                       [] Return to work with restrictions:                 Return Appointment:  [] Wound and dressing supply provider:   [x] ECF or Home Healthcare: [address]  [] Nurse visit:     [] Physician or NP scheduled for Nurse Visit:   [x] Return Appointment: With Casandra Morgan CNP  in  [facility])  [] Ordered tests:      Nurse Case

## 2018-09-10 NOTE — PLAN OF CARE
[employer] Application   Below Knee    NAME:  Polo Venice  YOB: 1950  MEDICAL RECORD NUMBER:  [id number]  DATE:  9/10/2018     [x] Applied moisturizing agent to dry skin as needed.  [x] Appied primary and secondary dressing as ordered     [x] Applied Unna roll from toes to knee overlapping each time.  [x] Applied ace wrap or coban from toes to below the knee.  [x] Secured with tape and/or metal clips covered with tape.  [x] Instructed patient/caregiver to keep dressing dry and intact. DO NOT REMOVE DRESSING.  [x] Instructed pt/family/caregiver to report excessive draining, loose bandage, wet dressing, severe pain or tingling in toes.  [x] Applied [employer] dressing below the knee to Left lower leg(s)        Unna Boot(s) were applied per  Guidelines.      Electronically signed by Francisco Millard RN on 9/10/2018 at 3:52 PM

## 2018-09-12 NOTE — PROGRESS NOTES
Elicia Irwin [address]   Progress Note and Procedure Note      Sahil Kelsey RECORD NUMBER:  [id number]  AGE: 70 y.o. GENDER: female  : 1950  EPISODE DATE:  9/10/2018    Subjective:     Chief Complaint   Patient presents with    Wound Check     Follow Up on Left Lower Leg         HISTORY of PRESENT ILLNESS HPI  Lavell Gaxiola is a 70 y.o. female who presents today for wound/ulcer evaluation. History of Wound Context: Patient presents with a new complaint of sores on left ankle of 2 mweeks duration. Pt denies attributing trauma, and reports she has a history of recurring sores on her legs from chronic swelling of the legs. Pt admits to tenderness with pressure to the sores, but it is tolerable. Pt denies redness or drainage from the sores. Pt is a diabetic and admits to numbness to her feet.     Ulcer Identification:  Ulcer Type: venous  Contributing Factors: edema, venous stasis, diabetes and obesity    Wound: N/A        PAST MEDICAL HISTORY        Diagnosis Date    Acid reflux     Arthritis     Balance problem     Blood circulation, collateral     Cellulitis of both lower extremities     CHF (congestive heart failure) (HCC)     Chronic kidney disease     Chronic renal failure    Chronic venous hypertension (idiopathic) with ulcer and inflammation of bilateral lower extremity ([facility] 2017    Community acquired bacterial pneumonia 2018    Depression     Diabetes mellitus (HCC)     GERD (gastroesophageal reflux disease)     Hyperlipidemia     Hypertension     Movement disorder     Murmur     Neuromuscular disorder (HCC)     Restless leg syndrome     Urinary frequency     Urinary incontinence        PAST SURGICAL HISTORY    Past Surgical History:   Procedure Laterality Date    APPENDECTOMY      BLADDER SUSPENSION      CHOLECYSTECTOMY      COLONOSCOPY      CYSTOSCOPY      EYE SURGERY      HYSTERECTOMY         FAMILY HISTORY    Family History   Problem 325 (65 FE) MG tablet Take 325 mg by mouth 2 times daily.  aspirin 81 MG tablet Take 81 mg by mouth daily.  diphenhydrAMINE (DIPHENHIST) 25 MG capsule Take 25 mg by mouth nightly as needed for Itching      ondansetron (ZOFRAN) 4 MG tablet Take 4-8 mg by mouth every 8 hours as needed for Nausea or Vomiting      loperamide (IMODIUM) 2 MG capsule See Admin Instructions 1-2 tablets after loose bowel movement. May repeat 1 tablet after subsequent loose stools up to a maximum of 8 tablets or 16 mg      metoclopramide (REGLAN) 5 MG tablet Take 5 mg by mouth 4 times daily Before meals and nightly       No current facility-administered medications on file prior to encounter. REVIEW OF SYSTEMS    Pertinent items are noted in HPI. Objective:      /68   Pulse 78   Temp 98 °F (36.7 °C) (Oral)   Resp 18     Wt Readings from Last 3 Encounters:   09/05/18 175 lb 14.8 oz (79.8 kg)   08/30/18 179 lb 9.6 oz (81.5 kg)   08/17/18 195 lb 8.8 oz (88.7 kg)       PHYSICAL EXAM    General Appearance: alert and oriented to person, place and time and obese  Skin: warm and dry  Head: normocephalic and atraumatic  Eyes: pupils equal, round, and reactive to light  Pulmonary/Chest:  no respiratory distress  Cardiovascular: normal rate, regular rhythm, no gallops and intact distal pulses  Extremities: no cyanosis and no clubbing      Assessment:      Patient Active Problem List   Diagnosis Code    Diastolic [facility] H74.17    Cellulitis L03.90    Chest pain R07.9    CKD (chronic kidney disease) stage 4, GFR 15-29 ml/min ([facility]) N18.4    DM (diabetes mellitus) ([facility]) E11.9    HTN (hypertension) I10    Cellulitis of both lower extremities L03.115, L03. 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 ([facility]) I70.232, I70.242    Chronic venous hypertension (idiopathic) with ulcer and inflammation of bilateral 0.5 cm 9/10/2018  3:50 PM   Wound Depth (cm)  0.2 9/10/2018  3:50 PM   Calculated Wound Size (cm^2) (l*w) 0.65 cm^2 9/10/2018  3:50 PM   Change in Wound Size % (l*w) 68.6 9/10/2018  3:50 PM   Wound Assessment Granulation tissue;Slough 9/10/2018  2:57 PM   Drainage Amount Scant 9/10/2018  2:57 PM   Drainage Description Serosanguinous 9/10/2018  2:57 PM   Odor None 9/10/2018  2:57 PM   Margins Attached edges 9/10/2018  2:57 PM   Carol-wound Assessment Dry;Pink 9/10/2018  2:57 PM   Non-staged Wound Description Full thickness 9/10/2018  2:57 PM   [place]%Wound Bed 90 9/10/2018  2:57 PM   Yellow%Wound Bed 10 9/10/2018  2:57 PM   Black%Wound Bed 5 9/5/2018  2:30 PM   Op First Treatment Date 09/05/18 9/5/2018  2:30 PM   Number of days: 6       Percent of Wound(s)/Ulcer(s) Debrided: 100%    Total Surface Area Debrided:  0.65 sq cm       Diabetic/Pressure/Non Pressure Ulcers only:  Ulcer: Non-Pressure ulcer, limited to breakdown of skin      Estimated Blood Loss:  None    Hemostasis Achieved:  not needed    Procedural Pain:  0  / 10     Post Procedural Pain:  0 / 10     Response to treatment:  Well tolerated by patient. Plan:   Pt education per provider related to improving wounds - 2 wounds closed and one open. Will continue same treatment. Pt agreeable to plan of care, questions answered. Treatment Note please see attached Discharge Instructions    Written patient dismissal instructions given to patient and signed by patient or POA. Discharge Instructions       Wound Clinic Physician Orders and Discharge Instructions  [place] [address], [facility]  Telephone: [phone]     NAME:  Shelly Yoon  YOB: 1950  MEDICAL RECORD NUMBER:  [id number]  DATE:  8/13/18     Wound Cleansing:   Do not scrub or use excessive force.   Cleanse wound prior to applying a clean dressing with:  [x] Normal Saline            [x] Keep [x] Elevate leg(s) above the level of the heart when sitting. [x] Avoid prolonged standing in one place.   COMPRESSION STOCKING TO RIGHT LEG- COVER NEW OPEN AREA ON RIGHT LEG WITH MEPILEX BORDER AND CHANGE EVERY 5-7 DAYS        Off-Loading:   [] Off-loading when        [] walking           [] in bed [] sitting  [] Total non-weight bearing  [] Right Leg  [] Left Leg          [x] Assistive Device         [x] Walker            [] Cane   [] Wheelchair      [] Crutches              [] Surgical shoe    [] Podus Boot(s)   [] Foam Boot(s)  [] Roll About              [] Cast Boot       [] CROW Boot  [] Other:         Dietary:  [] Diet as tolerated:        [x] Calorie Diabetic Diet: [] No Added Salt:  [x] Increase Protein:        [] Other:   Activity:  [x] Activity as tolerated:  [] Patient has no activity restrictions     [] Strict Bedrest:            [] Remain off Work:     [] May return to full duty work:                                       [] Return to work with restrictions:                 Return Appointment:  [] Wound and dressing supply provider:   [x] ECF or Home Healthcare: [address]  [] Nurse visit:     [] Physician or NP scheduled for Nurse Visit:   [x] Return Appointment: With Ross Lee CNP  in  1 Week(s)  [] Ordered tests:      Nurse Case Manger:  Rafia Snyder [address] Information: Should you experience any significant changes in your wound(s) or have questions about your wound care, please contact the [address] [place] [address] at [address] 8:30 am - 4:30 pm and Friday 8:30 am - 1:00 pm.  If you need help with your wound outside these hours and cannot wait until we are again available, contact your PCP or go to the hospital emergency room.      Nurse Signature:_______________________     Date: ___________ Time:  ____________        Discharge Nurse Signature        PLEASE NOTE: IF YOU ARE UNABLE TO OBTAIN WOUND SUPPLIES, CONTINUE TO USE THE

## 2018-09-18 NOTE — PROGRESS NOTES
and role of edema to new wounds. Questions answered. Treatment Note please see attached Discharge Instructions    Written patient dismissal instructions given to patient and signed by patient or POA. Discharge Instructions       Wound Clinic Physician Orders and Discharge Instructions  [address]   [address] [place], [place] [address]  Telephone: [phone]     NAME:  Shilpa Hernandez  YOB: 1950  MEDICAL RECORD NUMBER:  [id number]  DATE:  9/17/18     Wound Cleansing:   Do not scrub or use excessive force. Cleanse wound prior to applying a clean dressing with:  [x] Normal Saline            [x] Keep Wound Dry in Shower    [] Wound Cleanser   [] Cleanse wound with Mild Soap & Water  [] May Shower at Discharge   [] Other:        Topical Treatments:  Do not apply lotions, creams, or ointments to wound bed unless directed. [] Apply moisturizing lotion to skin surrounding the wound prior to dressing change.  [] Apply antifungal ointment to skin surrounding the wound prior to dressing change.  [] Apply thin film of moisture barrier ointment to skin immediately around wound.   [] Other:                  Dressings:                  Wound Location LEFT LOWER LEG           [] Apply Primary Dressing:                                         COMPRESSION STOCKING TO BILAT LOWER LEGS- COVER NEW OPEN AREA ON LEFT LEG WITH [address] BORDER AND CHECK ON Friday, IF HEALED YOU MAY CANCEL YOUR APPT FOR NEXT Monday, IF NOT HEALED, PLACE A NEW MEPILEX BORDER AND KEEP YOUR APPT FOR MONDAY        Off-Loading:   [] Off-loading when        [] walking           [] in bed [] sitting  [] Total non-weight bearing  [] Right Leg  [] Left Leg          [x] Assistive Device         [x] Walker            [] Cane   [] Wheelchair      [] Crutches              [] Surgical shoe    [] Podus Boot(s)   [] Foam Boot(s)  [] Roll About              [] Cast Boot       [] CROW the opportunity to ask questions regarding this information.   I have elected to receive;        Patient Signature:_______________________     Date: ___________ Time:  ____________     [] Patient unable to sign Discharge Instructions given to ECF/Transportation/POA        [x]  After Visit Summary  []  Comprehensive Discharge Instruction        Electronically signed by HOLLIE Quiroz CNP on 9/18/2018 at 7:55 PM

## 2018-09-25 NOTE — PLAN OF CARE
Multilayer Compression Wrap   (Not Unna) Below the Knee    NAME:  Joe Vee  YOB: 1950  MEDICAL RECORD NUMBER:  [id number]  DATE:  9/24/2018       [x] Removed old Multilayer wrap if indicated and wash leg with mild soap/water.  [x] Applied moisturizing agent to dry skin as needed.  [x] Applied primary and secondary dressing as ordered    [] Applied multilayered dressing below the knee to Bilateral lower leg(s).  [x] Instructed patient/caregiver not to remove dressing and to keep it clean and dry.  [x] Instructed patient/caregiver on complications to report to provider, such as pain, numbness in toes, heavy drainage, and slippage of dressing.  [x] Instructed patient on purpose of compression dressing and on activity and exercise recommendations.     Electronically signed by Angie Parra RN on 9/25/2018 at 4:58 PM

## 2018-09-25 NOTE — PROGRESS NOTES
(REQUIP) 1 MG tablet Take 2 mg by mouth nightly       dicyclomine (BENTYL) 20 MG tablet Take 20 mg by mouth 4 times daily (with meals and nightly)       simvastatin (ZOCOR) 20 MG tablet   Take 20 mg by mouth nightly       ferrous sulfate 325 (65 FE) MG tablet Take 325 mg by mouth 2 times daily.  aspirin 81 MG tablet Take 81 mg by mouth daily.  carvedilol (COREG) 12.5 MG tablet Take 1 tablet by mouth 2 times daily (with meals) 60 tablet 3     No current facility-administered medications on file prior to encounter. REVIEW OF SYSTEMS    Pertinent items are noted in HPI. Objective:      BP (!) 176/71   Pulse 89   Temp 99.1 °F (37.3 °C) (Oral)   Resp 18     Wt Readings from Last 3 Encounters:   09/05/18 175 lb 14.8 oz (79.8 kg)   08/30/18 179 lb 9.6 oz (81.5 kg)   08/17/18 195 lb 8.8 oz (88.7 kg)       PHYSICAL EXAM    General Appearance: alert and oriented to person, place and time, with anxious affect and obese  Skin: warm and dry  Head: normocephalic and atraumatic  Eyes: pupils equal, round, and reactive to light  Pulmonary/Chest:  normal air movement, no respiratory distress  Cardiovascular: normal rate, regular rhythm and intact distal pulses  Extremities: no cyanosis and no clubbing. New abrasions top of right foot from rubbing on shoe with increased edema. Assessment:      Patient Active Problem List   Diagnosis Code    Diastolic CHF ([facility] I96.37    Cellulitis L03.90    Chest pain R07.9    CKD (chronic kidney disease) stage 4, GFR 15-29 ml/min ([facility]) N18.4    DM (diabetes mellitus) ([facility] E11.9    HTN (hypertension) I10    Cellulitis of both lower extremities L03.115, L03. 80    Morbid obesity due to excess calories ([facility]) E66.01    Atherosclerosis of native artery of both lower extremities with bilateral ulceration of calves ([facility]) I70.232, I70.242    Chronic venous hypertension (idiopathic) with ulcer and inflammation of bilateral lower extremity ([facility] I87.333, L97.919, Wound 9/24/2018  2:09 PM   Dressing/Treatment Other (Comment) 9/24/2018  2:09 PM   Wound Length (cm) 1.9 cm 9/24/2018  2:09 PM   Wound Width (cm) 1.1 cm 9/24/2018  2:09 PM   Wound Depth (cm)  0.1 9/24/2018  2:09 PM   Calculated Wound Size (cm^2) (l*w) 2.09 cm^2 9/24/2018  2:09 PM   Wound Assessment Granulation tissue;Slough 9/24/2018  2:09 PM   Drainage Amount Scant 9/24/2018  2:09 PM   Drainage Description Serosanguinous 9/24/2018  2:09 PM   Odor None 9/24/2018  2:09 PM   Margins Defined edges 9/24/2018  2:09 PM   Carol-wound Assessment Dry;Fragile 9/24/2018  2:09 PM   Non-staged Wound Description Full thickness 9/24/2018  2:09 PM   [place]%Wound Bed 80 9/24/2018  2:09 PM   Yellow%Wound Bed 15 9/24/2018  2:09 PM   Black%Wound Bed 5 9/24/2018  2:09 PM   Number of days: 0       Percent of Wound(s)/Ulcer(s) Debrided: 100%    Total Surface Area Debrided:  11.63 sq cm       Diabetic/Pressure/Non Pressure Ulcers only:  Ulcer: Non-Pressure ulcer, limited to breakdown of skin      Estimated Blood Loss:  Minimal    Hemostasis Achieved:  not needed    Procedural Pain:  3  / 10     Post Procedural Pain:  1 / 10     Response to treatment:  Well tolerated by patient. Plan:   Pt education per Provider related to new acute onset of lower leg edema this week. Will again apply Coban 2 to bilateral lower legs along with a Hydrofiber and absorbant dressing. Questions answered. Treatment Note please see attached Discharge Instructions    Written patient dismissal instructions given to patient and signed by patient or POA. Discharge Instructions       Wound Clinic Physician Orders and Discharge Instructions  [place] [address], [place] [address]  Telephone: [phone]    NAME:  Chauncey Ho  YOB: 1950  MEDICAL RECORD NUMBER:  [id number]  DATE:  9/24/2018    Wound Cleansing:   Do not scrub or use excessive force.   Cleanse wound contact your PCP or go to the hospital emergency room. Nurse Signature:_______________________    Date: ___________ Time:  ____________      Discharge Nurse Signature      PLEASE NOTE: IF YOU ARE UNABLE TO OBTAIN WOUND SUPPLIES, CONTINUE TO USE THE SUPPLIES YOU HAVE AVAILABLE UNTIL YOU ARE ABLE TO [facility]. IT IS MOST IMPORTANT TO KEEP THE WOUND COVERED AT ALL TIMES. Physician Signature:_______________________    Date: ___________ Time:  ____________       [] Dr Paddy Kim    [] Dr Jamarcus Zelaya   [x] Jelena Boogie CNP     [] Dr Librado Roman  [] Dr Greg De Santiago   [] Dr Gio Kauffman       [] Dr Geronimo Padilla   [] Anusha Roberto NP          The information contained in the After Visit Summary has been reviewed with me, the patient and/or responsible adult, by my health care provider(s). I had the opportunity to ask questions regarding this information. I have elected to receive;       Patient Signature:_______________________    Date: ___________ Time:  ____________    [] Patient unable to sign Discharge Instructions given to ECF/Transportation/POA      []  After Visit Summary  []  Comprehensive Discharge Instruction                Electronically signed by HOLLIE Miguel CNP on 9/25/2018 at 1:46 PM

## 2018-10-02 NOTE — PROGRESS NOTES
Topics    Smoking status: Former Smoker     Packs/day: 0.50     Years: 10.00     Types: Cigarettes     Quit date: 3/21/1990    Smokeless tobacco: Never Used    Alcohol use No       ALLERGIES    Allergies   Allergen Reactions    Benadryl [Diphenhydramine] Itching       MEDICATIONS    Current Outpatient Prescriptions on File Prior to Encounter   Medication Sig Dispense Refill    vitamin D (CHOLECALCIFEROL) 1000 UNIT TABS tablet Take 1,000 Units by mouth daily      ranitidine (ZANTAC) 300 MG tablet Take 300 mg by mouth nightly      diphenhydrAMINE (DIPHENHIST) 25 MG capsule Take 25 mg by mouth nightly as needed for Itching      torsemide (DEMADEX) 20 MG tablet Take 10 mg by mouth daily      ondansetron (ZOFRAN) 4 MG tablet Take 4-8 mg by mouth every 8 hours as needed for Nausea or Vomiting      carvedilol (COREG) 12.5 MG tablet Take 1 tablet by mouth 2 times daily (with meals) 60 tablet 3    rifaximin (XIFAXAN) 550 MG tablet Take 1 tablet by mouth 3 times daily 42 tablet 0    oxybutynin (DITROPAN-XL) 5 MG extended release tablet Take 5 mg by mouth every evening      pantoprazole (PROTONIX) 40 MG tablet Take 40 mg by mouth every morning (before breakfast)       loperamide (IMODIUM) 2 MG capsule See Admin Instructions 1-2 tablets after loose bowel movement. May repeat 1 tablet after subsequent loose stools up to a maximum of 8 tablets or 16 mg      sodium bicarbonate 650 MG tablet Take 650 mg by mouth 2 times daily      metoclopramide (REGLAN) 5 MG tablet Take 5 mg by mouth 4 times daily Before meals and nightly      linagliptin (TRADJENTA) 5 MG tablet Take 5 mg by mouth every morning       insulin glargine (LANTUS) 100 UNIT/ML injection vial Inject 25 Units into the skin nightly 1 vial 3    oxyCODONE-acetaminophen (PERCOCET) 5-325 MG per tablet Take 1 tablet by mouth every 4 hours as needed for Pain (max 5/day).  Autumn Burr hydrALAZINE (APRESOLINE) 50 MG tablet Take 50 mg by mouth 3 times daily      

## 2018-10-03 NOTE — PROGRESS NOTES
aches/pain  Neurological: Denies syncope or TIA-like symptoms.   Psychiatric: Denies anxiety, insomnia or depression     Past Medical History:   Diagnosis Date    Acid reflux     Arthritis     Balance problem     Blood circulation, collateral     Cellulitis of both lower extremities     CHF (congestive heart failure) (HCC)     Chronic kidney disease     Chronic renal failure    Chronic venous hypertension (idiopathic) with ulcer and inflammation of bilateral lower extremity ([facility] 1/9/2017    Community acquired bacterial pneumonia 7/26/2018    Depression     Diabetes mellitus (HCC)     GERD (gastroesophageal reflux disease)     Hyperlipidemia     Hypertension     Movement disorder     Murmur     [facility]rom[facility]     Restless leg syndrome     Urinary frequency     Urinary incontinence      Past Surgical History:   Procedure Laterality Date    APPENDECTOMY      BLADDER SUSPENSION      CHOLECYSTECTOMY      COLONOSCOPY      CYSTOSCOPY      EYE SURGERY      HYSTERECTOMY       Family History   Problem Relation Age of Onset    Heart Disease Mother     Heart Disease Father     Diabetes Father     Kidney Disease Father      Social History   Substance Use Topics    Smoking status: Former Smoker     Packs/day: 0.50     Years: 10.00     Types: Cigarettes     Quit date: 3/21/1990    Smokeless tobacco: Never Used    Alcohol use No       Allergies   Allergen Reactions    Benadryl [Diphenhydramine] Itching     Current Outpatient Prescriptions   Medication Sig Dispense Refill    vitamin D (CHOLECALCIFEROL) 1000 UNIT TABS tablet Take 1,000 Units by mouth daily      ranitidine (ZANTAC) 300 MG tablet Take 300 mg by mouth nightly      diphenhydrAMINE (DIPHENHIST) 25 MG capsule Take 25 mg by mouth nightly as needed for Itching      torsemide (DEMADEX) 20 MG tablet Take 10 mg by mouth daily      ondansetron (ZOFRAN) 4 MG tablet Take 4-8 mg by mouth every 8 hours as needed for Nausea or

## 2018-10-03 NOTE — LETTER
-continue diuretic  -follows Dr Cheryl Harrison: due for labs next week    4. Dyslipidemia  -on statin    5. Iron deficiency anemia, unspecified iron deficiency anemia type  -on iron supplement  -Hgb stable on last labs in 9/2018    Plan:    Continue torsemide, carvedilol, simvastatin, hydralazine and ASA  Reinforced and discussed low-fat/low sodium diet, monitoring of daily weights, fluid restriction, worsening signs and symptoms of heart failure and when to call, and the importance of regular exercise and activity. Check labs next week as scheduled  Will not proceed with RHC as she has remained compensated and wants to just continue with conservative therapy  Follow up with Dr. Megan Serna or D. Enzweiler, CNP in 4-6 months or sooner if needed    Return in about 4 months (around 2/3/2019) for 4-6 months with Dr. Megan Serna or D. Enzweiler, CNP. Thanks for allowing me to participate in the care of this patient. If you have questions, please do not hesitate to call me. I look forward to following Faye Multani along with you.     Sincerely,        HOLLIE Albarado - CNP

## 2018-10-08 NOTE — PROGRESS NOTES
traZODone (DESYREL) 100 MG tablet Take 100 mg by mouth nightly       rOPINIRole (REQUIP) 1 MG tablet Take 2 mg by mouth nightly       dicyclomine (BENTYL) 20 MG tablet Take 20 mg by mouth 4 times daily (with meals and nightly)       simvastatin (ZOCOR) 20 MG tablet   Take 20 mg by mouth nightly       ferrous sulfate 325 (65 FE) MG tablet Take 325 mg by mouth 2 times daily.  aspirin 81 MG tablet Take 81 mg by mouth daily. No current facility-administered medications on file prior to encounter. REVIEW OF SYSTEMS    Pertinent items are noted in HPI. Objective:      BP (!) 184/70   Pulse 79   Temp 97.3 °F (36.3 °C) (Oral)   Resp 18     Wt Readings from Last 3 Encounters:   10/03/18 186 lb (84.4 kg)   10/01/18 177 lb 4 oz (80.4 kg)   09/05/18 175 lb 14.8 oz (79.8 kg)       PHYSICAL EXAM    General Appearance: alert and oriented to person, place and time, well-developed and well-nourished, in no acute distress and obese  Skin: warm and dry, no rash or erythema  Head: normocephalic and atraumatic  Eyes: pupils equal, round, and reactive to light  Pulmonary/Chest: normal air movement, no respiratory distress, occasional cough  Cardiovascular: normal rate, regular rhythm and intact distal pulses  Extremities: no cyanosis, no clubbing and 1 + edema-  bilateral lower leg  Wounds:  Superficial, red, moist base, less edema. Assessment:      Patient Active Problem List   Diagnosis Code    Di[facility] ([facility] Q33.35    Cellulitis L03.90    Chest pain R07.9    CKD (chronic kidney disease) stage 4, GFR 15-29 ml/min ([facility]) N18.4    DM (diabetes mellitus) ([facility] E11.9    HTN (hypertension) I10    Cellulitis of both lower extremities L03.115, L03. 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 ([facility]) I70.232, I70.242    Chronic venous hypertension (idiopathic) with ulcer and inflammation of Number of days: 14       Wound 09/24/18 Leg Right; Anterior;Distal #29 (Active)   Wound Image   10/8/2018  2:06 PM   Wound Type Wound 9/24/2018  2:09 PM   Dressing Status Intact;Dry;Clean 10/8/2018  2:06 PM   Dressing/Treatment Other (Comment) 9/24/2018  2:09 PM   Wound Length (cm) 0 cm 10/8/2018  2:06 PM   Wound Width (cm) 0 cm 10/8/2018  2:06 PM   Wound Depth (cm)  0 10/8/2018  2:06 PM   Calculated Wound Size (cm^2) (l*w) 0 cm^2 10/8/2018  2:06 PM   Change in Wound Size % (l*w) 100 10/8/2018  2:06 PM   Wound Assessment Epithelialization 10/8/2018  2:06 PM   Drainage Amount Small 10/1/2018  1:53 PM   Drainage Description Serosanguinous 10/1/2018  1:53 PM   Odor None 10/1/2018  1:53 PM   Margins Attached edges 10/1/2018  1:53 PM   Non-staged Wound Description Full thickness 10/1/2018  1:53 PM   Red%Wound Bed 95 10/1/2018  1:53 PM   Yellow%Wound Bed 5 10/1/2018  1:53 PM   Number of days: 14       Wound 09/24/18 Leg Left; Anterior; Lower #31 (Active)   Wound Image   10/1/2018  1:53 PM   Wound Type Wound 9/24/2018  2:09 PM   Dressing Status Old drainage 10/8/2018  2:01 PM   Dressing/Treatment Other (Comment) 9/24/2018  2:09 PM   Wound Length (cm) 0.6 cm 10/8/2018  2:06 PM   Wound Width (cm) 0.6 cm 10/8/2018  2:06 PM   Wound Depth (cm)  0.2 10/8/2018  2:06 PM   Calculated Wound Size (cm^2) (l*w) 0.36 cm^2 10/8/2018  2:06 PM   Change in Wound Size % (l*w) 82.78 10/8/2018  2:06 PM   Wound Assessment Granulation tissue;Slough 10/8/2018  2:01 PM   Drainage Amount Scant 10/8/2018  2:01 PM   Drainage Description Serosanguinous 10/8/2018  2:01 PM   Odor None 10/8/2018  2:01 PM   Margins Attached edges 10/8/2018  2:01 PM   Carol-wound Assessment Dry;Fragile 10/8/2018  2:01 PM   Non-staged Wound Description Full thickness 10/8/2018  2:01 PM   [place]%Wound Bed 80 9/24/2018  2:09 PM   Red%Wound Bed 95 10/8/2018  2:01 PM   Yellow%Wound Bed 5 10/8/2018  2:01 PM   Black%Wound Bed 5 9/24/2018  2:09 PM   Number of days: 14       [facility] [place] [facility]. Nurse Signature:_______________________    Date: ___________ Time:  ____________      Discharge Nurse Signature      PLEASE NOTE: IF YOU ARE UNABLE TO OBTAIN WOUND SUPPLIES, CONTINUE TO USE THE SUPPLIES YOU HAVE AVAILABLE UNTIL YOU ARE ABLE TO [facility]. IT IS MOST IMPORTANT TO KEEP THE WOUND COVERED AT ALL TIMES. Physician Signature:_______________________    Date: ___________ Time:  ____________       [] Dr Mayank Esqueda    [] Dr Taryn Wilson   [x] Antonietta Lee CNP     [] Dr Yvonne Vega  [] Dr Jun Hook   [] Dr Nilsa Madison       [] Dr Jai Geiger   [] Dianna Cantu NP          The information contained in the After Visit Summary has been reviewed with me, the patient and/or responsible adult, by my health care provider(s). I had the opportunity to ask questions regarding this information. I have elected to receive;       Patient Signature:_______________________    Date: ___________ Time:  ____________    [] Patient unable to sign Discharge Instructions given to ECF/Transportation/POA      []  After Visit Summary  []  Comprehensive Discharge Instruction        Electronically signed by HOLLIE Delgado CNP on 10/8/2018 at 2:33 PM

## 2018-10-17 NOTE — PROGRESS NOTES
[facility]   Progress Note and Procedure Note      Sahil Kelsey RECORD NUMBER:  [id number]  AGE: 70 y.o. GENDER: female  : 1950  EPISODE DATE:  10/15/2018    Subjective:     Chief Complaint   Patient presents with    Wound Check     Follow Up on Bilateral Lower Legs         HISTORY of PRESENT ILLNESS HPI  Heaven Salazar is a 70 y.o. female who presents today for wound/ulcer evaluation. Right and left lower leg abrasion noted, but getting better. History of Wound Context:  Pt denies trauma, and reports she has a history of recurring sores on her legs from chronic swelling of the legs.  Pt is a diabetic and admits to numbness to her feet.  Pt discussed getting 20-30 mmHg stockings through an online company when wounds healed.     Ulcer Identification:  Ulcer Type: venous  Contributing Factors: edema, venous stasis, diabetes and obesity      Wound: N/A      PAST MEDICAL HISTORY        Diagnosis Date    Acid reflux     Arthritis     Balance problem     Blood circulation, collateral     Cellulitis of both lower extremities     CHF (congestive heart failure) (HCC)     Chronic kidney disease     Chronic renal failure    Chronic venous hypertension (idiopathic) with ulcer and inflammation of bilateral lower extremity ([address] 2017    Community acquired bacterial pneumonia 2018    Depression     Diabetes mellitus (HCC)     GERD (gastroesophageal reflux disease)     Hyperlipidemia     Hypertension     Movement disorder     Murmur     Neuromuscular disorder ([address]     Restless leg syndrome     Urinary frequency     Urinary incontinence        PAST SURGICAL HISTORY    Past Surgical History:   Procedure Laterality Date    APPENDECTOMY      BLADDER SUSPENSION      CHOLECYSTECTOMY      COLONOSCOPY      CYSTOSCOPY      EYE SURGERY      HYSTERECTOMY         FAMILY HISTORY    Family History   Problem Relation Age of Onset    Heart Disease Mother     Heart Dressing Changed Changed/New 10/1/2018  2:47 PM   Dressing/Treatment Other (Comment) 10/15/2018  2:25 PM   Wound Length (cm) 0 cm 10/15/2018  2:25 PM   Wound Width (cm) 0 cm 10/15/2018  2:25 PM   Wound Depth (cm)  0 10/15/2018  2:25 PM   Calculated Wound Size (cm^2) (l*w) 0 cm^2 10/15/2018  2:25 PM   Change in Wound Size % (l*w) 100 10/15/2018  2:25 PM   Wound Assessment Epithelialization 10/15/2018  2:25 PM   Drainage Amount Scant 10/8/2018  2:06 PM   Drainage Description Serosanguinous 10/8/2018  2:06 PM   Odor None 10/8/2018  2:06 PM   Margins Attached edges 10/8/2018  2:06 PM   Carol-wound Assessment Dry;Pink 10/8/2018  2:06 PM   Non-staged Wound Description Full thickness 10/8/2018  2:06 PM   [place]%Wound Bed 95 10/8/2018  2:06 PM   Yellow%Wound Bed 5 10/8/2018  2:06 PM   Op First Treatment Date 10/01/18 10/1/2018  1:53 PM   Number of days: 15           Plan:   Pt education per provider related to progress to healing and newly epithelialized tissue. Discussed her concerns about reoccurrence of wounds in past and will protect newly closed wounds with nonadhernt dressing and compression wraps for one more week before discharging with use of bilateral lower leg compression stockings. Treatment Note please see attached Discharge Instructions    Written patient dismissal instructions given to patient and signed by patient or POA. Discharge Instructions       Wound Clinic Physician Orders and Discharge Instructions  [place] [address], [place] [address]  Telephone: [phone]    NAME:  Renetta Berg  YOB: 1950  MEDICAL RECORD NUMBER:  [id number]  DATE:  10/8/2018    Wound Cleansing:   Do not scrub or use excessive force.   Cleanse wound prior to applying a clean dressing with:  [] Normal Saline [x] Keep Wound Dry in Shower    [] Wound Cleanser   [] Cleanse wound with Mild Soap & Water  [] May Shower at Discharge   []

## 2018-10-22 NOTE — PROGRESS NOTES
and nightly)       simvastatin (ZOCOR) 20 MG tablet   Take 20 mg by mouth nightly       ferrous sulfate 325 (65 FE) MG tablet Take 325 mg by mouth 2 times daily.  aspirin 81 MG tablet Take 81 mg by mouth daily.  loperamide (IMODIUM) 2 MG capsule See Admin Instructions 1-2 tablets after loose bowel movement. May repeat 1 tablet after subsequent loose stools up to a maximum of 8 tablets or 16 mg      metoclopramide (REGLAN) 5 MG tablet Take 5 mg by mouth 4 times daily Before meals and nightly       No current facility-administered medications on file prior to encounter. REVIEW OF SYSTEMS    Pertinent items are noted in HPI. Objective:      BP (!) 143/73   Pulse 93   Temp 98.4 °F (36.9 °C) (Oral)   Resp 18     Wt Readings from Last 3 Encounters:   10/03/18 186 lb (84.4 kg)   10/01/18 177 lb 4 oz (80.4 kg)   09/05/18 175 lb 14.8 oz (79.8 kg)       PHYSICAL EXAM    General Appearance: alert and oriented to person, place and time, well-developed and well-nourished, in no acute distress  Skin: warm and dry, no rash or erythema  Head: normocephalic and atraumatic  Eyes: pupils equal, round, and reactive to light  Pulmonary/Chest:  normal air movement, no respiratory distress  Cardiovascular: normal rate, regular rhythm and intact distal pulses  Extremities: no cyanosis and no clubbing      Assessment:      Patient Active Problem List   Diagnosis Code    Diastolic CHF ([facility] F19.81    Cellulitis L03.90    Chest pain R07.9    CKD (chronic kidney disease) stage 4, GFR 15-29 ml/min ([facility]) N18.4    DM (diabetes mellitus) ([facility]) E11.9    HTN (hypertension) I10    Cellulitis of both lower extremities L03.115, L03. 80    Morbid obesity due to excess calories (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) I70.232, I70.242    Chronic venous hypertension (idiopathic) with ulcer and inflammation of bilateral lower extremity ([facility]

## 2018-10-25 NOTE — TELEPHONE ENCOUNTER
[address] [facility]  Heart Failure Service  [phone]        Follow up phone call:     Are you having any issues that need immediate attention? No     Do you have any signs or symptoms of edema? No              []  Shortness of breath              []  Swelling of hands, feet, ankles, or lower legs              []  Abdominal fullness              []  Cough, especially at night or when you lie down              []  Fatigue that limits activity     Do you have any other signs or symptoms to report? No              []  Dizziness              []  Light headedness, like you are going to pass out              []  Headache                                                                                []  Other    Wt Readings from Last 6 Encounters:   10/03/18 186 lb (84.4 kg)   10/01/18 177 lb 4 oz (80.4 kg)   09/05/18 175 lb 14.8 oz (79.8 kg)   08/30/18 179 lb 9.6 oz (81.5 kg)   08/17/18 195 lb 8.8 oz (88.7 kg)   08/06/18 198 lb 13.7 oz (90.2 kg)         Do you have a working scale? No     Have you been checking your weight daily? No              If not, the patient was encouraged to do so.     Dry weight = 173-177   What is your weight today? 188       With regards to your weight, when would you call the doctor? [] increased by 3 pounds or more in one day               [] 5 pounds or more in a week              [] weight above my dry weight              [] other                 [] unknown     Has your weight increased by 3 pounds or more in one day or 5 pounds or more in a week? no         Please call the [employer] at [phone] or your Cardiologist ([facility] @ [phone]) if you have a weight gain of 3 pounds or more in one day or 5 pounds or more in a week or above your dry weight.     Have you been admitted to the hospital or visited an emergency room recently?  No If yes, be sure to follow the medication instructions given to you when you were sent

## 2018-11-06 PROBLEM — S80.821A: Status: ACTIVE | Noted: 2018-01-01

## 2018-11-06 NOTE — PLAN OF CARE
Problem: Wound:  Goal: Will show signs of wound healing; wound closure and no evidence of infection  Will show signs of wound healing; wound closure and no evidence of infection  Outcome: Ongoing      Problem: Venous:  Goal: Signs of wound healing will improve  Signs of wound healing will improve  Outcome: Ongoing      Problem: Compression therapy:  Goal: Will be free from complications associated with compression therapy  Will be free from complications associated with compression therapy  Outcome: Ongoing      Problem: Weight control:  Goal: Ability to maintain an optimal weight for height and age will be supported  Ability to maintain an optimal weight for height and age will be supported  Outcome: Ongoing      Problem: Falls - Risk of:  Goal: Will remain free from falls  Will remain free from falls  Outcome: Ongoing      Problem: Blood Glucose:  Goal: Ability to maintain appropriate glucose levels will improve  Ability to maintain appropriate glucose levels will improve  Outcome: Ongoing

## 2018-11-09 PROBLEM — N39.0 UTI (URINARY TRACT INFECTION): Status: ACTIVE | Noted: 2018-01-01

## 2018-11-09 NOTE — ED PROVIDER NOTES
[facility]  eMERGENCY dEPARTMENT eNCOUnter      Pt Name: Dipti Freitas  MRN: [id number]  [place] 1950  Date of evaluation: 11/9/2018  Provider: Arnel Santizo MD    CHIEF COMPLAINT       Chief Complaint   Patient presents with    Altered Mental Status       HISTORY OF PRESENT ILLNESS    Dipti Freitas is a 70 y.o. female who presents to the emergency department with Fall. Patient fell at home and was laying on the ground all night. When home health care worker arrived this morning found patient on the ground covered in stool. Patient little more confused than usual health care worker. EMS brought the patient in. Patient without complaints on arrival.  No chest pain, shortness breath, abdominal pain. No other associated symptoms. Nursing Notes were reviewed. REVIEW OF SYSTEMS       Review of Systems   Constitutional: Negative for activity change, appetite change, chills, diaphoresis, fatigue, fever and unexpected weight change. HENT: Negative for congestion, dental problem, drooling and ear discharge. Eyes: Negative for photophobia, pain, discharge, redness and itching. Respiratory: Negative for apnea, cough, choking, chest tightness, shortness of breath, wheezing and stridor. Cardiovascular: Negative for chest pain and leg swelling. Gastrointestinal: Negative for abdominal distention. Endocrine: Negative for polyphagia and polyuria. Genitourinary: Negative for vaginal bleeding, vaginal discharge and vaginal pain. Musculoskeletal: Negative for arthralgias. Neurological: Negative for dizziness, facial asymmetry and headaches. Hematological: Negative for adenopathy. Does not bruise/bleed easily. Psychiatric/Behavioral: Negative for agitation, behavioral problems, confusion, decreased concentration, dysphoric mood, hallucinations, self-injury, sleep disturbance and suicidal ideas. The patient is not nervous/anxious and is not hyperactive.

## 2018-11-09 NOTE — PROGRESS NOTES
Occupational Therapy   Occupational Therapy Initial Assessment  Date: 2018   Patient Name: Kae Arrington  MRN: [id number]     : 1950    Date of Service: 2018    Assessment: Pt is 70 y.o. F who presents after fall at home, found down covered in stool after unknown amount of time. Pt presents today with decreased cognition, difficulty with motor planning and following cues. PLOF information taken from previous hospitalization and briefly clarified with pt. Pt lived in apartment building with HHA 7 days/week 4 hours/day. Currently, pt is unable to complete functional mobility with RW d/t inability to follow cues. Pt completed stand step with max A and verbal cueing. Pt completed standing for ~ 5 minutes for total A for toileting with CGA. Pt is unsafe to return home at this time, pt would benefit from continued therapy. Pending cognition clearing and continued progress with therapy pt may be able to return home however continue to assess discharge disposition. Discharge Recommendations:  Continue to assess pending progress  OT Equipment Recommendations  Other: TBD    Kae Arrington scored a 10/ on the AM-PAC ADL Inpatient form. Current research shows that an AM-PAC score of 17 or less is typically not associated with a discharge to the patient's home setting. Based on the patients AM-PAC score and their current ADL deficits, it is recommended that the patient have 3-5 sessions per week of Occupational Therapy at d/c to increase the patients independence. Patient Diagnosis(es): The primary encounter diagnosis was Acute pyelonephritis. Diagnoses of Altered mental status, unspecified altered mental status type, Dehydration, and Cellulitis, unspecified cellulitis site were also pertinent to this visit. has a past medical history of Acid reflux; Arthritis; Balance problem; Blood circulation, collateral; Cellulitis of both lower extremities; CHF (congestive heart failure) ([facility]  Chronic kidney disease; Chronic venous hypertension (idiopathic) with ulcer and inflammation of bilateral lower extremity ([address] Community acquired bacterial pneumonia; Depression; Diabetes mellitus ([address] GERD (gastroesophageal reflux disease); Hyperlipidemia; Hypertension; Movement disorder; Murmur; Neuromuscular disorder ([address] Restless leg syndrome; Urinary frequency; and Urinary incontinence. has a past surgical history that includes Appendectomy; Hysterectomy; Cholecystectomy; bladder suspension; Colonoscopy; Cystoscopy; and eye surgery. Treatment Diagnosis: Decreased ADL status; Decreased functional mobility ; Decreased cognition;Decreased strength;Decreased balance;Decreased endurance; Restrictions  Restrictions/Precautions  Restrictions/Precautions: Fall Risk    Subjective   General  Chart Reviewed: Yes  Patient assessed for rehabilitation services?: Yes  Additional Pertinent Hx: Per Kandy Singh MD 11/9/18: Pt is \"74 y.o. female who presents to the emergency department with Fall. Patient fell at home and was laying on the ground all night. When home health care worker arrived this morning found patient on the ground covered in stool. Patient little more confused than usual health care worker. EMS brought the patient in. Patient without complaints on arrival.  No chest pain, shortness breath, abdominal pain. No other associated symptoms. \"  Family / Caregiver Present: No  Referring Practitioner: Cris Coyne MD   Diagnosis: UTI   Subjective  Subjective: Pt met bedside for OT/PT evaluation. Pt pleasantly confused/disoriented. Pt agreeable to therapy.    Pain Assessment  Patient Currently in Pain: Denies  Oxygen Therapy  SpO2: 93 %  O2 Device: None (Room air)     Social/Functional History  Social/Functional History  Lives With: Alone  Type of Home: Apartment (8th floor senior living )  Home Layout: One level  Home Access: Level entry, Elevator  Bathroom Shower/Tub: Walk-in shower  Bathroom Toilet: Handicap height  Bathroom Equipment: Grab bars in shower, Grab bars around toilet, Shower chair  Bathroom Accessibility: Accessible, Walker accessible  Home Equipment: 4 wheeled walker, Alert Button, Lift chair, Reacher, Sock aid, Long-handled shoehorn  Receives Help From: Home health ([facility] 7 days/week for 4hrs/day )  ADL Assistance: Needs assistance (Aide assists with shower; pt can dress self; toilets self )  Homemaking Assistance:  (Aide does laundry and cleaning for pt, as well as minimal cooking and shopping; pt can get light meal for herself but doesn't cool; gets MOW )  Ambulation Assistance: Independent (5TQ)  Transfer Assistance: Independent  Active : No  Patient's  Info: Medical transportation to [place]. [place]  does grocery shopping. Additional Comments: Briefly clarified information above with pt from prior hospitalization however pt disoriented this date - would benefit from reclarification. Objective   Vision: Impaired  Vision Exceptions: Wears glasses for reading  Hearing: Exceptions to [facility]  Hearing Exceptions: Right hearing aid;Hard of hearing/hearing concerns      Orientation  Overall Orientation Status: Impaired  Orientation Level: Oriented to person;Disoriented to place; Disoriented to time;Disoriented to situation     Balance  Sitting Balance: Stand by assistance  Standing Balance: Contact guard assistance  Standing Balance  Time: 5 min   Activity: Toileting and ADL   Sit to stand: Minimal assistance (min A x2 to stand from EOB and verbal cueing for hand placement )  Stand to sit: Maximum assistance (Max A x1 d/t inability to motor plan to sit down in chair )    Functional Mobility  Functional Mobility Comments: Stand step pivot ~2 steps with RW with max A, Assist to manage RW and physical cues to initiate steps.      ADL  LE Dressing:  (Assist to doff soiled socks)  Toileting:  (Pt incontinent at baseline with episode of incontinence - total A for pericare and depends change )  Additional

## 2018-11-11 NOTE — PLAN OF CARE
Problem: Falls - Risk of:  Goal: Will remain free from falls  Will remain free from falls   Outcome: Ongoing    Goal: Absence of physical injury  Absence of physical injury   Outcome: Ongoing      Problem: Risk for Impaired Skin Integrity  Goal: Tissue integrity - skin and mucous membranes  Structural intactness and normal physiological function of skin and  mucous membranes.    Outcome: Ongoing      Problem: OXYGENATION/RESPIRATORY FUNCTION  Goal: Patient will maintain patent airway  Outcome: Ongoing    Goal: Patient will achieve/maintain normal respiratory rate/effort  Respiratory rate and effort will be within normal limits for the patient   Outcome: Ongoing      Problem: HEMODYNAMIC STATUS  Goal: Patient has stable vital signs and fluid balance  Outcome: Ongoing      Problem: FLUID AND ELECTROLYTE IMBALANCE  Goal: Fluid and electrolyte balance are achieved/maintained  Outcome: Ongoing      Problem: ACTIVITY INTOLERANCE/IMPAIRED MOBILITY  Goal: Mobility/activity is maintained at optimum level for patient  Outcome: Ongoing      Problem: Nutrition  Goal: Optimal nutrition therapy  Outcome: Ongoing

## 2018-11-11 NOTE — PROGRESS NOTES
non-distended without rigidity or guarding and positive bowel sounds   Extremities: No clubbing, cyanosis, or edema bilaterally. Skin: Skin color, texture, turgor normal.  No rashes or lesions. Neurologic: Awake, confused, not answering any questions, neurovascularly intact with sensory/motor intact upper extremities/lower extremities, bilaterally. Cranial nerves: II-XII intact, grossly non-focal.  Mental status: Awake  Capillary Refill: Acceptable  < 3 seconds  Peripheral Pulses: +3 Easily felt, not easily obliterated with pressure    Labs:   Recent Labs      11/09/18   1037  11/10/18   0746   WBC  9.5  6.7   HGB  11.2*  9.2*   HCT  34.5*  27.9*   PLT  189  164     Recent Labs      11/09/18   1037  11/10/18   0746   NA  136  144   K  4.6  4.0   CL  102  111*   CO2  20*  19*   BUN  28*  23*   CREATININE  1.9*  1.7*   CALCIUM  9.4  8.3     Recent Labs      11/09/18   1037   AST  23   ALT  9*   BILITOT  0.6   ALKPHOS  115     No results for input(s): INR in the last 72 hours. Recent Labs      11/09/18   1037  11/10/18   0746   CKTOTAL  662*  809*       Urinalysis:    Lab Results   Component Value Date    NITRU POSITIVE 11/09/2018    WBCUA 418 11/09/2018    BACTERIA 2+ 11/09/2018    RBCUA 0-2 11/09/2018    BLOODU LARGE 11/09/2018    SPECGRAV 1.019 11/09/2018    GLUCOSEU Negative 11/09/2018    GLUCOSEU NEGATIVE 02/26/2012       Radiology:  MRI BRAIN WO CONTRAST   Final Result   1. Examination is degraded by patient motion artifact. 2. No acute infarction. 3. Parenchymal volume loss and sequela of chronic microvascular ischemic   changes. CT ABDOMEN PELVIS WO CONTRAST Additional Contrast? None   Final Result   1. No acute abnormality in the abdomen/pelvis. 2. Cellulitis of the anterior pelvis. 3. Severe coronary artery disease. XR CHEST PORTABLE   Final Result   No evidence for acute cardiopulmonary pathology. CT Head WO Contrast   Final Result   No acute intracranial abnormality. Opacification of the right mastoid tip. CT CERVICAL SPINE WO CONTRAST   Final Result   No acute abnormality of the cervical spine. Multilevel degenerative changes                 Assessment/Plan:    Active Hospital Problems    Diagnosis Date Noted    UTI (urinary tract infection) [N39.0] 11/09/2018         Acute metabolic encephalopathy - unclear etio, suspect 2/2 UTI/found down. CT head neg, check MRI with no acute infarct     UTI - started on IV abx, await urine cx  Found down - , cont IVF, trend CPK  CKD III - creat 1.9-->1.7, monitor on IVF  DM II - cont lantus/SSI  HTN - uncontrolled, cont home meds, prn IV hydralazine, add PO hydralazine        DVT Prophylaxis: lovenox  Diet: DIET CARB CONTROL; No Added Salt (3-4 GM);  Daily Fluid Restriction: 1800 ml  Dietary Nutrition Supplements: Diabetic Oral Supplement  Code Status: Full Code    PT/OT Eval Status: ordered    Flaco Moore MD

## 2018-11-11 NOTE — PLAN OF CARE
Problem: Falls - Risk of:  Goal: Will remain free from falls  Will remain free from falls   Outcome: Ongoing  Pt uses call light appropriately. While in bed side rails up 3/4. Call light in reach. Non skid footwear in use Alarm on. While in Chair, call light in reach, non skid footwear and alarms on. Will continue to monitor  Goal: Absence of physical injury  Absence of physical injury   Outcome: Ongoing      Problem: Risk for Impaired Skin Integrity  Goal: Tissue integrity - skin and mucous membranes  Structural intactness and normal physiological function of skin and  mucous membranes. Outcome: Ongoing  Patient able to reposition self in bed. Encouraged to do so at a minimum of every two hours. Will continue to monitor. Patient repositions himself in chair as needed. Will continue to monitor.     Problem: OXYGENATION/RESPIRATORY FUNCTION  Goal: Patient will maintain patent airway  Outcome: Ongoing    Goal: Patient will achieve/maintain normal respiratory rate/effort  Respiratory rate and effort will be within normal limits for the patient   Outcome: Ongoing      Problem: HEMODYNAMIC STATUS  Goal: Patient has stable vital signs and fluid balance  Outcome: Ongoing      Problem: FLUID AND ELECTROLYTE IMBALANCE  Goal: Fluid and electrolyte balance are achieved/maintained  Outcome: Ongoing      Problem: ACTIVITY INTOLERANCE/IMPAIRED MOBILITY  Goal: Mobility/activity is maintained at optimum level for patient  Outcome: Ongoing      Problem: Nutrition  Goal: Optimal nutrition therapy  Outcome: Ongoing

## 2018-11-11 NOTE — PROGRESS NOTES
Patients BP elevated. See flow sheet. Patient refusing oral medications. Waving arms about stating leave me alone. pRN iv hydralazine give as per order. Will continue to monitor. SR up X3, call light in reach. Alarms on, katelynn cam in use.

## 2018-11-11 NOTE — PROGRESS NOTES
Hospitalist Progress Note      PCP: Servando Malcolm II    Date of Admission: 11/9/2018    Subjective: cont to be awake but not responsive to any questions    Medications:  Reviewed    Infusion Medications    dextrose       Scheduled Medications    NIFEdipine  30 mg Oral Daily    hydrALAZINE  50 mg Oral 3 times per day    aspirin  81 mg Oral Daily    carvedilol  12.5 mg Oral BID WC    ferrous sulfate  325 mg Oral BID    insulin glargine  25 Units Subcutaneous Nightly    linagliptin  5 mg Oral QAM    oxybutynin  5 mg Oral QPM    pantoprazole  40 mg Oral QAM AC    sodium chloride flush  10 mL Intravenous 2 times per day    enoxaparin  40 mg Subcutaneous Daily    cefepime  2 g Intravenous Q12H    insulin lispro  0-6 Units Subcutaneous TID WC    insulin lispro  0-3 Units Subcutaneous Nightly     PRN Meds: metoprolol, glucose, dextrose, glucagon (rDNA), dextrose, sodium chloride flush, magnesium hydroxide, ondansetron, hydrALAZINE      Intake/Output Summary (Last 24 hours) at 11/11/18 1855  Last data filed at 11/11/18 0557   Gross per 24 hour   Intake          1191.67 ml   Output                0 ml   Net          1191.67 ml       Physical Exam Performed:    BP (!) 193/93   Pulse 116   Temp 97.9 °F (36.6 °C) (Oral)   Resp 18   Ht 4' 11\" (1.499 m)   Wt 188 lb 7.9 oz (85.5 kg)   SpO2 96%   BMI 38.07 kg/m²     General appearance: No apparent distress appears stated age and cooperative. HEENT Normal cephalic, atraumatic without obvious deformity.  Pupils equal, round, and reactive to light.  Extra ocular muscles intact.  Conjunctivae/corneas clear. Neck: Supple, No jugular venous distention/bruits.  Trachea midline without thyromegaly or adenopathy with full range of motion. Lungs: Clear to auscultation, bilaterally without Rales/Wheezes/Rhonchi with good respiratory effort.   Heart: Regular rate and rhythm with Normal S1/S2   Abdomen: Soft, non-tender or non-distended without rigidity or guarding Final Result   No acute intracranial abnormality. Opacification of the right mastoid tip. CT CERVICAL SPINE WO CONTRAST   Final Result   No acute abnormality of the cervical spine. Multilevel degenerative changes                 Assessment/Plan:    Active Hospital Problems    Diagnosis Date Noted    UTI (urinary tract infection) [N39.0] 11/09/2018            Acute metabolic encephalopathy - unclear etio, suspect 2/2 UTI/found down. CT head neg, check MRI with no acute infarct, check EEG in am. Might need neuro consult if no improvement. Discussed with brother Damion Gonzalez today on phone ([phone]), her baseline is functional/oriented X3. ?HTN encephalopathy     UTI - started on IV abx, urine cx neg  Early rhabdo 2/2 found down - -->800, cont IVF, trend CPK  ARF on CKD III - creat 1.9-->1.7-->1.3, monitor off IVF  DM II - cont lantus/SSI  HTN - uncontrolled, cont home meds, prn IV hydralazine/IV metoprolol, added PO hydralazine        DVT Prophylaxis: lovenox  Diet: DIET CARB CONTROL; No Added Salt (3-4 GM);  Daily Fluid Restriction: 1800 ml  Dietary Nutrition Supplements: Diabetic Oral Supplement  Code Status: Full Code    PT/OT Eval Status: ordered    Herrera Euceda MD

## 2018-11-12 PROBLEM — N39.0 UTI (URINARY TRACT INFECTION): Status: RESOLVED | Noted: 2018-01-01 | Resolved: 2018-01-01

## 2018-11-12 PROBLEM — G40.901 STATUS EPILEPTICUS (HCC): Status: ACTIVE | Noted: 2018-01-01

## 2018-11-12 NOTE — DISCHARGE SUMMARY
linagliptin (TRADJENTA) 5 MG tablet Take 5 mg by mouth every morning       insulin glargine (LANTUS) 100 UNIT/ML injection vial Inject 25 Units into the skin nightly  Qty: 1 vial, Refills: 3      hydrALAZINE (APRESOLINE) 50 MG tablet Take 50 mg by mouth 3 times daily       traZODone (DESYREL) 100 MG tablet Take 100 mg by mouth nightly       rOPINIRole (REQUIP) 1 MG tablet Take 2 mg by mouth nightly       simvastatin (ZOCOR) 20 MG tablet   Take 20 mg by mouth nightly       vitamin D (CHOLECALCIFEROL) 1000 UNIT TABS tablet Take 1,000 Units by mouth daily      ranitidine (ZANTAC) 300 MG tablet Take 300 mg by mouth nightly      ondansetron (ZOFRAN) 4 MG tablet Take 4-8 mg by mouth every 8 hours as needed for Nausea or Vomiting      oxybutynin (DITROPAN-XL) 5 MG extended release tablet Take 5 mg by mouth every evening      loperamide (IMODIUM) 2 MG capsule See Admin Instructions 1-2 tablets after loose bowel movement. May repeat 1 tablet after subsequent loose stools up to a maximum of 8 tablets or 16 mg      dicyclomine (BENTYL) 20 MG tablet Take 20 mg by mouth 4 times daily (with meals and nightly)       ferrous sulfate 325 (65 FE) MG tablet Take 325 mg by mouth 2 times daily. aspirin 81 MG tablet Take 81 mg by mouth daily. Time Spent on discharge is more than 30 minutes in the examination, evaluation, counseling and review of medications and discharge plan. Time spent 2:30PM-3:10PM    Signed:    Rashard Howell MD   11/12/2018      Thank you Andrew Ornelas II for the opportunity to be involved in this patient's care. If you have any questions or concerns please feel free to contact me at [phone].

## 2018-11-12 NOTE — PROGRESS NOTES
Report called to [address] [place] [address] at [facility]. Denies any questions or concerns. Left call back number in case any do arise.

## 2018-11-12 NOTE — FLOWSHEET NOTE
Pt slept off and on overnight. Resistant to any care. Carol care given as needed. Pt is incont of urine. Has fungal infection to belly folds but will not leave interdry in place. Dressings to BLE's but around ankles because pt continuously rubs legs on the mattress. Pt strips gown off. Pt became combative when brief was changed. All fall precautions in place. Tele camera in room. Call light in reach. Pt still refuses to acknowledge any questions. Pt would yell \"stop\" while having her brief and gown changed and tried to hit.

## 2018-11-12 NOTE — PROGRESS NOTES
Occupational Therapy Daily Note  Cathie Cesar   [id number]   11/12/2018     Patient met b/s as co-treat with PT. Patient does not respond to therapy. Patient non-verbal throughout session. Patient dependent x2 for supine-sit-supine. Patient dependent to maintain sitting at EOB as patient remains in extension at the trunk and not able to bend at the waist despite Max A to assist with this. Patient does have startle reflex upon sitting with eyes opening wide and patient making noises, yet no words. Patient dependent for scooting to EOB and once supine to pull towards HOB. Dependent for rolling and positioning. Patient left in bed with call light in reach, bed alarm on, sitter cam present, and RN Karyn aware.      FRANKLIN Ibanez     Therapy Time     Individual Co-treatment   Time In  6204   Time Out  1022   Minutes  14

## 2018-11-12 NOTE — PROGRESS NOTES
Physical Therapy  Brief Note / Co-Treat with GIFTY    Patient Name: Anthony Maria   Patient : 1950  MRN: [id number]   Room Number: F4Z-1827/7064-96      I went to patient's bedside for treatment today. Pt supine in bed and non-responsive to verbal or tactile stimuli; pt with spontaneous jerking in all four limbs. Pt required total assist for transition from supine to sitting (HOB up) and total assist to sit at the edge of the bed (patient thrusting into extension and sliding toward edge of bed). Pt moaning once up to edge of bed; unsafe to sit up at edge of bed due to sliding forward. Total assist x 2 person to transfer back supine and scoot up in bed. Pt positioned toward right side due to being found with left leg toward left edge of bed. Pt not safe for any out of bed mobility this date. Will attempt treatment again tomorrow. Recommend continued skilled PT 3-5x/week at discharge; pt not safe to return home. This note also serves as a D/C Summary in the event that this patient is discharged prior to the next therapy session. Please refer to last PT note for goal status, discharge recommendations and functional status.       Time In: 1009  Time Out: 1025  Treatment Time: 16 minutes        Eleanor Irizarry, PT

## 2018-11-12 NOTE — CARE COORDINATION
Discharge Planning:  SW met with patients sister-in-law bennett and introduced self and role of discharge planning  Discussed and educated on the recommendation of SNF at d/c- family voices understanding and agree with recommendation  SNF list was given- family going to review and call SW when decision is made     Electronically signed by TRAN Ortiz on 11/12/2018 at 1:22 PM  [phone]

## 2018-11-13 NOTE — CONSULTS
Neurology consult Note    Dr. Francy Olea requesting this consult. CC: altered mental status   HPI:     Patient is a 70year old female with history of diastolic heart failure, CKD stage III, DM type 2 (insulin dependent) and hypertension who was taken to [facility] on 11/09 after being found down in her home covered in stool by home health nurse. On arrival to [facility], she was oriented to person and place. There was concern for metabolic toxic encephalopathy secondary to UTI (UA with positive nitrite, moderate LA), she was started on cefepime which was later discontinued after her urine culture came back negative. Her CPK was elevated at 600 and she was given IV fluids. It appears her mental status continued to worsen throughout admission with episodes of agitation and blank stares. CT head and MRI brain were negative for acute infarct. Some adventitious movement of both her legs were noted. EEG was performed which showed presence of bi hemispheric rhythmic generalized spike and wave discharges at a frequency of approximately 2 to 3 Hz. Neurology was consulted. Patient was given 4mg of Ativan IVP and 2g of Keppra which did not change her clinical exam. There was concern for subclinical status epilepticus and patient was received valproic acid load (20 mg/kg IV x 1). She was transferred to [facility] for continuous EEG and started on empiric acyclovir. On arrival to [facility], patient was intubated for airway protection. At time of exam, she is sedated with precedex and fentanyl and being set up for continuous EEG. She is also on sodium bicarbonate gtt for metabolic acidosis.      CURRENT MEDICATIONS:    Current Facility-Administered Medications:     chlorhexidine (PERIDEX) 0.12 % solution 15 mL, 15 mL, Mouth/Throat, BID, Pro Arredondo MD, 15 mL at 11/13/18 1020    [START ON 11/14/2018] acyclovir (ZOVIRAX) 430 mg in dextrose 5 % 100 mL IVPB, 430 mg, Intravenous, Q24H, Ray Pedraza DO    sodium chloride flush 0.9 %

## 2018-11-13 NOTE — DISCHARGE INSTR - COC
Continuity of Care Form    Patient Name: Kae Arrington   :  1950  MRN:  [id number]    Admit date:  2018  Discharge date:  18    Code Status Order: Full Code   Advance Directives:   [facility] Documentation     Date/Time Healthcare Directive Type of Healthcare Directive Copy in [address] Agent's Name Healthcare Agent's Phone Number    18 1045  Yes, patient has an advance directive for healthcare treatment  Durable power of  for health care  No, copy requested from family --  [facility]  [phone]          Admitting Physician:  Estelita Nieto MD  PCP: Andrew Ornelas II    Discharging Nurse: [facility] Unit/Room#: [id number]-9030/2652-36  Discharging Unit Phone Number: [phone]    Emergency Contact:   Extended Emergency Contact Information  Primary Emergency Contact: Radha Conrad [address] Phone: [phone]  Mobile Phone: [phone]  Relation: Brother/Sister  Secondary Emergency Contact: Leonard [address] [place] [address] Phone: [phone]  Relation: Other    Past Surgical History:  Past Surgical History:   Procedure Laterality Date    APPENDECTOMY      BLADDER SUSPENSION      CHOLECYSTECTOMY      COLONOSCOPY      CYSTOSCOPY      EYE SURGERY      HYSTERECTOMY         Immunization History:   Immunization History   Administered Date(s) Administered    Influenza Vaccine, unspecified formulation 10/17/2016    Pneumococcal Vaccine, unspecified formulation 10/17/2016       Active Problems:  Patient Active Problem List   Diagnosis Code    [facility] V00.14    Cellulitis L03.90    Chest pain R07.9    CKD (chronic kidney disease) stage 4, GFR 15-29 ml/min ([facility]) N18.4    DM (diabetes mellitus) ([facility] E11.9    HTN (hypertension) I10    Cellulitis of both lower extremities L03.115, L03. 116    Morbid obesity due to excess calories ([facility]) E66.01    Atherosclerosis of native artery of both lower extremities with bilateral ulceration of calves ([facility]) I70.232, I70.242    Chronic venous hypertension (idiopathic) with ulcer and inflammation of bilateral lower extremity ([facility]) I87.333, L97.919, L97.929    Ulcer of left lower leg, limited to breakdown of skin ([facility]) L97.921    Ulcer of right lower leg ([facility]) L97.919    Leg swelling M79.89    Pruritic condition L29.9    Excoriation of left lower leg S80.812A    Excoriation of right lower leg S80.811A    Venous stasis dermatitis of both lower extremities I87.2    Lower leg edema R60.0    Venous ulcer ([facility]) I83.009, L97.909    Venous hypertension, chronic, with ulcer, left ([facility]) I87.312, L97.929    Community acquired bacterial pneumonia J15.9    Hypertensive urgency I16.0    Stage 3 chronic kidney disease ([facility]) N18.3    Pneumonia due to organism J18.9    Acute pulmonary edema ([facility]) J81.0    Acute respiratory failure with hypoxia ([facility]) J96.01    Blister of lower leg, right, initial encounter D63.713B    Status epilepticus ([facility] G40.901       Isolation/Infection:   Isolation          No Isolation            Nurse Assessment:  Last Vital Signs: BP (!) 146/83   Pulse 121   Temp 99.2 °F (37.3 °C) (Oral)   Resp 20   Ht 4' 11\" (1.499 m)   Wt 192 lb 3.9 oz (87.2 kg)   SpO2 96%   BMI 38.83 kg/m²     Last documented pain score (0-10 scale): Pain Level: 0  Last Weight:   Wt Readings from Last 1 Encounters:   11/12/18 192 lb 3.9 oz (87.2 kg)     Mental Status:  disoriented    IV Access:  Peripheral IV: R forearm    Nursing Mobility/ADLs:  Walking   Dependent  Transfer  Dependent  Bathing  Dependent  Dressing  Dependent  Toileting  Dependent  Feeding  Dependent  Med Admin  Dependent  Med Delivery   not tolerating PO at this time    Wound Care Documentation and Therapy:  Wound 10/01/18 Leg Right;Lateral;Lower Wound #32; noted 10/1/18 (Active)   Wound Image   10/15/2018  2:25 PM   Dressing/Treatment Other (Comment) 10/15/2018  2:25

## 2018-11-13 NOTE — PROGRESS NOTES
Pt. With kerlix and adaptic wraps over wounds only to BLE. Small venous ulcers bilaterally with scattered dry, intact scabbing over each lower leg, possibly from excoriation due to evidence of chronic lower leg edema with hemosiderin staining. Recommend Xeroform sheets to cover openings and some of scabbing with wraps for edema management. Toes/feet dry, moisturizer and socks applied with SCD's reattached. Pt. Non-responsive to application of leg dressings.

## 2018-11-13 NOTE — CONSULTS
[address] [address]                                  CONSULTATION    PATIENT NAME: Stone Amaya                      :        1950  MED REC NO:   [id number]                          ROOM:       4256  ACCOUNT NO:   [de-identified]                           ADMIT DATE: 2018  PROVIDER:     Natali Crowley MD    CONSULT DATE:  2018    REASON FOR CONSULTATION:  Chronic kidney disease. HISTORY OF PRESENTING ILLNESS:  A 62-year-old  female with past  medical history significant for coronary artery disease, congestive  heart failure, diabetes mellitus type 2, chronic kidney disease stage  III, admitted after she was found down when the home healthcare arrived. The patient was confused, disoriented, and transferred to [facility] ER. She has been admitted for further workup for change of mentation. Initially, she was found to have a high CPK and was started on IV  fluids. Renal consultation has been called because of gradual decline  in mentation and acidosis. At the time of consultation, the patient is unarousable, jerking. She  just had an EEG done that confirmed active seizure activities. Gamble  catheter has been placed per instructions and the patient is making some  urine. Most of the information obtained from the record. The patient  follows up with me in my office for her chronic kidney disease and  hypertension. PAST MEDICAL HISTORY:  1. Peripheral vascular disease. 2.  Hypertension. 3.  Diabetes mellitus type 2,  4. Obesity. 5.  Hypercholesterolemia. 6.  Restless leg. 7.  History of UTI/cellulitis. 8.  GERD. 9.  Arthritis. 10.  Anxiety/depression. 11.  Chronic kidney disease stage III. PAST SURGICAL HISTORY:  1.  Status post appendectomy. 2.  Status post cholecystectomy. 3.  Status post hysterectomy.     HOME MEDICATIONS:  Include vitamin D supplements, Zantac,

## 2018-11-13 NOTE — PROGRESS NOTES
9.5  8.9  8.7   PHOS   --   4.0  4.1     Recent Labs      11/12/18   1224   AST  43*   ALT  16   BILIDIR  <0.2   BILITOT  0.5   ALKPHOS  93     No results for input(s): INR in the last 72 hours. Recent Labs      11/12/18   0743  11/12/18   1224   CKTOTAL  594*  552*       Urinalysis:    Lab Results   Component Value Date    NITRU Negative 11/12/2018    WBCUA 26 11/12/2018    BACTERIA 1+ 11/12/2018    RBCUA 10-20 11/12/2018    BLOODU LARGE 11/12/2018    SPECGRAV 1.021 11/12/2018    GLUCOSEU Negative 11/12/2018    GLUCOSEU NEGATIVE 02/26/2012       Radiology:  XR CHEST PORTABLE   Final Result      Endotracheal tube tip just above the level of the [place]. Assessment/Plan:  70 y.o. F w/ Hx of HFpEF (65%), CKD III, IDDM II, RLS, and IBS-D presented to  on 11/9 after being found down covered in stool. During admission she developed tremors in upper and lower extremities, non-verbal, and not arousable. EEG showed status epilepticus transferred to [facility] for cvEEG and further work-up for etiology of encephalopathy with movement disorder. She was also found to have ARF on CKD that improved with fluid resuscitation; mild rhabdo with  > 800 > 594; and an anion gap metabolic acidosis (18). Acute metabolic encephalopathy   EEG at [facility] was concerning for status epilepticus, but was terminated early due inability to cooperate. S/p 6mg Ativan and 2g IV Keppra >> Did not improve with ativan. Concern for encephalitis is not r/o at this time. Likely will require LP in near future. Etiology unclear at this time; ddx includes CNS infectious, metabolic, drug-induced, and seizure. Transferred to [facility] for cvEEG.   - Seizure precautions  - Neurology consulted and recommend              - cvEEG              - Keppra 500mg bid              - Acyclovir IV, empirically              - Possible LP in near future (will hold heparin tm in anticipation)   - Intubated for airway protection (on Fentanyl and Precedex gtt)  - May required repeat imaging, including vascular imaging, if etiology is not discovered.      Anion gap metabolic acidosis with HyCl and HyNa  Since admission on 11/9 at [facility] records show that pt has slowly developed AGMA measured to be HCO3 15 and AG 20. ABG this am showed respiratory compensation with pH at 7.35 / pCO2 29.9 / HCO3 16.1. Repeat here grossly unchanged. Etiology unclear at this time. Low suspicion for toxicity, but will f/u with labs as below. Not uremic, blood glucose and lactate wnl, Alb 3.8. Hx of IBS-D on loperamide and po bicarb at home. .. Possible to have GI losses that were unrecorded at [facility] without replacement of bicarb  - Serum Osm - 325   - Serum Tylenol + Salycilate - normal  - Repeat lactate - 3.4  - Urine studies - negative  - per Neuro: LP  - Nephrology consulted and recommend:              - IVF + Bicarb                          -Will do D5 + 75meq at 100ml/hr     CKD III  Baseline appears ~1.6   - Cr 2.3 today  - Nephrology consulted  - gentle hydration with IVF as above     HFpEF 60-65%  7/2018 ECHO showed EF 60-65% + G1DD + Mod AS + PASP 45  - Pro-BNP elevated from 3K (11/9) to 13K (11/12)   - Holding home meds in setting of NPO              - Lopressor IV q6h    - Gentle hydration as above of AGMA  - Low threshold for diuresis      IDDM II  At home on Lantus 25u + Tradjenta   - LDSSI  - Hypoglycemic protocol   - Will add basal if bs increases     HTN   Unable to tolerate po intake at this time              - Holding home coreg, hydralazine, torsemide.   Scheduled              - Lopressor 5mg IV q6h   PRN              - Labetalol 10mg IV q6h      BLE venous stasis ulcer  - Wound care consulted    DVT Prophylaxis: SCD (heparin on hold for possible LP)  Diet: Diet NPO Effective Now  Code Status: Full Code    Dispo - ICU    Angie Corral MD    I will discuss the patient with the senior resident and MD Angie Varghese MD  Internal Medicine Resident, PGY-1  Pager: [phone]

## 2018-11-13 NOTE — PROGRESS NOTES
CONTINUOUS EEG    Name:  Joycelyn Record Number:  [id number]  Age: 70 y.o. Gender: female  : 1950  Today's Date:  2018  [facility]:  5308/[facility]  Vital Signs   BP (!) 88/46   Pulse 73   Temp 98.4 °F (36.9 °C) (Axillary)   Resp 18   Ht 4' 11.06\" (1.5 m)   Wt 192 lb 3.9 oz (87.2 kg)   SpO2 94%   BMI 38.76 kg/m²       Patient currently on continuous EEG monitoring. All EEG leads are currently in place with no current issues. Verified [employer] connection via team viewer. Checked in with patient RN for current plan of care. Comments: Continue monitoring.      Electronically signed by Jett Garza on 2018 at 5:54 PM

## 2018-11-14 NOTE — PROGRESS NOTES
Nephrology Progress Note  [phone]   [email or web address]    Patient:  Anyi Moreno   : 1950    CC:  Seizures  Reason for consult: ROSANNA/CKD/NAGMA  59-year-old  female with past medical history significant for coronary artery disease, congestive heart failure, diabetes mellitus type 2, chronic kidney disease stage III, admitted after she was found down when the home healthcare arrived. Transferred from [address] [place] [address] for cEEG. She has been admitted for further workup for change of mentation. Initially, she was found to have a high CPK and was started on IV  fluids. She just had an EEG done that confirmed active seizure activities. Gamble  catheter has been placed per instructions and the patient is making some  urine.       Subjective:  Unresponsive, sedate ,stated on continuous  EEG     ROS:   unobtainable due to patient factors     SHx:  No visitors     Meds:  Current Facility-Administered Medications   Medication Dose Route Frequency Provider Last Rate Last Dose    carvedilol (COREG) tablet 12.5 mg  12.5 mg Oral BID  José Arias MD   12.5 mg at 18 1800    hydrALAZINE (APRESOLINE) tablet 50 mg  50 mg Oral TID MD Mariah Boyd ON 11/15/2018] famotidine (PEPCID) injection 40 mg  40 mg Intravenous Daily José Arias MD        hydrALAZINE (APRESOLINE) injection 10 mg  10 mg Intravenous Once Pro Arredondo MD        chlorhexidine (PERIDEX) 0.12 % solution 15 mL  15 mL Mouth/Throat BID Pro Arredondo MD   15 mL at 18 0746    sodium bicarbonate 50 mEq in dextrose 5 % 1,000 mL infusion   Intravenous Continuous Channing Dakin,  mL/hr at 18 1016      valproate (DEPACON) 1,310 mg in dextrose 5 % 100 mL IVPB  15 mg/kg Intravenous MATTHEW Joe MD   Stopped at 18 1014    propofol 1000 MG/100ML injection  10 mcg/kg/min Intravenous Titrated Ena Elias MD 20.9 mL/hr at 18 1557 40 mcg/kg/min at 18 1557    sodium chloride flush 0.9 % injection 10 mL  10 mL Intravenous 2 times per day Willaim Cassandra, DO   10 mL at 11/13/18 2257    sodium chloride flush 0.9 % injection 10 mL  10 mL Intravenous PRN Willaim Slatington, DO        magnesium hydroxide (MILK OF MAGNESIA) 400 MG/5ML suspension 30 mL  30 mL Oral Daily PRN Willaim Cassandra, DO        ondansetron [facility]) injection 4 mg  4 mg Intravenous Q6H PRN Willaim Slatington, DO        glucose (GLUTOSE) 40 % oral gel 15 g  15 g Oral PRN Willaim Slatington, DO        dextrose 50 % solution 12.5 g  12.5 g Intravenous PRN Willaim Slatington, DO        glucagon (rDNA) injection 1 mg  1 mg Intramuscular PRN Willaim Cassandra, DO        dextrose 5 % solution  100 mL/hr Intravenous PRN Willaim Cassandra, DO        insulin lispro (HUMALOG) injection pen 0-6 Units  0-6 Units Subcutaneous TID WC Willaim Cassandra, DO   1 Units at 11/14/18 0856    insulin lispro (HUMALOG) injection pen 0-3 Units  0-3 Units Subcutaneous Nightly Willaim Slatington, DO        acetaminophen (TYLENOL) suppository 650 mg  650 mg Rectal Q4H PRN Willaim Slatington, DO        levETIRAcetam (KEPPRA) 500 mg in sodium chloride 0.9 % 100 mL IVPB  500 mg Intravenous Q12H Willaim Cassandra, DO   Stopped at 11/14/18 1045    labetalol (NORMODYNE;TRANDATE) injection 10 mg  10 mg Intravenous Q6H PRN Willaim Slatington, DO           Vitals:  BP (!) 135/59   Pulse 120   Temp 99.2 °F (37.3 °C) (Axillary)   Resp 19   Ht 4' 11.06\" (1.5 m)   Wt 192 lb 3.9 oz (87.2 kg)   SpO2 100%   BMI 38.76 kg/m²     Physical Exam:  Gen: Intubated/sedated. HEENT: MMM, OP clear. CV: RRR no rub no gallop   Lungs: CTA-B  Abd: S/NT decrease BS at bases   Ext: legs wrapped   Skin: Warm. No rashes appreciated. 1+ UE/LE edema.     Labs:  CBC:   Lab Results   Component Value Date    WBC 5.8 11/14/2018    RBC 2.84 11/14/2018    HGB 8.6 11/14/2018    HCT 25.7 11/14/2018    MCV 90.6 11/14/2018    MCH 30.5 11/14/2018    MCHC 33.6 11/14/2018    RDW 16.6

## 2018-11-14 NOTE — PROGRESS NOTES
CONTINUOUS EEG    Name:  Joycelyn Record Number:  [id number]  Age: 70 y.o. Gender: female  : 1950  Today's Date:  2018  Room:  [facility]2788-81  Vital Signs   BP (!) 167/73   Pulse 111   Temp 100 °F (37.8 °C) (Axillary)   Resp 19   Ht 4' 11.06\" (1.5 m)   Wt 192 lb 3.9 oz (87.2 kg)   SpO2 97%   BMI 38.76 kg/m²       Patient currently on continuous EEG monitoring. All EEG leads are currently in place with no current issues. Verified [employer] connection via team viewer. Checked in with patient RN for current plan of care. Comments: Continue monitoring. All leads within 10K limit.     Electronically signed by Martin Bran on 2018 at 12:15 PM

## 2018-11-14 NOTE — PROCEDURES
Tristan Kunz is a 70 y.o. female patient. 1. Status epilepticus ([facility]      Past Medical History:   Diagnosis Date    Acid reflux     Arthritis     Balance problem     Blood circulation, collateral     Cellulitis of both lower extremities     CHF (congestive heart failure) (HCC)     Chronic kidney disease     Chronic renal failure    Chronic venous hypertension (idiopathic) with ulcer and inflammation of bilateral lower extremity ([facility] 1/9/2017    Community acquired bacterial pneumonia 7/26/2018    Depression     Diabetes mellitus (HCC)     GERD (gastroesophageal reflux disease)     Hyperlipidemia     Hypertension     Movement disorder     Murmur     Neuromuscular disorder (HCC)     Restless leg syndrome     Status epilepticus ([facility] 11/12/2018    Urinary frequency     Urinary incontinence      Blood pressure (!) 148/53, pulse 109, temperature 99.2 °F (37.3 °C), temperature source Axillary, resp. rate 19, height 4' 11.06\" (1.5 m), weight 192 lb 3.9 oz (87.2 kg), SpO2 100 %, not currently breastfeeding. Central Line  Date/Time: 11/14/2018 5:46 PM  Performed by: William Rodríguez  Authorized by: William Rodríguez   Consent: Verbal consent obtained. Risks and benefits: risks, benefits and alternatives were discussed  Consent given by: spouse  Procedure consent: procedure consent matches procedure scheduled  Relevant documents: relevant documents present and verified  Test results: test results available and properly labeled  Site marked: the operative site was marked  Imaging studies: imaging studies available  Patient identity confirmed: arm band  Time out: Immediately prior to procedure a \"time out\" was called to verify the correct patient, procedure, equipment, support staff and site/side marked as required.   Indications: vascular access    Anesthesia:  Local Anesthetic: lidocaine 1% without epinephrine    Sedation:  Patient sedated: yes  Sedatives: propofol    Preparation: skin prepped with

## 2018-11-14 NOTE — CONSULTS
34.7 11/13/2018    PO2ART 179.0 11/13/2018     No results for input(s): INR in the last 72 hours. @LABRCNT(NITRITE,COLORU,PHUR,LABCAST,WBCUA,RBCUA,MUCUS,TRICHOMONAS,YEAST,BACTERIA,CLARITYU,SPECGRAV,LEUKOCYTESUR,UROBILINOGEN,BILIRUBINUR,BLOODU,GLUCOSEU,KETONESU,AMORPHOUS)@     DATA:  cxr  Endotracheal tube tip just above the level of the [place]    MRI brain  1. Examination is degraded by patient motion artifact. 2. No acute infarction. 3. Parenchymal volume loss and sequela of chronic microvascular ischemic   changes     CT head  No acute intracranial abnormality.       Opacification of the right mastoid tip     CT abdomen  1. No acute abnormality in the abdomen/pelvis. 2. Cellulitis of the anterior pelvis. 3. Severe coronary artery disease. CT C spine  No acute abnormality of the cervical spine.       Multilevel degenerative changes     Echo on 7/27/18  Left ventricular cavity size is normal. There is moderate concentric left   ventricular hypertrophy. Overall left ventricular systolic function appears   normal with an ejection fraction of 60-65%. Diastolic filling parameters   suggest grade I diastolic dysfunction.   The mitral valve leaflets are slightly thickened with normal leaflet   mobility.   Mitral annular calcification is present. Mild mitral regurgitation is   present.   The aortic valve is thickened/calcified. Moderate aortic stenosis with a   peak velocity of 3.1m/s and a mean pressure gradient of 21mmHg. Mild   tricuspid regurgitation.  Estimated pulmonary artery systolic pressure is   normal at 45 mmHg assuming a right atrial pressure of 3 mmHg c/w mild   pulmonary htn.   The left atrium is at the upper limits of normal in size.       Assessment &Plan:    Patient Active Problem List:     Diastolic CHF (HCC)     Cellulitis     Chest pain     CKD (chronic kidney disease) stage 4, GFR 15-29 ml/min (HCC)     DM (diabetes mellitus) (HCC)     HTN (hypertension)     Cellulitis of both lower extremities     Morbid obesity due to excess calories (HCC)     Atherosclerosis of native artery of both lower extremities with bilateral ulceration of calves (HCC)     Chronic venous hypertension (idiopathic) with ulcer and inflammation of bilateral lower extremity (HCC)     Ulcer of left lower leg, limited to breakdown of skin (HCC)     Ulcer of right lower leg (HCC)     Leg swelling     Pruritic condition     Excoriation of left lower leg     Excoriation of right lower leg     Venous stasis dermatitis of both lower extremities     Lower leg edema     Venous ulcer (HCC)     Venous hypertension, chronic, with ulcer, left ([facility]     Community acquired bacterial pneumonia     Hypertensive urgency     Stage 3 chronic kidney disease ([facility]     Pneumonia due to organism     Acute pulmonary edema (HCC)     Acute respiratory failure with hypoxia (HCC)     Blister of lower leg, right, initial encounter     Status epilepticus ([facility]      Status epilepticus  Acute respiratory failure on mechanical vent support. Intubated for airway protection. Hemodynamically stable  ROSANNA  Hypernatremia  Lactic acidosis - resolved  Mild rhabdo  R/u CNS infection    Continue vent support  Switch fentanyl and precedex to propofol   Continue IVF  LP was done. Does not appear to be infected, however protein is mildly elevated. Although early HSV encephalitis can be found with normal CSF early in the course of the disease this is unlikely in the setting of normal MRI. Continue acyclovir, f/u on CSF panel. Heparin, PPI  Full Code    Pt has a high probability of imminent or life-threatening deterioration requiring close monitoring, and highly complex decision-making and/or interventions of high intensity to assess, manipulate, and support his critical organ systems to prevent a likely inevitable decline which could occur if left untreated.      A total critical care time 60 minutes was used.  This includes but not limited to examining

## 2018-11-15 NOTE — PROGRESS NOTES
Propofol gtt off at this time in attempt for pt to wake up/follow commands for possible extubation. BSWR remain in place. Will cont to monitor.

## 2018-11-15 NOTE — PROGRESS NOTES
ABG obtained by RT and sample sent to lab. Pt placed back on previous vent settings, Propofol gtt increased for pt comfort. Will cont to monitor.

## 2018-11-15 NOTE — PROGRESS NOTES
Neurology Follow Up  Note    Updates:    No episodes of seizures. Patient seen and examined, intubated, less sedated compared to yesterday. ROS:  Unable to obtain     Exam:  Blood pressure (!) 166/86, pulse 98, temperature 99.3 °F (37.4 °C), temperature source Axillary, resp. rate 26, height 4' 11.06\" (1.5 m), weight 192 lb 3.9 oz (87.2 kg), SpO2 99 %, not currently breastfeeding. General depressed mental status, no distress  Cardiovascular: pulses symmetric in all 4 extremities. No peripheral edema. Psychiatric: limited exam given encephalopathy but not agitated and no signs of hallucinations  Neurologic  Mental status: limited exam given encephalopathy  orientation unable to assess given comatose state  General fund of knowledge limited exam given encephalopathy              Memory limited exam given encephalopathy  Attention poor  Language limited exam given encephalopathy  Comprehension not following any commands  CN2: Visual Fields: does resist opening of eyes with deep stimulation   CN 3,4,6: pupils equal round and reactive, extraocular muscles intact with dolls maneuver  CN5: facial sensation limited exam given encephalopathy  CN7:face symmetric but exam limited by ET tube  CN8: hearing limited exam given encephalopathy  CN9: limited exam given encephalopathy  CN11: limited exam given encephalopathy  CN12: limited exam given encephalopathy  Strength: not following commands  Deep tendon reflexes: normal in all 4 extremities  Sensory: grimace to pain in all 4 extremities, withdraws briskly to pain in LE   Cerebellar/coordination:limited exam given encephalopathy  Tone: normal in all 4 extremities  Gait: limited exam given encephalopathy and intubated with ventilator. Labs:  Hepatic:   Recent Labs      11/12/18   1224   AST  43*   ALT  16   BILITOT  0.5   ALKPHOS  93     Lipids: No results for input(s): CHOL, TRIG, HDL, LDLCALC, LABVLDL in the last 72 hours.   INR:   Recent Labs      11/14/18

## 2018-11-15 NOTE — PROCEDURES
CONTINUOUS VIDEO EEG MONITORING     DATE OF SERVICE: 11/13/2018 10:56 TO 11/15/2018 14:40     NAME: Atiya Ho   YOB: 1950  SEX: female  MEDICAL RECORD FRBFTM:[id number]     EEG-CCTV study:   The patient is a 70 y.o.y old female monitored at the request of the team for altered mental status and concern for subclinical seizures.      EEG-VIDEO MONITORING METHODOLOGY:   Time-locked EEG-video monitoring was performed using the 32-channel [employer] monitoring system.      Analyses of the monitoring data were performed using the following techniques:   1. Review of the relevant EEG-video data. 2. Review of events detected by the computer system in detail. 3. Review of clinical seizures, with both detailed review of EEG and video and playback using multiple montages. A variety of referential and bipolar montages were used.     CLINICAL AND EEG ANALYSIS:  Video EEG recording was reviewed in real time by a technologist, and then reviewed at least twice a day when available on the  with maximum possible sampling. Results of the monitoring were related to the treating team frequently throughout the study, at least once a day to help guide treatment via verbal or written communication as brief notes or direct text messages or emails. Updates and response to treatment was communicated as requested by the requesting physician or the team.     11/13/2018        Seizures - Episodes of evolving rhythmic delta activity that appear to arise bifrontal (not clearly lateralized) and build  in amplitude, frequency and distribution becoming diffuse before abruptly stopping. These episodes are consistent with electrographic seizures. Episodes are approximately 50 seconds in duration. No clinical change. Example 11:47  Push buttons - None  Background - Background is mostly characterized by continuous generalized slowing, mostly theta, delta which is of low to moderate amplitude.   Intermittent focal

## 2018-11-15 NOTE — PROGRESS NOTES
IDDM II, RLS, and IBS-D presented to [facility] on 11/9 after being found down covered in stool. During admission she developed tremors in upper and lower extremities, non-verbal, and not arousable. EEG showed status epilepticus transferred to [facility] for cvEEG and further work-up for etiology of encephalopathy with movement disorder. She was also found to have ARF on CKD that improved with fluid resuscitation; mild rhabdo with  > 800 > 594; and an anion gap metabolic acidosis (18). Acute metabolic encephalopathy   EEG at [facility] was concerning for status epilepticus, but was terminated early due inability to cooperate. S/p 6mg Ativan and 2g IV Keppra >> Did not improve with ativan. Concern for encephalitis is not r/o at this time. Likely will require LP in near future. Etiology unclear at this time; ddx includes CNS infectious, metabolic, drug-induced, and seizure. Transferred to [facility] for cvEEG. - Seizure precautions  - Neurology consulted and recommend              - cvEEG              - Keppra 500mg bid              - Acyclovir IV, empirically              - LP:    -CSF gram stains: no organisms    -CSF Cx: NGTD    -CSF Meningitis: not detected   - Intubated for airway protection  - May required repeat imaging, including vascular imaging, if etiology is not discovered.      Anion gap metabolic acidosis with HyCl and HyNa  Since admission on 11/9 at [facility] records show that pt has slowly developed AGMA measured to be HCO3 15 and AG 20. ABG this am showed respiratory compensation with pH at 7.35 / pCO2 29.9 / HCO3 16.1. Repeat here grossly unchanged. Etiology unclear at this time. Low suspicion for toxicity, but will f/u with labs as below. Not uremic, blood glucose and lactate wnl, Alb 3.8. Hx of IBS-D on loperamide and po bicarb at home. ..  Possible to have GI losses that were unrecorded at [facility] without replacement of bicarb  - Serum Osm - 325   - Serum Tylenol + Salycilate - normal  - Repeat lactate - 1.1  - Urine

## 2018-11-15 NOTE — PROGRESS NOTES
Pulmonary & Critical Care Medicine    Admit Date: 2018  PCP: Lisa Villagomez II    CC:  Acute respiratory failure  Events of Last 24 hours:   No seizures over the past 24 hours  She is more responsive though hasn't regained full consciousness. Vitals:  Tmax:  VITALS:  BP (!) 108/53 Comment: Simultaneous filing. User may not have seen previous data. Pulse 93 Comment: Simultaneous filing. User may not have seen previous data. Temp 98.4 °F (36.9 °C) (Axillary)   Resp 17 Comment: Simultaneous filing. User may not have seen previous data. Ht 4' 11.06\" (1.5 m)   Wt 192 lb 3.9 oz (87.2 kg)   SpO2 98% Comment: Simultaneous filing. User may not have seen previous data. BMI 38.75 kg/m²   24HR INTAKE/OUTPUT:    Intake/Output Summary (Last 24 hours) at 18 2126  Last data filed at 18 1845   Gross per 24 hour   Intake           3919.3 ml   Output              825 ml   Net           3094.3 ml     CURRENT PULSE OXIMETRY:  SpO2: 98 % (Simultaneous filing. User may not have seen previous data.)  24HR PULSE OXIMETRY RANGE:  SpO2  Av.6 %  Min: 88 %  Max: 100 %    EXAM:  General: No distress. Sedated. Eyes: PERRL. No sclera icterus. No conjunctival injection. ENT: ETT in place, moist oral mucosa   Neck: Trachea midline. Normal thyroid. Resp: No accessory muscle use. No crackles. No wheezing. No rhonchi. CV: Regular rate. Regular rhythm. No mumur or rub. No edema. GI: Non-tender. Non-distended. No masses. No organmegaly. Normal bowel sounds. Skin: Warm and dry. No nodule on exposed extremities. No rash on exposed extremities. M/S: No cyanosis. No joint deformity. No clubbing.    Neuro: sedated on vent support     Medications:    IV:   sodium chloride      sodium bicarbonate infusion 50 mL/hr at 18    propofol 40 mcg/kg/min (18)    dextrose           Scheduled Meds:   carvedilol  12.5 mg Oral BID WC    hydrALAZINE  50 mg Oral TID    [START ON 11/15/2018] famotidine (PEPCID) injection  40 mg Intravenous Daily    hydrALAZINE  10 mg Intravenous Once    chlorhexidine  15 mL Mouth/Throat BID    valproate sodium (DEPACON) IVPB  15 mg/kg Intravenous QAM    sodium chloride flush  10 mL Intravenous 2 times per day    insulin lispro  0-6 Units Subcutaneous TID WC    insulin lispro  0-3 Units Subcutaneous Nightly    levetiracetam  500 mg Intravenous Q12H         Diet: DIET TUBE FEED CONTINUOUS/CYCLIC NPO; STANDARD WITH FIBER (Jevity 1.2); Continuous; 20; 50; 24     Results:  CBC: Recent Labs      11/12/18   0743  11/13/18   0414  11/14/18   0455   WBC  9.3  8.3  5.8   HGB  10.6*  9.6*  8.6*   HCT  32.8*  29.6*  25.7*   MCV  90.0  91.5  90.6   PLT  207  202  135     BMP: Recent Labs      11/12/18   2332  11/13/18   0414  11/14/18   0455   NA  143  147*  141   K  4.1  4.0  3.2*   CL  109  112*  106   CO2  16*  18*  21   PHOS  4.0  4.1  3.7   BUN  42*  49*  52*   CREATININE  2.0*  2.3*  2.1*     LIVER PROFILE:   Recent Labs      11/12/18   1224   AST  43*   ALT  16   BILIDIR  <0.2   BILITOT  0.5   ALKPHOS  93     PT/INR:   Recent Labs      11/14/18   0455   PROTIME  13.9*   INR  1.22*     APTT: No results for input(s): APTT in the last 72 hours. UA:  Recent Labs      11/12/18   1207   COLORU  YELLOW   PHUR  6.0  5.0   LABCAST  1-3 Hyaline*   WBCUA  26*   RBCUA  10-20*   YEAST  Present*   BACTERIA  1+*   CLARITYU  CLOUDY*   SPECGRAV  1.021   LEUKOCYTESUR  Negative   UROBILINOGEN  0.2   BILIRUBINUR  Negative   BLOODU  LARGE*   GLUCOSEU  Negative         Assessment/Plan:  70 y.o. female with     Status epilepticus - no seizures over the past 24 hours  Acute respiratory failure on mechanical vent support. Intubated for airway protection. good lung mechanics, FiO2 30%, PEEP 5  Hemodynamically stable  ROSANNA - creatinine trending down. 6+ positive fluid balance   Mild hypokalemia   Hypernatremia - resolved.     Lactic acidosis - resolved  Mild rhabdo  Anemia - HGB is down to 8.6     Continue vent support  D/c acyclovir, as there is no evidence of HZV on MRI, LP cells or CSF panel  Continue sedation with propofol. SBT daily  CXR reviewed. ETT adjusted. Heparin, PPI  Full Code     Pt has a high probability of imminent or life-threatening deterioration requiring close monitoring, and highly complex decision-making and/or interventions of high intensity to assess, manipulate, and support his critical organ systems to prevent a likely inevitable decline which could occur if left untreated.      A total critical care time 33 minutes was used. This includes but not limited to examining patient, collaborating with other physicians, monitoring vital signs, telemetry, continuous pulse oximetry, and clinical response to IV medications, documentation time, review and interpretation of laboratory and radiological data, review of nursing notes and old record review. This time excludes any time that may have been spent performing procedures for life threatening organ failure.     Douglas Ellis MD

## 2018-11-15 NOTE — PLAN OF CARE
Problem: Restraint Use - Nonviolent/Non-Self-Destructive Behavior:  Goal: Absence of restraint indications  Absence of restraint indications   Outcome: Ongoing  Continues in bilat soft wrist restraints for safety and line/tube protection. Does no meet criteria for removal. Continuing hourly safety checks. Problem: Risk for Impaired Skin Integrity  Goal: Tissue integrity - skin and mucous membranes  Structural intactness and normal physiological function of skin and  mucous membranes. Outcome: Ongoing  Skin integrity maintained. Turns every 2 hours with staff assist. Sacral heart in place. Heels elevated off bed. Problem: Falls - Risk of:  Goal: Will remain free from falls  Will remain free from falls   Outcome: Ongoing  Safety precautions in place including bed alarm, low bed, fall risk band. Remains in bilat soft wrist restraints. Problem: Nutrition  Goal: Optimal nutrition therapy  Outcome: Ongoing  Continues with ordered TF. Now at goal rate. Tolerating well.

## 2018-11-15 NOTE — PROGRESS NOTES
91.2 11/15/2018    MCH 30.0 11/15/2018    MCHC 32.9 11/15/2018    RDW 16.7 11/15/2018     11/15/2018    MPV 9.2 11/15/2018     BMP:    Lab Results   Component Value Date     11/15/2018    K 3.3 11/15/2018    K 4.0 11/10/2018     11/15/2018    CO2 25 11/15/2018    BUN 39 11/15/2018    LABALBU 2.8 11/15/2018    CREATININE 1.6 11/15/2018    CALCIUM 7.7 11/15/2018    GFRAA 39 11/15/2018    GFRAA >60 12/20/2010    LABGLOM 32 11/15/2018    GLUCOSE 167 11/15/2018       Assessment/Plan:  1. Acidosis; resolved with IVF  2. Hypernatremia; resolved with IVF; now on TF/FW  3. Encephalopathy  ? Transferred from [facility] for cEEG after EEG there showed status epilepticus   - Neurology following; MRI normal on admission. cEEG stopped 11/15/2018. - LP 11/13/2018; CSF studies pending.   - on VPM/LEV  - On Acyclovir until CSF panel returns for HSV; pending autoimmune work up. 4. CKD 3; Follows with Dr. Brittany Nagy Baseline 2.0. DM/HTN   5. Elevated CPK due to seizure  6. HTN controlled       Recommendation:  Agree with euvolemic to volume up today; IVF stopped and torsemide restarted  BP improved. May need FW with TF; will evaluate as volume status improves. Thank you for involving us in the care of this patient, please call with any questions.     Signed:  Katheryn Meneses M.D.   [facility]  Office Number: [phone]  Fax Number: [phone]  Answering Service: [phone]  11/15/2018, 3:22 PM

## 2018-11-16 PROBLEM — E44.0 MODERATE MALNUTRITION (HCC): Status: ACTIVE | Noted: 2018-01-01

## 2018-11-16 NOTE — PROGRESS NOTES
Pt to MRI/CT with ICU RN/RT and Transportation. VSS at time of unit departure. Precedex gtt increased for transport/scans.

## 2018-11-16 NOTE — PLAN OF CARE
Problem: Risk for Impaired Skin Integrity  Goal: Tissue integrity - skin and mucous membranes  Structural intactness and normal physiological function of skin and  mucous membranes. Outcome: Ongoing  Pt is at risk for skin breakdown. See Jasbir Score. Pt remains on bedrest, unable to reposition self in bed. Heels elevated off bed. Will continue to turn and reposition pt Q2H and PRN. Will continue to keep pt clean and dry, applying skin care cream PRN. Preventative sacral drsg in place now that incontinence has stopped. Pillows used for positioning and to offset pressure to bony prominences. Will continue to monitor and assess for skin breakdown. Problem: Falls - Risk of:  Goal: Will remain free from falls  Outcome: Ongoing  Pt is a Fall Risk. See Akbar Cradelores Fall Score. Pt's bed in low position, side rails up, call light within reach. Bed alarm on. Pt encouraged to call for assistance. Will continue with hourly rounds for pain management, incontinence assessments, and repositioning as needed. Will continue to monitor for needs. Problem: Urinary Elimination:  Goal: Signs and symptoms of infection will decrease  Outcome: Ongoing  Gamble cath remains in place at this time. Gamble care done with CHG wipes. Urinary bag continues to hang below bladder level, tubing remains free from kinks/obstruction.  Will cont to monitor.

## 2018-11-16 NOTE — PROGRESS NOTES
Internal Medicine PGY-1 Resident Progress Note        PCP: Cheko Grimes II    Date of Admission: 11/12/2018    Chief Complaint: AMS    Subjective: Yesterday CEEG monitoring w/ no further seizures  CEEG discontinued  -restarted torsemide stopped IVF; continuing hydralazine and coreg  -restarted tube feeds (tube feeds originally stopped due to diarrhea)    SBP up to 180s - 10 mg hydralazine at 0445; this am SBP at 118; otherwise VS wnl    Met pt at bedside. Intubated. Unable/unwilling to open eyes or respond to commands; however making spontaneous movements. Precedex at 1.4 (being weaned down) fentanyl and propofol off    FiO2 30/ / RR 18/ PEEP 5    Hb: 8.6 stable  Na:145 stable    Cr: 1.5 remains at BL (BL ~1.6)  Intake 1.9L; Output 1.7L; net since admission 7.5L    AG:10 (11 yesterday; 14 on admission)  CK: 314 (552 11/12; 662 on admission to OSH)    CSF gram stains: no organisms  CSF Cx: NGTD  CSF Meningitis Encephalitis panel: not detected    per Neurology:   - rec workup for new onset status epilepticus: autoimmune/paraneoplastic epilepsy panel  - repeat MRI brain wo contrast today    MRI today  CT abd/chest today  SBT today    Hospital Course: 70 y.o. F w/ Hx of HFpEF (65%), CKD III, IDDM II, RLS, and IBS-D presented to [facility] with AMS on 11/9. She was found down by home health covered in stool at her home. She was non-verbal on presentation (AAOx3 at [facility]). CT head and MRI brain (although poor quality due to pt movement) was negative for acute pathology. UTI initially suspected and was started on Cefepime; however UCx showed no infection and abx d/c (11/12). During admission developed tremors in upper and lower extremities, non-verbal, and not arousable. EEG showed status epilepticus, but was terminated due to inability to cooperate. CT head and MRI brain were negative for acute infarct. Neurology loaded with 2g Keppra IV and 6mg of Ativan, without clinical resolution of extremity movements.  She determine  Capillary Refill: Brisk,< 3 seconds   Peripheral Pulses: +2 palpable, equal bilaterally     Labs:   Recent Labs      11/14/18   0455  11/15/18   0426  11/16/18   0420   WBC  5.8  5.2  3.7*   HGB  8.6*  8.2*  8.6*   HCT  25.7*  24.8*  25.9*   PLT  135  127*  130*     Recent Labs      11/14/18   0455  11/15/18   0426  11/16/18   0420   NA  141  141  145   K  3.2*  3.3*  3.8   CL  106  105  110   CO2  21  25  25   BUN  52*  39*  38*   CREATININE  2.1*  1.6*  1.5*   CALCIUM  8.0*  7.7*  7.6*   PHOS  3.7  3.1  3.5     No results for input(s): AST, ALT, BILIDIR, BILITOT, ALKPHOS in the last 72 hours. Recent Labs      11/14/18 0455   INR  1.22*     Recent Labs      11/15/18   0426   CKTOTAL  314*       Urinalysis:      Lab Results   Component Value Date    NITRU Negative 11/12/2018    WBCUA 26 11/12/2018    BACTERIA 1+ 11/12/2018    RBCUA 10-20 11/12/2018    BLOODU LARGE 11/12/2018    SPECGRAV 1.021 11/12/2018    GLUCOSEU Negative 11/12/2018    GLUCOSEU NEGATIVE 02/26/2012       Radiology:  XR CHEST PORTABLE   Final Result   1. Low lying endotracheal tube with tip at the origin of the right mainstem bronchus. This could be pulled back approximately 2 cm to place the endotracheal tube tip in the thoracic trachea. 2. Right IJ central venous catheter tip at the cavoatrial junction. 3. No evidence of pneumothorax. The results were called to the patient's nurse at 6:28 PM on 11/14/2018. XR ABDOMEN (KUB) (SINGLE AP VIEW)   Final Result      Tip of orogastric tube extends into proximal-mid body of stomach. XR CHEST PORTABLE   Final Result      Endotracheal tube tip just above the level of the [place]. MRI BRAIN WO CONTRAST    (Results Pending)           Assessment/Plan:  70 y.o. F w/ Hx of HFpEF (65%), CKD III, IDDM II, RLS, and IBS-D presented to  on 11/9 after being found down covered in stool.  During admission she developed tremors in upper and lower extremities, non-verbal, and not Bicarb                         CKD III (at baseline)  Baseline appears ~1.6   - Follow kidney fn  - Nephrology consulted  - gentle hydration with IVF as above     HFpEF 60-65%  7/2018 ECHO showed EF 60-65% + G1DD + Mod AS + PASP 45  - Pro-BNP elevated from 3K (11/9) to 13K (11/12)   - Holding home meds in setting of NPO              - Lopressor IV q6h    - Gentle hydration as above of AGMA  - Low threshold for diuresis      IDDM II  At home on Lantus 25u + Tradjenta   - LDSSI  - Hypoglycemic protocol   - Will add basal if bs increases     HTN   Unable to tolerate po intake at this time              - Cont home coreg and hydralazine   - Start torsemide  Scheduled              - Lopressor 5mg IV q6h   PRN              - Labetalol 10mg IV q6h      BLE venous stasis ulcer  - Wound care consulted    DVT Prophylaxis: SCD (heparin on hold for possible LP)  Diet: DIET TUBE FEED CONTINUOUS/CYCLIC NPO; STANDARD WITH FIBER (Jevity 1.2); Continuous; 20; 50; 24  Code Status: Full Code    Dispo - ICU    José Arias MD    I will discuss the patient with the senior resident and Garey Baumgarten, MD Antonetta Nutley, MD  Internal Medicine Resident, PGY-1  Pager: [phone]    Patient seen and examined, labs and imaging studies reviewed, agree with assessment and plan as outlined above. Continue with care discussed with neurology discussed case with pulmonary critical care, ct scan today.      Garey Baumgarten, MD Lucile Pool

## 2018-11-16 NOTE — PROGRESS NOTES
José Arias MD   50 mg at 11/16/18 5311    hydrALAZINE (APRESOLINE) injection 10 mg  10 mg Intravenous Once Pro Arredondo MD        chlorhexidine (PERIDEX) 0.12 % solution 15 mL  15 mL Mouth/Throat BID Pro Arredondo MD   15 mL at 11/16/18 0751    valproate (DEPACON) 1,310 mg in dextrose 5 % 100 mL IVPB  15 mg/kg Intravenous QAM Burgess Heath MD   Stopped at 11/16/18 1018    sodium chloride flush 0.9 % injection 10 mL  10 mL Intravenous 2 times per day Shonda Guaman, DO   10 mL at 11/16/18 0806    sodium chloride flush 0.9 % injection 10 mL  10 mL Intravenous PRN Shonda Guaman DO        magnesium hydroxide (MILK OF MAGNESIA) 400 MG/5ML suspension 30 mL  30 mL Oral Daily PRN Shonda Guaman DO        ondansetron [facility]) injection 4 mg  4 mg Intravenous Q6H PRN Shonda Guaman DO        glucose (GLUTOSE) 40 % oral gel 15 g  15 g Oral PRN Shonda Guaman, DO        dextrose 50 % solution 12.5 g  12.5 g Intravenous PRN Shonda Guaman, DO        glucagon (rDNA) injection 1 mg  1 mg Intramuscular PRN Shonda Guaman, DO        dextrose 5 % solution  100 mL/hr Intravenous PRN Shonda Guaman DO        acetaminophen (TYLENOL) suppository 650 mg  650 mg Rectal Q4H PRN Shonda Guaman DO        levETIRAcetam (KEPPRA) 500 mg in sodium chloride 0.9 % 100 mL IVPB  500 mg Intravenous Q12H Shonda Guaman DO   Stopped at 11/16/18 1045    labetalol (NORMODYNE;TRANDATE) injection 10 mg  10 mg Intravenous Q6H PRN Shonda Guaman DO           Vitals:  /83   Pulse 61   Temp 97.5 °F (36.4 °C) (Axillary)   Resp 18   Ht 4' 11.06\" (1.5 m)   Wt 194 lb 0.1 oz (88 kg)   SpO2 98%   BMI 39.10 kg/m²      Wt Readings from Last 20 Encounters:   11/16/18 194 lb 0.1 oz (88 kg)   11/12/18 192 lb 3.9 oz (87.2 kg)   11/06/18 194 lb 0.1 oz (88 kg)   10/03/18 186 lb (84.4 kg)   10/01/18 177 lb 4 oz (80.4 kg)   09/05/18 175 lb 14.8 oz (79.8 kg)   08/30/18 179 lb 9.6 oz (81.5 kg)   08/17/18 195 lb

## 2018-11-17 NOTE — PROGRESS NOTES
Speech Language Pathology  Facility/Department: [facility]   BEDSIDE SWALLOW EVALUATION/Treatment    NAME: Lior Parish  : 1950  MRN: [id number]    ADMISSION DATE: 2018  ADMITTING DIAGNOSIS: has Diastolic CHF ([address] Cellulitis; Chest pain; CKD (chronic kidney disease) stage 4, GFR 15-29 ml/min (HCC); DM (diabetes mellitus) ([address] HTN (hypertension); Cellulitis of both lower extremities; Morbid obesity due to excess calories ([address] Atherosclerosis of native artery of both lower extremities with bilateral ulceration of calves ([address] Chronic venous hypertension (idiopathic) with ulcer and inflammation of bilateral lower extremity ([address] Ulcer of left lower leg, limited to breakdown of skin ([address] Ulcer of right lower leg ([address] Leg swelling; Pruritic condition; Excoriation of left lower leg; Excoriation of right lower leg; Venous stasis dermatitis of both lower extremities; Lower leg edema; Venous ulcer ([address] Venous hypertension, chronic, with ulcer, left ([address] Community acquired bacterial pneumonia; Hypertensive urgency; Stage 3 chronic kidney disease ([address] Pneumonia due to organism; Acute pulmonary edema ([address] Acute respiratory failure with hypoxia ([address] Blister of lower leg, right, initial encounter; Status epilepticus ([address] Acute respiratory failure ([address] and Moderate malnutrition ([address] on her problem list.  ONSET DATE: 18    HPI: \"Mrs Lior Parish is a 70 y.o. female with PMH of HFpEF (65%), CKD III, IDDM II, RLS, and IBS-D who presented to [facility] with AMS on . She was found down by home health covered in stool at her home. At presentation was non-verbal but apparently is alert and oriented x 3 at baseline. CT head and MRI brain (although poor quality due to pt movement) was negative for acute pathology. Pt was initially thought to have UTI per UA findings and was started on Cefepime, but UCx showed no infection so abx was stopped on .  During stay pt developed what suspected pharyngeal residue, and suspected pharyngeal pooling. Pt's sister-in-law present for evaluation and reported no difficulty with swallowing prior to admission to hospital, just occasionally \"throat clearing\" and \"watching what she eats\" due to limited dentition. Pt was intubated 11/12/18-11/16/18 and has hoarse vocal quality at this time. She is alert and responsive when repetiting questions/commands inconsistently. Per sister-in-law, this is a change from her baseline, when pt is able to communicate \"normally. \" Pt was presented with trials of ice chips, thin liquids, nectar thick liquids, and pureed solids to assess swallow function. Following all trials, pt demonstrated wet vocal quality or cough with suspected poor pharyngeal clearance, as she continued to demonstrate wet vocal quality or cough post-instruction to cough. She also demonstrated wet breath sounds post-PO trials. No additional PO trials were given due to reduced airway protection and pt not tolerating PO trials at this time. Recommend continue NPO at this time with continual follow-up from SLP for PO trials. Pt may also need speech/lang/cogition evaluation as appropriate. Dysphagia Outcome Severity Scale: Level 1: Severe dysphagia- NPO. Unable to tolerate any PO safely     Treatment Plan  Requires SLP Intervention: Yes  Duration/Frequency of Treatment: 3-5x/wk       Recommended Diet and Intervention  Diet Solids Recommendation: NPO  Liquid Consistency Recommendation: NPO  Recommended Form of Meds: Via alternative means of nutrition     Compensatory Swallowing Strategies  N/A as pt is NPO at this time. Treatment/Goals  Short-term Goals  Goal 1: The patient will tolerate PO trials without overt s/s aspiration  11/17: Pt demonstrated decreased airway protection/decreased pharyngeal and airway clearance with all PO trials. Recommend continue NPO at this time. See above impressions for details.  Continue goal.  Goal 2: The patient/caregivers

## 2018-11-17 NOTE — PROGRESS NOTES
Performed:    /64   Pulse 80   Temp 99.2 °F (37.3 °C) (Oral)   Resp 26   Ht 4' 11.06\" (1.5 m)   Wt 182 lb 12.2 oz (82.9 kg)   SpO2 97%   BMI 36.84 kg/m²     General appearance:  No apparent distress. HEENT:  Normal cephalic, atraumatic without obvious deformity. Neck: Supple. No JVD. Trachea midline. Respiratory:  Normal respiratory effort. CTAB w/o r/r/w. Cardiovascular:  RRR; nl S1&S2; w/o m/r/g. Abdomen: Soft, nt/nd w/ normal bowel sounds. Musculoskeletal:  No clubbing, cyanosis or edema bilaterally. Skin: Skin color, texture, turgor normal.  No rashes or lesions. Neurologic:  CN II-XII intact; no focal neuro deficits L>R in upper extremities  Psychiatric:  Normal affect and judgement. Capillary Refill: Brisk,< 3 seconds   Peripheral Pulses: +2 palpable, equal bilaterally     Labs:   Recent Labs      11/15/18   0426  11/16/18   0420  11/17/18   0529   WBC  5.2  3.7*  5.7   HGB  8.2*  8.6*  9.6*   HCT  24.8*  25.9*  29.6*   PLT  127*  130*  175     Recent Labs      11/15/18   0426  11/16/18   0420  11/17/18   0529   NA  141  145  149*   K  3.3*  3.8  3.6   CL  105  110  112*   CO2  25  25  25   BUN  39*  38*  33*   CREATININE  1.6*  1.5*  1.3*   CALCIUM  7.7*  7.6*  8.2*   PHOS  3.1  3.5  3.3     No results for input(s): AST, ALT, BILIDIR, BILITOT, ALKPHOS in the last 72 hours. No results for input(s): INR in the last 72 hours. Recent Labs      11/15/18   0426   CKTOTAL  314*       Urinalysis:      Lab Results   Component Value Date    NITRU Negative 11/12/2018    WBCUA 26 11/12/2018    BACTERIA 1+ 11/12/2018    RBCUA 10-20 11/12/2018    BLOODU LARGE 11/12/2018    SPECGRAV 1.021 11/12/2018    GLUCOSEU Negative 11/12/2018    GLUCOSEU NEGATIVE 02/26/2012       Radiology:  XR CHEST PORTABLE   Final Result   Diminished lung volumes. No definite focal consolidation. No appreciable large pleural effusion or pneumothorax.       CT ABDOMEN PELVIS WO CONTRAST Additional Contrast? None   Final Result      Endotracheal tube extends to [place] directed toward right main bronchus. Critical Result position of endotracheal tube with recommendation to withdraw 2.5 cm called to [address] Staff 11/16/2018 at 1350. Development of subsegmental atelectasis posterior medial right lower lobe and very small nodular opacity lateral lingula. Coronary artery calcification. Mitral valve calcification         CT ABDOMEN PELVIS WITH CONTRAST      HISTORY: 69-year-old woman with clinical symptoms of paraneoplastic syndrome, evaluate for underlying malignancy, nonhealing wound      Images from dome of liver through pubic symphysis without oral contrast and with the 80 mL Isovue-370 IV contrast use for CT chest. Comparison is CT abdomen pelvis 11/9/2018. Radiation dose reduction individualized and optimized for the CT scan to reduce the radiation exposure to as low as reasonably achievable (ALARA), while still obtaining diagnotic-quality images. The dose reduction techniques include automated exposure    control, adjustment of mA and/or kV according to patient size and use of iterative reconstruction technique. Normal liver, spleen and pancreas. Cholecystectomy . Normal caliber biliary tract. No calculi in kidneys or ureters. No hydroureteronephrosis. Normal adrenals. Mild arterial wall calcification infrarenal abdominal aorta and common iliac arteries. No aneurysm. No adenopathy, mesenteric mass, ascites or free air. Small fat-containing umbilical hernia. No abnormal bowel dilatation or wall thickening. Gamble catheter decompresses bladder. Hysterectomy or atrophic uterus. No abnormal adnexal mass. No free fluid in cul-de-sac. Improvement in skin edema and induration lower abdominal-pelvic ventral wall. Lumbar disc degeneration. No acute abnormality or lesion of visualized skeleton. IMPRESSION:      Improvement in cellulitis lower abdominal-pelvic ventral wall.

## 2018-11-17 NOTE — PROGRESS NOTES
Patient A&OX3. Nursing bedside swallow eval completed. Patient tolerated sips of water and apple sauce with no signs of aspiration. SpO2 have remained above 90%. She is able to cough on command. Morning meds crushed and given with applesauce.  Will continue to monitor

## 2018-11-17 NOTE — PROGRESS NOTES
Orogastric; Continuous; 20; 45; 24     Results:  CBC:   Recent Labs      11/14/18   0455  11/15/18   0426  11/16/18   0420   WBC  5.8  5.2  3.7*   HGB  8.6*  8.2*  8.6*   HCT  25.7*  24.8*  25.9*   MCV  90.6  91.2  91.1   PLT  135  127*  130*     BMP:   Recent Labs      11/14/18   0455  11/15/18   0426  11/16/18   0420   NA  141  141  145   K  3.2*  3.3*  3.8   CL  106  105  110   CO2  21  25  25   PHOS  3.7  3.1  3.5   BUN  52*  39*  38*   CREATININE  2.1*  1.6*  1.5*     LIVER PROFILE:   No results for input(s): AST, ALT, LIPASE, BILIDIR, BILITOT, ALKPHOS in the last 72 hours. Invalid input(s): AMYLASE,  ALB  PT/INR:   Recent Labs      11/14/18 0455   PROTIME  13.9*   INR  1.22*     APTT: No results for input(s): APTT in the last 72 hours. UA:  No results for input(s): NITRITE, COLORU, PHUR, LABCAST, WBCUA, RBCUA, MUCUS, TRICHOMONAS, YEAST, BACTERIA, CLARITYU, SPECGRAV, LEUKOCYTESUR, UROBILINOGEN, BILIRUBINUR, BLOODU, GLUCOSEU, AMORPHOUS in the last 72 hours. Invalid input(s): Lexy Carlos      Assessment/Plan:  70 y.o. female with     Status epilepticus - improved. precedex weaned down this afternoon and pt was able to follow commands. Weak but was able to move all four extremities. Acute respiratory failure on mechanical vent support. Intubated for airway protection. Hemodynamically stable  ROSANNA - creatinine is close to baseline now. Still total positive balance but improving   Mild hypokalemia - resolved. Hypernatremia - resolved. Lactic acidosis - resolved  Mild rhabdo  Anemia - HGB is stable.       Passed SBT. ABG is fully compensated. Extubated successfully   MRI and CT scans reviewed - non diagnostic.    Heparin, PPI  Full Code     Pt has a high probability of imminent or life-threatening deterioration requiring close monitoring, and highly complex decision-making and/or interventions of high intensity to assess, manipulate, and support his critical organ systems to prevent a likely

## 2018-11-18 NOTE — PROGRESS NOTES
continue to recommend NPO this date)    Treatment:  Pt seen bedside to address the following goals:  Goal 1: The patient will tolerate PO trials without overt s/s aspiration  11/17: Pt demonstrated decreased airway protection/decreased pharyngeal and airway clearance with all PO trials. Recommend continue NPO at this time. See above impressions for details. Continue goal.  11/18: Recommendation per BSE 11/17/18 for NPO; however, pt currently with diet order for pureed consistency in. RN reported night staff gave pt pudding and stated pt \"tolerated well. \" Pt lethargic upon entry with limited responsiveness to questions; minimal verbal output during tx. RN notified. Pt did have increased responsiveness and verbalizations noted when family arrived partway through session. Pt did provide head nod in response to question if desiring something to drink at beginning of session. Dried secretions noted to coat lingual and labial surfaces - some removal achieved with moistened toothbrush. Analyzed pt with ice chips, thins via tsp, thins via straw, and puree consistencies. Pt did not initiate taking cup to take liquids from cup edge - liquids only administered via tsp and straw. Pt with poor oral control of ice chips and liquids with mod anterior spillage at times. At times, pt remained alert with eyes on SLP but no effort to manipulate bolus or complete A-P transit of liquids or solids. Swallow not consistently palpable on anterior portion of neck and pt required verbal cues to initiate swallow consistently. Mild reactive coughing noted with sequential sips of thins via straw. Given pt's inconsistent initiation of swallow and overt signs of oropharyngeal dysfunction at bedside, pt continues to be unsafe for PO intake. Recommend continue NPO with aggressive oral care via suction kit. If pt alert, could provide 1/2 tsp of thin water after oral care for pt comfort.  D/C if pt not initiating swallow or overt signs of aspiration

## 2018-11-18 NOTE — PROGRESS NOTES
Hospitalist Progress Note      PCP: John Buckley II    Date of Admission: 11/12/2018    Chief Complaint: confusion, seizures    Hospital Course: transferred from [facility] with seizures. More alert now. Extubated and taken to medical floor. Remains weak, and having trouble swallowing. SLP eval done. NPO recommended    Subjective: Very weak, denies pain. No seizures, no chest pain, no shortness of breath        Medications:  Reviewed    Infusion Medications    IV infusion builder 100 mL/hr at 11/18/18 1021    dextrose       Scheduled Medications    metoprolol  5 mg Intravenous Q6H    famotidine (PEPCID) injection  20 mg Intravenous BID    albuterol  2.5 mg Nebulization BID    insulin lispro  0-6 Units Subcutaneous Q6H    chlorhexidine  15 mL Mouth/Throat BID    valproate sodium (DEPACON) IVPB  15 mg/kg Intravenous QAM    sodium chloride flush  10 mL Intravenous 2 times per day    levetiracetam  500 mg Intravenous Q12H     PRN Meds: hydrALAZINE, albuterol, sodium chloride flush, magnesium hydroxide, ondansetron, glucose, dextrose, glucagon (rDNA), dextrose, acetaminophen, labetalol      Intake/Output Summary (Last 24 hours) at 11/18/18 1629  Last data filed at 11/18/18 1508   Gross per 24 hour   Intake                0 ml   Output             2675 ml   Net            -2675 ml       Physical Exam Performed:    BP (!) 213/89   Pulse 98   Temp 99.3 °F (37.4 °C) (Axillary)   Resp 16   Ht 4' 11.06\" (1.5 m)   Wt 180 lb 1.9 oz (81.7 kg)   SpO2 96%   BMI 36.31 kg/m²     General appearance: No apparent distress, appears stated age. Tired appearing  HEENT: Pupils equal, round, and reactive to light. Conjunctivae/corneas clear. Neck: Supple, with full range of motion. No jugular venous distention. Trachea midline. Respiratory:  Poor effort, clear but distant breath sounds  Cardiovascular: Regular rate and rhythm with normal S1/S2 without murmurs, rubs or gallops.   Abdomen: Soft, non-tender, non-distended diffuse parenchymal volume loss. 3. Mild burden T2 hyperintense white matter disease likely related to chronic microvascular ischemia. Mild pontine gliosis. 4. Opacified right mastoid air cells. XR CHEST PORTABLE   Final Result   1. Low lying endotracheal tube with tip at the origin of the right mainstem bronchus. This could be pulled back approximately 2 cm to place the endotracheal tube tip in the thoracic trachea. 2. Right IJ central venous catheter tip at the cavoatrial junction. 3. No evidence of pneumothorax. The results were called to the patient's nurse at 6:28 PM on 11/14/2018. XR ABDOMEN (KUB) (SINGLE AP VIEW)   Final Result      Tip of orogastric tube extends into proximal-mid body of stomach. XR CHEST PORTABLE   Final Result      Endotracheal tube tip just above the level of the [place]. Assessment/Plan:    Active Hospital Problems    Diagnosis Date Noted    Acute respiratory failure ([address] [J96.00]     Status epilepticus ([address] [G40.901] 11/12/2018    CKD (chronic kidney disease) stage 4, GFR 15-29 ml/min ([address] [N18.4] 08/06/2015     PLAN:    Acute metabolic encephalopathy: reason unclear. Infection, seizure, cefepime are all possibilities. Better but not fully resolved. Monitor     Dysphagia: ST recommends npo for now. Monitor overnight. may require PEG if no improvement    CKD 3:  Stabilized. Nephrology following. BMP checked daily    DM 2: Continue SSI. Blood sugar controlled    HTN: Becoming harder to control with npo status and inability to take oral antihypertensives. Continue IV scheduled metoprolol for now       DVT Prophylaxis: SCD  Diet: DIET DYSPHAGIA I PUREED;  Dysphagia I Pureed  Code Status: Full Code    PT/OT Eval Status: pending    Rosa Ordaz MD

## 2018-11-18 NOTE — PROGRESS NOTES
4 Eyes Admission Assessment     I agree as the admission nurse that 2 RN's have performed a thorough Head to Toe Skin Assessment on the patient. ALL assessment sites listed below have been assessed on admission.        Areas assessed by both nurses:   [x]   Head, Face, and Ears   [x]   Shoulders, Back, and Chest  [x]   Arms, Elbows, and Hands   [x]   Coccyx, Sacrum, and Ischum  [x]   Legs, Feet, and Heels        Does the Patient have Skin Breakdown?  yes      Jasbir Prevention initiated:  Yes   Wound Care Orders initiated:  Yes      Jerald Aguilar consulted for Pressure Injury (Stage 3,4, Unstageable, DTI, NWPT, and Complex wounds):  Yes      Nurse 1 eSignature: Electronically signed by Hilda Kennedy RN on 11/18/18 at 2000 PM    **SHARE this note so that the co-signing nurse is able to place an eSignature**    Nurse 2 eSignature: Electronically signed by Anali Escobedo RN on 11/18/18 at 6:15 AM

## 2018-11-18 NOTE — PROGRESS NOTES
Nephrology Progress Note  [phone]   [email or web address]    Patient:  Jose Hart   : 1950    CC:  Seizures  Reason for consult: ROSANNA/CKD/NAGMA    77-year-old  female with past medical history significant for coronary artery disease, congestive heart failure, diabetes mellitus type 2, chronic kidney disease stage III, admitted after she was found down when the home healthcare arrived. Transferred from [address] [place] [address] for cEEG. EEG done that confirmed active seizure activities. Subjective:  Awake and conversant. Poor recollection of recent events  Speech slow to answer  MRI with no acute changes, has white matter changes      ROS:   No dyspnea, CP. No cough or wheezing. No N/V, abd pain, or diarrhea. No F/C. Has dysphagia    SHx:  No visitors     Vitals:  VITALS:  BP (!) 197/94   Pulse 95   Temp 97.1 °F (36.2 °C) (Axillary)   Resp (P) 16   Ht 4' 11.06\" (1.5 m)   Wt 180 lb 1.9 oz (81.7 kg)   SpO2 (P) 94%   BMI 36.31 kg/m²   TEMPERATURE:  Current - Temp: 97.1 °F (36.2 °C); Max - Temp  Av.2 °F (36.8 °C)  Min: 97.1 °F (36.2 °C)  Max: 99.1 °F (37.3 °C)  PULSE RANGE: Pulse  Av.9  Min: 80  Max: 95  BLOOD PRESSURE RANGE:  Systolic (48QJY), IQS:986 , Min:128 , XFR:291   ; Diastolic (39WMK), SFT:36, Min:64, Max:94    24HR INTAKE/OUTPUT:      Intake/Output Summary (Last 24 hours) at 18 0834  Last data filed at 18 0300   Gross per 24 hour   Intake                0 ml   Output             3275 ml   Net            -3275 ml       Physical Exam:  Gen: awake. NAD  HEENT: MMM, OP clear. CV: RRR no rub no gallop   Lungs: good air movement. clear  Abd: S/NT decrease BS at bases   Ext: feet wrapped. No edema   Skin: Warm. No rashes appreciated.     Labs:  CBC:   Recent Labs      18   0420  18   0529  18   0440   WBC  3.7*  5.7  4.4   RBC  2.85*  3.23*  3.35*   HGB  8.6*  9.6*  10.0*   HCT  25.9*  29.6*  30.3*   PLT  130*  175  204     BMP:    Recent

## 2018-11-19 PROBLEM — G93.41 ACUTE METABOLIC ENCEPHALOPATHY: Status: ACTIVE | Noted: 2018-01-01

## 2018-11-19 PROBLEM — R13.10 DYSPHAGIA: Status: ACTIVE | Noted: 2018-01-01

## 2018-11-19 NOTE — PROGRESS NOTES
Neurology Follow Up  Note    Updates:    No events overnight. Patient seen and examined. She is not very interactive, states her \"bottom\" is hurting. I spoke with the nurse regarding the flexible-seal that was placed in the ICU, it was removed and patient states that she feels better. Exam:  Blood pressure (!) 175/90, pulse 89, temperature 98.8 °F (37.1 °C), temperature source Axillary, resp. rate 18, height 4' 11.06\" (1.5 m), weight 178 lb 9.2 oz (81 kg), SpO2 95 %, not currently breastfeeding. Constitutional    Vital signs: BP, HR, and RR reviewed   General Alert, no distress, well-nourished  Cardiovascular: pulses symmetric in all 4 extremities. No peripheral edema. Psychiatric: no  psychotic behavior noted  Neurologic  Mental status:   orientation to person, place, knows its thanksgiving month but cannot name the month     Memory not participating in immediate or 3 minute recall    Attention able to find the clock but unable to tell me the time, unable to perform simple coin calculations    Language fluent in conversation   Comprehension intact; follows simple commands  Cranial nerves:   CN 3,4,6: extraocular muscles intact  CN7:face symmetric without dysarthria  CN8: hearing grossly intact  CN9: palate elevated symmetrically  CN11: trap full strength on shoulder shrug  CN12: tongue midline with protrusion  Strength: patient unable to lift both her upper and lower extremities against gravity, when passively raised, she is unable to hold them against gravity for more than 5 seconds in her upper extremities and drops instantly in her lower extremities  Sensory: light touch intact in all 4 extremities.     Cerebellar/coordination: does not follows instructions for ataxia testing  Tone: normal in all 4 extremities    Labs:  Lab Results   Component Value Date    WBC 5.7 11/19/2018    HGB 10.2 (L) 11/19/2018    HCT 31.6 (L) 11/19/2018    MCV 91.6 11/19/2018     11/19/2018     Lab Results

## 2018-11-19 NOTE — PROCEDURES
INSTRUMENTAL SWALLOW REPORT  MODIFIED BARIUM SWALLOW    NAME: Anyi Moreno   : 1950  MRN: [id number]       Date of Eval: 2018     Ordering Physician: Suki Swanson  Radiologist: Yousif Covarrubias     Referring Diagnosis(es): Referring Diagnosis: epilecticus- intubated -18    Past Medical History:  has a past medical history of Acid reflux; Arthritis; Balance problem; Blood circulation, collateral; Cellulitis of both lower extremities; CHF (congestive heart failure) ([address] Chronic kidney disease; Chronic venous hypertension (idiopathic) with ulcer and inflammation of bilateral lower extremity ([address] Community acquired bacterial pneumonia; Depression; Diabetes mellitus ([address] GERD (gastroesophageal reflux disease); Hyperlipidemia; Hypertension; Movement disorder; Murmur; Neuromuscular disorder ([address] Restless leg syndrome; Status epilepticus ([address] Urinary frequency; and Urinary incontinence. Past Surgical History:  has a past surgical history that includes Appendectomy; Hysterectomy; Cholecystectomy; bladder suspension; Colonoscopy; Cystoscopy; and eye surgery. Current Diet Solid Consistency: NPO  Current Diet Liquid Consistency: NPO       Type of Study: Initial MBS       Recent CXR 18  Diminished lung volumes.  No definite focal consolidation.     No appreciable large pleural effusion or pneumothorax. Patient Complaints/Reason for Referral:  Anyi Moreno was referred for a MBS to assess the efficiency of his/her swallow function, assess for aspiration, and to make recommendations regarding safe dietary consistencies, effective compensatory strategies, and safe eating environment. Patient complaints: pt is without complaints     Onset of problem:   Date of Onset: 18          Behavior/Cognition/Vision/Hearing:  Behavior/Cognition: Alert; Cooperative    Impressions:  Pt presents with moderate oral phase dysphagia with mild pharyngeal phase dysphagia  Oral- pt with poor

## 2018-11-19 NOTE — PROGRESS NOTES
3.35*  3.45*   HGB  9.6*  10.0*  10.2*   HCT  29.6*  30.3*  31.6*   PLT  175  204  229     BMP:    Recent Labs      11/17/18   0529  11/18/18   0440  11/19/18   0524   NA  149*  152*  149*   K  3.6  3.0*  3.6   CL  112*  109  108   CO2  25  29  28   BUN  33*  28*  24*   CREATININE  1.3*  1.3*  1.2   CALCIUM  8.2*  8.7  8.8   GLUCOSE  109*  126*  132*     Phosphorus:    Recent Labs      11/17/18   0529  11/18/18   0440  11/19/18   0524   PHOS  3.3  3.5  3.0     Magnesium:    Recent Labs      11/17/18   0529  11/18/18 0440  11/19/18   0524   MG  1.90  1.80  2.00       Assessment/Plan:  1. CKD stage 3 - baseline creatinine reported 2  Better than usual levels  2. Acidosis; resolved   3. Hypernatremia - worse with diuresis and poor oral intake  Will repeat D5 today, hold torsemide acutely  4. Hypokalemia - improved after supplement  5. Encephalopathy   Transferred from [facility] for cEEG after EEG there showed status epilepticus   - Neurology following; MRI with white matter changes, no acute  - cEEG stopped 11/15/2018.  - on VPM/LEV  - On Acyclovir until CSF panel returns for HSV; pending autoimmune work up. - ? Cefepime induced; treatment for UTI earlier this month. 6. Elevated CPK due to seizure - only mild elevation. Resolved   7.  HTN - BP remains high  Cautious use IV vasotec until able to take PO

## 2018-11-19 NOTE — PROGRESS NOTES
Difficulty swallowing     Signs and symptoms:  as evidenced by Swallow study results    Objective Information:  · Nutrition-Focused Physical Findings: appears obese; confusion;weakness; stool 1 x recorded on 11/19  · Wound Type: Venous Stasis  · Current Nutrition Therapies:  · Oral Diet Orders: Dysphagia 1 (Pureed)   · Oral Diet intake: Unable to assess  · Oral Nutrition Supplement (ONS) Orders: None  · ONS intake: NPO  · Anthropometric Measures:  · Ht: 4' 11.06\" (150 cm)   · Current Body Wt: 178 lb (80.7 kg)  · Admission Body Wt: 191 lb (86.6 kg)  · Usual Body Wt:  (180-190 lb per EMR review)  · Ideal Body Wt: 97 lb (44 kg),  · BMI Classification: BMI 35.0 - 39.9 Obese Class II    Nutrition Interventions:   Continue current diet  Continued Inpatient Monitoring    Nutrition Evaluation:   · Evaluation: Goals set   · Goals: Pt will tolerate diet advancement per ST    · Monitoring: Meal Intake, Diet Tolerance, Mental Status/Confusion, Weight, Pertinent Labs, Chewing/Swallowing, Diarrhea      Electronically signed by Vijaya Vides RD, LD on 11/19/18 at 1:57 PM    Contact Number: [phone]

## 2018-11-19 NOTE — PROGRESS NOTES
Diagnosis: status epilepticuls  Treatment Diagnosis: dysphagia    BSE Impression (11/17/18)  Pt presents with moderate oral and moderate-severe pharyngeal dysphagia at this time. Oral phase is characterized by decreased labial seal with subsequent anterior spillage, decreased bolus transfer, and suspected premature spillage to pharynx. Pharyngeal phase is characterized by decreased laryngeal elevation, delayed initiation of swallow, suspected reduced airway protection, suspected pharyngeal residue, and suspected pharyngeal pooling. Pt's sister-in-law present for evaluation and reported no difficulty with swallowing prior to admission to hospital, just occasionally \"throat clearing\" and \"watching what she eats\" due to limited dentition. Pt was intubated 11/12/18-11/16/18 and has hoarse vocal quality at this time. She is alert and responsive when repetiting questions/commands inconsistently. Per sister-in-law, this is a change from her baseline, when pt is able to communicate \"normally. \" Pt was presented with trials of ice chips, thin liquids, nectar thick liquids, and pureed solids to assess swallow function. Following all trials, pt demonstrated wet vocal quality or cough with suspected poor pharyngeal clearance, as she continued to demonstrate wet vocal quality or cough post-instruction to cough. She also demonstrated wet breath sounds post-PO trials. No additional PO trials were given due to reduced airway protection and pt not tolerating PO trials at this time. Recommend continue NPO at this time with continual follow-up from SLP for PO trials. Pt may also need speech/lang/cogition evaluation as appropriate.      MBS results  - will attempt today as radiology schedule allows     Pain:- pt indicated rectal tube discomfort- RN present and aware    Current Diet :NPO  Treatment:  Pt seen bedside to address the following goals:  Goal 1: The patient will tolerate PO trials without overt s/s aspiration  11/17: Pt

## 2018-11-20 PROBLEM — E44.0 MODERATE PROTEIN-CALORIE MALNUTRITION (HCC): Chronic | Status: ACTIVE | Noted: 2018-01-01

## 2018-11-20 NOTE — PROGRESS NOTES
Occupational Therapy   Occupational Therapy Initial Assessment and Treatment  Date: 2018   Patient Name: Dannielle Moritz  MRN: [id number]     : 1950    Date of Service: 2018    Discharge Recommendations: Dannielle Moritz scored a /24 on the AM-PAC ADL Inpatient form. Current research shows that an AM-PAC score of 17 or less is typically not associated with a discharge to the patient's home setting. Based on the patients AM-PAC score and their current ADL deficits, it is recommended that the patient have 3-5 sessions per week of Occupational Therapy at d/c to increase the patients independence. OT Equipment Recommendations  Equipment Needed: No  Other: defer      Patient Diagnosis(es): The encounter diagnosis was Status epilepticus ([address] has a past medical history of Acid reflux; Arthritis; Balance problem; Blood circulation, collateral; Cellulitis of both lower extremities; CHF (congestive heart failure) ([address] Chronic kidney disease; Chronic venous hypertension (idiopathic) with ulcer and inflammation of bilateral lower extremity ([address] Community acquired bacterial pneumonia; Depression; Diabetes mellitus ([address] GERD (gastroesophageal reflux disease); Hyperlipidemia; Hypertension; Movement disorder; Murmur; Neuromuscular disorder ([address] Restless leg syndrome; Status epilepticus ([address] Urinary frequency; and Urinary incontinence. has a past surgical history that includes Appendectomy; Hysterectomy; Cholecystectomy; bladder suspension; Colonoscopy; Cystoscopy; and eye surgery. Treatment Diagnosis: Decreased ADLs, functional mobility and transfers      Restrictions  Position Activity Restriction  Other position/activity restrictions: up with assist, seizure precautions    Subjective   General  Chart Reviewed: Yes  Additional Pertinent Hx: Pt is a 70 y.o. female adm to [facility] on  - found down at home. Transferred to [facility] on . Intub .   Continuous EEG showed episodes of active seizure activity. Extub 11/16. Stroke alert called on 11/19. Rapid response on 11/20 - pt pulled out central line. MRI brain: neg. CT abd/pelvis: improvement cellulitis lower abd-pelvic ventral vall. PMH: arthritis, cellulitis, CHF, CKD, depression, DM, GERD, hyperlipidemia, HTN   Family / Caregiver Present: No  Diagnosis: Status epilepticus  Subjective  Subjective: Pt supine in bed upon entry. Inconsistently answers to questions or follows commands. Pain Assessment  Patient Currently in Pain: Yes (c/o pain bottom with alfonzo-care, not rated, RN aware)       Social/Functional History  Social/Functional History  Lives With: Alone  Type of Home: Apartment (8th floor senior living )  Home Layout: One level  Home Access: Level entry, Elevator  Bathroom Shower/Tub: Walk-in shower  Bathroom Toilet: Handicap height  Bathroom Equipment: Grab bars in shower, Grab bars around toilet, Shower chair  Bathroom Accessibility: Accessible, Walker accessible  Home Equipment: 4 wheeled walker, Alert Button, Lift chair, Reacher, Sock aid, Long-handled shoehorn  Receives Help From: Home health (A 7 days/week for 4hrs/day )  ADL Assistance: Needs assistance (Aide assists with shower; pt can dress self; toilets self )  Homemaking Assistance:  (Aide does laundry and cleaning for pt, as well as minimal cooking and shopping; pt can get light meal for herself but doesn't cool; gets MOW )  Ambulation Assistance: Independent (1GO)  Transfer Assistance: Independent  Active : No  Patient's  Info: Medical transportation to [place]. Magaly Manriquez does grocery shopping. Additional Comments: Briefly clarified information above with pt from prior hospitalization however pt disoriented this date - would benefit from reclarification. Objective    Treatment included functional transfer training, ADLs, and patient education.      Vision: Within Functional Limits  Vision Exceptions: Wears glasses for reading  Hearing: Within appears below baseline; however pt unable to report accurate PLOF info. Only able to tolerate sitting eob this session, with poor command following throughout. Per chart, pt lives alone. Pt is not safe to return there at this time and would benefit from continued inpt OT at d/c. Will follow as inpt. Treatment Diagnosis: Decreased ADLs, functional mobility and transfers  Decision Making: Medium Complexity  Patient Education: Role of OT- pt needs reinforcement  REQUIRES OT FOLLOW UP: Yes  Activity Tolerance  Activity Tolerance: Treatment limited secondary to decreased cognition;Patient limited by fatigue  Safety Devices  Safety Devices in place: Yes  Type of devices: Left in bed;Bed alarm in place;Call light within reach;Nurse notified         Plan   Plan  Times per week: 2-5x  Times per day: Daily  If patient discharges prior to next treatment, this note will serve as discharge summary. Will continue per POC if patient does not discharge.     AM-PAC Score        AM-PAC Inpatient Daily Activity Raw Score: 9  AM-PAC Inpatient ADL T-Scale Score : 25.33  ADL Inpatient CMS 0-100% Score: 79.59  ADL Inpatient CMS G-Code Modifier : CL    Goals  Short term goals  Time Frame for Short term goals: By d/c   Short term goal 1: Pt will sit at EOB x5 mins w/ SBA while completing ADL task   Short term goal 2: Rolling in bed w/ min assist in prep for future OOB tasks   Short term goal 3: BUE AAROM HEP x10 reps all planes to increase participation in ADLs  Patient Goals   Patient goals : Unable to state        Therapy Time   Individual Concurrent Group Co-treatment   Time In [address] [place] [address]         Time Out 1442         Minutes 38         Timed Code Treatment Minutes:   23    Total Treatment Minutes:  [address], OT

## 2018-11-20 NOTE — PROGRESS NOTES
Rapid response called for a pt who pulled out her central line. OPt placed on 3lnc. HHN tx given due to decreased breath sounds. Stat xray called.

## 2018-11-20 NOTE — PROGRESS NOTES
104   CO2  29  28  27   BUN  28*  24*  23*   CREATININE  1.3*  1.2  1.3*   GLUCOSE  126*  132*  158*     Hepatic: No results for input(s): AST, ALT, ALB, BILITOT, ALKPHOS in the last 72 hours. Assessment & Plan:   Principal Problem:    Status epilepticus ([address]  Active Problems:    Acute respiratory failure (HCC)    Acute metabolic encephalopathy    Dysphagia    DM (diabetes mellitus) (HCC)    HTN (hypertension)    Stage 3 chronic kidney disease (HCC)    Moderate protein-calorie malnutrition (HCC)     Change keppra and valproic acid to PO. Restart oral BP medications. Neurology and Nephrology following. Resend C diff. Plans for SNF at [facility] tomorrow if stable. Diet: DIET DYSPHAGIA I PUREED;  Dysphagia I Pureed  Code:Full Code  DVT PPX     Buddy Tabares MD   11/20/2018 12:08 PM

## 2018-11-20 NOTE — PLAN OF CARE
Problem: Risk for Impaired Skin Integrity  Goal: Tissue integrity - skin and mucous membranes  Structural intactness and normal physiological function of skin and  mucous membranes. Outcome: Ongoing  Patient has shown no new signs of skin breakdown this shift. Jasbir score completed this shift. Special mattress ordered, and patient changed as needed. Patient turned Q2. Will continue to monitor. Problem: Falls - Risk of:  Goal: Will remain free from falls  Will remain free from falls   Outcome: Ongoing  Patient has remained free from falls during shift. Patient has [employer] monitor in place for increased safety. Andrew Fall Risk; High (45& higher). Patient has non-skid socks on, fall precautions are in place, dome light illuminated. Bed is in lowest position, with alarm on, locked wheels, and call light is with in reach. Will continue to monitor.

## 2018-11-20 NOTE — PROGRESS NOTES
Physical Therapy    Facility/Department: [place] [address] [facility]  Initial Assessment and Treatment    NAME: Anthony Maria  : 1950  MRN: [id number]    Date of Service: 2018    Discharge David scored a 8/24 on the AM-PAC short mobility form. Current research shows that an AM-PAC score of 17 or less is typically not associated with a discharge to the patient's home setting. Based on the patients AM-PAC score and their current functional mobility deficits, it is recommended that the patient have 3-5 sessions per week of Physical Therapy at d/c to increase the patients independence. PT Equipment Recommendations  Equipment Needed:  (cont to assess)    Patient Diagnosis(es): The encounter diagnosis was Status epilepticus ([address] has a past medical history of Acid reflux; Arthritis; Balance problem; Blood circulation, collateral; Cellulitis of both lower extremities; CHF (congestive heart failure) ([address] Chronic kidney disease; Chronic venous hypertension (idiopathic) with ulcer and inflammation of bilateral lower extremity ([address] Community acquired bacterial pneumonia; Depression; Diabetes mellitus ([address] GERD (gastroesophageal reflux disease); Hyperlipidemia; Hypertension; Movement disorder; Murmur; Neuromuscular disorder ([address] Restless leg syndrome; Status epilepticus ([address] Urinary frequency; and Urinary incontinence. has a past surgical history that includes Appendectomy; Hysterectomy; Cholecystectomy; bladder suspension; Colonoscopy; Cystoscopy; and eye surgery. Restrictions  Position Activity Restriction  Other position/activity restrictions: up with assist, seizure precautions  Vision/Hearing  Vision: Within Functional Limits  Hearing: Within functional limits     Subjective  General  Chart Reviewed: Yes  Additional Pertinent Hx: Pt is a 70 y.o. female adm to [facility] on  - found down at home. Transferred to [facility] on . Intub .   Continuous EEG showed episodes of active seizure activity. Extub 11/16. Stroke alert called on 11/19. Rapid response on 11/20 - pt pulled out central line. MRI brain: neg. CT abd/pelvis: improvement cellulitis lower abd-pelvic ventral vall. PMH: arthritis, cellulitis, CHF, CKD, depression, DM, GERD, hyperlipidemia, HTN   Referring Practitioner: Nicolas Dias MD  Referral Date : 11/19/18  Subjective  Subjective: Pt found in supine. Lethargic. Minimal verbalization. Mildly improved alertness and minimal talking once sitting EOB  Pain Screening  Patient Currently in Pain: Yes (c/o pain bottom with alfonzo-care, not rated, RN aware)  Vital Signs  Patient Currently in Pain: Yes (c/o pain bottom with alfonzo-care, not rated, RN aware)       Orientation  Orientation  Overall Orientation Status: Impaired  Orientation Level: Oriented to person;Disoriented to place; Disoriented to time  Social/Functional History  Social/Functional History  Lives With: Alone  Type of Home: Apartment (8th floor senior living )  Home Layout: One level  Home Access: Level entry, Elevator  Bathroom Shower/Tub: Walk-in shower  Bathroom Toilet: Handicap height  Bathroom Equipment: Grab bars in shower, Grab bars around toilet, Shower chair  Bathroom Accessibility: Accessible, Walker accessible  Home Equipment: 4 wheeled walker, Alert Button, Lift chair, Reacher, Sock aid, Long-handled shoehorn  Receives Help From: Home health ([facility] 7 days/week for 4hrs/day )  ADL Assistance: Needs assistance (Aide assists with shower; pt can dress self; toilets self )  Homemaking Assistance:  (Aide does laundry and cleaning for pt, as well as minimal cooking and shopping; pt can get light meal for herself but doesn't cool; gets MOW )  Ambulation Assistance: Independent (4ZE)  Transfer Assistance: Independent  Active : No  Patient's  Info: Medical transportation to [place]. Elsa Lynn does grocery shopping.   Additional Comments: Briefly clarified information above with pt from prior hospitalization however pt disoriented this date - would benefit from reclarification. Cognition        Objective          AROM RLE (degrees)  RLE AROM:  (AAROM WFL)  AROM LLE (degrees)  LLE AROM :  (AAROM WFL)  Strength RLE  Strength RLE:  (unable to fully assess 2/2 not following commands)  Strength LLE  Strength LLE:  (unable to fully assess 2/2 not following commands)        Bed mobility  Rolling to Left: Maximum assistance  Rolling to Right: Maximum assistance  Supine to Sit: Dependent/Total (max assist x 2)  Sit to Supine: Dependent/Total (max assist x 2)           Balance  Sitting - Static:  (Sat EOB x 15 min- assist fluctuates from max assist 2/2 lean post and to L, progressed to SBA at times.  )    Treatment included: bed mobility, sitting balance, pt education      Assessment   Body structures, Functions, Activity limitations: Decreased functional mobility ; Decreased strength;Decreased cognition;Decreased balance;Decreased coordination;Decreased fine motor control  Assessment: Unclear of pt's recent baseline. Per previous notes in epic, pt was living alone and independent with gait with 4 wheeled walker. Currently needing max assist x 2 for bed mobility and max to SBA for sitting balance. Limited by lethargy with decreased command following. Pt not safe to return home alone. Rec cont skilled PT to maximize mobilty and independence  Treatment Diagnosis: impaired functional mobility 2/2 decreased strength and balance  Decision Making: Medium Complexity  Patient Education: Educated pt on role of PT, use of call light.   Pt did not verbalize understanding - will need reteaching  REQUIRES PT FOLLOW UP: Yes         Plan   Plan  Times per week: 2-5  Current Treatment Recommendations: Strengthening, Balance Training, Functional Mobility Training, Patient/Caregiver Education & Training, Safety Education & Training  Safety Devices  Type of devices: Call light within reach, Left in bed, Bed alarm in place, Nurse notified    G-Code  PT G-Codes  Functional Limitation: Mobility: Walking and moving around  Mobility: Walking and Moving Around Current Status (): At least 80 percent but less than 100 percent impaired, limited or restricted  Mobility: Walking and Moving Around Goal Status (): At least 60 percent but less than 80 percent impaired, limited or restricted  OutComes Score                                           AM-PAC Score  AM-PAC Inpatient Mobility Raw Score : 8  AM-PAC Inpatient T-Scale Score : 28.52  Mobility Inpatient CMS 0-100% Score: 86.62  Mobility Inpatient CMS G-Code Modifier : CM          Goals  Short term goals  Time Frame for Short term goals: By discharge  Short term goal 1: Pt will transfer sup to sit with min assist   Short term goal 2: Pt will sit EOB x 5 minutes with consistent SBA  Short term goal 3: Pt will participate in LE ther ex with min assist x 10 reps ea       Therapy Time   Individual Concurrent Group Co-treatment   Time In 1404         Time Out 1442         Minutes 38               Timed Code Treatment Minutes:23       Total Treatment Minutes:  38  If pt d/c'd prior to next treatment, this note serves as a discharge note.     Kassie Issa, PT

## 2018-11-20 NOTE — PROGRESS NOTES
and successive swallows by cup and straw, to the level of the cords with complete, spontaneous clearing from the laryngeal vestibule. Pt with mildly impaired pharyngeal contraction which resulted in minimal residue to remain in the valleculae and pyriform after the swallow with pureed, cracker and to a lesser degree, nectar thick liquid. Pt able to clear most of the residue when instructed to perform a dry swallow. There was no aspiration or penetration observed with puree, cracker or nectar thick by cup. Pain:- pt indicated rectal tube discomfort- RN present and aware    Current Diet  Pureed with thin liquids    Treatment:  Pt seen bedside to address the following goals:  Goal 1: The patient will tolerate PO trials without overt s/s aspiration  11/17: Pt demonstrated decreased airway protection/decreased pharyngeal and airway clearance with all PO trials. Recommend continue NPO at this time. See above impressions for details. Continue goal.  11/18: Recommendation per BSE 11/17/18 for NPO; however, pt currently with diet order for pureed consistency in. RN reported night staff gave pt pudding and stated pt \"tolerated well. \" Pt lethargic upon entry with limited responsiveness to questions; minimal verbal output during tx. RN notified. Pt did have increased responsiveness and verbalizations noted when family arrived partway through session. Pt did provide head nod in response to question if desiring something to drink at beginning of session. Dried secretions noted to coat lingual and labial surfaces - some removal achieved with moistened toothbrush. Analyzed pt with ice chips, thins via tsp, thins via straw, and puree consistencies. Pt did not initiate taking cup to take liquids from cup edge - liquids only administered via tsp and straw. Pt with poor oral control of ice chips and liquids with mod anterior spillage at times.   At times, pt remained alert with eyes on SLP but no effort to manipulate bolus or

## 2018-11-20 NOTE — PROGRESS NOTES
Thick, Sputum How Obtained: Oral  Cough: Strong, spontaneous, non-productive = 0    Vital Signs   BP (!) 181/89   Pulse 84   Temp 97 °F (36.1 °C) (Axillary)   Resp 16   Ht 4' 11.06\" (1.5 m)   Wt 178 lb 9.2 oz (81 kg)   SpO2 96%   BMI 35.99 kg/m²   SPO2 (COPD values may differ): Greater than or equal to 92% on room air = 0    Peak Flow (asthma only): not applicable = 0    RSI: 3-1=[id number] (every four hours as needed) for dyspnea        Plan       Goals: medication delivery, mobilize retained secretions and volume expansion    Patient/caregiver was educated on the proper method of use for Respiratory Care Devices:  Yes      Level of patient/caregiver understanding able to:   [x] Verbalize understanding   [x] Demonstrate understanding       [] Teach back        [] Needs reinforcement       []  No available caregiver               []  Other:     Response to education:  Excellent     Is patient being placed on Home Treatment Regimen? No     Does the patient have everything they need prior to discharge? Yes     Comments: pt clear, chart reviewed    Plan of Care: albuterol q4h prn hhn,     Electronically signed by Karma Monsivais RCP on 11/19/2018 at 10:31 PM    Respiratory Protocol Guidelines     1. Assessment and treatment by Respiratory Therapy will be initiated for medication and therapeutic interventions upon initiation of aerosolized medication. 2. Physician will be contacted for respiratory rate (RR) greater than 35 breaths per minute. Therapy will be held for heart rate (HR) greater than 140 beats per minute, pending direction from physician. 3. Bronchodilators will be administered via Metered Dose Inhaler (MDI) with spacer when the following criteria are met:  a. Alert and cooperative     b. HR < 140 bpm  c. RR < 30 bpm                d. Can demonstrate a 2-3 second inspiratory hold  4.  Bronchodilators will be administered via Hand Held Nebulizer [facility]) to patients when ANY of the following criteria are

## 2018-11-21 NOTE — PROGRESS NOTES
diet and strategies and f/u dysphagia therapy recommended after discharge. No family present. Pt with questionable comprehension. Cont goal    New goal-  3-Pt will participate in [facility]  11/19- goal met       Patient/Family/Caregiver Education:  As above    Compensatory Strategies:  Supervision: Close  : Upright as possible for all oral intake;  Remain upright for 30-45 minutes after meals; Alternate solids and liquids        Plan:  Continued daily Dysphagia treatment with goals per  plan of care. Diet recommendations: pureed with thin liquids   DC recommendation: ongoing dysphagia tx warranted at this time  Treatment: 10  D/W nursing, Perla  Needs met prior to leaving room, call button in reach.       Terrell العلي MA CCC/SLP [id number]  Speech-Language Pathologist  Pager [phone]      If patient is discharged prior to next treatment, this note will serve as the discharge summary

## 2018-11-21 NOTE — PLAN OF CARE
Problem: Risk for Impaired Skin Integrity  Goal: Tissue integrity - skin and mucous membranes  Structural intactness and normal physiological function of skin and  mucous membranes. Outcome: Ongoing  Skin assessment preformed and documented. Bottom has blanchable redness. Protective barrier cream implemented. Pt on a turn q2 schedule. Pt does pull out pillows/wedge from turning. Will continue to monitor.  Electronically signed by Destini Hawkins RN on 11/21/2018 at 12:04 PM

## 2018-11-21 NOTE — DISCHARGE SUMMARY
1 [facility] DISCHARGE SUMMARY    Patient Demographics    Patient. David Fraser  Date of Birth. 1950  MRN. [id number]     Primary care provider. María Elena Roberto  (Tel: [phone])    Admit date: 11/12/2018    Discharge date (blank if same as Note Date): Note Date: 11/21/2018     Reason for Hospitalization. Acute toxic metabolic encephalopathy and status epilepticus        Principal Problem:    Status epilepticus ([address]  Active Problems:    Acute metabolic encephalopathy    Dysphagia    DM (diabetes mellitus) (HCC)    HTN (hypertension)    Stage 3 chronic kidney disease (HCC)    Moderate protein-calorie malnutrition ([address]  Resolved Problems:    Acute respiratory failure (HCC)   Rhabdomyolysis    Problems and results from this hospitalization that need follow up. 1. Seizures and acute toxic metabolic encephalopathy. Continue Keppra and valproic acid. Patient has some CSF studies which are still pending. F/U Dr. Jhonathan Cortes in 1-2 weeks. 2. Dysphagia. Tolerating diet. On dysphagia I with thins. Continue ST  3. Diabetes mellitus. On sliding scale insulin coverage, monitor glucose AC and HS. Add back other chronic meds when indicated. 4. Hyperlipidemia. Statin stopped due to elevated CPK after seizures. May restart once CPK normalizes. 5. Debility. PT, OT. To SNF    Significant test results and incidental findings. 1. MRI brain   1. No acute intracranial abnormality. 2. Mild diffuse parenchymal volume loss. 3. Mild burden T2 hyperintense white matter disease likely related to chronic microvascular ischemia. Mild pontine gliosis. 4. Opacified right mastoid air cells. CT chest abd pelvis no acute    EEG with SIRPIDs as well as periodic discharges both generalized and bilateral independent. Invasive procedures and treatments. 1. Mechanical vent    Hospital Course.   Hospital Course: 71

## 2018-11-23 NOTE — ED PROVIDER NOTES
exhibits no edema or deformity. Neurological: She is alert. She displays no atrophy. No cranial nerve deficit. She exhibits normal muscle tone. Skin: Skin is warm. No rash noted. She is not diaphoretic. No erythema. Psychiatric: She has a normal mood and affect.  Her behavior is normal. Thought content normal.       DIAGNOSTIC RESULTS     EKG: All EKG's are interpreted by the Emergency Department Physician who either signs or Co-signs this chart in the absence of acardiologist.    EKG NSR LAD no ST changes      RADIOLOGY:   Non-plain film images such as CT, Ultrasoundand MRI are read by the radiologist. Kira All radiographic images are visualized and preliminarily interpreted by the emergency physician with the below findings:    CT reassuring/at baseline- Nodule identified and follow up instructed to convey to PCP    ED BEDSIDE ULTRASOUND:   Performed by ED Physician - none    LABS:  Labs Reviewed   CBC WITH AUTO DIFFERENTIAL - Abnormal; Notable for the following:        Result Value    RBC 3.87 (*)     Hemoglobin 11.3 (*)     Hematocrit 35.0 (*)     RDW 15.8 (*)     All other components within normal limits    Narrative:     Performed at:  [facility]  [address] [place] [address]   Phone [phone]   BASIC METABOLIC PANEL W/ REFLEX TO MG FOR LOW K - Abnormal; Notable for the following:     Sodium 146 (*)     Glucose 118 (*)     BUN 31 (*)     CREATININE 1.6 (*)     GFR Non- 32 (*)     GFR  39 (*)     All other components within normal limits    Narrative:     Performed at:  [facility]  [address] [facility] [place] [address]   Phone [phone]   TROPONIN - Abnormal; Notable for the following:     Troponin 0.12 (*)     All other components within normal limits    Narrative:     Performed at:  [facility]  [address] [facility] [place] [address]   Phone [phone]   Phone [phone]   LEVETIRACETAM LEVEL    Narrative:     Performed at:  [facility]  [address] [place] [address]   Phone [phone]   BLOOD GAS, VENOUS    Narrative:     Performed at:  [facility]  [address] [place] [address]   Phone [phone]   VALPROIC ACID LEVEL, FREE       All other labs were withinnormal range or not returned as of this dictation. EMERGENCY DEPARTMENT COURSE and DIFFERENTIAL DIAGNOSIS/MDM:     Creatinine at baseline    UA low suspicion UTI    Troponin at baseline trending down    Mental status at baseline- CT reassuring    Patient oxygen saturation 97% on room air    No wheezing noted    Discussed case with brother Janet Givens per him patient is disoriented at baseline as of 2 weeks ago and was admitted and evaluated at [facility], [facility]. and answers some questions and sleeps most of the day. My exam shows a patient who answers some questions, moves all limbs and has no focal deficits. I discussed with patient the results of evaluation in the ED, diagnosis, care, and prognosis. The plan is to discharge to home. Patient is in agreement with plan and questions have been answered.      I also discussed with patient the reasons which may require a return visit and the importance of follow-up care. The patient is well-appearing, nontoxic, and improved at the time of discharge. Patient agrees to call to arrange follow-up care as directed. Patient understands to return immediately for worsening/change in symptoms. CRITICAL CARE TIME   Total Critical Caretime was 39 minutes, excluding separately reportable procedures. There was a high probability of clinically significant/life threatening deterioration in the patient's condition which required my urgent intervention. PROCEDURES:  Unlessotherwise noted below, none    FINAL IMPRESSION      1.  Dyspnea, unspecified type

## 2018-11-23 NOTE — TELEPHONE ENCOUNTER
Writer contacted [facility], Dr. Cecelia De La Rosa,  ED provider to inform of 30 day readmission risk. ED provider informed writer of intention to  probably discharge and follow up recommended.

## 2018-11-24 PROBLEM — E87.0 HYPERNATREMIA: Status: ACTIVE | Noted: 2018-01-01

## 2018-11-24 NOTE — ED NOTES
Bed: B32  Expected date:   Expected time:   Means of arrival:   Comments:  teetee Soler  11/24/18 0455

## 2018-11-24 NOTE — ED PROVIDER NOTES
Age of Onset    Heart Disease Mother     Heart Disease Father     Diabetes Father     Kidney Disease Father        SOCIAL HISTORY       Social History     Social History    Marital status: Single     Spouse name: N/A    Number of children: 1    Years of education: 12     Social History Main Topics    Smoking status: Former Smoker     Packs/day: 0.50     Years: 10.00     Types: Cigarettes     Quit date: 3/21/1990    Smokeless tobacco: Never Used    Alcohol use No    Drug use: No    Sexual activity: No     Other Topics Concern    Not on file     Social History Narrative    No narrative on file       PHYSICAL EXAM       Vitals:    11/24/18 0956 11/24/18 1015 11/24/18 1115   BP: (!) 207/94 (!) 210/91 (!) 166/74   Resp: 16     Temp: 99.1 °F (37.3 °C)     TempSrc: Oral     SpO2: 96% 96%    Weight: 176 lb 2.4 oz (79.9 kg)     Height: 5' (1.524 m)       Physical Exam   Constitutional: She appears well-developed and well-nourished. No distress. HENT:   Head: Normocephalic and atraumatic. Right Ear: External ear normal.   Left Ear: External ear normal.   Eyes: Pupils are equal, round, and reactive to light. Conjunctivae are normal. Right eye exhibits no discharge. Left eye exhibits no discharge. Neck: Normal range of motion. Neck supple. Cardiovascular: Normal rate and regular rhythm. No murmur heard. Pulmonary/Chest: Effort normal and breath sounds normal. No respiratory distress. She has no wheezes. She has no rales. Abdominal: Soft. Bowel sounds are normal. She exhibits no distension and no mass. There is no tenderness. There is no rebound and no guarding. Musculoskeletal: Normal range of motion. She exhibits no edema or deformity. Neurological: She is alert. She displays no atrophy. No cranial nerve deficit. She exhibits normal muscle tone. Skin: Skin is warm. Capillary refill takes less than 2 seconds. No rash noted. She is not diaphoretic. No erythema.    Psychiatric: She has a normal >900 (*)     RBC, UA >900 (*)     Epi Cells 8 (*)     All other components within normal limits    Narrative:     Performed at:  [address] [facility] [address]   Phone [phone]   RAPID INFLUENZA A/B ANTIGENS    Narrative:     Performed at:  [address] [facility] [address]   Phone [phone]   URINE CULTURE   HEPATIC FUNCTION PANEL    Narrative:     Performed at:  [address] [address]   Phone [phone]   LACTIC ACID, PLASMA    Narrative:     Performed at:  [address] [place] [address]   Phone [phone]   AMMONIA     All other labs were withinnormal range or not returned as of this dictation. EMERGENCY DEPARTMENT COURSE and DIFFERENTIAL DIAGNOSIS/MDM:     Patient presents with AMS, Urine shows concern with infection. Because this is patients 3rd visits and worsening AMS per nursing home. Will admit for complicated UTI/Pyelonpehirtis and treat with IV abx and assess if patients skilled nursing facility able to take care of patient. CRITICAL CARE TIME   Total Critical Caretime was 39 minutes, excluding separately reportable procedures. There was a high probability of clinically significant/life threatening deterioration in the patient's condition which required my urgent intervention. PROCEDURES:  Unlessotherwise noted below, none    FINAL IMPRESSION      1. Altered mental status, unspecified altered mental status type    2.  Acute pyelonephritis          DISPOSITION/PLAN   DISPOSITION      ADMIT      (Please note that portions ofthis note were completed with a voice recognition program.  Efforts were made to edit the dictations but occasionally words are mis-transcribed.)    Paula Braxton MD(electronically signed)  Attending Emergency Physician

## 2018-11-25 NOTE — PROGRESS NOTES
Received patient from [facility]. On assessment, patient is sleeping, appears comfortable. IVF infusing at ordered rate. Bed alarm activated. Will continue to monitor.

## 2018-11-25 NOTE — ED NOTES
Fall risk screening completed. Fall risk bracelet applied to patient. Non-skid socks provided and placed on patient. The call light is within the patient's reach, and instructions for use were provided. The bed is in the lowest position with wheels locked. The patient has been advised to notify staff, using the call light, if there is a need to get up or use restroom. The patient verbalized understanding of safety precautions and how to contact staff for assistance.         Jadiel Mazariegos RN  11/25/18 7156

## 2018-11-25 NOTE — PROGRESS NOTES
Pt with increased SOB, inspiratory wheezing, 93% on room air. Notified Dr. Chika Medina, breathing treatments ordered. RT notified.

## 2018-11-25 NOTE — PROGRESS NOTES
Physical exam is unremarkable  Keep HHN treatment available. UTI  Urine culture grows different bacteria with each culture. 11/23 shows Enterococcus, 11/24 Gram negative kenneth. I will continue ampicillin, for the urine culture with available final result  Starting to doubt that patient has anything other than colonization. Will ask for ID consult.      Hypernatremia  Insufficient oral intake. Most likely hypovolemic hypernatremia  Seen by nephrology. Slightly better today     Metabolic encephalopathy  More alert today. Given previous events, I will ask for neurology consult     CKD stage 3  Creatinine is stable compared to baseline. Monitor.  Nephrology consult appreciated     Dysphagia:  May require PEG if unable to sustain nutrition     Diabetes mellitus type 2  Continue SSI and monitor blood sugar     Seizure disorder  Continue patient's antiepileptic medication    DVT Prophylaxis: Lovenox  Diet: DIET GENERAL; Dysphagia I Pureed  Code Status: Full Code    PT/OT Eval Status: pending    Anni Islas MD

## 2018-11-26 NOTE — PROGRESS NOTES
Skin: Skin color, texture, turgor normal.  No rashes or lesions. Neurologic:  Grossly non-focal. No motor or sensory deficits   Psychiatric: Alert compared to yesterday, poor insight  Capillary Refill: Brisk,< 3 seconds   Peripheral Pulses: +2 palpable, equal bilaterally       Labs:   Recent Labs      11/23/18   0951  11/24/18   1021  11/25/18   0950   WBC  6.5  6.6  4.5   HGB  11.3*  11.0*  9.7*   HCT  35.0*  33.8*  29.7*   PLT  242  250  173     Recent Labs      11/23/18   0952  11/24/18   1021  11/25/18   0950   NA  146*  149*  146*   K  4.2  4.5  3.5   CL  110  111*  109   CO2  24 24 24   BUN  31*  29*  23*   CREATININE  1.6*  1.5*  1.5*   CALCIUM  8.8  8.9  8.3     Recent Labs      11/24/18   1021  11/25/18   0950   AST  21  19   ALT  13  11   BILIDIR  <0.2   --    BILITOT  0.3  0.3   ALKPHOS  83  71     No results for input(s): INR in the last 72 hours. Recent Labs      11/23/18   0952  11/23/18   1239   CKTOTAL  61   --    TROPONINI  0.12*  0.09*       Urinalysis:      Lab Results   Component Value Date    NITRU POSITIVE 11/24/2018    WBCUA >900 11/24/2018    BACTERIA 4+ 11/24/2018    RBCUA >900 11/24/2018    BLOODU LARGE 11/24/2018    SPECGRAV 1.024 11/24/2018    GLUCOSEU Negative 11/24/2018    GLUCOSEU NEGATIVE 02/26/2012       Radiology:  XR CHEST PORTABLE   Final Result   No acute process. CT Head WO Contrast   Final Result   1. No acute intracranial abnormality. 2. Acute bilateral sphenoid sinusitis. XR CHEST PORTABLE   Final Result   1. No acute abnormality. Assessment/Plan:    Active Hospital Problems    Diagnosis Date Noted    Hypernatremia [E87.0] 11/24/2018     PLAN:    Dysphagia:  Likely subjective. CXR and ABG are OK. Physical exam is unremarkable  MBS on 11/20: No aspiration observed  Keep HHN treatment available. UTI  Urine culture 11/24 with klebsiella. 11/23 shows Enterococcus.    continue ampicillin, rocephin added  Starting to doubt that patient

## 2018-11-26 NOTE — PROGRESS NOTES
400 MG/5ML suspension 30 mL  30 mL Oral Daily PRN Joaquin Schuster MD        ondansetron (ZOFRAN) injection 4 mg  4 mg Intravenous Q6H PRN Joaquin Schuster MD        enoxaparin (LOVENOX) injection 40 mg  40 mg Subcutaneous Daily Joaquin Schuster MD   40 mg at 11/26/18 0924    glucose (GLUTOSE) 40 % oral gel 15 g  15 g Oral PRN Joaquin Schuster MD        dextrose 50 % solution 12.5 g  12.5 g Intravenous PRN Joaquin Schuster MD        glucagon (rDNA) injection 1 mg  1 mg Intramuscular PRN Joaquin Schuster MD        dextrose 5 % solution  100 mL/hr Intravenous PRN Joaquin Schuster MD        insulin lispro (HUMALOG) injection vial 0-12 Units  0-12 Units Subcutaneous TID WC Joaquin Schuster MD   2 Units at 11/26/18 1150    insulin lispro (HUMALOG) injection vial 0-6 Units  0-6 Units Subcutaneous Nightly Joaquin Schuster MD   1 Units at 11/25/18 2053       Vitals:  BP (!) 146/70   Pulse 80   Temp 96.3 °F (35.7 °C) (Axillary)   Resp 17   Ht 5' (1.524 m)   Wt 179 lb 14.3 oz (81.6 kg)   SpO2 95%   BMI 35.13 kg/m²     Physical Exam:  Gen: Resting in bed, awake, not responding or following commands. HEENT: MMM, OP clear. CV: RRR no m/r/g. No S3.  Lungs: CTA-B  Abd: S/NT +BS  Ext: No edema, no cyanosis  Skin: Warm. No rashes appreciated. Labs:  CBC:   Lab Results   Component Value Date    WBC 4.5 11/25/2018    RBC 3.28 11/25/2018    HGB 9.7 11/25/2018    HCT 29.7 11/25/2018    MCV 90.7 11/25/2018    MCH 29.5 11/25/2018    MCHC 32.5 11/25/2018    RDW 15.7 11/25/2018     11/25/2018    MPV 8.6 11/25/2018     BMP:    Lab Results   Component Value Date     11/26/2018    K 3.5 11/26/2018    K 3.5 11/25/2018     11/26/2018    CO2 23 11/26/2018    BUN 18 11/26/2018    LABALBU 2.8 11/25/2018    CREATININE 1.4 11/26/2018    CALCIUM 8.1 11/26/2018    GFRAA 45 11/26/2018    GFRAA >60 12/20/2010    LABGLOM 37 11/26/2018    GLUCOSE 179 11/26/2018       Assessment/Plan:    1.  CKD stage 3: Baseline Cr was reported around 2, but has been closer to 1.5 over the last admission, which is where her Cr is currently. She follows with my partner Dr. Martin Salinas in the office. Cr 1.4 mg today Mnitor     2. Hypernatremia: Recurrent. She presented two weeks ago with a similar issue. Poor po intake. Na+ 140 meq today Change to 1/2 NS D/W daughter at bedside      3. Altered mental status: She had a fairly extensive workup last admit     4. UTI: Culture shows Klebsiella resistant to Ampicillin Start Ceftriaxone     5. Anemia of CKD: Hgb down with IV volume expansion. No overt signs of bleeding. Monitor for now.     6. HTN: Continue current meds. Avoid ACE/ARB and diuretics going forward given her predisposition for volume depletion. 7. Hx of seizures: On keppra.       Mauricio Patterson MD  11/26/2018

## 2018-11-26 NOTE — PROGRESS NOTES
Infectious Disease Follow up Notes  Admit Date: 11/24/2018  Hospital Day: 4    Antibiotics :   IV Ceftriaxone     CHIEF COMPLAINT:     Change in Mentation  CKD  UTI  Subjective interval History :  70 y.o. woman admitted with change in Mentation and h/o seizures on meds and followed by Neurology work up in progress went for EEG yesterday and results pending. Repeat UA from straight cath specimen looking better and urine cx pending.       Past Medical History:    Past Medical History:   Diagnosis Date    Acid reflux     Arthritis     Balance problem     Blood circulation, collateral     Cellulitis of both lower extremities     [facility]congestive heart failure) [facility])     Chronic kidney disease     Chronic renal failure    Chronic venous hypertension (idiopathic) with ulcer and inflammation of bilateral lower extremity ([address] 1/9/2017    Community acquired bacterial pneumonia 7/26/2018    Depression     Diabetes mellitus ([facility])     GERD (gastroesophageal reflux disease)     Hyperlipidemia     Hypertension     Movement disorder     Murmur     Neuromuscular disorder ([facility])     Restless leg syndrome     Status epilepticus ([address] 11/12/2018    Urinary frequency     Urinary incontinence        Past Surgical History:    Past Surgical History:   Procedure Laterality Date    APPENDECTOMY      BLADDER SUSPENSION      CHOLECYSTECTOMY      COLONOSCOPY      CYSTOSCOPY      EYE SURGERY      HYSTERECTOMY         Current Medications:    Outpatient Prescriptions Marked as Taking for the 11/24/18 encounter [facility] Encounter)   Medication Sig Dispense Refill    levETIRAcetam (KEPPRA) 500 MG tablet Take 1 tablet by mouth 2 times daily 60 tablet 3    valproic acid (DEPAKENE) 250 MG capsule Take 2 capsules by mouth 2 times daily 120 capsule 3    insulin lispro (HUMALOG) 100 UNIT/ML pen Inject 0-6 Units into the skin every 6 hours 11/25/2018    ALT 11 11/25/2018    AST 19 11/25/2018    PROT 6.7 11/25/2018    BILITOT 0.3 11/25/2018    BILIDIR <0.2 11/24/2018    IBILI see below 11/24/2018    LABALBU 2.8 11/25/2018       UA:  Lab Results   Component Value Date    COLORU Yellow 11/26/2018    CLARITYU Clear 11/26/2018    GLUCOSEU Negative 11/26/2018    GLUCOSEU NEGATIVE 02/26/2012    BILIRUBINUR Negative 11/26/2018    BILIRUBINUR NEGATIVE 02/26/2012    KETUA Negative 11/26/2018    SPECGRAV 1.020 11/26/2018    BLOODU TRACE-INTACT 11/26/2018    PHUR 6.0 11/26/2018    PROTEINU >=300 11/26/2018    UROBILINOGEN 0.2 11/26/2018    NITRU Negative 11/26/2018    LEUKOCYTESUR SMALL 11/26/2018    LABMICR YES 11/26/2018    URINETYPE Not Specified 11/26/2018      Urine Microscopic:   Lab Results   Component Value Date    LABCAST 1-3 Hyaline 11/12/2018    BACTERIA Rare 11/26/2018    COMU see below 11/26/2018    HYALCAST 2 11/26/2018    WBCUA 20 11/26/2018    RBCUA 7 11/26/2018    EPIU 3 11/26/2018     Creat  :   1.4     Lactic acid  1.4     CK  61     bnp  2725      Ref Range & Units 11/25/18 0950   Hemoglobin A1C See comment % 6.0    Comment: Comment:   Diagnosis of Diabetes: > or = 6.5%   Increased risk of diabetes (Prediabetes): 5.7-6.4%   Glycemic Control: Nonpregnant Adults: <7.0%                     Pregnant: <6.0%          MICRO: cultures reviewed and updated by me            Urine Culture [[id number]] (Abnormal)  Collected: 11/24/18 1021   Order Status: Completed Specimen: Urine, clean catch Updated: 11/26/18 8160    Urine Culture, Routine --    Organism Klebsiella pneumoniae ssp pneumoniae (A)    Urine Culture, Routine >100,000 CFU/ml   Narrative:     ORDER#: [id number]                          ORDERED BY: Felicia Sullivan  SOURCE: Urine Clean Catch                  COLLECTED:  11/24/18 10:21  ANTIBIOTICS AT [facility]. :                      RECEIVED :  11/24/18 11:04  Performed at:  [address] [place] [address].[place], [place] [address]   Phone [phone]   Rapid influenza A/B antigens [[id number]] Collected: 11/24/18 1032   Order Status: Completed Specimen: Nares Updated: 11/24/18 1056    Rapid Influenza A Ag Negative    Rapid Influenza B Ag Negative   Narrative:     Performed at:  [address] [place] [address]   Phone [phone]     Culture & Susceptibility     KLEBSIELLA PNEUMONIAE SSP PNEUMONIAE     Antibiotic Interpretation YAYO Unit   amoxicillin-clavulanate Sensitive <=4/2 mcg/mL   ampicillin Resistant >16 mcg/mL   ceFAZolin Intermediate 4 mcg/mL   Comment: NOTE: Cefazolin should only be used for uncomplicated UTI        for E.coli or Klebsiella pneumoniae. cefTRIAXone Sensitive <=1 mcg/mL   cefepime Sensitive <=1 mcg/mL   ciprofloxacin Sensitive <=0.5 mcg/mL   gentamicin Sensitive <=2 mcg/mL   levofloxacin Sensitive <=1 mcg/mL   meropenem Sensitive <=0.25 mcg/mL   nitrofurantoin Resistant >64 mcg/mL   piperacillin-tazobactam Sensitive 16/4 mcg/mL   tetracycline Resistant >8 mcg/mL   trimethoprim-sulfamethoxazole Sensitive <=0.5/9.5 mcg          Procedure Component Value Units Date/Time   Urine Culture [[id number]] Collected: 11/26/18 1455   Order Status: Completed Specimen: Urine, straight catheter Updated: 11/27/18 0800    Urine Culture, Routine No growth to date   Narrative:     ORDER#: [id number]                          ORDERED BY: Luther Escalona  SOURCE: Urine Straight Cath                COLLECTED:  73/70/97 14:55  ANTIBIOTICS AT [facility]. :                      RECEIVED :  11/26/18 15:07  Performed at:  [address] [place] [address]   Phone [phone]       IMAGING:    XR CHEST PORTABLE   Final Result   No acute process. CT Head WO Contrast   Final Result   1. No acute intracranial abnormality. 2. Acute bilateral sphenoid sinusitis. XR CHEST PORTABLE   Final Result   1.   No acute PRN Meds:  albuterol, sodium chloride flush, magnesium hydroxide, ondansetron, glucose, dextrose, glucagon (rDNA), dextrose      Assessment:   Change in mentation   Encephalopathy  Seizure history on therapy   Work up in progress  UA abnormal on admit 11/24   Urine cx from 11/24 Klebsiella  Off note UA from 11/23 not much inflammation and urine cx grew Enterococcus     Its not clear if the UA from 11/23 is a true specimen or not hence repeat UA and urine cx from straight cath sent yesterday and results pending     Labs, Microbiology, Radiology and all the pertinent results from current hospitalization and  care every where were reviewed  by me as a part of the evaluation   Plan:   1. Cont IV Ceftriaxone x 1 gm  Q 24 hrs   2. Able to change to oral abx at d/c WOULD choose oral Cipro x 250 mg  X bid x 3 days   3. [address] [address] for d/c when ok with primary   4. Follow up on urine cx from 11/26    Discussed with patient/Family  D/w      Thanks for allowing me to participate in your patient's care and please call me with any questions or concerns.     Jean Marie Muniz MD  Infectious Disease  [facility] Physician  Phone: [phone]   Fax : [phone]

## 2018-11-26 NOTE — PROGRESS NOTES
person and place   Attention intact as able to attend well to the exam     Language no obvious aphasia, deliberate with her speech. Some delays. Comprehension following some simple commands, having more difficult with complex commands. Cranial nerves:   CN2: blink to threat bilaterally. CN 3,4,6: extraocular muscles intact. Pupils are equal, round, reactive bilaterally. CN7: face symmetric without dysarthria  CN8: hearing grossly intact  CN12: tongue midline with protrusion  Strength: no focal weakness identified. She is independently moving her upper limbs. She may have some symmetrical bilateral lower limb weakness, though difficult to assess as she is not following all requests. Sensory: light touch intact in all 4 extremities. Cerebellar/coordination: finger nose finger unable to be assessed at this time. Tone: normal in all 4 extremities  Gait: deferred at this time. ROS  Constitutional- No weight loss or fevers  Eyes- No diplopia. No photophobia. Ears/nose/throat- No dysphagia. No Dysarthria  Cardiovascular- No palpitations. No chest pain  Respiratory- No dyspnea. No Cough  Gastrointestinal- No Abdominal pain. No Vomiting. Genitourinary- No incontinence. No urinary retention  Musculoskeletal- No myalgia. No arthralgia  Skin- No rash. No easy bruising. Psychiatric- hx depression. No anxiety  Endocrine- hx diabetes. hx thyroid issues. Hematologic- No bleeding difficulty. No fatigue  Neurologic- No weakness. No Headache. Labs  Glucose 179  Na 140  K 3.5  BUN 18  Creatinine 1.4    WBC 4.5K  Hg 9.7  Platelets 490    UA + nitrites, large leukocyte esterase, > 900 WBCs. Culture +     Studies  CT head 11/24/18, independently reviewed  No acute intracranial abnormality. Acute bilateral sphenoid sinusitis. Impression  1. Metabolic encephalopathy, likely secondary to UTI, occurring in a patient who was recently treated for status epilepticus.     2. Urinary tract

## 2018-11-26 NOTE — CONSULTS
Neurology Consult - 11/25/18    Impression:  3. 69 yo female with recent status epilepticus presents with AMS. Patient cannot provide any history. True baseline cannot be identified. Found to have UTI with pyelonephritis in ED. Suspect presenting symptoms are due to metabolic encephalopathy from UTI. 2. Recent status epilepticus. 3. Suspected dementia.   4. Numerous medical co-morbidities. Plan:  1. Will check EEG. 2. Treatment of UTI per hospitalist and ID (who has been consulted). 3. Await paraneoplastic panel from prior hospitalization. 4. Continue Keppra and Depakote at current doses. 5. Recommend seizure precautions given history. 6. Above discussed with patient. 7. See dictated note. #[id number]      --  Thank you for allowing me to participate in the care of your patient with high level of medical complexity requiring a high level of medical decision making. If I can be of any further assistance, please do not hesitate to contact me. Alex Pérez, DO  [address] [place] [address] [phone] Neuroscience
A 69-year-old female with recent status epilepticus who presents  here with altered mental status yesterday. Unfortunately, she cannot  provide any history. Her true baseline cannot be identified though is  certainly altered according to hospital records. She was found to have  a urinary tract infection with suspected pyelonephritis in the emergency  room. I do suspect that her presenting symptoms are most likely due to  metabolic encephalopathy from urinary tract infection and possible  dehydration though other etiologies cannot be entirely excluded. 2.  Recent status epilepticus. Though index of suspicion is somewhat  low for seizure currently, I cannot entirely rule out the possibility of  a recurrent seizures due to a lower seizure threshold, especially given  her recent history. 3.  Suspected underlying dementia though her true baseline is unclear at  this point. 4.  Numerous medical comorbidities as described above. PLAN:  1. We will check EEG. 2.  Treatment of urinary tract infection per hospitalist and Infectious  Disease was then consulted. 3.  Await paraneoplastic panel from her prior hospitalization. 4.  Continue Keppra and Depakote at her current doses. 5.  Recommend seizure precautions given her history. 6.  She will need to follow up with Vance Zamora MD as an outpatient as  was recommended from her previous hospitalization. 7.  The above was discussed with the patient. Thank you for allowing me to participate in the care of this patient  with high medical complexity requiring a higher level of medical  decision making. If I could be of any further assistance, please do not  hesitate to contact me.         MYLES LU DO    D: 11/25/2018 17:40:19       T: 11/25/2018 23:37:19     [id number]  Job#: [id number]     Doc#: [id number]    CC:

## 2018-11-27 NOTE — PROGRESS NOTES
Occupational Therapy   Occupational Therapy Initial Assessment and Daily Treatment Note    Date: 2018   Patient Name: Tracy Walter  MRN: [id number]     : 1950    Date of Service: 2018    Assessment: Pt is 70 y.o. F who presents with increased altered mental status and is being treated for hypernatremia. Pt recently d/c from [facility] to [facility]. Pt is unreliable historian and therefore unsure of accuracy of PLOF. Anticipate pt was requiring max/total A for ADL needs, completing little functional mobility potentially spent in w/c. Currently, pt is limited by decreased cognition with limited ability to follow cueing. Pt required total A for feedingand has limited oral intake. Pt required max Ax2 to stand to mely stedy from EOB and sit to recliner chair. Pt would benefit from continued therapy to increase mobility. Discharge Recommendations:  3-5 sessions per week     Tracy Walter scored a  on the AM-PAC ADL Inpatient form. Current research shows that an AM-PAC score of 17 or less is typically not associated with a discharge to the patient's home setting. Based on the patients AM-PAC score and their current ADL deficits, it is recommended that the patient have 3-5 sessions per week of Occupational Therapy at d/c to increase the patients independence. Patient Diagnosis(es): The primary encounter diagnosis was Altered mental status, unspecified altered mental status type. A diagnosis of Acute pyelonephritis was also pertinent to this visit. has a past medical history of Acid reflux; Arthritis; Balance problem; Blood circulation, collateral; Cellulitis of both lower extremities; CHF (congestive heart failure) ([address] Chronic kidney disease; Chronic venous hypertension (idiopathic) with ulcer and inflammation of bilateral lower extremity ([address]  Community acquired bacterial pneumonia; Depression; Diabetes mellitus ([address] GERD (gastroesophageal reflux disease); Hyperlipidemia; Hypertension; Movement disorder; Murmur; Neuromuscular disorder ([address] Restless leg syndrome; Status epilepticus ([address] Urinary frequency; and Urinary incontinence. has a past surgical history that includes Appendectomy; Hysterectomy; Cholecystectomy; bladder suspension; Colonoscopy; Cystoscopy; and eye surgery. Treatment Diagnosis: Decreased functional mobility ; Decreased ADL status; Decreased endurance;Decreased strength;Decreased cognition;Decreased balance; Restrictions  Restrictions/Precautions  Restrictions/Precautions: Fall Risk, Seizure    Subjective   General  Chart Reviewed: Yes  Patient assessed for rehabilitation services?: Yes  Additional Pertinent Hx: Per Jaison Ibrahim MD 11/24/18: Pt is \"77 y.o. female who presented to [facility] with confusion. This patient was previously admitted to [facility] and transferred to [facility] for seizures. From there, she was transferred to skilled nursing facility for post acute care. Unfortunately, starting yesterday, she started having progressive worsening of mental status. Yesterday she was taken to the emergency room. Had a urine culture done and was discharged back to skilled nursing facility. Today, she is back in the emergency room with worsening mental status again. Does not complain of any pain. Urine culture from yesterday is growing enterococci. Sodium level was noted to be elevated. Hospitalist service was called for inpatient admission. \"  Family / Caregiver Present: No  Referring Practitioner: Glorious Due, DO  Diagnosis: Hypernatremia  Subjective  Subjective: Pt met bedside for OT/PT evaluation. Pt with minimal verbalizations throughout session.    Pain Assessment  Patient Currently in Pain: Denies  Pain Assessment: Faces  Carroll-Wallace Pain Rating: No hurt  Pain Level: 0  Oxygen Therapy  SpO2: 96 %  O2 Device: None (Room air)     Social/Functional History  Social/Functional History  Lives With: Alone  Type of historian - unsure of PLOF since last hospitalization. Anticipate pt requiring total A with little participation in ADL tasks. Tone RUE  RUE Tone: Normotonic  Tone LUE  LUE Tone: Normotonic        Transfers  Sit to stand: Maximum assistance  Stand to sit: Maximum assistance  Transfer Comments: Max Ax2 for sit <> stand from elevated EOB to mely stedy and sit to recliner chair; maxi sling placed under pt for staff for transfers. LUE AROM (degrees)  LUE General AROM: Pt with limited AROM, difficulty following cueing. Assist to bring UE to mely stedy bar. Left Hand AROM (degrees)  Left Hand AROM: WFL  RUE AROM (degrees)  RUE General AROM: Pt with limited AROM, difficulty following cueing. Assist to bring UE to mely stedy bar. Right Hand AROM (degrees)  Right Hand AROM: WFL  LUE Strength  LUE Strength Comment: Pt unable to follow commads, general weakness noted. RUE Strength  RUE Strength Comment: Pt unable to follow commads, general weakness noted. Assessment   Performance deficits / Impairments: Decreased functional mobility ; Decreased ADL status; Decreased endurance;Decreased strength;Decreased cognition;Decreased balance  Assessment: Pt is 70 y.o. F who presents with increased altered mental status and is being treated for hypernatremia. [facility]. Pt is unreliable historian and therefore unsure of accuracy of PLOF. Anticipate pt was requiring max/total A for ADL needs, completing little functional mobility potentially spent in w/c. Currently, pt is limited by decreased cognition with limited ability to follow cueing. Pt required total A for feedingand has limited oral intake. Pt required max Ax2 to stand to mely stedy from EOB and sit to recliner chair. Pt would benefit from continued therapy to increase mobility. Treatment Diagnosis: Decreased functional mobility ; Decreased ADL status; Decreased endurance;Decreased strength;Decreased

## 2018-11-27 NOTE — PROGRESS NOTES
Neurology Progress Note    Updates  No significant events to note. Patient alert, cooperative with most of the exam this morning.       Past Medical History:   Diagnosis Date    Acid reflux     Arthritis     Balance problem     Blood circulation, collateral     Cellulitis of both lower extremities     CHF (congestive heart failure) [facility]     Chronic kidney disease     Chronic venous hypertension (idiopathic) with ulcer and inflammation of bilateral lower extremity (HCC) 1/9/2017    Community acquired bacterial pneumonia 7/26/2018    Depression     Diabetes mellitus (HCC)     GERD (gastroesophageal reflux disease)     Hyperlipidemia     Hypertension     Movement disorder     Murmur     Neuromuscular disorder ([facility])     Restless leg syndrome     Status epilepticus ([address] 11/12/2018    Urinary frequency     Urinary incontinence      Current Facility-Administered Medications:     levETIRAcetam (KEPPRA) tablet 1,000 mg, 1,000 mg, Oral, BID, HOLLIE Hobbs CNP    dextrose 5 % and 0.45 % NaCl with KCl 20 mEq infusion, , Intravenous, Continuous, Iqra Cleaning MD, Last Rate: 50 mL/hr at 11/27/18 0620    cefTRIAXone (ROCEPHIN) 1 g IVPB in 50 mL D5W minibag, 1 g, Intravenous, Q24H, Iqra Cleaning MD, Stopped at 11/26/18 1444    albuterol (PROVENTIL) nebulizer solution 2.5 mg, 2.5 mg, Nebulization, Q4H PRN, Philomena Duran MD, 2.5 mg at 11/25/18 0923    amLODIPine (NORVASC) tablet 5 mg, 5 mg, Oral, BID, Philomena Duran MD, 5 mg at 11/26/18 2013    carvedilol (COREG) tablet 12.5 mg, 12.5 mg, Oral, BID WC, Christie Dowd MD, 12.5 mg at 11/26/18 1803    aspirin chewable tablet 81 mg, 81 mg, Oral, Daily, Philomena Duran MD, 81 mg at 11/26/18 3706    hydrALAZINE (APRESOLINE) tablet 50 mg, 50 mg, Oral, TID, Philomena Duran MD, 50 mg at 11/26/18 2013    rOPINIRole (REQUIP) tablet 2 mg, 2 mg, Oral, Nightly, Philomena Duran MD, 2 mg at 11/26/18 2014    sodium bicarbonate tablet 650 mg, 650

## 2018-11-27 NOTE — PROGRESS NOTES
Lethargic initially, but more awake once seated EOB. C/o being cold throughout the session. Minimally verbal and difficulty following commands. Pain Screening  Patient Currently in Pain: Denies  Vital Signs  Patient Currently in Pain: Denies       Orientation  Orientation  Overall Orientation Status:  (unable to assess due to minimal verbalizations)     Social/Functional History  Social/Functional History  Lives With: Alone  Type of Home: Apartment (8th floor senior living)  Home Layout: One level  Home Access: Elevator, Level entry  Bathroom Shower/Tub: Walk-in shower  Bathroom Toilet: Handicap height  Bathroom Equipment: Grab bars in shower, Grab bars around toilet, Shower chair  Bathroom Accessibility: Accessible, Walker accessible  Home Equipment: 4 wheeled walker, Alert Button, Lift chair, Reacher, Sock aid, Long-handled shoehorn  Receives Help From: Home health ([facility] 7 days/week for 4hrs/day)  ADL Assistance: Needs assistance (Aide assists with shower; pt can dress self; toilets self )  Homemaking Assistance:  (Aide does laundry and cleaning for pt, as well as minimal cooking and shopping; pt can get light meal for herself but doesn't cook; gets MOW )  Ambulation Assistance: Independent (7EG)  Transfer Assistance: Independent  Active : No  Patient's  Info: Medical transportation to [place]. Adelfo Heredia does grocery shopping.   Additional Comments: social functional hx taken from previous therapy note 11/9- pt has recently been at [facility] since previous hospitalization       Objective          PROM RLE (degrees)  RLE PROM: WFL  AROM RLE (degrees)  RLE General AROM: unable to formally assess AROM due to difficulty following commands  PROM LLE (degrees)  LLE PROM: WFL  AROM LLE (degrees)  LLE General AROM: unable to formally assess AROM due to difficulty following commands  Strength RLE  Comment: functionally poor  Strength LLE  Comment: functionally poor  Tone RLE  RLE Tone: Normotonic  Tone activity as tolerated  Prognosis: Guarded  Decision Making: High Complexity  History: Per Lucie Francisco MD \"This patient was previously admitted to [facility] and transferred to [facility] for seizures. From there, she was transferred to skilled nursing facility for post acute care. Unfortunately, starting yesterday, she started having progressive worsening of mental status. Yesterday she was taken to the emergency room. Had a urine culture done and was discharged back to skilled nursing facility. Today, she is back in the emergency room with worsening mental status again. Does not complain of any pain. Urine culture from yesterday is growing enterococci. Sodium level was noted to be elevated. \" PMH: cellulitis, arthritis, CHF, CKD, depression, DM, GERD, status epilecticus  Exam: bed mobility, transfers  Clinical Presentation: evolving  Patient Education: role of acute care PT, POC, use of call light   Barriers to Learning: cognition  REQUIRES PT FOLLOW UP: Yes  Activity Tolerance  Activity Tolerance: Patient limited by cognitive status; Patient limited by fatigue  Activity Tolerance: limited by difficulty following commands; limited by lethargy         Plan   Plan  Times per week: 3-5x/wk  Specific instructions for Next Treatment: increase activity as tolerated  Current Treatment Recommendations: Strengthening, ROM, Balance Training, Functional Mobility Training, Transfer Training, Gait Training, Safety Education & Training, Patient/Caregiver Education & Training  Safety Devices  Type of devices: All fall risk precautions in place, Call light within reach, Chair alarm in place, Gait belt, Patient at risk for falls, Left in chair, Nurse notified (STEFANIE Escalona aware)  Restraints  Initially in place: No    G-Code  PT G-Codes  Functional Assessment Tool Used: AMPAC  Score: 8  Functional Limitation: Mobility: Walking and moving around  Mobility: Walking and Moving Around Current Status ():  At least 80 percent but

## 2018-11-27 NOTE — PROGRESS NOTES
Hypernatremia - improving  Insufficient oral intake. Most likely hypovolemic hypernatremia, and poor PO intake  Seen by nephrology.       Metabolic encephalopathy - improving, due to UTI most likely  neurology consulted   - EEG: Periodic epileptiform discharges, D/W neurology  - increased keppra and cont depakote     CKD stage 3  Creatinine is stable compared to baseline. Monitor.  Nephrology consult appreciated     Diabetes mellitus type 2  Continue SSI and monitor blood sugar     Seizure disorder  Continue patient's antiepileptic medication as above    DVT Prophylaxis: Lovenox  Diet: DIET GENERAL; Dysphagia I Pureed  Code Status: Full Code    PT/OT Eval Status: pending    Dispo - Inpatient treatmenty      Bridgett Canseco, DO

## 2018-11-28 NOTE — DISCHARGE INSTR - COC
artery of both lower extremities with bilateral ulceration of calves ([facility]) I70.232, I70.242    Chronic venous hypertension (idiopathic) with ulcer and inflammation of bilateral lower extremity ([facility]) I87.333, L97.919, L97.929    Ulcer of left lower leg, limited to breakdown of skin (HCC) L97.921    Ulcer of right lower leg ([facility]) L97.919    Leg swelling M79.89    Pruritic condition L29.9    Excoriation of left lower leg S80.812A    Excoriation of right lower leg S80.811A    Venous stasis dermatitis of both lower extremities I87.2    Lower leg edema R60.0    Venous ulcer ([facility]) I83.009, L97.909    Venous hypertension, chronic, with ulcer, left ([facility]) I87.312, L97.929    Community acquired bacterial pneumonia J15.9    Hypertensive urgency I16.0    Stage 3 chronic kidney disease ([facility]) N18.3    Pneumonia due to organism J18.9    Acute pulmonary edema ([facility]) J81.0    Acute respiratory failure with hypoxia ([facility]) J96.01    Blister of lower leg, right, initial encounter T13.655V    Status epilepticus ([facility] G40.901    Acute metabolic encephalopathy W29.09    Dysphagia R13.10    Moderate protein-calorie malnutrition ([facility]) E44.0    Hypernatremia E87.0       Isolation/Infection:   Isolation          C Diff Contact            Nurse Assessment:  Last Vital Signs: BP (!) 170/91 Comment: nurse notifed  Pulse 84   Temp 98.9 °F (37.2 °C) (Oral)   Resp 16   Ht 5' (1.524 m)   Wt 176 lb 12.9 oz (80.2 kg)   SpO2 99%   BMI 34.53 kg/m²     Last documented pain score (0-10 scale): Pain Level: 0  Last Weight:   Wt Readings from Last 1 Encounters:   11/28/18 176 lb 12.9 oz (80.2 kg)     Mental Status:  oriented and alert to person place    IV Access:  - None    Nursing Mobility/ADLs:  Walking   Dependent  Transfer  Dependent  Bathing  Assisted  Dressing  Assisted  Toileting  Dependent  Feeding  Independent  Med Admin  Assisted  Med Delivery   crushed    Wound Care Documentation and Therapy:  Wound 10/01/18 Leg 9:30 AM   Wound Depth (cm)  0.1 11/13/2018  9:30 AM   Calculated Wound Size (cm^2) (l*w) 3 cm^2 11/13/2018  9:30 AM   Change in Wound Size % (l*w) -172.73 11/13/2018  9:30 AM   Wound Assessment Pink;Red 11/25/2018  7:35 PM   Drainage Amount Small 11/25/2018  7:35 PM   Drainage Description Serous; Nicolas 11/25/2018  7:35 PM   Odor None 11/13/2018  9:30 AM   Margins Unattached edges 11/13/2018  9:30 AM   Carol-wound Assessment Yellow-brown; Other (Comment) 11/13/2018  9:30 AM   Non-staged Wound Description Full thickness 11/6/2018  9:09 AM   Red%Wound Bed 90 11/6/2018  9:09 AM   Black%Wound Bed 100 11/9/2018  4:21 PM   Culture Taken No 11/24/2018  3:15 PM   Op First Treatment Date 11/06/18 11/6/2018  9:09 AM   Number of days: 21       Wound 11/06/18 Venous ulcer Leg Left; Lower; Lateral Dry, intact scabs #35 ( states noted 11/4/2018) (Active)   Wound Image   11/6/2018  9:09 AM   Wound Type Wound 11/27/2018 10:40 PM   Wound Venous 11/25/2018  8:05 PM   Dressing Status Clean;Dry; Intact 11/27/2018 10:40 PM   Dressing Changed Changed/New 11/25/2018  7:35 PM   Dressing/Treatment Dry dressing 11/27/2018 10:40 PM   Wound Cleansed Not Cleansed 11/21/2018  7:50 AM   Dressing Change Due 11/26/18 11/25/2018  7:35 PM   Wound Length (cm) 4.2 cm 11/9/2018  4:21 PM   Wound Width (cm) 1.8 cm 11/9/2018  4:21 PM   Wound Depth (cm)  0.1 11/9/2018  4:21 PM   Calculated Wound Size (cm^2) (l*w) 7.56 cm^2 11/9/2018  4:21 PM   Change in Wound Size % (l*w) 10 11/9/2018  4:21 PM   Wound Assessment Pink;Epithelialization 11/9/2018  4:21 PM   Drainage Amount Small 11/9/2018  4:21 PM   Drainage Description Sanguinous 11/9/2018  4:21 PM   Odor None 11/9/2018  4:21 PM   Margins Attached edges 11/6/2018  9:09 AM   Carol-wound Assessment Edema;Pink 11/9/2018  4:21 PM   Non-staged Wound Description Full thickness 11/6/2018  9:09 AM   [place]%Wound Bed 100 11/9/2018  4:21 PM   Red%Wound Bed 85 11/6/2018  9:09 AM   Yellow%Wound Bed 10 11/6/2018  9:09 AM SECTION    Inpatient Status Date: 11/28/18    Readmission Risk Assessment Score:  Readmission Risk              Risk of Unplanned Readmission:        32           Discharging to Facility/ Agency   · Name: [facility]   · Address: [address] [place] [address]  · Phone: [phone]  · Fax: [phone]    / signature: Electronically signed by TRAN Márquez on 11/28/18 at 10:08 AM    PHYSICIAN SECTION    Prognosis: Fair    Condition at Discharge: Stable    Rehab Potential (if transferring to Rehab): Fair    Recommended Labs or Other Treatments After Discharge: PT/OT. Needs to follow up with neurology in 1-2 weeks. Needs a valproic acid level ordered in 1 week, prior to neurology appointment. Physician Certification: I certify the above information and transfer of Renetta Berg  is necessary for the continuing treatment of the diagnosis listed and that she requires [place] for greater 30 days.      Update Admission H&P: No change in H&P    PHYSICIAN SIGNATURE:  Electronically signed by Jami Garay DO on 11/28/18 at 9:07 AM

## 2018-11-28 NOTE — PROGRESS NOTES
pulses symmetric in all 4 extremities. Psychiatric: cooperative with portions of examination, no psychotic behavior noted. Neurologic  Mental status:   orientation to person, place, year.                Attention intact as able to attend well to the exam                Language no obvious aphasia.                Comprehension following some simple commands, having more difficulty with complex commands.    Cranial nerves:   CN2: blink to threat bilaterally.    CN 3,4,6: extraocular muscles intact.  Pupils are equal, round, reactive bilaterally.    CN7: face symmetric without dysarthria  CN8: hearing grossly intact  CN12: tongue midline with protrusion  Strength: no focal weakness identified.  She is independently moving her upper limbs.  She may have some symmetrical bilateral lower limb weakness but is able to elevate both off of the bed somewhat today.     Sensory: light touch intact in all 4 extremities. Tone: normal in all 4 extremities  Gait: deferred at this time.      ROS  Constitutional- No weight loss or fevers  Eyes- No diplopia. No photophobia. Ears/nose/throat- No dysphagia. No Dysarthria  Cardiovascular- No palpitations. No chest pain  Respiratory- No dyspnea. No Cough  Gastrointestinal- No Abdominal pain. No Vomiting. Genitourinary- No incontinence. No urinary retention  Musculoskeletal- No myalgia. No arthralgia  Skin- No rash. No easy bruising. Psychiatric- hx depression. No anxiety  Endocrine- hx diabetes. hx thyroid issues. Hematologic- No bleeding difficulty. No fatigue  Neurologic- No focal weakness. No Headache.     Labs  Glucose 127  Na 138  K 3.9  BUN 10  Creatinine 1.4    C.difficile - indeterminate    Urine culture + (11/24/18), - (11/26/18)    Studies  EEG 11/26/18  Periodic epileptiform discharges which were both seen as generalized discharges and occasionally independently over the bilateral hemispheres, consistent with cortical irritability.       CT head 11/24/18  No acute

## 2018-11-28 NOTE — PROGRESS NOTES
Patient is limited by decreased cognition, decreased activity tolerance, and weakness. Patient is requiring assist of 2 for transfers and the use of lift equipment. Patient is in need of continued therapy. Cont per POC. Treatment Diagnosis: Decreased functional mobility ; Decreased ADL status; Decreased endurance;Decreased strength;Decreased cognition;Decreased balance;  Prognosis: Fair  Patient Education: orientation, events that have occured as patient is unable to recall   REQUIRES OT FOLLOW UP: Yes  Activity Tolerance: Treatment limited secondary to decreased cognition;Patient limited by fatigue  Safety Devices in place: Yes  Type of devices: Call light within reach; Chair alarm in place; Left in chair;Gait belt;Nurse notified (STEFANIE Maki notified )        Plan   Times per week: 3-5  Times per day: Daily  Current Treatment Recommendations: Strengthening, Balance Training, Endurance Training, Safety Education & Training, Equipment Evaluation, Education, & procurement, Patient/Caregiver Education & Training, Self-Care / ADL    AM-PAC Score  Renetta Berg scored a 7/24 on the AM-PAC ADL Inpatient form. Current research shows that an AM-PAC score of 17 or less is typically not associated with a discharge to the patient's home setting. Based on the patients AM-PAC score and their current ADL deficits, it is recommended that the patient have 3-5 sessions per week of Occupational Therapy at d/c to increase the patients independence.     Goals  Short term goals  Time Frame for Short term goals: Prior to d/c: status as of 11/28/18: goals ongoing   Short term goal 1: Pt will complete sit <> stand transfers with min Ax2  Short term goal 2: Pt will tolerate sitting EOB for 5+ minutes with SBA   Short term goal 3: Pt will complete grooming tasks with setup and min A  Short term goal 4: Pt will complete UE ROM exercises to increase mobility and strength for self-care and transfers   Patient Goals   Patient goals : Pt unable to

## 2018-11-28 NOTE — PROGRESS NOTES
alternates between responding appropriate to questions and then absent. Pain: Patient Currently in Pain: Denies      OBJECTIVE:      Bed mobility  Supine to Sit: Minimal assistance (HOB up, use of HR )  Sit to Supine:  (left in chair at end of session )  Scooting: Dependent/Total, Maximal assistance (Max Assist at EOB, Total Assist in chair)    Transfers  Sit to Stand: Maximum Assistance, Minimal Assistance, Moderate Assistance (Max Assist x 2 person, Mod/Max Assist x 2 person, BUE support from elevated SaraStedy)  Stand to sit: Maximum Assistance  Bed to Chair: Dependent/Total Daja Quithouston)    Ambulation  Ambulation  Ambulation?: No (pt too weak to ambulate)    Neuro/balance  Balance  Posture: Fair  Sitting - Static: Good (SBA for balance sitting)  Standing - Static: Fair, + (Mod Assist standing balance)  Comments: . Patient Education: role of acute care PT, POC, use of call light        Safety Devices: Type of devices: All fall risk precautions in place, Call light within reach, Chair alarm in place, Gait belt, Patient at risk for falls, Left in chair, Nurse notified (STEFANIE Ho notified)      ASSESSMENT:   Per Guillermo Nevarez MD \"This patient was previously admitted to [facility] and transferred to [facility] for seizures. From there, she was transferred to skilled nursing facility for post acute care. Unfortunately, starting yesterday, she started having progressive worsening of mental status. Yesterday she was taken to the emergency room. Had a urine culture done and was discharged back to skilled nursing facility. Today, she required up to Max Assist for transfers and was too weak to take steps. She is functioning below her baseline and I anticipate would benefit from continued skilled PT 3-5x/wk at discharge. Tracy Walter scored a 8/24 on the AM-PAC short mobility form.  Initial research suggests that an AM-PAC score of 18 or greater may be associated with a discharge to patient's home setting,

## 2019-01-01 ENCOUNTER — APPOINTMENT (OUTPATIENT)
Dept: CT IMAGING | Age: 69
DRG: 177 | End: 2019-01-01
Payer: MEDICARE

## 2019-01-01 ENCOUNTER — TELEPHONE (OUTPATIENT)
Dept: CARDIOLOGY CLINIC | Age: 69
End: 2019-01-01

## 2019-01-01 ENCOUNTER — OFFICE VISIT (OUTPATIENT)
Dept: ORTHOPEDIC SURGERY | Age: 69
End: 2019-01-01
Payer: MEDICARE

## 2019-01-01 ENCOUNTER — HOSPITAL ENCOUNTER (OUTPATIENT)
Dept: WOUND CARE | Age: 69
Discharge: HOME | End: 2019-01-24
Payer: MEDICARE

## 2019-01-01 ENCOUNTER — HOSPITAL ENCOUNTER (OUTPATIENT)
Dept: WOUND CARE | Age: 69
Discharge: HOME | End: 2019-01-07
Payer: MEDICARE

## 2019-01-01 ENCOUNTER — OFFICE VISIT (OUTPATIENT)
Dept: CARDIOLOGY CLINIC | Age: 69
End: 2019-01-01
Payer: MEDICARE

## 2019-01-01 ENCOUNTER — HOSPITAL ENCOUNTER (OUTPATIENT)
Dept: WOUND CARE | Age: 69
Discharge: HOME | End: 2019-01-21

## 2019-01-01 ENCOUNTER — APPOINTMENT (OUTPATIENT)
Dept: GENERAL RADIOLOGY | Age: 69
DRG: 291 | End: 2019-01-01
Payer: MEDICARE

## 2019-01-01 ENCOUNTER — HOSPITAL ENCOUNTER (INPATIENT)
Age: 69
LOS: 6 days | Discharge: NURSING, REHAB OR LONG-TERM CARE | DRG: 177 | End: 2019-05-27
Attending: HOSPITALIST | Admitting: INTERNAL MEDICINE
Payer: MEDICARE

## 2019-01-01 ENCOUNTER — HOSPITAL ENCOUNTER (INPATIENT)
Age: 69
LOS: 5 days | Discharge: NURSING, REHAB OR LONG-TERM CARE | DRG: 291 | End: 2019-06-06
Attending: EMERGENCY MEDICINE | Admitting: INTERNAL MEDICINE
Payer: MEDICARE

## 2019-01-01 ENCOUNTER — HOSPITAL ENCOUNTER (OUTPATIENT)
Dept: WOUND CARE | Age: 69
Discharge: HOME | End: 2019-01-14
Payer: MEDICARE

## 2019-01-01 ENCOUNTER — HOSPITAL ENCOUNTER (OUTPATIENT)
Dept: VASCULAR LAB | Age: 69
Discharge: HOME | End: 2019-01-11
Payer: MEDICARE

## 2019-01-01 ENCOUNTER — APPOINTMENT (OUTPATIENT)
Dept: GENERAL RADIOLOGY | Age: 69
DRG: 177 | End: 2019-01-01
Payer: MEDICARE

## 2019-01-01 ENCOUNTER — SCHEDULED TELEPHONE ENCOUNTER (OUTPATIENT)
Dept: PHARMACY | Age: 69
End: 2019-01-01

## 2019-01-01 VITALS
HEIGHT: 59 IN | BODY MASS INDEX: 35.42 KG/M2 | OXYGEN SATURATION: 95 % | SYSTOLIC BLOOD PRESSURE: 166 MMHG | TEMPERATURE: 97.6 F | DIASTOLIC BLOOD PRESSURE: 80 MMHG | RESPIRATION RATE: 18 BRPM | HEART RATE: 80 BPM | WEIGHT: 175.71 LBS

## 2019-01-01 VITALS
TEMPERATURE: 97.9 F | HEART RATE: 73 BPM | RESPIRATION RATE: 18 BRPM | DIASTOLIC BLOOD PRESSURE: 81 MMHG | SYSTOLIC BLOOD PRESSURE: 154 MMHG

## 2019-01-01 VITALS — DIASTOLIC BLOOD PRESSURE: 58 MMHG | SYSTOLIC BLOOD PRESSURE: 94 MMHG | HEART RATE: 75 BPM

## 2019-01-01 VITALS
DIASTOLIC BLOOD PRESSURE: 65 MMHG | BODY MASS INDEX: 36.23 KG/M2 | RESPIRATION RATE: 18 BRPM | HEART RATE: 71 BPM | WEIGHT: 172.62 LBS | SYSTOLIC BLOOD PRESSURE: 101 MMHG | TEMPERATURE: 98 F | HEIGHT: 58 IN | OXYGEN SATURATION: 91 %

## 2019-01-01 VITALS
DIASTOLIC BLOOD PRESSURE: 70 MMHG | SYSTOLIC BLOOD PRESSURE: 124 MMHG | HEART RATE: 71 BPM | RESPIRATION RATE: 18 BRPM | TEMPERATURE: 97.4 F

## 2019-01-01 VITALS
HEART RATE: 70 BPM | HEIGHT: 60 IN | DIASTOLIC BLOOD PRESSURE: 72 MMHG | SYSTOLIC BLOOD PRESSURE: 132 MMHG | BODY MASS INDEX: 34.95 KG/M2 | WEIGHT: 178 LBS | OXYGEN SATURATION: 96 %

## 2019-01-01 VITALS
RESPIRATION RATE: 18 BRPM | SYSTOLIC BLOOD PRESSURE: 132 MMHG | HEART RATE: 70 BPM | DIASTOLIC BLOOD PRESSURE: 70 MMHG | TEMPERATURE: 99.1 F

## 2019-01-01 DIAGNOSIS — I87.2 VENOUS STASIS DERMATITIS OF BOTH LOWER EXTREMITIES: Primary | ICD-10-CM

## 2019-01-01 DIAGNOSIS — L97.909 VENOUS ULCER (HCC): ICD-10-CM

## 2019-01-01 DIAGNOSIS — E78.5 DYSLIPIDEMIA: ICD-10-CM

## 2019-01-01 DIAGNOSIS — R60.0 LOWER LEG EDEMA: ICD-10-CM

## 2019-01-01 DIAGNOSIS — I10 ESSENTIAL HYPERTENSION: ICD-10-CM

## 2019-01-01 DIAGNOSIS — L97.921 ULCER OF LEFT LOWER LEG, LIMITED TO BREAKDOWN OF SKIN (HCC): ICD-10-CM

## 2019-01-01 DIAGNOSIS — R00.0 TACHYCARDIA: ICD-10-CM

## 2019-01-01 DIAGNOSIS — N17.9 AKI (ACUTE KIDNEY INJURY) (HCC): ICD-10-CM

## 2019-01-01 DIAGNOSIS — R79.89 ELEVATED BRAIN NATRIURETIC PEPTIDE (BNP) LEVEL: ICD-10-CM

## 2019-01-01 DIAGNOSIS — N18.30 CKD (CHRONIC KIDNEY DISEASE), STAGE III (HCC): ICD-10-CM

## 2019-01-01 DIAGNOSIS — R60.0 LOWER LEG EDEMA: Primary | ICD-10-CM

## 2019-01-01 DIAGNOSIS — R06.02 SHORTNESS OF BREATH: Primary | ICD-10-CM

## 2019-01-01 DIAGNOSIS — I83.009 VENOUS ULCER (HCC): ICD-10-CM

## 2019-01-01 DIAGNOSIS — I87.2 VENOUS STASIS DERMATITIS OF BOTH LOWER EXTREMITIES: ICD-10-CM

## 2019-01-01 DIAGNOSIS — I50.32 CHRONIC DIASTOLIC HF (HEART FAILURE) (HCC): Primary | ICD-10-CM

## 2019-01-01 DIAGNOSIS — M79.604 LEG PAIN, BILATERAL: ICD-10-CM

## 2019-01-01 DIAGNOSIS — M79.605 LEG PAIN, BILATERAL: ICD-10-CM

## 2019-01-01 DIAGNOSIS — J96.22 ACUTE ON CHRONIC RESPIRATORY FAILURE WITH HYPERCAPNIA (HCC): Primary | ICD-10-CM

## 2019-01-01 DIAGNOSIS — M17.11 PRIMARY OSTEOARTHRITIS OF RIGHT KNEE: Primary | ICD-10-CM

## 2019-01-01 DIAGNOSIS — D50.9 IRON DEFICIENCY ANEMIA, UNSPECIFIED IRON DEFICIENCY ANEMIA TYPE: ICD-10-CM

## 2019-01-01 DIAGNOSIS — I50.9 ACUTE ON CHRONIC CONGESTIVE HEART FAILURE, UNSPECIFIED HEART FAILURE TYPE (HCC): ICD-10-CM

## 2019-01-01 LAB
A/G RATIO: 0.8 (ref 1.1–2.2)
A/G RATIO: 1 (ref 1.1–2.2)
ALBUMIN SERPL-MCNC: 3.5 G/DL (ref 3.4–5)
ALBUMIN SERPL-MCNC: 3.7 G/DL (ref 3.4–5)
ALP BLD-CCNC: 114 U/L (ref 40–129)
ALP BLD-CCNC: 125 U/L (ref 40–129)
ALT SERPL-CCNC: 10 U/L (ref 10–40)
ALT SERPL-CCNC: 11 U/L (ref 10–40)
ANION GAP SERPL CALCULATED.3IONS-SCNC: 10 MMOL/L (ref 3–16)
ANION GAP SERPL CALCULATED.3IONS-SCNC: 10 MMOL/L (ref 3–16)
ANION GAP SERPL CALCULATED.3IONS-SCNC: 11 MMOL/L (ref 3–16)
ANION GAP SERPL CALCULATED.3IONS-SCNC: 12 MMOL/L (ref 3–16)
ANION GAP SERPL CALCULATED.3IONS-SCNC: 13 MMOL/L (ref 3–16)
ANION GAP SERPL CALCULATED.3IONS-SCNC: 17 MMOL/L (ref 3–16)
ANION GAP SERPL CALCULATED.3IONS-SCNC: 9 MMOL/L (ref 3–16)
ANION GAP SERPL CALCULATED.3IONS-SCNC: 9 MMOL/L (ref 3–16)
AST SERPL-CCNC: 15 U/L (ref 15–37)
AST SERPL-CCNC: 17 U/L (ref 15–37)
BACTERIA: ABNORMAL /HPF
BACTERIA: ABNORMAL /HPF
BASE EXCESS ARTERIAL: -2.4 MMOL/L (ref -3–3)
BASE EXCESS ARTERIAL: -5.7 MMOL/L (ref -3–3)
BASOPHILS ABSOLUTE: 0 K/UL (ref 0–0.2)
BASOPHILS ABSOLUTE: 0.1 K/UL (ref 0–0.2)
BASOPHILS RELATIVE PERCENT: 0.3 %
BASOPHILS RELATIVE PERCENT: 0.5 %
BASOPHILS RELATIVE PERCENT: 0.7 %
BASOPHILS RELATIVE PERCENT: 0.8 %
BASOPHILS RELATIVE PERCENT: 0.8 %
BILIRUB SERPL-MCNC: 0.3 MG/DL (ref 0–1)
BILIRUB SERPL-MCNC: 0.4 MG/DL (ref 0–1)
BILIRUBIN URINE: NEGATIVE
BILIRUBIN URINE: NEGATIVE
BLOOD CULTURE, ROUTINE: NORMAL
BLOOD CULTURE, ROUTINE: NORMAL
BLOOD, URINE: ABNORMAL
BLOOD, URINE: NEGATIVE
BUN BLDV-MCNC: 26 MG/DL (ref 7–20)
BUN BLDV-MCNC: 27 MG/DL (ref 7–20)
BUN BLDV-MCNC: 27 MG/DL (ref 7–20)
BUN BLDV-MCNC: 28 MG/DL (ref 7–20)
BUN BLDV-MCNC: 30 MG/DL (ref 7–20)
BUN BLDV-MCNC: 31 MG/DL (ref 7–20)
BUN BLDV-MCNC: 37 MG/DL (ref 7–20)
BUN BLDV-MCNC: 39 MG/DL (ref 7–20)
BUN BLDV-MCNC: 40 MG/DL (ref 7–20)
BUN BLDV-MCNC: 43 MG/DL (ref 7–20)
CALCIUM SERPL-MCNC: 7.8 MG/DL (ref 8.3–10.6)
CALCIUM SERPL-MCNC: 8 MG/DL (ref 8.3–10.6)
CALCIUM SERPL-MCNC: 8 MG/DL (ref 8.3–10.6)
CALCIUM SERPL-MCNC: 8.1 MG/DL (ref 8.3–10.6)
CALCIUM SERPL-MCNC: 8.2 MG/DL (ref 8.3–10.6)
CALCIUM SERPL-MCNC: 8.3 MG/DL (ref 8.3–10.6)
CALCIUM SERPL-MCNC: 8.4 MG/DL (ref 8.3–10.6)
CALCIUM SERPL-MCNC: 8.4 MG/DL (ref 8.3–10.6)
CALCIUM SERPL-MCNC: 8.5 MG/DL (ref 8.3–10.6)
CALCIUM SERPL-MCNC: 8.5 MG/DL (ref 8.3–10.6)
CALCIUM SERPL-MCNC: 8.6 MG/DL (ref 8.3–10.6)
CALCIUM SERPL-MCNC: 8.6 MG/DL (ref 8.3–10.6)
CARBOXYHEMOGLOBIN ARTERIAL: 1 % (ref 0–1.5)
CARBOXYHEMOGLOBIN ARTERIAL: 1 % (ref 0–1.5)
CHLORIDE BLD-SCNC: 101 MMOL/L (ref 99–110)
CHLORIDE BLD-SCNC: 103 MMOL/L (ref 99–110)
CHLORIDE BLD-SCNC: 103 MMOL/L (ref 99–110)
CHLORIDE BLD-SCNC: 104 MMOL/L (ref 99–110)
CHLORIDE BLD-SCNC: 104 MMOL/L (ref 99–110)
CHLORIDE BLD-SCNC: 105 MMOL/L (ref 99–110)
CHLORIDE BLD-SCNC: 106 MMOL/L (ref 99–110)
CHLORIDE BLD-SCNC: 106 MMOL/L (ref 99–110)
CHLORIDE BLD-SCNC: 107 MMOL/L (ref 99–110)
CHLORIDE BLD-SCNC: 108 MMOL/L (ref 99–110)
CLARITY: ABNORMAL
CLARITY: ABNORMAL
CO2: 20 MMOL/L (ref 21–32)
CO2: 21 MMOL/L (ref 21–32)
CO2: 21 MMOL/L (ref 21–32)
CO2: 22 MMOL/L (ref 21–32)
CO2: 22 MMOL/L (ref 21–32)
CO2: 23 MMOL/L (ref 21–32)
CO2: 24 MMOL/L (ref 21–32)
CO2: 25 MMOL/L (ref 21–32)
CO2: 27 MMOL/L (ref 21–32)
CO2: 29 MMOL/L (ref 21–32)
CO2: 30 MMOL/L (ref 21–32)
CO2: 30 MMOL/L (ref 21–32)
COLOR: YELLOW
COLOR: YELLOW
COMMENT UA: ABNORMAL
CREAT SERPL-MCNC: 1.3 MG/DL (ref 0.6–1.2)
CREAT SERPL-MCNC: 1.6 MG/DL (ref 0.6–1.2)
CREAT SERPL-MCNC: 1.7 MG/DL (ref 0.6–1.2)
CREAT SERPL-MCNC: 1.7 MG/DL (ref 0.6–1.2)
CREAT SERPL-MCNC: 1.8 MG/DL (ref 0.6–1.2)
CREAT SERPL-MCNC: 1.9 MG/DL (ref 0.6–1.2)
CREAT SERPL-MCNC: 2 MG/DL (ref 0.6–1.2)
CREAT SERPL-MCNC: 2 MG/DL (ref 0.6–1.2)
CREAT SERPL-MCNC: 2.1 MG/DL (ref 0.6–1.2)
CREAT SERPL-MCNC: 2.1 MG/DL (ref 0.6–1.2)
CULTURE, BLOOD 2: NORMAL
CULTURE, BLOOD 2: NORMAL
CULTURE, RESPIRATORY: NORMAL
EKG ATRIAL RATE: 106 BPM
EKG ATRIAL RATE: 136 BPM
EKG DIAGNOSIS: NORMAL
EKG DIAGNOSIS: NORMAL
EKG P AXIS: 56 DEGREES
EKG P AXIS: 77 DEGREES
EKG P-R INTERVAL: 134 MS
EKG P-R INTERVAL: 156 MS
EKG Q-T INTERVAL: 286 MS
EKG Q-T INTERVAL: 326 MS
EKG QRS DURATION: 76 MS
EKG QRS DURATION: 84 MS
EKG QTC CALCULATION (BAZETT): 430 MS
EKG QTC CALCULATION (BAZETT): 433 MS
EKG R AXIS: -16 DEGREES
EKG R AXIS: -25 DEGREES
EKG T AXIS: 72 DEGREES
EKG T AXIS: 87 DEGREES
EKG VENTRICULAR RATE: 106 BPM
EKG VENTRICULAR RATE: 136 BPM
EOSINOPHILS ABSOLUTE: 0.1 K/UL (ref 0–0.6)
EOSINOPHILS ABSOLUTE: 0.2 K/UL (ref 0–0.6)
EOSINOPHILS ABSOLUTE: 0.2 K/UL (ref 0–0.6)
EOSINOPHILS ABSOLUTE: 0.3 K/UL (ref 0–0.6)
EOSINOPHILS ABSOLUTE: 0.5 K/UL (ref 0–0.6)
EOSINOPHILS RELATIVE PERCENT: 1.2 %
EOSINOPHILS RELATIVE PERCENT: 2.5 %
EOSINOPHILS RELATIVE PERCENT: 3.6 %
EOSINOPHILS RELATIVE PERCENT: 5.2 %
EOSINOPHILS RELATIVE PERCENT: 6.6 %
EPITHELIAL CELLS, UA: 0 /HPF (ref 0–5)
EPITHELIAL CELLS, UA: 72 /HPF (ref 0–5)
ESTIMATED AVERAGE GLUCOSE: 119.8 MG/DL
GFR AFRICAN AMERICAN: 28
GFR AFRICAN AMERICAN: 28
GFR AFRICAN AMERICAN: 30
GFR AFRICAN AMERICAN: 30
GFR AFRICAN AMERICAN: 32
GFR AFRICAN AMERICAN: 34
GFR AFRICAN AMERICAN: 36
GFR AFRICAN AMERICAN: 36
GFR AFRICAN AMERICAN: 39
GFR AFRICAN AMERICAN: 49
GFR NON-AFRICAN AMERICAN: 23
GFR NON-AFRICAN AMERICAN: 23
GFR NON-AFRICAN AMERICAN: 25
GFR NON-AFRICAN AMERICAN: 25
GFR NON-AFRICAN AMERICAN: 26
GFR NON-AFRICAN AMERICAN: 28
GFR NON-AFRICAN AMERICAN: 30
GFR NON-AFRICAN AMERICAN: 30
GFR NON-AFRICAN AMERICAN: 32
GFR NON-AFRICAN AMERICAN: 40
GLOBULIN: 3.7 G/DL
GLOBULIN: 4.3 G/DL
GLUCOSE BLD-MCNC: 100 MG/DL (ref 70–99)
GLUCOSE BLD-MCNC: 101 MG/DL (ref 70–99)
GLUCOSE BLD-MCNC: 101 MG/DL (ref 70–99)
GLUCOSE BLD-MCNC: 102 MG/DL (ref 70–99)
GLUCOSE BLD-MCNC: 105 MG/DL (ref 70–99)
GLUCOSE BLD-MCNC: 105 MG/DL (ref 70–99)
GLUCOSE BLD-MCNC: 106 MG/DL (ref 70–99)
GLUCOSE BLD-MCNC: 106 MG/DL (ref 70–99)
GLUCOSE BLD-MCNC: 107 MG/DL (ref 70–99)
GLUCOSE BLD-MCNC: 108 MG/DL (ref 70–99)
GLUCOSE BLD-MCNC: 108 MG/DL (ref 70–99)
GLUCOSE BLD-MCNC: 109 MG/DL (ref 70–99)
GLUCOSE BLD-MCNC: 110 MG/DL (ref 70–99)
GLUCOSE BLD-MCNC: 114 MG/DL (ref 70–99)
GLUCOSE BLD-MCNC: 118 MG/DL (ref 70–99)
GLUCOSE BLD-MCNC: 124 MG/DL (ref 70–99)
GLUCOSE BLD-MCNC: 125 MG/DL (ref 70–99)
GLUCOSE BLD-MCNC: 133 MG/DL (ref 70–99)
GLUCOSE BLD-MCNC: 138 MG/DL (ref 70–99)
GLUCOSE BLD-MCNC: 138 MG/DL (ref 70–99)
GLUCOSE BLD-MCNC: 140 MG/DL (ref 70–99)
GLUCOSE BLD-MCNC: 141 MG/DL (ref 70–99)
GLUCOSE BLD-MCNC: 147 MG/DL (ref 70–99)
GLUCOSE BLD-MCNC: 148 MG/DL (ref 70–99)
GLUCOSE BLD-MCNC: 150 MG/DL (ref 70–99)
GLUCOSE BLD-MCNC: 154 MG/DL (ref 70–99)
GLUCOSE BLD-MCNC: 163 MG/DL (ref 70–99)
GLUCOSE BLD-MCNC: 167 MG/DL (ref 70–99)
GLUCOSE BLD-MCNC: 184 MG/DL (ref 70–99)
GLUCOSE BLD-MCNC: 191 MG/DL (ref 70–99)
GLUCOSE BLD-MCNC: 196 MG/DL (ref 70–99)
GLUCOSE BLD-MCNC: 213 MG/DL (ref 70–99)
GLUCOSE BLD-MCNC: 216 MG/DL (ref 70–99)
GLUCOSE BLD-MCNC: 223 MG/DL (ref 70–99)
GLUCOSE BLD-MCNC: 244 MG/DL (ref 70–99)
GLUCOSE BLD-MCNC: 253 MG/DL (ref 70–99)
GLUCOSE BLD-MCNC: 254 MG/DL (ref 70–99)
GLUCOSE BLD-MCNC: 73 MG/DL (ref 70–99)
GLUCOSE BLD-MCNC: 74 MG/DL (ref 70–99)
GLUCOSE BLD-MCNC: 74 MG/DL (ref 70–99)
GLUCOSE BLD-MCNC: 77 MG/DL (ref 70–99)
GLUCOSE BLD-MCNC: 84 MG/DL (ref 70–99)
GLUCOSE BLD-MCNC: 86 MG/DL (ref 70–99)
GLUCOSE BLD-MCNC: 88 MG/DL (ref 70–99)
GLUCOSE BLD-MCNC: 88 MG/DL (ref 70–99)
GLUCOSE BLD-MCNC: 89 MG/DL (ref 70–99)
GLUCOSE BLD-MCNC: 90 MG/DL (ref 70–99)
GLUCOSE BLD-MCNC: 91 MG/DL (ref 70–99)
GLUCOSE BLD-MCNC: 92 MG/DL (ref 70–99)
GLUCOSE BLD-MCNC: 92 MG/DL (ref 70–99)
GLUCOSE BLD-MCNC: 93 MG/DL (ref 70–99)
GLUCOSE BLD-MCNC: 93 MG/DL (ref 70–99)
GLUCOSE BLD-MCNC: 94 MG/DL (ref 70–99)
GLUCOSE BLD-MCNC: 95 MG/DL (ref 70–99)
GLUCOSE BLD-MCNC: 96 MG/DL (ref 70–99)
GLUCOSE BLD-MCNC: 98 MG/DL (ref 70–99)
GLUCOSE URINE: NEGATIVE MG/DL
GLUCOSE URINE: NEGATIVE MG/DL
GRAM STAIN RESULT: NORMAL
HBA1C MFR BLD: 5.8 %
HCO3 ARTERIAL: 24 MMOL/L (ref 21–29)
HCO3 ARTERIAL: 24.6 MMOL/L (ref 21–29)
HCT VFR BLD CALC: 25.4 % (ref 36–48)
HCT VFR BLD CALC: 25.4 % (ref 36–48)
HCT VFR BLD CALC: 25.9 % (ref 36–48)
HCT VFR BLD CALC: 27.3 % (ref 36–48)
HCT VFR BLD CALC: 27.8 % (ref 36–48)
HCT VFR BLD CALC: 28.1 % (ref 36–48)
HCT VFR BLD CALC: 28.4 % (ref 36–48)
HCT VFR BLD CALC: 28.6 % (ref 36–48)
HCT VFR BLD CALC: 32.6 % (ref 36–48)
HCT VFR BLD CALC: 35 % (ref 36–48)
HEMOGLOBIN, ART, EXTENDED: 10.1 G/DL (ref 12–16)
HEMOGLOBIN, ART, EXTENDED: 10.2 G/DL (ref 12–16)
HEMOGLOBIN: 11.1 G/DL (ref 12–16)
HEMOGLOBIN: 11.2 G/DL (ref 12–16)
HEMOGLOBIN: 8.4 G/DL (ref 12–16)
HEMOGLOBIN: 8.6 G/DL (ref 12–16)
HEMOGLOBIN: 8.7 G/DL (ref 12–16)
HEMOGLOBIN: 9.2 G/DL (ref 12–16)
HEMOGLOBIN: 9.2 G/DL (ref 12–16)
HEMOGLOBIN: 9.3 G/DL (ref 12–16)
HEMOGLOBIN: 9.3 G/DL (ref 12–16)
HEMOGLOBIN: 9.4 G/DL (ref 12–16)
HYALINE CASTS: 9 /LPF (ref 0–8)
KETONES, URINE: NEGATIVE MG/DL
KETONES, URINE: NEGATIVE MG/DL
LACTIC ACID: 1.4 MMOL/L (ref 0.4–2)
LEUKOCYTE ESTERASE, URINE: ABNORMAL
LEUKOCYTE ESTERASE, URINE: ABNORMAL
LV EF: 63 %
LVEF MODALITY: NORMAL
LYMPHOCYTES ABSOLUTE: 0.6 K/UL (ref 1–5.1)
LYMPHOCYTES ABSOLUTE: 1 K/UL (ref 1–5.1)
LYMPHOCYTES ABSOLUTE: 1.2 K/UL (ref 1–5.1)
LYMPHOCYTES ABSOLUTE: 1.4 K/UL (ref 1–5.1)
LYMPHOCYTES ABSOLUTE: 2.6 K/UL (ref 1–5.1)
LYMPHOCYTES RELATIVE PERCENT: 14 %
LYMPHOCYTES RELATIVE PERCENT: 23.7 %
LYMPHOCYTES RELATIVE PERCENT: 24.3 %
LYMPHOCYTES RELATIVE PERCENT: 28 %
LYMPHOCYTES RELATIVE PERCENT: 9 %
MCH RBC QN AUTO: 30.4 PG (ref 26–34)
MCH RBC QN AUTO: 30.5 PG (ref 26–34)
MCH RBC QN AUTO: 30.5 PG (ref 26–34)
MCH RBC QN AUTO: 30.9 PG (ref 26–34)
MCH RBC QN AUTO: 31 PG (ref 26–34)
MCH RBC QN AUTO: 31 PG (ref 26–34)
MCH RBC QN AUTO: 31.2 PG (ref 26–34)
MCH RBC QN AUTO: 31.2 PG (ref 26–34)
MCH RBC QN AUTO: 31.5 PG (ref 26–34)
MCH RBC QN AUTO: 31.7 PG (ref 26–34)
MCHC RBC AUTO-ENTMCNC: 31.9 G/DL (ref 31–36)
MCHC RBC AUTO-ENTMCNC: 32.6 G/DL (ref 31–36)
MCHC RBC AUTO-ENTMCNC: 33 G/DL (ref 31–36)
MCHC RBC AUTO-ENTMCNC: 33.1 G/DL (ref 31–36)
MCHC RBC AUTO-ENTMCNC: 33.2 G/DL (ref 31–36)
MCHC RBC AUTO-ENTMCNC: 33.2 G/DL (ref 31–36)
MCHC RBC AUTO-ENTMCNC: 33.8 G/DL (ref 31–36)
MCHC RBC AUTO-ENTMCNC: 33.9 G/DL (ref 31–36)
MCV RBC AUTO: 91.6 FL (ref 80–100)
MCV RBC AUTO: 91.6 FL (ref 80–100)
MCV RBC AUTO: 92 FL (ref 80–100)
MCV RBC AUTO: 93.3 FL (ref 80–100)
MCV RBC AUTO: 93.6 FL (ref 80–100)
MCV RBC AUTO: 93.7 FL (ref 80–100)
MCV RBC AUTO: 93.7 FL (ref 80–100)
MCV RBC AUTO: 93.8 FL (ref 80–100)
MCV RBC AUTO: 94.1 FL (ref 80–100)
MCV RBC AUTO: 95.7 FL (ref 80–100)
METHEMOGLOBIN ARTERIAL: 0.8 %
METHEMOGLOBIN ARTERIAL: 1 %
MICROSCOPIC EXAMINATION: YES
MICROSCOPIC EXAMINATION: YES
MONOCYTES ABSOLUTE: 0.4 K/UL (ref 0–1.3)
MONOCYTES ABSOLUTE: 0.7 K/UL (ref 0–1.3)
MONOCYTES ABSOLUTE: 0.9 K/UL (ref 0–1.3)
MONOCYTES RELATIVE PERCENT: 10.1 %
MONOCYTES RELATIVE PERCENT: 12.1 %
MONOCYTES RELATIVE PERCENT: 4.7 %
MONOCYTES RELATIVE PERCENT: 6.7 %
MONOCYTES RELATIVE PERCENT: 9.7 %
MRSA SCREEN RT-PCR: ABNORMAL
NEUTROPHILS ABSOLUTE: 14.4 K/UL (ref 1.7–7.7)
NEUTROPHILS ABSOLUTE: 2.3 K/UL (ref 1.7–7.7)
NEUTROPHILS ABSOLUTE: 2.6 K/UL (ref 1.7–7.7)
NEUTROPHILS ABSOLUTE: 3.5 K/UL (ref 1.7–7.7)
NEUTROPHILS ABSOLUTE: 5.3 K/UL (ref 1.7–7.7)
NEUTROPHILS RELATIVE PERCENT: 54.9 %
NEUTROPHILS RELATIVE PERCENT: 60.3 %
NEUTROPHILS RELATIVE PERCENT: 61.9 %
NEUTROPHILS RELATIVE PERCENT: 78 %
NEUTROPHILS RELATIVE PERCENT: 80.4 %
NITRITE, URINE: NEGATIVE
NITRITE, URINE: NEGATIVE
O2 CONTENT ARTERIAL: 12 ML/DL
O2 CONTENT ARTERIAL: 15 ML/DL
O2 SAT, ARTERIAL: 87 %
O2 SAT, ARTERIAL: 99.9 %
O2 THERAPY: ABNORMAL
O2 THERAPY: ABNORMAL
ORGANISM: ABNORMAL
PCO2 ARTERIAL: 57 MMHG (ref 35–45)
PCO2 ARTERIAL: 74.9 MMHG (ref 35–45)
PDW BLD-RTO: 16.4 % (ref 12.4–15.4)
PDW BLD-RTO: 16.4 % (ref 12.4–15.4)
PDW BLD-RTO: 16.5 % (ref 12.4–15.4)
PDW BLD-RTO: 16.8 % (ref 12.4–15.4)
PDW BLD-RTO: 16.8 % (ref 12.4–15.4)
PDW BLD-RTO: 16.9 % (ref 12.4–15.4)
PDW BLD-RTO: 16.9 % (ref 12.4–15.4)
PDW BLD-RTO: 17 % (ref 12.4–15.4)
PDW BLD-RTO: 17.2 % (ref 12.4–15.4)
PDW BLD-RTO: 17.3 % (ref 12.4–15.4)
PERFORMED ON: ABNORMAL
PERFORMED ON: NORMAL
PH ARTERIAL: 7.13 (ref 7.35–7.45)
PH ARTERIAL: 7.26 (ref 7.35–7.45)
PH UA: 5 (ref 5–8)
PH UA: 6 (ref 5–8)
PLATELET # BLD: 128 K/UL (ref 135–450)
PLATELET # BLD: 136 K/UL (ref 135–450)
PLATELET # BLD: 137 K/UL (ref 135–450)
PLATELET # BLD: 142 K/UL (ref 135–450)
PLATELET # BLD: 143 K/UL (ref 135–450)
PLATELET # BLD: 145 K/UL (ref 135–450)
PLATELET # BLD: 148 K/UL (ref 135–450)
PLATELET # BLD: 150 K/UL (ref 135–450)
PLATELET # BLD: 154 K/UL (ref 135–450)
PLATELET # BLD: 323 K/UL (ref 135–450)
PMV BLD AUTO: 8.2 FL (ref 5–10.5)
PMV BLD AUTO: 8.2 FL (ref 5–10.5)
PMV BLD AUTO: 8.4 FL (ref 5–10.5)
PMV BLD AUTO: 8.4 FL (ref 5–10.5)
PMV BLD AUTO: 8.5 FL (ref 5–10.5)
PMV BLD AUTO: 8.5 FL (ref 5–10.5)
PMV BLD AUTO: 8.6 FL (ref 5–10.5)
PMV BLD AUTO: 8.7 FL (ref 5–10.5)
PMV BLD AUTO: 9 FL (ref 5–10.5)
PMV BLD AUTO: 9.2 FL (ref 5–10.5)
PO2 ARTERIAL: 444 MMHG (ref 75–108)
PO2 ARTERIAL: 63.9 MMHG (ref 75–108)
POTASSIUM REFLEX MAGNESIUM: 4.1 MMOL/L (ref 3.5–5.1)
POTASSIUM REFLEX MAGNESIUM: 4.1 MMOL/L (ref 3.5–5.1)
POTASSIUM REFLEX MAGNESIUM: 4.2 MMOL/L (ref 3.5–5.1)
POTASSIUM REFLEX MAGNESIUM: 4.5 MMOL/L (ref 3.5–5.1)
POTASSIUM REFLEX MAGNESIUM: 4.6 MMOL/L (ref 3.5–5.1)
POTASSIUM REFLEX MAGNESIUM: 4.7 MMOL/L (ref 3.5–5.1)
POTASSIUM REFLEX MAGNESIUM: 5.8 MMOL/L (ref 3.5–5.1)
POTASSIUM SERPL-SCNC: 3.9 MMOL/L (ref 3.5–5.1)
POTASSIUM SERPL-SCNC: 4.4 MMOL/L (ref 3.5–5.1)
POTASSIUM SERPL-SCNC: 4.6 MMOL/L (ref 3.5–5.1)
PRO-BNP: 4249 PG/ML (ref 0–124)
PRO-BNP: 6470 PG/ML (ref 0–124)
PROCALCITONIN: 0.07 NG/ML (ref 0–0.15)
PROCALCITONIN: 0.11 NG/ML (ref 0–0.15)
PROTEIN UA: 100 MG/DL
PROTEIN UA: 100 MG/DL
RBC # BLD: 2.7 M/UL (ref 4–5.2)
RBC # BLD: 2.77 M/UL (ref 4–5.2)
RBC # BLD: 2.78 M/UL (ref 4–5.2)
RBC # BLD: 2.92 M/UL (ref 4–5.2)
RBC # BLD: 2.97 M/UL (ref 4–5.2)
RBC # BLD: 3.03 M/UL (ref 4–5.2)
RBC # BLD: 3.05 M/UL (ref 4–5.2)
RBC # BLD: 3.06 M/UL (ref 4–5.2)
RBC # BLD: 3.55 M/UL (ref 4–5.2)
RBC # BLD: 3.66 M/UL (ref 4–5.2)
RBC UA: 3 /HPF (ref 0–4)
RBC UA: ABNORMAL /HPF (ref 0–2)
SODIUM BLD-SCNC: 138 MMOL/L (ref 136–145)
SODIUM BLD-SCNC: 139 MMOL/L (ref 136–145)
SODIUM BLD-SCNC: 140 MMOL/L (ref 136–145)
SODIUM BLD-SCNC: 140 MMOL/L (ref 136–145)
SODIUM BLD-SCNC: 141 MMOL/L (ref 136–145)
SODIUM BLD-SCNC: 142 MMOL/L (ref 136–145)
SODIUM BLD-SCNC: 143 MMOL/L (ref 136–145)
SODIUM BLD-SCNC: 143 MMOL/L (ref 136–145)
SPECIFIC GRAVITY UA: 1.01 (ref 1–1.03)
SPECIFIC GRAVITY UA: 1.01 (ref 1–1.03)
TCO2 ARTERIAL: 26.3 MMOL/L
TCO2 ARTERIAL: 26.4 MMOL/L
TOTAL PROTEIN: 7.4 G/DL (ref 6.4–8.2)
TOTAL PROTEIN: 7.8 G/DL (ref 6.4–8.2)
TROPONIN: 0.04 NG/ML
URINE CULTURE, ROUTINE: ABNORMAL
URINE REFLEX TO CULTURE: YES
URINE TYPE: ABNORMAL
URINE TYPE: ABNORMAL
UROBILINOGEN, URINE: 0.2 E.U./DL
UROBILINOGEN, URINE: 0.2 E.U./DL
VANCOMYCIN RANDOM: 12.5 UG/ML
VANCOMYCIN RANDOM: 17.4 UG/ML
VANCOMYCIN RANDOM: 19.6 UG/ML
VANCOMYCIN TROUGH: 20.8 UG/ML (ref 10–20)
WBC # BLD: 18.5 K/UL (ref 4–11)
WBC # BLD: 4.1 K/UL (ref 4–11)
WBC # BLD: 4.2 K/UL (ref 4–11)
WBC # BLD: 4.3 K/UL (ref 4–11)
WBC # BLD: 4.6 K/UL (ref 4–11)
WBC # BLD: 4.6 K/UL (ref 4–11)
WBC # BLD: 4.9 K/UL (ref 4–11)
WBC # BLD: 5.3 K/UL (ref 4–11)
WBC # BLD: 5.6 K/UL (ref 4–11)
WBC # BLD: 6.6 K/UL (ref 4–11)
WBC UA: 101 /HPF (ref 0–5)
WBC UA: 49 /HPF (ref 0–5)

## 2019-01-01 PROCEDURE — 97530 THERAPEUTIC ACTIVITIES: CPT

## 2019-01-01 PROCEDURE — 36415 COLL VENOUS BLD VENIPUNCTURE: CPT

## 2019-01-01 PROCEDURE — 2580000003 HC RX 258: Performed by: HOSPITALIST

## 2019-01-01 PROCEDURE — 6370000000 HC RX 637 (ALT 250 FOR IP): Performed by: INTERNAL MEDICINE

## 2019-01-01 PROCEDURE — 99285 EMERGENCY DEPT VISIT HI MDM: CPT

## 2019-01-01 PROCEDURE — 97535 SELF CARE MNGMENT TRAINING: CPT

## 2019-01-01 PROCEDURE — 96374 THER/PROPH/DIAG INJ IV PUSH: CPT

## 2019-01-01 PROCEDURE — 36600 WITHDRAWAL OF ARTERIAL BLOOD: CPT

## 2019-01-01 PROCEDURE — 6360000002 HC RX W HCPCS: Performed by: INTERNAL MEDICINE

## 2019-01-01 PROCEDURE — 6360000002 HC RX W HCPCS: Performed by: HOSPITALIST

## 2019-01-01 PROCEDURE — 81001 URINALYSIS AUTO W/SCOPE: CPT

## 2019-01-01 PROCEDURE — 84484 ASSAY OF TROPONIN QUANT: CPT

## 2019-01-01 PROCEDURE — 94761 N-INVAS EAR/PLS OXIMETRY MLT: CPT

## 2019-01-01 PROCEDURE — 85025 COMPLETE CBC W/AUTO DIFF WBC: CPT

## 2019-01-01 PROCEDURE — 99223 1ST HOSP IP/OBS HIGH 75: CPT | Performed by: INTERNAL MEDICINE

## 2019-01-01 PROCEDURE — 6370000000 HC RX 637 (ALT 250 FOR IP): Performed by: HOSPITALIST

## 2019-01-01 PROCEDURE — 6360000002 HC RX W HCPCS: Performed by: EMERGENCY MEDICINE

## 2019-01-01 PROCEDURE — 99212 OFFICE O/P EST SF 10 MIN: CPT | Performed by: NURSE PRACTITIONER

## 2019-01-01 PROCEDURE — 80048 BASIC METABOLIC PNL TOTAL CA: CPT

## 2019-01-01 PROCEDURE — 97116 GAIT TRAINING THERAPY: CPT | Performed by: PHYSICAL THERAPIST

## 2019-01-01 PROCEDURE — 83880 ASSAY OF NATRIURETIC PEPTIDE: CPT

## 2019-01-01 PROCEDURE — 94640 AIRWAY INHALATION TREATMENT: CPT

## 2019-01-01 PROCEDURE — 80202 ASSAY OF VANCOMYCIN: CPT

## 2019-01-01 PROCEDURE — 94660 CPAP INITIATION&MGMT: CPT

## 2019-01-01 PROCEDURE — 3017F COLORECTAL CA SCREEN DOC REV: CPT | Performed by: NURSE PRACTITIONER

## 2019-01-01 PROCEDURE — 6370000000 HC RX 637 (ALT 250 FOR IP): Performed by: NURSE PRACTITIONER

## 2019-01-01 PROCEDURE — 84145 PROCALCITONIN (PCT): CPT

## 2019-01-01 PROCEDURE — 1200000000 HC SEMI PRIVATE

## 2019-01-01 PROCEDURE — 99232 SBSQ HOSP IP/OBS MODERATE 35: CPT | Performed by: INTERNAL MEDICINE

## 2019-01-01 PROCEDURE — 97530 THERAPEUTIC ACTIVITIES: CPT | Performed by: PHYSICAL THERAPIST

## 2019-01-01 PROCEDURE — 85027 COMPLETE CBC AUTOMATED: CPT

## 2019-01-01 PROCEDURE — 99213 OFFICE O/P EST LOW 20 MIN: CPT | Performed by: ORTHOPAEDIC SURGERY

## 2019-01-01 PROCEDURE — 2580000003 HC RX 258: Performed by: INTERNAL MEDICINE

## 2019-01-01 PROCEDURE — 94760 N-INVAS EAR/PLS OXIMETRY 1: CPT

## 2019-01-01 PROCEDURE — 97166 OT EVAL MOD COMPLEX 45 MIN: CPT

## 2019-01-01 PROCEDURE — 97162 PT EVAL MOD COMPLEX 30 MIN: CPT

## 2019-01-01 PROCEDURE — G8417 CALC BMI ABV UP PARAM F/U: HCPCS | Performed by: NURSE PRACTITIONER

## 2019-01-01 PROCEDURE — 93306 TTE W/DOPPLER COMPLETE: CPT

## 2019-01-01 PROCEDURE — 92526 ORAL FUNCTION THERAPY: CPT

## 2019-01-01 PROCEDURE — 93005 ELECTROCARDIOGRAM TRACING: CPT | Performed by: EMERGENCY MEDICINE

## 2019-01-01 PROCEDURE — 94664 DEMO&/EVAL PT USE INHALER: CPT

## 2019-01-01 PROCEDURE — 99214 OFFICE O/P EST MOD 30 MIN: CPT | Performed by: NURSE PRACTITIONER

## 2019-01-01 PROCEDURE — 2700000000 HC OXYGEN THERAPY PER DAY

## 2019-01-01 PROCEDURE — G8598 ASA/ANTIPLAT THER USED: HCPCS | Performed by: NURSE PRACTITIONER

## 2019-01-01 PROCEDURE — 6360000002 HC RX W HCPCS: Performed by: NURSE PRACTITIONER

## 2019-01-01 PROCEDURE — 2060000000 HC ICU INTERMEDIATE R&B

## 2019-01-01 PROCEDURE — 2000000000 HC ICU R&B

## 2019-01-01 PROCEDURE — 51702 INSERT TEMP BLADDER CATH: CPT

## 2019-01-01 PROCEDURE — 80053 COMPREHEN METABOLIC PANEL: CPT

## 2019-01-01 PROCEDURE — 87077 CULTURE AEROBIC IDENTIFY: CPT

## 2019-01-01 PROCEDURE — 99212 OFFICE O/P EST SF 10 MIN: CPT | Performed by: SURGERY

## 2019-01-01 PROCEDURE — G8400 PT W/DXA NO RESULTS DOC: HCPCS | Performed by: NURSE PRACTITIONER

## 2019-01-01 PROCEDURE — 1090F PRES/ABSN URINE INCON ASSESS: CPT | Performed by: NURSE PRACTITIONER

## 2019-01-01 PROCEDURE — G8427 DOCREV CUR MEDS BY ELIG CLIN: HCPCS | Performed by: NURSE PRACTITIONER

## 2019-01-01 PROCEDURE — 97116 GAIT TRAINING THERAPY: CPT

## 2019-01-01 PROCEDURE — 87040 BLOOD CULTURE FOR BACTERIA: CPT

## 2019-01-01 PROCEDURE — 4040F PNEUMOC VAC/ADMIN/RCVD: CPT | Performed by: NURSE PRACTITIONER

## 2019-01-01 PROCEDURE — 92610 EVALUATE SWALLOWING FUNCTION: CPT

## 2019-01-01 PROCEDURE — 87205 SMEAR GRAM STAIN: CPT

## 2019-01-01 PROCEDURE — 94762 N-INVAS EAR/PLS OXIMTRY CONT: CPT

## 2019-01-01 PROCEDURE — 87186 SC STD MICRODIL/AGAR DIL: CPT

## 2019-01-01 PROCEDURE — 1101F PT FALLS ASSESS-DOCD LE1/YR: CPT | Performed by: NURSE PRACTITIONER

## 2019-01-01 PROCEDURE — 71045 X-RAY EXAM CHEST 1 VIEW: CPT

## 2019-01-01 PROCEDURE — 71250 CT THORAX DX C-: CPT

## 2019-01-01 PROCEDURE — 93010 ELECTROCARDIOGRAM REPORT: CPT | Performed by: INTERNAL MEDICINE

## 2019-01-01 PROCEDURE — 83605 ASSAY OF LACTIC ACID: CPT

## 2019-01-01 PROCEDURE — 2500000003 HC RX 250 WO HCPCS: Performed by: INTERNAL MEDICINE

## 2019-01-01 PROCEDURE — 87641 MR-STAPH DNA AMP PROBE: CPT

## 2019-01-01 PROCEDURE — 97162 PT EVAL MOD COMPLEX 30 MIN: CPT | Performed by: PHYSICAL THERAPIST

## 2019-01-01 PROCEDURE — 83036 HEMOGLOBIN GLYCOSYLATED A1C: CPT

## 2019-01-01 PROCEDURE — G8484 FLU IMMUNIZE NO ADMIN: HCPCS | Performed by: NURSE PRACTITIONER

## 2019-01-01 PROCEDURE — 1123F ACP DISCUSS/DSCN MKR DOCD: CPT | Performed by: NURSE PRACTITIONER

## 2019-01-01 PROCEDURE — 82803 BLOOD GASES ANY COMBINATION: CPT

## 2019-01-01 PROCEDURE — 2580000003 HC RX 258

## 2019-01-01 PROCEDURE — 1036F TOBACCO NON-USER: CPT | Performed by: NURSE PRACTITIONER

## 2019-01-01 PROCEDURE — 29581 APPL MULTLAYER CMPRN SYS LEG: CPT

## 2019-01-01 PROCEDURE — 6360000002 HC RX W HCPCS

## 2019-01-01 PROCEDURE — 87086 URINE CULTURE/COLONY COUNT: CPT

## 2019-01-01 PROCEDURE — 93971 EXTREMITY STUDY: CPT

## 2019-01-01 PROCEDURE — 99213 OFFICE O/P EST LOW 20 MIN: CPT

## 2019-01-01 PROCEDURE — 6370000000 HC RX 637 (ALT 250 FOR IP): Performed by: EMERGENCY MEDICINE

## 2019-01-01 PROCEDURE — 93005 ELECTROCARDIOGRAM TRACING: CPT | Performed by: NURSE PRACTITIONER

## 2019-01-01 PROCEDURE — 87070 CULTURE OTHR SPECIMN AEROBIC: CPT

## 2019-01-01 PROCEDURE — 20610 DRAIN/INJ JOINT/BURSA W/O US: CPT | Performed by: ORTHOPAEDIC SURGERY

## 2019-01-01 PROCEDURE — 99212 OFFICE O/P EST SF 10 MIN: CPT

## 2019-01-01 RX ORDER — TORSEMIDE 10 MG/1
10 TABLET ORAL PRN
Qty: 30 TABLET | Refills: 0
Start: 2019-01-01

## 2019-01-01 RX ORDER — OXYCODONE HYDROCHLORIDE AND ACETAMINOPHEN 5; 325 MG/1; MG/1
1 TABLET ORAL EVERY 8 HOURS PRN
Qty: 9 TABLET | Refills: 0 | Status: SHIPPED | OUTPATIENT
Start: 2019-01-01 | End: 2019-01-01

## 2019-01-01 RX ORDER — TORSEMIDE 20 MG/1
10 TABLET ORAL ONCE
Status: COMPLETED | OUTPATIENT
Start: 2019-01-01 | End: 2019-01-01

## 2019-01-01 RX ORDER — DEXTROSE MONOHYDRATE 25 G/50ML
12.5 INJECTION, SOLUTION INTRAVENOUS PRN
Status: DISCONTINUED | OUTPATIENT
Start: 2019-01-01 | End: 2019-01-01 | Stop reason: HOSPADM

## 2019-01-01 RX ORDER — ROPINIROLE 1 MG/1
3 TABLET, FILM COATED ORAL NIGHTLY
Status: DISCONTINUED | OUTPATIENT
Start: 2019-01-01 | End: 2019-01-01 | Stop reason: HOSPADM

## 2019-01-01 RX ORDER — ROPINIROLE 0.5 MG/1
0.5 TABLET, FILM COATED ORAL DAILY
Status: DISCONTINUED | OUTPATIENT
Start: 2019-01-01 | End: 2019-01-01 | Stop reason: HOSPADM

## 2019-01-01 RX ORDER — ACETAMINOPHEN 325 MG/1
650 TABLET ORAL EVERY 4 HOURS PRN
Status: DISCONTINUED | OUTPATIENT
Start: 2019-01-01 | End: 2019-01-01 | Stop reason: HOSPADM

## 2019-01-01 RX ORDER — DULOXETIN HYDROCHLORIDE 60 MG/1
60 CAPSULE, DELAYED RELEASE ORAL NIGHTLY
COMMUNITY

## 2019-01-01 RX ORDER — ACETAMINOPHEN 325 MG/1
650 TABLET ORAL EVERY 6 HOURS PRN
Status: DISCONTINUED | OUTPATIENT
Start: 2019-01-01 | End: 2019-01-01 | Stop reason: HOSPADM

## 2019-01-01 RX ORDER — BENZONATATE 100 MG/1
100 CAPSULE ORAL 3 TIMES DAILY PRN
Qty: 30 CAPSULE | Refills: 0 | DISCHARGE
Start: 2019-01-01 | End: 2019-01-01

## 2019-01-01 RX ORDER — CLOTRIMAZOLE AND BETAMETHASONE DIPROPIONATE 10; .64 MG/G; MG/G
CREAM TOPICAL 2 TIMES DAILY
COMMUNITY

## 2019-01-01 RX ORDER — BENZONATATE 100 MG/1
100 CAPSULE ORAL 3 TIMES DAILY PRN
Status: DISCONTINUED | OUTPATIENT
Start: 2019-01-01 | End: 2019-01-01 | Stop reason: HOSPADM

## 2019-01-01 RX ORDER — NICOTINE POLACRILEX 4 MG
15 LOZENGE BUCCAL PRN
Status: DISCONTINUED | OUTPATIENT
Start: 2019-01-01 | End: 2019-01-01 | Stop reason: HOSPADM

## 2019-01-01 RX ORDER — ASPIRIN 81 MG/1
81 TABLET, CHEWABLE ORAL DAILY
Status: DISCONTINUED | OUTPATIENT
Start: 2019-01-01 | End: 2019-01-01 | Stop reason: HOSPADM

## 2019-01-01 RX ORDER — SODIUM CHLORIDE 0.9 % (FLUSH) 0.9 %
10 SYRINGE (ML) INJECTION EVERY 12 HOURS SCHEDULED
Status: DISCONTINUED | OUTPATIENT
Start: 2019-01-01 | End: 2019-01-01 | Stop reason: HOSPADM

## 2019-01-01 RX ORDER — GUAIFENESIN 600 MG/1
1200 TABLET, EXTENDED RELEASE ORAL 2 TIMES DAILY
COMMUNITY

## 2019-01-01 RX ORDER — CARVEDILOL 12.5 MG/1
12.5 TABLET ORAL 2 TIMES DAILY WITH MEALS
Status: DISCONTINUED | OUTPATIENT
Start: 2019-01-01 | End: 2019-01-01 | Stop reason: HOSPADM

## 2019-01-01 RX ORDER — LEVETIRACETAM 500 MG/1
1000 TABLET ORAL 2 TIMES DAILY
Status: DISCONTINUED | OUTPATIENT
Start: 2019-01-01 | End: 2019-01-01 | Stop reason: HOSPADM

## 2019-01-01 RX ORDER — SODIUM BICARBONATE 650 MG/1
650 TABLET ORAL 2 TIMES DAILY
Status: DISCONTINUED | OUTPATIENT
Start: 2019-01-01 | End: 2019-01-01 | Stop reason: HOSPADM

## 2019-01-01 RX ORDER — SODIUM CHLORIDE 0.9 % (FLUSH) 0.9 %
10 SYRINGE (ML) INJECTION PRN
Status: DISCONTINUED | OUTPATIENT
Start: 2019-01-01 | End: 2019-01-01 | Stop reason: HOSPADM

## 2019-01-01 RX ORDER — LABETALOL HYDROCHLORIDE 5 MG/ML
5 INJECTION, SOLUTION INTRAVENOUS EVERY 4 HOURS PRN
Status: DISCONTINUED | OUTPATIENT
Start: 2019-01-01 | End: 2019-01-01 | Stop reason: HOSPADM

## 2019-01-01 RX ORDER — AMLODIPINE BESYLATE 5 MG/1
2.5 TABLET ORAL DAILY
Status: ON HOLD | COMMUNITY
End: 2019-01-01 | Stop reason: HOSPADM

## 2019-01-01 RX ORDER — VALPROIC ACID 250 MG/1
500 CAPSULE, LIQUID FILLED ORAL 2 TIMES DAILY
Qty: 120 CAPSULE | Refills: 3
Start: 2019-01-01

## 2019-01-01 RX ORDER — ALBUTEROL SULFATE 1.25 MG/3ML
1 SOLUTION RESPIRATORY (INHALATION) 2 TIMES DAILY
COMMUNITY

## 2019-01-01 RX ORDER — VALPROIC ACID 250 MG/1
500 CAPSULE, LIQUID FILLED ORAL 2 TIMES DAILY
Status: DISCONTINUED | OUTPATIENT
Start: 2019-01-01 | End: 2019-01-01 | Stop reason: HOSPADM

## 2019-01-01 RX ORDER — INSULIN GLARGINE 100 [IU]/ML
10 INJECTION, SOLUTION SUBCUTANEOUS NIGHTLY
Status: DISCONTINUED | OUTPATIENT
Start: 2019-01-01 | End: 2019-01-01 | Stop reason: HOSPADM

## 2019-01-01 RX ORDER — LIDOCAINE HYDROCHLORIDE 40 MG/ML
SOLUTION TOPICAL ONCE
Status: DISCONTINUED | OUTPATIENT
Start: 2019-01-01 | End: 2019-01-01 | Stop reason: HOSPADM

## 2019-01-01 RX ORDER — FUROSEMIDE 10 MG/ML
60 INJECTION INTRAMUSCULAR; INTRAVENOUS ONCE
Status: COMPLETED | OUTPATIENT
Start: 2019-01-01 | End: 2019-01-01

## 2019-01-01 RX ORDER — POLYETHYLENE GLYCOL 3350 17 G/17G
17 POWDER, FOR SOLUTION ORAL DAILY
Status: DISCONTINUED | OUTPATIENT
Start: 2019-01-01 | End: 2019-01-01

## 2019-01-01 RX ORDER — OXYCODONE HYDROCHLORIDE AND ACETAMINOPHEN 5; 325 MG/1; MG/1
1 TABLET ORAL EVERY 8 HOURS PRN
Status: ON HOLD | COMMUNITY
End: 2019-01-01 | Stop reason: SDUPTHER

## 2019-01-01 RX ORDER — AMLODIPINE BESYLATE 5 MG/1
5 TABLET ORAL DAILY
Status: DISCONTINUED | OUTPATIENT
Start: 2019-01-01 | End: 2019-01-01 | Stop reason: HOSPADM

## 2019-01-01 RX ORDER — IPRATROPIUM BROMIDE AND ALBUTEROL SULFATE 2.5; .5 MG/3ML; MG/3ML
1 SOLUTION RESPIRATORY (INHALATION) EVERY 4 HOURS PRN
COMMUNITY

## 2019-01-01 RX ORDER — GUAIFENESIN/DEXTROMETHORPHAN 100-10MG/5
5 SYRUP ORAL EVERY 4 HOURS PRN
Status: DISCONTINUED | OUTPATIENT
Start: 2019-01-01 | End: 2019-01-01 | Stop reason: HOSPADM

## 2019-01-01 RX ORDER — HEPARIN SODIUM 5000 [USP'U]/ML
5000 INJECTION, SOLUTION INTRAVENOUS; SUBCUTANEOUS EVERY 8 HOURS SCHEDULED
Status: DISCONTINUED | OUTPATIENT
Start: 2019-01-01 | End: 2019-01-01 | Stop reason: HOSPADM

## 2019-01-01 RX ORDER — FERROUS SULFATE TAB EC 324 MG (65 MG FE EQUIVALENT) 324 (65 FE) MG
324 TABLET DELAYED RESPONSE ORAL 2 TIMES DAILY WITH MEALS
Status: DISCONTINUED | OUTPATIENT
Start: 2019-01-01 | End: 2019-01-01 | Stop reason: HOSPADM

## 2019-01-01 RX ORDER — INSULIN GLARGINE 100 [IU]/ML
10 INJECTION, SOLUTION SUBCUTANEOUS NIGHTLY
Status: DISCONTINUED | OUTPATIENT
Start: 2019-01-01 | End: 2019-01-01

## 2019-01-01 RX ORDER — VALPROIC ACID 250 MG/5ML
10 SOLUTION ORAL 2 TIMES DAILY
Status: ON HOLD | COMMUNITY
End: 2019-01-01 | Stop reason: HOSPADM

## 2019-01-01 RX ORDER — ONDANSETRON 2 MG/ML
4 INJECTION INTRAMUSCULAR; INTRAVENOUS EVERY 6 HOURS PRN
Status: DISCONTINUED | OUTPATIENT
Start: 2019-01-01 | End: 2019-01-01 | Stop reason: HOSPADM

## 2019-01-01 RX ORDER — BENZONATATE 100 MG/1
100 CAPSULE ORAL 3 TIMES DAILY PRN
Qty: 30 CAPSULE | Refills: 0 | Status: ON HOLD | OUTPATIENT
Start: 2019-01-01 | End: 2019-01-01

## 2019-01-01 RX ORDER — OXYCODONE HYDROCHLORIDE AND ACETAMINOPHEN 5; 325 MG/1; MG/1
1 TABLET ORAL EVERY 6 HOURS PRN
Status: DISCONTINUED | OUTPATIENT
Start: 2019-01-01 | End: 2019-01-01 | Stop reason: HOSPADM

## 2019-01-01 RX ORDER — POLYETHYLENE GLYCOL 3350 17 G/17G
17 POWDER, FOR SOLUTION ORAL DAILY PRN
Status: DISCONTINUED | OUTPATIENT
Start: 2019-01-01 | End: 2019-01-01 | Stop reason: HOSPADM

## 2019-01-01 RX ORDER — DEXTROSE MONOHYDRATE 50 MG/ML
100 INJECTION, SOLUTION INTRAVENOUS PRN
Status: DISCONTINUED | OUTPATIENT
Start: 2019-01-01 | End: 2019-01-01 | Stop reason: HOSPADM

## 2019-01-01 RX ORDER — ACETAMINOPHEN 325 MG/1
650 TABLET ORAL ONCE
Status: COMPLETED | OUTPATIENT
Start: 2019-01-01 | End: 2019-01-01

## 2019-01-01 RX ORDER — IPRATROPIUM BROMIDE AND ALBUTEROL SULFATE 2.5; .5 MG/3ML; MG/3ML
1 SOLUTION RESPIRATORY (INHALATION) EVERY 4 HOURS PRN
Status: DISCONTINUED | OUTPATIENT
Start: 2019-01-01 | End: 2019-01-01 | Stop reason: HOSPADM

## 2019-01-01 RX ORDER — INSULIN GLARGINE 100 [IU]/ML
8 INJECTION, SOLUTION SUBCUTANEOUS NIGHTLY
Status: DISCONTINUED | OUTPATIENT
Start: 2019-01-01 | End: 2019-01-01 | Stop reason: HOSPADM

## 2019-01-01 RX ORDER — IPRATROPIUM BROMIDE AND ALBUTEROL SULFATE 2.5; .5 MG/3ML; MG/3ML
1 SOLUTION RESPIRATORY (INHALATION) ONCE
Status: COMPLETED | OUTPATIENT
Start: 2019-01-01 | End: 2019-01-01

## 2019-01-01 RX ORDER — PANTOPRAZOLE SODIUM 40 MG/1
40 TABLET, DELAYED RELEASE ORAL
Status: DISCONTINUED | OUTPATIENT
Start: 2019-01-01 | End: 2019-01-01 | Stop reason: HOSPADM

## 2019-01-01 RX ORDER — HYDROXYZINE HYDROCHLORIDE 10 MG/1
10 TABLET, FILM COATED ORAL
Status: COMPLETED | OUTPATIENT
Start: 2019-01-01 | End: 2019-01-01

## 2019-01-01 RX ORDER — DULOXETIN HYDROCHLORIDE 60 MG/1
60 CAPSULE, DELAYED RELEASE ORAL NIGHTLY
Status: DISCONTINUED | OUTPATIENT
Start: 2019-01-01 | End: 2019-01-01 | Stop reason: HOSPADM

## 2019-01-01 RX ORDER — FUROSEMIDE 10 MG/ML
40 INJECTION INTRAMUSCULAR; INTRAVENOUS 2 TIMES DAILY
Status: DISCONTINUED | OUTPATIENT
Start: 2019-01-01 | End: 2019-01-01

## 2019-01-01 RX ORDER — LIDOCAINE 50 MG/G
OINTMENT TOPICAL PRN
Status: DISCONTINUED | OUTPATIENT
Start: 2019-01-01 | End: 2019-01-01 | Stop reason: HOSPADM

## 2019-01-01 RX ORDER — LACTOBACILLUS RHAMNOSUS GG 10B CELL
1 CAPSULE ORAL
Status: DISCONTINUED | OUTPATIENT
Start: 2019-01-01 | End: 2019-01-01 | Stop reason: HOSPADM

## 2019-01-01 RX ORDER — ROPINIROLE 3 MG/1
3 TABLET, FILM COATED ORAL NIGHTLY
COMMUNITY

## 2019-01-01 RX ORDER — CIPROFLOXACIN 500 MG/1
500 TABLET, FILM COATED ORAL 2 TIMES DAILY
Qty: 10 TABLET | Refills: 0 | DISCHARGE
Start: 2019-01-01 | End: 2019-01-01

## 2019-01-01 RX ORDER — FERROUS SULFATE TAB EC 324 MG (65 MG FE EQUIVALENT) 324 (65 FE) MG
325 TABLET DELAYED RESPONSE ORAL 2 TIMES DAILY
Status: DISCONTINUED | OUTPATIENT
Start: 2019-01-01 | End: 2019-01-01 | Stop reason: HOSPADM

## 2019-01-01 RX ORDER — METHYLPREDNISOLONE SODIUM SUCCINATE 125 MG/2ML
125 INJECTION, POWDER, LYOPHILIZED, FOR SOLUTION INTRAMUSCULAR; INTRAVENOUS ONCE
Status: COMPLETED | OUTPATIENT
Start: 2019-01-01 | End: 2019-01-01

## 2019-01-01 RX ORDER — OXYCODONE HYDROCHLORIDE AND ACETAMINOPHEN 5; 325 MG/1; MG/1
1 TABLET ORAL EVERY 8 HOURS PRN
Status: DISCONTINUED | OUTPATIENT
Start: 2019-01-01 | End: 2019-01-01 | Stop reason: HOSPADM

## 2019-01-01 RX ORDER — ALBUTEROL SULFATE 1.25 MG/3ML
1 SOLUTION RESPIRATORY (INHALATION) EVERY 6 HOURS PRN
COMMUNITY

## 2019-01-01 RX ORDER — IPRATROPIUM BROMIDE AND ALBUTEROL SULFATE 2.5; .5 MG/3ML; MG/3ML
1 SOLUTION RESPIRATORY (INHALATION)
Status: DISCONTINUED | OUTPATIENT
Start: 2019-01-01 | End: 2019-01-01 | Stop reason: HOSPADM

## 2019-01-01 RX ORDER — HYDRALAZINE HYDROCHLORIDE 50 MG/1
50 TABLET, FILM COATED ORAL 3 TIMES DAILY
Status: DISCONTINUED | OUTPATIENT
Start: 2019-01-01 | End: 2019-01-01 | Stop reason: HOSPADM

## 2019-01-01 RX ORDER — TORSEMIDE 10 MG/1
10 TABLET ORAL EVERY OTHER DAY
Status: ON HOLD | COMMUNITY
End: 2019-01-01 | Stop reason: SDUPTHER

## 2019-01-01 RX ORDER — LORATADINE 10 MG/1
10 TABLET ORAL DAILY
COMMUNITY
End: 2019-01-01

## 2019-01-01 RX ORDER — TORSEMIDE 20 MG/1
20 TABLET ORAL DAILY
Status: DISCONTINUED | OUTPATIENT
Start: 2019-01-01 | End: 2019-01-01 | Stop reason: HOSPADM

## 2019-01-01 RX ADMIN — SODIUM BICARBONATE 650 MG: 650 TABLET ORAL at 21:44

## 2019-01-01 RX ADMIN — FERROUS SULFATE TAB EC 324 MG (65 MG FE EQUIVALENT) 324 MG: 324 (65 FE) TABLET DELAYED RESPONSE at 11:25

## 2019-01-01 RX ADMIN — VALPROIC ACID 500 MG: 250 CAPSULE, LIQUID FILLED ORAL at 11:25

## 2019-01-01 RX ADMIN — SODIUM BICARBONATE 650 MG: 650 TABLET ORAL at 21:20

## 2019-01-01 RX ADMIN — FERROUS SULFATE TAB EC 324 MG (65 MG FE EQUIVALENT) 325 MG: 324 (65 FE) TABLET DELAYED RESPONSE at 21:44

## 2019-01-01 RX ADMIN — SODIUM BICARBONATE 650 MG: 650 TABLET ORAL at 08:59

## 2019-01-01 RX ADMIN — INSULIN LISPRO 4 UNITS: 100 INJECTION, SOLUTION INTRAVENOUS; SUBCUTANEOUS at 17:46

## 2019-01-01 RX ADMIN — IPRATROPIUM BROMIDE AND ALBUTEROL SULFATE 1 AMPULE: .5; 3 SOLUTION RESPIRATORY (INHALATION) at 08:08

## 2019-01-01 RX ADMIN — AMLODIPINE BESYLATE 5 MG: 5 TABLET ORAL at 09:16

## 2019-01-01 RX ADMIN — FERROUS SULFATE TAB EC 324 MG (65 MG FE EQUIVALENT) 325 MG: 324 (65 FE) TABLET DELAYED RESPONSE at 12:53

## 2019-01-01 RX ADMIN — HYDRALAZINE HYDROCHLORIDE 50 MG: 50 TABLET, FILM COATED ORAL at 09:01

## 2019-01-01 RX ADMIN — HEPARIN SODIUM 5000 UNITS: 5000 INJECTION INTRAVENOUS; SUBCUTANEOUS at 06:40

## 2019-01-01 RX ADMIN — Medication 10 ML: at 08:26

## 2019-01-01 RX ADMIN — SODIUM BICARBONATE 650 MG: 650 TABLET ORAL at 21:10

## 2019-01-01 RX ADMIN — BENZONATATE 100 MG: 100 CAPSULE ORAL at 12:03

## 2019-01-01 RX ADMIN — HYDRALAZINE HYDROCHLORIDE 50 MG: 50 TABLET, FILM COATED ORAL at 15:12

## 2019-01-01 RX ADMIN — SODIUM BICARBONATE 650 MG: 650 TABLET ORAL at 20:01

## 2019-01-01 RX ADMIN — ONDANSETRON 4 MG: 2 INJECTION INTRAMUSCULAR; INTRAVENOUS at 10:22

## 2019-01-01 RX ADMIN — CARVEDILOL 12.5 MG: 12.5 TABLET, FILM COATED ORAL at 18:29

## 2019-01-01 RX ADMIN — MICONAZOLE NITRATE: 20 POWDER TOPICAL at 23:45

## 2019-01-01 RX ADMIN — MICONAZOLE NITRATE: 20 POWDER TOPICAL at 21:45

## 2019-01-01 RX ADMIN — HEPARIN SODIUM 5000 UNITS: 5000 INJECTION INTRAVENOUS; SUBCUTANEOUS at 15:17

## 2019-01-01 RX ADMIN — ROPINIROLE HYDROCHLORIDE 3 MG: 1 TABLET, FILM COATED ORAL at 21:44

## 2019-01-01 RX ADMIN — MUPIROCIN: 20 OINTMENT TOPICAL at 10:35

## 2019-01-01 RX ADMIN — DULOXETINE HYDROCHLORIDE 60 MG: 60 CAPSULE, DELAYED RELEASE ORAL at 20:48

## 2019-01-01 RX ADMIN — MICONAZOLE NITRATE: 20 POWDER TOPICAL at 13:00

## 2019-01-01 RX ADMIN — SODIUM BICARBONATE 650 MG: 650 TABLET ORAL at 21:09

## 2019-01-01 RX ADMIN — LEVETIRACETAM 1000 MG: 500 TABLET ORAL at 21:41

## 2019-01-01 RX ADMIN — CARVEDILOL 12.5 MG: 12.5 TABLET, FILM COATED ORAL at 10:10

## 2019-01-01 RX ADMIN — PANTOPRAZOLE SODIUM 40 MG: 40 TABLET, DELAYED RELEASE ORAL at 06:02

## 2019-01-01 RX ADMIN — OXYCODONE HYDROCHLORIDE AND ACETAMINOPHEN 1 TABLET: 5; 325 TABLET ORAL at 10:54

## 2019-01-01 RX ADMIN — Medication 10 ML: at 08:24

## 2019-01-01 RX ADMIN — CARVEDILOL 12.5 MG: 12.5 TABLET, FILM COATED ORAL at 15:08

## 2019-01-01 RX ADMIN — FUROSEMIDE 60 MG: 10 INJECTION, SOLUTION INTRAMUSCULAR; INTRAVENOUS at 07:16

## 2019-01-01 RX ADMIN — Medication 10 ML: at 10:11

## 2019-01-01 RX ADMIN — FERROUS SULFATE TAB EC 324 MG (65 MG FE EQUIVALENT) 324 MG: 324 (65 FE) TABLET DELAYED RESPONSE at 09:49

## 2019-01-01 RX ADMIN — CARVEDILOL 12.5 MG: 12.5 TABLET, FILM COATED ORAL at 09:16

## 2019-01-01 RX ADMIN — HYDRALAZINE HYDROCHLORIDE 50 MG: 50 TABLET, FILM COATED ORAL at 09:16

## 2019-01-01 RX ADMIN — SODIUM BICARBONATE 650 MG: 650 TABLET ORAL at 08:23

## 2019-01-01 RX ADMIN — ROPINIROLE HYDROCHLORIDE 0.5 MG: 0.5 TABLET, FILM COATED ORAL at 10:54

## 2019-01-01 RX ADMIN — HEPARIN SODIUM 5000 UNITS: 5000 INJECTION INTRAVENOUS; SUBCUTANEOUS at 14:31

## 2019-01-01 RX ADMIN — OXYCODONE HYDROCHLORIDE AND ACETAMINOPHEN 1 TABLET: 5; 325 TABLET ORAL at 21:58

## 2019-01-01 RX ADMIN — VALPROIC ACID 500 MG: 250 CAPSULE, LIQUID FILLED ORAL at 10:54

## 2019-01-01 RX ADMIN — OXYCODONE HYDROCHLORIDE AND ACETAMINOPHEN 1 TABLET: 5; 325 TABLET ORAL at 15:12

## 2019-01-01 RX ADMIN — HEPARIN SODIUM 5000 UNITS: 5000 INJECTION INTRAVENOUS; SUBCUTANEOUS at 13:39

## 2019-01-01 RX ADMIN — LEVETIRACETAM 1000 MG: 500 TABLET ORAL at 20:01

## 2019-01-01 RX ADMIN — VALPROIC ACID 500 MG: 250 CAPSULE, LIQUID FILLED ORAL at 10:12

## 2019-01-01 RX ADMIN — HEPARIN SODIUM 5000 UNITS: 5000 INJECTION INTRAVENOUS; SUBCUTANEOUS at 06:17

## 2019-01-01 RX ADMIN — OXYCODONE HYDROCHLORIDE AND ACETAMINOPHEN 1 TABLET: 5; 325 TABLET ORAL at 20:52

## 2019-01-01 RX ADMIN — HEPARIN SODIUM 5000 UNITS: 5000 INJECTION INTRAVENOUS; SUBCUTANEOUS at 21:09

## 2019-01-01 RX ADMIN — Medication 1 CAPSULE: at 11:26

## 2019-01-01 RX ADMIN — SODIUM BICARBONATE 650 MG: 650 TABLET ORAL at 08:24

## 2019-01-01 RX ADMIN — HEPARIN SODIUM 5000 UNITS: 5000 INJECTION INTRAVENOUS; SUBCUTANEOUS at 20:49

## 2019-01-01 RX ADMIN — LEVETIRACETAM 1000 MG: 500 TABLET ORAL at 10:53

## 2019-01-01 RX ADMIN — HEPARIN SODIUM 5000 UNITS: 5000 INJECTION INTRAVENOUS; SUBCUTANEOUS at 21:44

## 2019-01-01 RX ADMIN — ROPINIROLE HYDROCHLORIDE 0.5 MG: 0.5 TABLET, FILM COATED ORAL at 08:23

## 2019-01-01 RX ADMIN — IPRATROPIUM BROMIDE AND ALBUTEROL SULFATE 1 AMPULE: .5; 3 SOLUTION RESPIRATORY (INHALATION) at 21:22

## 2019-01-01 RX ADMIN — HYDRALAZINE HYDROCHLORIDE 50 MG: 50 TABLET, FILM COATED ORAL at 13:38

## 2019-01-01 RX ADMIN — VALPROIC ACID 500 MG: 250 CAPSULE, LIQUID FILLED ORAL at 08:24

## 2019-01-01 RX ADMIN — ROPINIROLE HYDROCHLORIDE 3 MG: 1 TABLET, FILM COATED ORAL at 20:01

## 2019-01-01 RX ADMIN — NITROGLYCERIN 0.5 INCH: 20 OINTMENT TOPICAL at 07:16

## 2019-01-01 RX ADMIN — SODIUM BICARBONATE 650 MG: 650 TABLET ORAL at 10:54

## 2019-01-01 RX ADMIN — HYDRALAZINE HYDROCHLORIDE 50 MG: 50 TABLET, FILM COATED ORAL at 21:57

## 2019-01-01 RX ADMIN — HYDRALAZINE HYDROCHLORIDE 50 MG: 50 TABLET, FILM COATED ORAL at 09:00

## 2019-01-01 RX ADMIN — ASPIRIN 81 MG 81 MG: 81 TABLET ORAL at 10:54

## 2019-01-01 RX ADMIN — VALPROIC ACID 500 MG: 250 CAPSULE, LIQUID FILLED ORAL at 09:16

## 2019-01-01 RX ADMIN — MUPIROCIN: 20 OINTMENT TOPICAL at 21:57

## 2019-01-01 RX ADMIN — INSULIN GLARGINE 8 UNITS: 100 INJECTION, SOLUTION SUBCUTANEOUS at 22:06

## 2019-01-01 RX ADMIN — VALPROIC ACID 500 MG: 250 CAPSULE, LIQUID FILLED ORAL at 08:59

## 2019-01-01 RX ADMIN — CARVEDILOL 12.5 MG: 12.5 TABLET, FILM COATED ORAL at 09:00

## 2019-01-01 RX ADMIN — MICONAZOLE NITRATE: 20 POWDER TOPICAL at 08:28

## 2019-01-01 RX ADMIN — Medication 10 ML: at 09:02

## 2019-01-01 RX ADMIN — IPRATROPIUM BROMIDE AND ALBUTEROL SULFATE 1 AMPULE: .5; 3 SOLUTION RESPIRATORY (INHALATION) at 12:10

## 2019-01-01 RX ADMIN — LEVETIRACETAM 1000 MG: 500 TABLET ORAL at 20:47

## 2019-01-01 RX ADMIN — SODIUM BICARBONATE 650 MG: 650 TABLET ORAL at 09:18

## 2019-01-01 RX ADMIN — CARVEDILOL 12.5 MG: 12.5 TABLET, FILM COATED ORAL at 08:23

## 2019-01-01 RX ADMIN — HEPARIN SODIUM 5000 UNITS: 5000 INJECTION INTRAVENOUS; SUBCUTANEOUS at 07:16

## 2019-01-01 RX ADMIN — SODIUM BICARBONATE 650 MG: 650 TABLET ORAL at 11:25

## 2019-01-01 RX ADMIN — HYDRALAZINE HYDROCHLORIDE 50 MG: 50 TABLET, FILM COATED ORAL at 04:14

## 2019-01-01 RX ADMIN — IPRATROPIUM BROMIDE AND ALBUTEROL SULFATE 1 AMPULE: .5; 3 SOLUTION RESPIRATORY (INHALATION) at 15:49

## 2019-01-01 RX ADMIN — HYDRALAZINE HYDROCHLORIDE 50 MG: 50 TABLET, FILM COATED ORAL at 20:46

## 2019-01-01 RX ADMIN — FERROUS SULFATE TAB EC 324 MG (65 MG FE EQUIVALENT) 324 MG: 324 (65 FE) TABLET DELAYED RESPONSE at 09:16

## 2019-01-01 RX ADMIN — SODIUM BICARBONATE 650 MG: 650 TABLET ORAL at 08:46

## 2019-01-01 RX ADMIN — FERROUS SULFATE TAB EC 324 MG (65 MG FE EQUIVALENT) 324 MG: 324 (65 FE) TABLET DELAYED RESPONSE at 21:30

## 2019-01-01 RX ADMIN — Medication 10 ML: at 08:47

## 2019-01-01 RX ADMIN — FERROUS SULFATE TAB EC 324 MG (65 MG FE EQUIVALENT) 325 MG: 324 (65 FE) TABLET DELAYED RESPONSE at 08:23

## 2019-01-01 RX ADMIN — INSULIN LISPRO 2 UNITS: 100 INJECTION, SOLUTION INTRAVENOUS; SUBCUTANEOUS at 16:57

## 2019-01-01 RX ADMIN — SODIUM BICARBONATE 650 MG: 650 TABLET ORAL at 10:10

## 2019-01-01 RX ADMIN — SODIUM BICARBONATE 650 MG: 650 TABLET ORAL at 09:16

## 2019-01-01 RX ADMIN — LEVETIRACETAM 1000 MG: 500 TABLET ORAL at 20:48

## 2019-01-01 RX ADMIN — VALPROIC ACID 500 MG: 250 CAPSULE, LIQUID FILLED ORAL at 12:59

## 2019-01-01 RX ADMIN — LEVETIRACETAM 1000 MG: 500 TABLET ORAL at 21:30

## 2019-01-01 RX ADMIN — CARVEDILOL 12.5 MG: 12.5 TABLET, FILM COATED ORAL at 17:54

## 2019-01-01 RX ADMIN — CARVEDILOL 12.5 MG: 12.5 TABLET, FILM COATED ORAL at 10:53

## 2019-01-01 RX ADMIN — IPRATROPIUM BROMIDE AND ALBUTEROL SULFATE 1 AMPULE: .5; 3 SOLUTION RESPIRATORY (INHALATION) at 12:13

## 2019-01-01 RX ADMIN — ONDANSETRON 4 MG: 2 INJECTION INTRAMUSCULAR; INTRAVENOUS at 09:17

## 2019-01-01 RX ADMIN — PANTOPRAZOLE SODIUM 40 MG: 40 TABLET, DELAYED RELEASE ORAL at 06:00

## 2019-01-01 RX ADMIN — TORSEMIDE 20 MG: 20 TABLET ORAL at 08:24

## 2019-01-01 RX ADMIN — FERROUS SULFATE TAB EC 324 MG (65 MG FE EQUIVALENT) 324 MG: 324 (65 FE) TABLET DELAYED RESPONSE at 10:53

## 2019-01-01 RX ADMIN — HYDRALAZINE HYDROCHLORIDE 50 MG: 50 TABLET, FILM COATED ORAL at 08:59

## 2019-01-01 RX ADMIN — HYDRALAZINE HYDROCHLORIDE 50 MG: 50 TABLET, FILM COATED ORAL at 10:54

## 2019-01-01 RX ADMIN — OXYCODONE HYDROCHLORIDE AND ACETAMINOPHEN 1 TABLET: 5; 325 TABLET ORAL at 08:44

## 2019-01-01 RX ADMIN — OXYCODONE HYDROCHLORIDE AND ACETAMINOPHEN 1 TABLET: 5; 325 TABLET ORAL at 03:30

## 2019-01-01 RX ADMIN — INSULIN LISPRO 2 UNITS: 100 INJECTION, SOLUTION INTRAVENOUS; SUBCUTANEOUS at 18:28

## 2019-01-01 RX ADMIN — Medication 10 ML: at 10:54

## 2019-01-01 RX ADMIN — AMLODIPINE BESYLATE 5 MG: 5 TABLET ORAL at 09:18

## 2019-01-01 RX ADMIN — SODIUM BICARBONATE 650 MG: 650 TABLET ORAL at 09:01

## 2019-01-01 RX ADMIN — OXYCODONE HYDROCHLORIDE AND ACETAMINOPHEN 1 TABLET: 5; 325 TABLET ORAL at 06:30

## 2019-01-01 RX ADMIN — ROPINIROLE HYDROCHLORIDE 0.5 MG: 0.5 TABLET, FILM COATED ORAL at 09:48

## 2019-01-01 RX ADMIN — LEVETIRACETAM 1000 MG: 500 TABLET ORAL at 21:20

## 2019-01-01 RX ADMIN — INSULIN LISPRO 4 UNITS: 100 INJECTION, SOLUTION INTRAVENOUS; SUBCUTANEOUS at 14:19

## 2019-01-01 RX ADMIN — AMLODIPINE BESYLATE 5 MG: 5 TABLET ORAL at 10:54

## 2019-01-01 RX ADMIN — Medication 10 ML: at 21:33

## 2019-01-01 RX ADMIN — PANTOPRAZOLE SODIUM 40 MG: 40 TABLET, DELAYED RELEASE ORAL at 06:17

## 2019-01-01 RX ADMIN — DULOXETINE HYDROCHLORIDE 60 MG: 60 CAPSULE, DELAYED RELEASE ORAL at 21:10

## 2019-01-01 RX ADMIN — HYDRALAZINE HYDROCHLORIDE 50 MG: 50 TABLET, FILM COATED ORAL at 15:25

## 2019-01-01 RX ADMIN — FUROSEMIDE 40 MG: 10 INJECTION, SOLUTION INTRAMUSCULAR; INTRAVENOUS at 09:00

## 2019-01-01 RX ADMIN — LEVETIRACETAM 1000 MG: 500 TABLET ORAL at 08:23

## 2019-01-01 RX ADMIN — ASPIRIN 81 MG 81 MG: 81 TABLET ORAL at 12:03

## 2019-01-01 RX ADMIN — HEPARIN SODIUM 5000 UNITS: 5000 INJECTION INTRAVENOUS; SUBCUTANEOUS at 21:45

## 2019-01-01 RX ADMIN — HYDRALAZINE HYDROCHLORIDE 50 MG: 50 TABLET, FILM COATED ORAL at 21:30

## 2019-01-01 RX ADMIN — IPRATROPIUM BROMIDE AND ALBUTEROL SULFATE 1 AMPULE: .5; 3 SOLUTION RESPIRATORY (INHALATION) at 08:24

## 2019-01-01 RX ADMIN — INSULIN GLARGINE 10 UNITS: 100 INJECTION, SOLUTION SUBCUTANEOUS at 22:18

## 2019-01-01 RX ADMIN — OXYCODONE HYDROCHLORIDE AND ACETAMINOPHEN 1 TABLET: 5; 325 TABLET ORAL at 10:22

## 2019-01-01 RX ADMIN — HYDRALAZINE HYDROCHLORIDE 50 MG: 50 TABLET, FILM COATED ORAL at 21:09

## 2019-01-01 RX ADMIN — LEVETIRACETAM 1000 MG: 500 TABLET ORAL at 09:16

## 2019-01-01 RX ADMIN — OXYCODONE HYDROCHLORIDE AND ACETAMINOPHEN 1 TABLET: 5; 325 TABLET ORAL at 22:19

## 2019-01-01 RX ADMIN — ASPIRIN 81 MG 81 MG: 81 TABLET ORAL at 09:01

## 2019-01-01 RX ADMIN — IPRATROPIUM BROMIDE AND ALBUTEROL SULFATE 1 AMPULE: .5; 3 SOLUTION RESPIRATORY (INHALATION) at 16:18

## 2019-01-01 RX ADMIN — INSULIN LISPRO 1 UNITS: 100 INJECTION, SOLUTION INTRAVENOUS; SUBCUTANEOUS at 21:43

## 2019-01-01 RX ADMIN — OXYCODONE HYDROCHLORIDE AND ACETAMINOPHEN 1 TABLET: 5; 325 TABLET ORAL at 05:24

## 2019-01-01 RX ADMIN — Medication 1 CAPSULE: at 11:18

## 2019-01-01 RX ADMIN — HEPARIN SODIUM 5000 UNITS: 5000 INJECTION INTRAVENOUS; SUBCUTANEOUS at 06:34

## 2019-01-01 RX ADMIN — OXYCODONE HYDROCHLORIDE AND ACETAMINOPHEN 1 TABLET: 5; 325 TABLET ORAL at 05:10

## 2019-01-01 RX ADMIN — INSULIN LISPRO 1 UNITS: 100 INJECTION, SOLUTION INTRAVENOUS; SUBCUTANEOUS at 22:18

## 2019-01-01 RX ADMIN — Medication 10 ML: at 09:55

## 2019-01-01 RX ADMIN — CARVEDILOL 12.5 MG: 12.5 TABLET, FILM COATED ORAL at 09:48

## 2019-01-01 RX ADMIN — DULOXETINE HYDROCHLORIDE 60 MG: 60 CAPSULE, DELAYED RELEASE ORAL at 20:02

## 2019-01-01 RX ADMIN — MUPIROCIN: 20 OINTMENT TOPICAL at 09:17

## 2019-01-01 RX ADMIN — FERROUS SULFATE TAB EC 324 MG (65 MG FE EQUIVALENT) 324 MG: 324 (65 FE) TABLET DELAYED RESPONSE at 17:02

## 2019-01-01 RX ADMIN — HEPARIN SODIUM 5000 UNITS: 5000 INJECTION INTRAVENOUS; SUBCUTANEOUS at 21:20

## 2019-01-01 RX ADMIN — Medication 10 ML: at 09:00

## 2019-01-01 RX ADMIN — FERROUS SULFATE TAB EC 324 MG (65 MG FE EQUIVALENT) 324 MG: 324 (65 FE) TABLET DELAYED RESPONSE at 16:56

## 2019-01-01 RX ADMIN — Medication 10 ML: at 22:22

## 2019-01-01 RX ADMIN — HEPARIN SODIUM 5000 UNITS: 5000 INJECTION INTRAVENOUS; SUBCUTANEOUS at 14:45

## 2019-01-01 RX ADMIN — IPRATROPIUM BROMIDE AND ALBUTEROL SULFATE 1 AMPULE: .5; 3 SOLUTION RESPIRATORY (INHALATION) at 08:02

## 2019-01-01 RX ADMIN — AMLODIPINE BESYLATE 5 MG: 5 TABLET ORAL at 09:49

## 2019-01-01 RX ADMIN — MICONAZOLE NITRATE: 20 POWDER TOPICAL at 20:02

## 2019-01-01 RX ADMIN — LEVETIRACETAM 1000 MG: 500 TABLET ORAL at 21:57

## 2019-01-01 RX ADMIN — INSULIN GLARGINE 10 UNITS: 100 INJECTION, SOLUTION SUBCUTANEOUS at 21:12

## 2019-01-01 RX ADMIN — VALPROIC ACID 500 MG: 250 CAPSULE, LIQUID FILLED ORAL at 22:18

## 2019-01-01 RX ADMIN — OXYCODONE HYDROCHLORIDE AND ACETAMINOPHEN 1 TABLET: 5; 325 TABLET ORAL at 21:41

## 2019-01-01 RX ADMIN — TORSEMIDE 20 MG: 20 TABLET ORAL at 09:00

## 2019-01-01 RX ADMIN — SODIUM BICARBONATE 650 MG: 650 TABLET ORAL at 22:18

## 2019-01-01 RX ADMIN — HEPARIN SODIUM 5000 UNITS: 5000 INJECTION INTRAVENOUS; SUBCUTANEOUS at 05:56

## 2019-01-01 RX ADMIN — FERROUS SULFATE TAB EC 324 MG (65 MG FE EQUIVALENT) 324 MG: 324 (65 FE) TABLET DELAYED RESPONSE at 09:00

## 2019-01-01 RX ADMIN — HYDRALAZINE HYDROCHLORIDE 50 MG: 50 TABLET, FILM COATED ORAL at 10:10

## 2019-01-01 RX ADMIN — DULOXETINE HYDROCHLORIDE 60 MG: 60 CAPSULE, DELAYED RELEASE ORAL at 21:31

## 2019-01-01 RX ADMIN — HYDRALAZINE HYDROCHLORIDE 50 MG: 50 TABLET, FILM COATED ORAL at 20:48

## 2019-01-01 RX ADMIN — ASPIRIN 81 MG 81 MG: 81 TABLET ORAL at 11:26

## 2019-01-01 RX ADMIN — Medication 10 ML: at 20:46

## 2019-01-01 RX ADMIN — ASPIRIN 81 MG 81 MG: 81 TABLET ORAL at 09:18

## 2019-01-01 RX ADMIN — HYDRALAZINE HYDROCHLORIDE 50 MG: 50 TABLET, FILM COATED ORAL at 14:19

## 2019-01-01 RX ADMIN — Medication 10 ML: at 20:53

## 2019-01-01 RX ADMIN — Medication 10 ML: at 21:45

## 2019-01-01 RX ADMIN — AMLODIPINE BESYLATE 5 MG: 5 TABLET ORAL at 10:10

## 2019-01-01 RX ADMIN — HEPARIN SODIUM 5000 UNITS: 5000 INJECTION INTRAVENOUS; SUBCUTANEOUS at 06:02

## 2019-01-01 RX ADMIN — ROPINIROLE HYDROCHLORIDE 3 MG: 1 TABLET, FILM COATED ORAL at 21:11

## 2019-01-01 RX ADMIN — HYDRALAZINE HYDROCHLORIDE 50 MG: 50 TABLET, FILM COATED ORAL at 22:18

## 2019-01-01 RX ADMIN — HEPARIN SODIUM 5000 UNITS: 5000 INJECTION INTRAVENOUS; SUBCUTANEOUS at 05:51

## 2019-01-01 RX ADMIN — CARVEDILOL 12.5 MG: 12.5 TABLET, FILM COATED ORAL at 09:01

## 2019-01-01 RX ADMIN — HYDRALAZINE HYDROCHLORIDE 50 MG: 50 TABLET, FILM COATED ORAL at 09:18

## 2019-01-01 RX ADMIN — FERROUS SULFATE TAB EC 324 MG (65 MG FE EQUIVALENT) 324 MG: 324 (65 FE) TABLET DELAYED RESPONSE at 17:46

## 2019-01-01 RX ADMIN — BENZOCAINE AND MENTHOL 1 LOZENGE: 15; 3.6 LOZENGE ORAL at 15:25

## 2019-01-01 RX ADMIN — Medication 10 ML: at 21:11

## 2019-01-01 RX ADMIN — HYDRALAZINE HYDROCHLORIDE 50 MG: 50 TABLET, FILM COATED ORAL at 15:04

## 2019-01-01 RX ADMIN — MICONAZOLE NITRATE: 20 POWDER TOPICAL at 21:32

## 2019-01-01 RX ADMIN — CARVEDILOL 12.5 MG: 12.5 TABLET, FILM COATED ORAL at 09:18

## 2019-01-01 RX ADMIN — OXYCODONE HYDROCHLORIDE AND ACETAMINOPHEN 1 TABLET: 5; 325 TABLET ORAL at 19:47

## 2019-01-01 RX ADMIN — MICONAZOLE NITRATE: 20 POWDER TOPICAL at 08:22

## 2019-01-01 RX ADMIN — INSULIN GLARGINE 8 UNITS: 100 INJECTION, SOLUTION SUBCUTANEOUS at 23:07

## 2019-01-01 RX ADMIN — HEPARIN SODIUM 5000 UNITS: 5000 INJECTION INTRAVENOUS; SUBCUTANEOUS at 22:18

## 2019-01-01 RX ADMIN — OXYCODONE HYDROCHLORIDE AND ACETAMINOPHEN 1 TABLET: 5; 325 TABLET ORAL at 06:39

## 2019-01-01 RX ADMIN — FERROUS SULFATE TAB EC 324 MG (65 MG FE EQUIVALENT) 324 MG: 324 (65 FE) TABLET DELAYED RESPONSE at 08:22

## 2019-01-01 RX ADMIN — LEVETIRACETAM 1000 MG: 500 TABLET ORAL at 11:26

## 2019-01-01 RX ADMIN — INSULIN GLARGINE 8 UNITS: 100 INJECTION, SOLUTION SUBCUTANEOUS at 21:43

## 2019-01-01 RX ADMIN — MUPIROCIN: 20 OINTMENT TOPICAL at 08:47

## 2019-01-01 RX ADMIN — ROPINIROLE HYDROCHLORIDE 0.5 MG: 0.5 TABLET, FILM COATED ORAL at 11:25

## 2019-01-01 RX ADMIN — INSULIN LISPRO 4 UNITS: 100 INJECTION, SOLUTION INTRAVENOUS; SUBCUTANEOUS at 12:40

## 2019-01-01 RX ADMIN — HYDRALAZINE HYDROCHLORIDE 50 MG: 50 TABLET, FILM COATED ORAL at 08:37

## 2019-01-01 RX ADMIN — ROPINIROLE HYDROCHLORIDE 0.5 MG: 0.5 TABLET, FILM COATED ORAL at 08:46

## 2019-01-01 RX ADMIN — VALPROIC ACID 500 MG: 250 CAPSULE, LIQUID FILLED ORAL at 22:40

## 2019-01-01 RX ADMIN — POLYETHYLENE GLYCOL 3350 17 G: 17 POWDER, FOR SOLUTION ORAL at 08:58

## 2019-01-01 RX ADMIN — ROPINIROLE HYDROCHLORIDE 0.5 MG: 0.5 TABLET, FILM COATED ORAL at 10:10

## 2019-01-01 RX ADMIN — FUROSEMIDE 40 MG: 10 INJECTION, SOLUTION INTRAMUSCULAR; INTRAVENOUS at 08:42

## 2019-01-01 RX ADMIN — IPRATROPIUM BROMIDE AND ALBUTEROL SULFATE 1 AMPULE: .5; 3 SOLUTION RESPIRATORY (INHALATION) at 15:55

## 2019-01-01 RX ADMIN — CARVEDILOL 12.5 MG: 12.5 TABLET, FILM COATED ORAL at 17:59

## 2019-01-01 RX ADMIN — MUPIROCIN: 20 OINTMENT TOPICAL at 10:10

## 2019-01-01 RX ADMIN — ASPIRIN 81 MG 81 MG: 81 TABLET ORAL at 09:48

## 2019-01-01 RX ADMIN — VANCOMYCIN HYDROCHLORIDE 1000 MG: 1 INJECTION, POWDER, LYOPHILIZED, FOR SOLUTION INTRAVENOUS at 18:24

## 2019-01-01 RX ADMIN — MICONAZOLE NITRATE: 20 POWDER TOPICAL at 09:02

## 2019-01-01 RX ADMIN — FERROUS SULFATE TAB EC 324 MG (65 MG FE EQUIVALENT) 324 MG: 324 (65 FE) TABLET DELAYED RESPONSE at 08:59

## 2019-01-01 RX ADMIN — CARVEDILOL 12.5 MG: 12.5 TABLET, FILM COATED ORAL at 17:46

## 2019-01-01 RX ADMIN — MICONAZOLE NITRATE: 20 POWDER TOPICAL at 20:53

## 2019-01-01 RX ADMIN — Medication 10 ML: at 22:03

## 2019-01-01 RX ADMIN — INSULIN LISPRO 2 UNITS: 100 INJECTION, SOLUTION INTRAVENOUS; SUBCUTANEOUS at 17:58

## 2019-01-01 RX ADMIN — INSULIN GLARGINE 10 UNITS: 100 INJECTION, SOLUTION SUBCUTANEOUS at 21:20

## 2019-01-01 RX ADMIN — PANTOPRAZOLE SODIUM 40 MG: 40 TABLET, DELAYED RELEASE ORAL at 06:35

## 2019-01-01 RX ADMIN — HYDRALAZINE HYDROCHLORIDE 50 MG: 50 TABLET, FILM COATED ORAL at 09:48

## 2019-01-01 RX ADMIN — SODIUM BICARBONATE 650 MG: 650 TABLET ORAL at 12:03

## 2019-01-01 RX ADMIN — HYDRALAZINE HYDROCHLORIDE 50 MG: 50 TABLET, FILM COATED ORAL at 20:02

## 2019-01-01 RX ADMIN — INSULIN LISPRO 4 UNITS: 100 INJECTION, SOLUTION INTRAVENOUS; SUBCUTANEOUS at 09:48

## 2019-01-01 RX ADMIN — ROPINIROLE HYDROCHLORIDE 0.5 MG: 0.5 TABLET, FILM COATED ORAL at 12:03

## 2019-01-01 RX ADMIN — IPRATROPIUM BROMIDE AND ALBUTEROL SULFATE 1 AMPULE: .5; 3 SOLUTION RESPIRATORY (INHALATION) at 16:37

## 2019-01-01 RX ADMIN — CARVEDILOL 12.5 MG: 12.5 TABLET, FILM COATED ORAL at 17:02

## 2019-01-01 RX ADMIN — OXYCODONE HYDROCHLORIDE AND ACETAMINOPHEN 1 TABLET: 5; 325 TABLET ORAL at 11:45

## 2019-01-01 RX ADMIN — IPRATROPIUM BROMIDE AND ALBUTEROL SULFATE 1 AMPULE: .5; 3 SOLUTION RESPIRATORY (INHALATION) at 20:49

## 2019-01-01 RX ADMIN — FERROUS SULFATE TAB EC 324 MG (65 MG FE EQUIVALENT) 324 MG: 324 (65 FE) TABLET DELAYED RESPONSE at 10:09

## 2019-01-01 RX ADMIN — OXYCODONE HYDROCHLORIDE AND ACETAMINOPHEN 1 TABLET: 5; 325 TABLET ORAL at 16:08

## 2019-01-01 RX ADMIN — METHYLPREDNISOLONE SODIUM SUCCINATE 125 MG: 125 INJECTION, POWDER, FOR SOLUTION INTRAMUSCULAR; INTRAVENOUS at 02:38

## 2019-01-01 RX ADMIN — ASPIRIN 81 MG 81 MG: 81 TABLET ORAL at 08:24

## 2019-01-01 RX ADMIN — ONDANSETRON 4 MG: 2 INJECTION INTRAMUSCULAR; INTRAVENOUS at 08:44

## 2019-01-01 RX ADMIN — VALPROIC ACID 500 MG: 250 CAPSULE, LIQUID FILLED ORAL at 20:54

## 2019-01-01 RX ADMIN — MICONAZOLE NITRATE: 20 POWDER TOPICAL at 09:06

## 2019-01-01 RX ADMIN — IPRATROPIUM BROMIDE AND ALBUTEROL SULFATE 1 AMPULE: .5; 3 SOLUTION RESPIRATORY (INHALATION) at 12:37

## 2019-01-01 RX ADMIN — CARVEDILOL 12.5 MG: 12.5 TABLET, FILM COATED ORAL at 16:56

## 2019-01-01 RX ADMIN — POLYETHYLENE GLYCOL 3350 17 G: 17 POWDER, FOR SOLUTION ORAL at 12:04

## 2019-01-01 RX ADMIN — ONDANSETRON 4 MG: 2 INJECTION INTRAMUSCULAR; INTRAVENOUS at 16:08

## 2019-01-01 RX ADMIN — HEPARIN SODIUM 5000 UNITS: 5000 INJECTION INTRAVENOUS; SUBCUTANEOUS at 07:42

## 2019-01-01 RX ADMIN — VALPROIC ACID 500 MG: 250 CAPSULE, LIQUID FILLED ORAL at 09:18

## 2019-01-01 RX ADMIN — VALPROIC ACID 500 MG: 250 CAPSULE, LIQUID FILLED ORAL at 08:37

## 2019-01-01 RX ADMIN — PANTOPRAZOLE SODIUM 40 MG: 40 TABLET, DELAYED RELEASE ORAL at 05:50

## 2019-01-01 RX ADMIN — ACETAMINOPHEN 650 MG: 325 TABLET ORAL at 13:38

## 2019-01-01 RX ADMIN — INSULIN LISPRO 4 UNITS: 100 INJECTION, SOLUTION INTRAVENOUS; SUBCUTANEOUS at 17:02

## 2019-01-01 RX ADMIN — Medication 10 ML: at 20:02

## 2019-01-01 RX ADMIN — MUPIROCIN: 20 OINTMENT TOPICAL at 21:41

## 2019-01-01 RX ADMIN — FUROSEMIDE 40 MG: 10 INJECTION, SOLUTION INTRAMUSCULAR; INTRAVENOUS at 17:03

## 2019-01-01 RX ADMIN — LEVETIRACETAM 1000 MG: 500 TABLET ORAL at 21:44

## 2019-01-01 RX ADMIN — HEPARIN SODIUM 5000 UNITS: 5000 INJECTION INTRAVENOUS; SUBCUTANEOUS at 15:13

## 2019-01-01 RX ADMIN — OXYCODONE HYDROCHLORIDE AND ACETAMINOPHEN 1 TABLET: 5; 325 TABLET ORAL at 12:03

## 2019-01-01 RX ADMIN — TORSEMIDE 20 MG: 20 TABLET ORAL at 09:01

## 2019-01-01 RX ADMIN — ASPIRIN 81 MG 81 MG: 81 TABLET ORAL at 08:23

## 2019-01-01 RX ADMIN — ACETAMINOPHEN 650 MG: 325 TABLET ORAL at 09:18

## 2019-01-01 RX ADMIN — INSULIN LISPRO 2 UNITS: 100 INJECTION, SOLUTION INTRAVENOUS; SUBCUTANEOUS at 12:23

## 2019-01-01 RX ADMIN — Medication 1 CAPSULE: at 08:46

## 2019-01-01 RX ADMIN — IPRATROPIUM BROMIDE AND ALBUTEROL SULFATE 1 AMPULE: .5; 3 SOLUTION RESPIRATORY (INHALATION) at 09:15

## 2019-01-01 RX ADMIN — FERROUS SULFATE TAB EC 324 MG (65 MG FE EQUIVALENT) 325 MG: 324 (65 FE) TABLET DELAYED RESPONSE at 20:01

## 2019-01-01 RX ADMIN — HYDRALAZINE HYDROCHLORIDE 50 MG: 50 TABLET, FILM COATED ORAL at 15:17

## 2019-01-01 RX ADMIN — LEVETIRACETAM 1000 MG: 500 TABLET ORAL at 09:01

## 2019-01-01 RX ADMIN — ROPINIROLE HYDROCHLORIDE 0.5 MG: 0.5 TABLET, FILM COATED ORAL at 09:00

## 2019-01-01 RX ADMIN — Medication 1 CAPSULE: at 10:10

## 2019-01-01 RX ADMIN — VALPROIC ACID 500 MG: 250 CAPSULE, LIQUID FILLED ORAL at 09:01

## 2019-01-01 RX ADMIN — HYDRALAZINE HYDROCHLORIDE 50 MG: 50 TABLET, FILM COATED ORAL at 08:24

## 2019-01-01 RX ADMIN — SODIUM BICARBONATE 650 MG: 650 TABLET ORAL at 21:41

## 2019-01-01 RX ADMIN — SODIUM BICARBONATE 650 MG: 650 TABLET ORAL at 20:48

## 2019-01-01 RX ADMIN — HYDRALAZINE HYDROCHLORIDE 50 MG: 50 TABLET, FILM COATED ORAL at 21:41

## 2019-01-01 RX ADMIN — HEPARIN SODIUM 5000 UNITS: 5000 INJECTION INTRAVENOUS; SUBCUTANEOUS at 06:30

## 2019-01-01 RX ADMIN — LEVETIRACETAM 1000 MG: 500 TABLET ORAL at 09:00

## 2019-01-01 RX ADMIN — OXYCODONE HYDROCHLORIDE AND ACETAMINOPHEN 1 TABLET: 5; 325 TABLET ORAL at 11:18

## 2019-01-01 RX ADMIN — FERROUS SULFATE TAB EC 324 MG (65 MG FE EQUIVALENT) 325 MG: 324 (65 FE) TABLET DELAYED RESPONSE at 21:11

## 2019-01-01 RX ADMIN — VANCOMYCIN HYDROCHLORIDE 750 MG: 750 INJECTION, POWDER, LYOPHILIZED, FOR SOLUTION INTRAVENOUS at 15:49

## 2019-01-01 RX ADMIN — AMLODIPINE BESYLATE 5 MG: 5 TABLET ORAL at 11:26

## 2019-01-01 RX ADMIN — HEPARIN SODIUM 5000 UNITS: 5000 INJECTION INTRAVENOUS; SUBCUTANEOUS at 14:17

## 2019-01-01 RX ADMIN — FERROUS SULFATE TAB EC 324 MG (65 MG FE EQUIVALENT) 325 MG: 324 (65 FE) TABLET DELAYED RESPONSE at 20:47

## 2019-01-01 RX ADMIN — SODIUM BICARBONATE 650 MG: 650 TABLET ORAL at 21:30

## 2019-01-01 RX ADMIN — HEPARIN SODIUM 5000 UNITS: 5000 INJECTION INTRAVENOUS; SUBCUTANEOUS at 08:23

## 2019-01-01 RX ADMIN — ASPIRIN 81 MG 81 MG: 81 TABLET ORAL at 09:16

## 2019-01-01 RX ADMIN — ACETAMINOPHEN 650 MG: 325 TABLET ORAL at 09:49

## 2019-01-01 RX ADMIN — ROPINIROLE HYDROCHLORIDE 0.5 MG: 0.5 TABLET, FILM COATED ORAL at 09:16

## 2019-01-01 RX ADMIN — CARVEDILOL 12.5 MG: 12.5 TABLET, FILM COATED ORAL at 08:47

## 2019-01-01 RX ADMIN — HEPARIN SODIUM 5000 UNITS: 5000 INJECTION INTRAVENOUS; SUBCUTANEOUS at 21:57

## 2019-01-01 RX ADMIN — FERROUS SULFATE TAB EC 324 MG (65 MG FE EQUIVALENT) 324 MG: 324 (65 FE) TABLET DELAYED RESPONSE at 09:18

## 2019-01-01 RX ADMIN — VANCOMYCIN HYDROCHLORIDE 750 MG: 750 INJECTION, POWDER, LYOPHILIZED, FOR SOLUTION INTRAVENOUS at 21:09

## 2019-01-01 RX ADMIN — LEVETIRACETAM 1000 MG: 500 TABLET ORAL at 08:46

## 2019-01-01 RX ADMIN — Medication 1 CAPSULE: at 09:18

## 2019-01-01 RX ADMIN — IPRATROPIUM BROMIDE AND ALBUTEROL SULFATE 1 AMPULE: .5; 3 SOLUTION RESPIRATORY (INHALATION) at 16:53

## 2019-01-01 RX ADMIN — CARVEDILOL 12.5 MG: 12.5 TABLET, FILM COATED ORAL at 17:11

## 2019-01-01 RX ADMIN — HYDROXYZINE HYDROCHLORIDE 10 MG: 10 TABLET, FILM COATED ORAL at 09:19

## 2019-01-01 RX ADMIN — LEVETIRACETAM 1000 MG: 500 TABLET ORAL at 09:49

## 2019-01-01 RX ADMIN — HEPARIN SODIUM 5000 UNITS: 5000 INJECTION INTRAVENOUS; SUBCUTANEOUS at 13:28

## 2019-01-01 RX ADMIN — TORSEMIDE 10 MG: 20 TABLET ORAL at 10:37

## 2019-01-01 RX ADMIN — LEVETIRACETAM 1000 MG: 500 TABLET ORAL at 22:17

## 2019-01-01 RX ADMIN — HEPARIN SODIUM 5000 UNITS: 5000 INJECTION INTRAVENOUS; SUBCUTANEOUS at 14:19

## 2019-01-01 RX ADMIN — Medication 10 ML: at 12:04

## 2019-01-01 RX ADMIN — HEPARIN SODIUM 5000 UNITS: 5000 INJECTION INTRAVENOUS; SUBCUTANEOUS at 15:03

## 2019-01-01 RX ADMIN — HYDRALAZINE HYDROCHLORIDE 50 MG: 50 TABLET, FILM COATED ORAL at 13:00

## 2019-01-01 RX ADMIN — HYDRALAZINE HYDROCHLORIDE 50 MG: 50 TABLET, FILM COATED ORAL at 11:26

## 2019-01-01 RX ADMIN — LEVETIRACETAM 1000 MG: 500 TABLET ORAL at 08:59

## 2019-01-01 RX ADMIN — ROPINIROLE HYDROCHLORIDE 0.5 MG: 0.5 TABLET, FILM COATED ORAL at 09:01

## 2019-01-01 RX ADMIN — VALPROIC ACID 500 MG: 250 CAPSULE, LIQUID FILLED ORAL at 22:03

## 2019-01-01 RX ADMIN — SODIUM BICARBONATE 650 MG: 650 TABLET ORAL at 21:57

## 2019-01-01 RX ADMIN — VANCOMYCIN HYDROCHLORIDE 1000 MG: 1 INJECTION, POWDER, LYOPHILIZED, FOR SOLUTION INTRAVENOUS at 16:56

## 2019-01-01 RX ADMIN — Medication 10 ML: at 09:18

## 2019-01-01 RX ADMIN — OXYCODONE HYDROCHLORIDE AND ACETAMINOPHEN 1 TABLET: 5; 325 TABLET ORAL at 20:53

## 2019-01-01 RX ADMIN — AMLODIPINE BESYLATE 5 MG: 5 TABLET ORAL at 08:46

## 2019-01-01 RX ADMIN — IPRATROPIUM BROMIDE AND ALBUTEROL SULFATE 3 ML: .5; 3 SOLUTION RESPIRATORY (INHALATION) at 12:19

## 2019-01-01 RX ADMIN — CEFEPIME HYDROCHLORIDE 2 G: 2 INJECTION, POWDER, FOR SOLUTION INTRAVENOUS at 13:38

## 2019-01-01 RX ADMIN — IPRATROPIUM BROMIDE AND ALBUTEROL SULFATE 1 AMPULE: .5; 3 SOLUTION RESPIRATORY (INHALATION) at 09:21

## 2019-01-01 RX ADMIN — ASPIRIN 81 MG 81 MG: 81 TABLET ORAL at 08:59

## 2019-01-01 RX ADMIN — LEVETIRACETAM 1000 MG: 500 TABLET ORAL at 10:10

## 2019-01-01 RX ADMIN — CARVEDILOL 12.5 MG: 12.5 TABLET, FILM COATED ORAL at 13:00

## 2019-01-01 RX ADMIN — POLYETHYLENE GLYCOL 3350 17 G: 17 POWDER, FOR SOLUTION ORAL at 08:23

## 2019-01-01 RX ADMIN — FUROSEMIDE 40 MG: 10 INJECTION, SOLUTION INTRAMUSCULAR; INTRAVENOUS at 17:17

## 2019-01-01 RX ADMIN — FERROUS SULFATE TAB EC 324 MG (65 MG FE EQUIVALENT) 324 MG: 324 (65 FE) TABLET DELAYED RESPONSE at 17:59

## 2019-01-01 RX ADMIN — IPRATROPIUM BROMIDE AND ALBUTEROL SULFATE 1 AMPULE: .5; 3 SOLUTION RESPIRATORY (INHALATION) at 21:04

## 2019-01-01 RX ADMIN — VALPROIC ACID 500 MG: 250 CAPSULE, LIQUID FILLED ORAL at 20:46

## 2019-01-01 RX ADMIN — IPRATROPIUM BROMIDE AND ALBUTEROL SULFATE 1 AMPULE: .5; 3 SOLUTION RESPIRATORY (INHALATION) at 12:01

## 2019-01-01 RX ADMIN — ROPINIROLE HYDROCHLORIDE 3 MG: 1 TABLET, FILM COATED ORAL at 20:48

## 2019-01-01 RX ADMIN — OXYCODONE HYDROCHLORIDE AND ACETAMINOPHEN 1 TABLET: 5; 325 TABLET ORAL at 13:38

## 2019-01-01 RX ADMIN — HYDRALAZINE HYDROCHLORIDE 50 MG: 50 TABLET, FILM COATED ORAL at 14:17

## 2019-01-01 RX ADMIN — OXYCODONE HYDROCHLORIDE AND ACETAMINOPHEN 1 TABLET: 5; 325 TABLET ORAL at 09:13

## 2019-01-01 RX ADMIN — VALPROIC ACID 500 MG: 250 CAPSULE, LIQUID FILLED ORAL at 21:20

## 2019-01-01 RX ADMIN — HYDRALAZINE HYDROCHLORIDE 50 MG: 50 TABLET, FILM COATED ORAL at 14:32

## 2019-01-01 RX ADMIN — ENOXAPARIN SODIUM 30 MG: 30 INJECTION SUBCUTANEOUS at 12:04

## 2019-01-01 RX ADMIN — ACETAMINOPHEN 650 MG: 325 TABLET ORAL at 01:57

## 2019-01-01 RX ADMIN — CEFEPIME HYDROCHLORIDE 2 G: 2 INJECTION, POWDER, FOR SOLUTION INTRAVENOUS at 13:28

## 2019-01-01 RX ADMIN — VALPROIC ACID 500 MG: 250 CAPSULE, LIQUID FILLED ORAL at 21:30

## 2019-01-01 RX ADMIN — VALPROIC ACID 500 MG: 250 CAPSULE, LIQUID FILLED ORAL at 08:47

## 2019-01-01 RX ADMIN — IPRATROPIUM BROMIDE AND ALBUTEROL SULFATE 1 AMPULE: .5; 3 SOLUTION RESPIRATORY (INHALATION) at 21:00

## 2019-01-01 RX ADMIN — MUPIROCIN: 20 OINTMENT TOPICAL at 20:46

## 2019-01-01 RX ADMIN — Medication 10 ML: at 21:57

## 2019-01-01 RX ADMIN — PANTOPRAZOLE SODIUM 40 MG: 40 TABLET, DELAYED RELEASE ORAL at 12:59

## 2019-01-01 RX ADMIN — LEVETIRACETAM 1000 MG: 500 TABLET ORAL at 21:09

## 2019-01-01 RX ADMIN — IPRATROPIUM BROMIDE AND ALBUTEROL SULFATE 1 AMPULE: .5; 3 SOLUTION RESPIRATORY (INHALATION) at 02:26

## 2019-01-01 RX ADMIN — VALPROIC ACID 500 MG: 250 CAPSULE, LIQUID FILLED ORAL at 21:41

## 2019-01-01 RX ADMIN — HYDRALAZINE HYDROCHLORIDE 50 MG: 50 TABLET, FILM COATED ORAL at 21:10

## 2019-01-01 RX ADMIN — HEPARIN SODIUM 5000 UNITS: 5000 INJECTION INTRAVENOUS; SUBCUTANEOUS at 21:12

## 2019-01-01 RX ADMIN — HYDRALAZINE HYDROCHLORIDE 50 MG: 50 TABLET, FILM COATED ORAL at 21:32

## 2019-01-01 RX ADMIN — CEFEPIME HYDROCHLORIDE 2 G: 2 INJECTION, POWDER, FOR SOLUTION INTRAVENOUS at 14:32

## 2019-01-01 RX ADMIN — HYDRALAZINE HYDROCHLORIDE 50 MG: 50 TABLET, FILM COATED ORAL at 14:45

## 2019-01-01 RX ADMIN — HEPARIN SODIUM 5000 UNITS: 5000 INJECTION INTRAVENOUS; SUBCUTANEOUS at 05:10

## 2019-01-01 RX ADMIN — Medication 10 ML: at 09:16

## 2019-01-01 RX ADMIN — HYDRALAZINE HYDROCHLORIDE 50 MG: 50 TABLET, FILM COATED ORAL at 08:46

## 2019-01-01 RX ADMIN — VALPROIC ACID 500 MG: 250 CAPSULE, LIQUID FILLED ORAL at 21:11

## 2019-01-01 RX ADMIN — ONDANSETRON 4 MG: 2 INJECTION INTRAMUSCULAR; INTRAVENOUS at 15:12

## 2019-01-01 RX ADMIN — CEFEPIME HYDROCHLORIDE 2 G: 2 INJECTION, POWDER, FOR SOLUTION INTRAVENOUS at 15:12

## 2019-01-01 RX ADMIN — LEVETIRACETAM 1000 MG: 500 TABLET ORAL at 12:03

## 2019-01-01 RX ADMIN — FERROUS SULFATE TAB EC 324 MG (65 MG FE EQUIVALENT) 324 MG: 324 (65 FE) TABLET DELAYED RESPONSE at 17:42

## 2019-01-01 RX ADMIN — CARVEDILOL 12.5 MG: 12.5 TABLET, FILM COATED ORAL at 16:43

## 2019-01-01 RX ADMIN — HEPARIN SODIUM 5000 UNITS: 5000 INJECTION INTRAVENOUS; SUBCUTANEOUS at 15:09

## 2019-01-01 RX ADMIN — IPRATROPIUM BROMIDE AND ALBUTEROL SULFATE 1 AMPULE: .5; 3 SOLUTION RESPIRATORY (INHALATION) at 19:41

## 2019-01-01 RX ADMIN — Medication 10 ML: at 21:21

## 2019-01-01 RX ADMIN — ROPINIROLE HYDROCHLORIDE 0.5 MG: 0.5 TABLET, FILM COATED ORAL at 08:59

## 2019-01-01 RX ADMIN — OXYCODONE HYDROCHLORIDE AND ACETAMINOPHEN 1 TABLET: 5; 325 TABLET ORAL at 22:28

## 2019-01-01 RX ADMIN — CARVEDILOL 12.5 MG: 12.5 TABLET, FILM COATED ORAL at 17:17

## 2019-01-01 RX ADMIN — Medication 10 ML: at 21:41

## 2019-01-01 RX ADMIN — HEPARIN SODIUM 5000 UNITS: 5000 INJECTION INTRAVENOUS; SUBCUTANEOUS at 21:41

## 2019-01-01 RX ADMIN — FERROUS SULFATE TAB EC 324 MG (65 MG FE EQUIVALENT) 324 MG: 324 (65 FE) TABLET DELAYED RESPONSE at 08:46

## 2019-01-01 RX ADMIN — ROPINIROLE HYDROCHLORIDE 3 MG: 1 TABLET, FILM COATED ORAL at 21:30

## 2019-01-01 RX ADMIN — IPRATROPIUM BROMIDE AND ALBUTEROL SULFATE 1 AMPULE: .5; 3 SOLUTION RESPIRATORY (INHALATION) at 11:14

## 2019-01-01 RX ADMIN — CARVEDILOL 12.5 MG: 12.5 TABLET, FILM COATED ORAL at 17:42

## 2019-01-01 RX ADMIN — CARVEDILOL 12.5 MG: 12.5 TABLET, FILM COATED ORAL at 08:59

## 2019-01-01 RX ADMIN — MICONAZOLE NITRATE: 20 POWDER TOPICAL at 08:59

## 2019-01-01 RX ADMIN — OXYCODONE HYDROCHLORIDE AND ACETAMINOPHEN 1 TABLET: 5; 325 TABLET ORAL at 09:11

## 2019-01-01 RX ADMIN — HEPARIN SODIUM 5000 UNITS: 5000 INJECTION INTRAVENOUS; SUBCUTANEOUS at 14:48

## 2019-01-01 RX ADMIN — INSULIN LISPRO 1 UNITS: 100 INJECTION, SOLUTION INTRAVENOUS; SUBCUTANEOUS at 21:20

## 2019-01-01 RX ADMIN — VALPROIC ACID 500 MG: 250 CAPSULE, LIQUID FILLED ORAL at 20:01

## 2019-01-01 RX ADMIN — HYDRALAZINE HYDROCHLORIDE 50 MG: 50 TABLET, FILM COATED ORAL at 21:43

## 2019-01-01 RX ADMIN — OXYCODONE HYDROCHLORIDE AND ACETAMINOPHEN 1 TABLET: 5; 325 TABLET ORAL at 09:36

## 2019-01-01 RX ADMIN — VALPROIC ACID 500 MG: 250 CAPSULE, LIQUID FILLED ORAL at 10:00

## 2019-01-01 RX ADMIN — SODIUM BICARBONATE 650 MG: 650 TABLET ORAL at 09:49

## 2019-01-01 RX ADMIN — IPRATROPIUM BROMIDE AND ALBUTEROL SULFATE 1 AMPULE: .5; 3 SOLUTION RESPIRATORY (INHALATION) at 16:26

## 2019-01-01 RX ADMIN — ASPIRIN 81 MG 81 MG: 81 TABLET ORAL at 09:00

## 2019-01-01 RX ADMIN — ASPIRIN 81 MG 81 MG: 81 TABLET ORAL at 08:46

## 2019-01-01 RX ADMIN — HEPARIN SODIUM 5000 UNITS: 5000 INJECTION INTRAVENOUS; SUBCUTANEOUS at 22:19

## 2019-01-01 RX ADMIN — VANCOMYCIN HYDROCHLORIDE 750 MG: 750 INJECTION, POWDER, LYOPHILIZED, FOR SOLUTION INTRAVENOUS at 05:10

## 2019-01-01 RX ADMIN — DULOXETINE HYDROCHLORIDE 60 MG: 60 CAPSULE, DELAYED RELEASE ORAL at 21:44

## 2019-01-01 RX ADMIN — CEFEPIME HYDROCHLORIDE 2 G: 2 INJECTION, POWDER, FOR SOLUTION INTRAVENOUS at 15:04

## 2019-01-01 RX ADMIN — INSULIN LISPRO 1 UNITS: 100 INJECTION, SOLUTION INTRAVENOUS; SUBCUTANEOUS at 23:07

## 2019-01-01 RX ADMIN — HYDRALAZINE HYDROCHLORIDE 50 MG: 50 TABLET, FILM COATED ORAL at 14:48

## 2019-01-01 RX ADMIN — FERROUS SULFATE TAB EC 324 MG (65 MG FE EQUIVALENT) 324 MG: 324 (65 FE) TABLET DELAYED RESPONSE at 18:29

## 2019-01-01 RX ADMIN — CEFEPIME HYDROCHLORIDE 2 G: 2 INJECTION, POWDER, FOR SOLUTION INTRAVENOUS at 14:42

## 2019-01-01 RX ADMIN — ROPINIROLE HYDROCHLORIDE 0.5 MG: 0.5 TABLET, FILM COATED ORAL at 09:18

## 2019-01-01 RX ADMIN — LEVETIRACETAM 1000 MG: 500 TABLET ORAL at 21:10

## 2019-01-01 RX ADMIN — ASPIRIN 81 MG 81 MG: 81 TABLET ORAL at 10:10

## 2019-01-01 ASSESSMENT — PAIN DESCRIPTION - ONSET
ONSET: ON-GOING
ONSET: SUDDEN
ONSET: ON-GOING
ONSET: GRADUAL
ONSET: ON-GOING

## 2019-01-01 ASSESSMENT — PAIN DESCRIPTION - PROGRESSION
CLINICAL_PROGRESSION: NOT CHANGED
CLINICAL_PROGRESSION: GRADUALLY IMPROVING
CLINICAL_PROGRESSION: NOT CHANGED
CLINICAL_PROGRESSION: GRADUALLY WORSENING
CLINICAL_PROGRESSION: GRADUALLY WORSENING
CLINICAL_PROGRESSION: NOT CHANGED
CLINICAL_PROGRESSION: RESOLVED
CLINICAL_PROGRESSION: NOT CHANGED
CLINICAL_PROGRESSION: NOT CHANGED
CLINICAL_PROGRESSION: GRADUALLY IMPROVING
CLINICAL_PROGRESSION: GRADUALLY IMPROVING
CLINICAL_PROGRESSION: NOT CHANGED
CLINICAL_PROGRESSION: NOT CHANGED
CLINICAL_PROGRESSION: RESOLVED
CLINICAL_PROGRESSION: NOT CHANGED
CLINICAL_PROGRESSION: NOT CHANGED
CLINICAL_PROGRESSION: GRADUALLY IMPROVING
CLINICAL_PROGRESSION: NOT CHANGED
CLINICAL_PROGRESSION: GRADUALLY WORSENING
CLINICAL_PROGRESSION: NOT CHANGED
CLINICAL_PROGRESSION: GRADUALLY IMPROVING
CLINICAL_PROGRESSION: NOT CHANGED
CLINICAL_PROGRESSION: RAPIDLY WORSENING
CLINICAL_PROGRESSION: GRADUALLY IMPROVING
CLINICAL_PROGRESSION: NOT CHANGED
CLINICAL_PROGRESSION: GRADUALLY IMPROVING
CLINICAL_PROGRESSION: NOT CHANGED
CLINICAL_PROGRESSION: NOT CHANGED
CLINICAL_PROGRESSION: GRADUALLY WORSENING
CLINICAL_PROGRESSION: GRADUALLY IMPROVING
CLINICAL_PROGRESSION: NOT CHANGED
CLINICAL_PROGRESSION: GRADUALLY WORSENING
CLINICAL_PROGRESSION: RAPIDLY IMPROVING
CLINICAL_PROGRESSION: GRADUALLY IMPROVING

## 2019-01-01 ASSESSMENT — PAIN - FUNCTIONAL ASSESSMENT
PAIN_FUNCTIONAL_ASSESSMENT: ACTIVITIES ARE NOT PREVENTED
PAIN_FUNCTIONAL_ASSESSMENT: PREVENTS OR INTERFERES SOME ACTIVE ACTIVITIES AND ADLS
PAIN_FUNCTIONAL_ASSESSMENT: ACTIVITIES ARE NOT PREVENTED
PAIN_FUNCTIONAL_ASSESSMENT: ACTIVITIES ARE NOT PREVENTED
PAIN_FUNCTIONAL_ASSESSMENT: PREVENTS OR INTERFERES SOME ACTIVE ACTIVITIES AND ADLS
PAIN_FUNCTIONAL_ASSESSMENT: ACTIVITIES ARE NOT PREVENTED
PAIN_FUNCTIONAL_ASSESSMENT: ACTIVITIES ARE NOT PREVENTED
PAIN_FUNCTIONAL_ASSESSMENT: PREVENTS OR INTERFERES SOME ACTIVE ACTIVITIES AND ADLS
PAIN_FUNCTIONAL_ASSESSMENT: ACTIVITIES ARE NOT PREVENTED
PAIN_FUNCTIONAL_ASSESSMENT: ACTIVITIES ARE NOT PREVENTED
PAIN_FUNCTIONAL_ASSESSMENT: PREVENTS OR INTERFERES SOME ACTIVE ACTIVITIES AND ADLS
PAIN_FUNCTIONAL_ASSESSMENT: PREVENTS OR INTERFERES SOME ACTIVE ACTIVITIES AND ADLS
PAIN_FUNCTIONAL_ASSESSMENT: ACTIVITIES ARE NOT PREVENTED
PAIN_FUNCTIONAL_ASSESSMENT: ACTIVITIES ARE NOT PREVENTED
PAIN_FUNCTIONAL_ASSESSMENT: PREVENTS OR INTERFERES SOME ACTIVE ACTIVITIES AND ADLS
PAIN_FUNCTIONAL_ASSESSMENT: ACTIVITIES ARE NOT PREVENTED
PAIN_FUNCTIONAL_ASSESSMENT: ACTIVITIES ARE NOT PREVENTED
PAIN_FUNCTIONAL_ASSESSMENT: PREVENTS OR INTERFERES SOME ACTIVE ACTIVITIES AND ADLS
PAIN_FUNCTIONAL_ASSESSMENT: ACTIVITIES ARE NOT PREVENTED
PAIN_FUNCTIONAL_ASSESSMENT: ACTIVITIES ARE NOT PREVENTED
PAIN_FUNCTIONAL_ASSESSMENT: PREVENTS OR INTERFERES SOME ACTIVE ACTIVITIES AND ADLS
PAIN_FUNCTIONAL_ASSESSMENT: ACTIVITIES ARE NOT PREVENTED

## 2019-01-01 ASSESSMENT — PAIN SCALES - GENERAL
PAINLEVEL_OUTOF10: 0
PAINLEVEL_OUTOF10: 10
PAINLEVEL_OUTOF10: 0
PAINLEVEL_OUTOF10: 0
PAINLEVEL_OUTOF10: 10
PAINLEVEL_OUTOF10: 0
PAINLEVEL_OUTOF10: 10
PAINLEVEL_OUTOF10: 0
PAINLEVEL_OUTOF10: 0
PAINLEVEL_OUTOF10: 7
PAINLEVEL_OUTOF10: 0
PAINLEVEL_OUTOF10: 0
PAINLEVEL_OUTOF10: 10
PAINLEVEL_OUTOF10: 0
PAINLEVEL_OUTOF10: 7
PAINLEVEL_OUTOF10: 3
PAINLEVEL_OUTOF10: 10
PAINLEVEL_OUTOF10: 0
PAINLEVEL_OUTOF10: 7
PAINLEVEL_OUTOF10: 9
PAINLEVEL_OUTOF10: 10
PAINLEVEL_OUTOF10: 0
PAINLEVEL_OUTOF10: 10
PAINLEVEL_OUTOF10: 0
PAINLEVEL_OUTOF10: 7
PAINLEVEL_OUTOF10: 0
PAINLEVEL_OUTOF10: 7
PAINLEVEL_OUTOF10: 0
PAINLEVEL_OUTOF10: 10
PAINLEVEL_OUTOF10: 0
PAINLEVEL_OUTOF10: 10
PAINLEVEL_OUTOF10: 5
PAINLEVEL_OUTOF10: 0
PAINLEVEL_OUTOF10: 7
PAINLEVEL_OUTOF10: 8
PAINLEVEL_OUTOF10: 0
PAINLEVEL_OUTOF10: 0
PAINLEVEL_OUTOF10: 10
PAINLEVEL_OUTOF10: 10
PAINLEVEL_OUTOF10: 2
PAINLEVEL_OUTOF10: 4
PAINLEVEL_OUTOF10: 3
PAINLEVEL_OUTOF10: 4
PAINLEVEL_OUTOF10: 0
PAINLEVEL_OUTOF10: 7
PAINLEVEL_OUTOF10: 10
PAINLEVEL_OUTOF10: 7
PAINLEVEL_OUTOF10: 3
PAINLEVEL_OUTOF10: 0
PAINLEVEL_OUTOF10: 10
PAINLEVEL_OUTOF10: 0
PAINLEVEL_OUTOF10: 10
PAINLEVEL_OUTOF10: 7
PAINLEVEL_OUTOF10: 10
PAINLEVEL_OUTOF10: 0
PAINLEVEL_OUTOF10: 9
PAINLEVEL_OUTOF10: 0
PAINLEVEL_OUTOF10: 7
PAINLEVEL_OUTOF10: 0
PAINLEVEL_OUTOF10: 0
PAINLEVEL_OUTOF10: 4
PAINLEVEL_OUTOF10: 3
PAINLEVEL_OUTOF10: 0
PAINLEVEL_OUTOF10: 0
PAINLEVEL_OUTOF10: 7
PAINLEVEL_OUTOF10: 0
PAINLEVEL_OUTOF10: 0
PAINLEVEL_OUTOF10: 8
PAINLEVEL_OUTOF10: 0
PAINLEVEL_OUTOF10: 10

## 2019-01-01 ASSESSMENT — ENCOUNTER SYMPTOMS
DIARRHEA: 0
ABDOMINAL PAIN: 0
BACK PAIN: 0
COUGH: 1
NAUSEA: 0
VOMITING: 0
ABDOMINAL DISTENTION: 0
SHORTNESS OF BREATH: 1

## 2019-01-01 ASSESSMENT — PAIN DESCRIPTION - PAIN TYPE
TYPE: ACUTE PAIN
TYPE: CHRONIC PAIN
TYPE: ACUTE PAIN
TYPE: CHRONIC PAIN
TYPE: ACUTE PAIN
TYPE: CHRONIC PAIN
TYPE: ACUTE PAIN
TYPE: CHRONIC PAIN
TYPE: ACUTE PAIN
TYPE: CHRONIC PAIN
TYPE: CHRONIC PAIN

## 2019-01-01 ASSESSMENT — PAIN DESCRIPTION - ORIENTATION
ORIENTATION: LEFT
ORIENTATION: RIGHT
ORIENTATION: RIGHT;LEFT
ORIENTATION: LEFT
ORIENTATION: RIGHT
ORIENTATION: LEFT;RIGHT
ORIENTATION: RIGHT
ORIENTATION: RIGHT;LEFT

## 2019-01-01 ASSESSMENT — PAIN DESCRIPTION - LOCATION
LOCATION: KNEE
LOCATION: LEG
LOCATION: KNEE
LOCATION: HEAD
LOCATION: KNEE
LOCATION: GENERALIZED
LOCATION: KNEE
LOCATION: LEG
LOCATION: KNEE
LOCATION: KNEE;LEG
LOCATION: KNEE
LOCATION: LEG
LOCATION: KNEE
LOCATION: LEG
LOCATION: KNEE

## 2019-01-01 ASSESSMENT — PAIN DESCRIPTION - FREQUENCY
FREQUENCY: CONTINUOUS
FREQUENCY: INTERMITTENT
FREQUENCY: CONTINUOUS
FREQUENCY: INTERMITTENT
FREQUENCY: CONTINUOUS
FREQUENCY: INTERMITTENT
FREQUENCY: CONTINUOUS
FREQUENCY: INTERMITTENT
FREQUENCY: CONTINUOUS
FREQUENCY: INTERMITTENT
FREQUENCY: CONTINUOUS
FREQUENCY: INTERMITTENT

## 2019-01-01 ASSESSMENT — PAIN DESCRIPTION - DESCRIPTORS
DESCRIPTORS: THROBBING;OTHER (COMMENT)
DESCRIPTORS: ACHING
DESCRIPTORS: THROBBING
DESCRIPTORS: ACHING
DESCRIPTORS: THROBBING
DESCRIPTORS: HEADACHE
DESCRIPTORS: ACHING
DESCRIPTORS: THROBBING
DESCRIPTORS: SORE
DESCRIPTORS: ACHING
DESCRIPTORS: ACHING
DESCRIPTORS: DISCOMFORT
DESCRIPTORS: THROBBING
DESCRIPTORS: ACHING
DESCRIPTORS: THROBBING
DESCRIPTORS: ACHING
DESCRIPTORS: OTHER (COMMENT)
DESCRIPTORS: ACHING
DESCRIPTORS: ACHING
DESCRIPTORS: THROBBING
DESCRIPTORS: ACHING
DESCRIPTORS: THROBBING
DESCRIPTORS: SORE
DESCRIPTORS: ACHING

## 2019-01-01 ASSESSMENT — PAIN SCALES - WONG BAKER
WONGBAKER_NUMERICALRESPONSE: 0

## 2019-05-02 NOTE — PROGRESS NOTES
NEW PATIENT ORTHOPAEDIC NOTE    Chief Complaint   Patient presents with    Knee Pain     Right X 3 months Last Cortisone 3 months ago       HPI  78 y.o. female seen for evaluation of right knee pain    Onset acute on chronic, worse past 2 weeks  Seen and treated by Dr Yesi Carey previously, including a steroid injection 3 months ago  She reports total of two injections in right knee that she can remember  Injury/trauma none  She is a resident of [facility]  Pain is located diffuse right knee  Worse with pressure, WB, ROM  Better with nothing  Previous injection 3 months ago provided relief for ~ 2.5 months  Associated with venous stasis    [facility].   Pain Assessment  Location of Pain: Knee  Location Modifiers: Right  Severity of Pain: 10  Quality of Pain: Locking, Throbbing  Duration of Pain: Persistent  Frequency of Pain: Constant  Date Pain First Started: 02/02/19  Aggravating Factors: Bending, Stretching, Straightening, Exercise, Kneeling, Squatting, Standing, Walking  Limiting Behavior: Yes  Relieving Factors: Rest  Result of Injury: No  Work-Related Injury: No  Are there other pain locations you wish to document?: No    Review of Systems  Constitutional - denies fevers, weight loss  Cardiovascular - denies chest pain, palpitations, peripheral edema, blood clots  Respiratory - denies SOB, cough  Gastrointestinal - denies abdominal pain, nausea, vomiting  Genitourinary - denies dysuria, discharge  Musculoskeletal - per HPI  Integumentary - denies rash, sores  Neurologic - denies numbness,tingling, paresthesias  Hematologic - denies abnormal bleeding, blood clots  Allergic/Immunologic - denies metal allergies, recurrent infections    Allergies   Allergen Reactions    Benadryl [Diphenhydramine] Itching        Current Outpatient Medications   Medication Sig Dispense Refill    torsemide (DEMADEX) 10 MG tablet Take 10 mg by mouth daily      syndrome     Status epilepticus ([facility] 2018    Ulcer of right lower leg ([facility] 2017    Urinary frequency     Urinary incontinence         Past Surgical History:   Procedure Laterality Date    APPENDECTOMY      BLADDER SUSPENSION      CHOLECYSTECTOMY      COLONOSCOPY      CYSTOSCOPY      EYE SURGERY      HYSTERECTOMY         Family History   Problem Relation Age of Onset    Heart Disease Mother     Heart Disease Father     Diabetes Father     Kidney Disease Father        Social History     Socioeconomic History    Marital status: Single     Spouse name: Not on file    Number of children: 1    Years of education: 15    Highest education level: Not on file   Occupational History    Not on file   Social Needs    Financial resource strain: Not on file    Food insecurity:     Worry: Not on file     Inability: Not on file    Transportation needs:     Medical: Not on file     Non-medical: Not on file   Tobacco Use    Smoking status: Former Smoker     Packs/day: 0.50     Years: 10.00     Pack years: 5.00     Types: Cigarettes     Last attempt to quit: 3/21/1990     Years since quittin.1    Smokeless tobacco: Never Used   Substance and Sexual Activity    Alcohol use: No    Drug use: No    Sexual activity: Never   Lifestyle    Physical activity:     Days per week: Not on file     Minutes per session: Not on file    Stress: Not on file   Relationships    Social connections:     Talks on phone: Not on file     Gets together: Not on file     Attends Yazidism service: Not on file     Active member of club or organization: Not on file     Attends meetings of clubs or organizations: Not on file     Relationship status: Not on file    Intimate partner violence:     Fear of current or ex partner: Not on file     Emotionally abused: Not on file     Physically abused: Not on file     Forced sexual activity: Not on file   Other Topics Concern    Not on file   Social History Narrative    Not on file        Vitals:    05/02/19 1345   BP: (!) 94/58   Pulse: 75       Physical Exam  Constitutional - well-groomed, well-nourished, BMI 35 , obese  Psychiatric - pleasant,  normal mood & affect, oriented to place, person, and situation  Cardiovascular - RRR, positive peripheral edema, no varicosities, venous stasis present bilaterally  Gastrointestinal - protuberant belly  Skin - venous stasis. Couple small scabs right proximal leg. Neurological - SILT SP/DP/T/sural/saphenous nerve distributions; EHL/TA/GS intact  Right knee - clinical valgus   Moderate effusion   ROM 10 - 85   No erythema, no calor        Imaging:  Images were personally reviewed by myself and discussed with the patient  Right knee 3 views performed today in clinic   - patient could not perform weight bearing radiographs. No acute osseous abnormalities, no fractures. Moderate tricompartmental osteoarthritis. Bones appear demineralized. Assessment & Plan:  78 y.o. female who presents with    Diagnosis Orders   1. Primary osteoarthritis of right knee  NE ARTHROCENTESIS ASPIR&/INJ MAJOR JT/BURSA W/O US    NE TRIAMCINOLONE ACETONIDE INJ           Procedures    NE ARTHROCENTESIS ASPIR&/INJ MAJOR JT/BURSA W/O US    NE TRIAMCINOLONE ACETONIDE INJ     Kenalog (40mg/mL) 1 cc injected     Discussed at length conservative treatment of knee symptoms/arthritis, according to AAOS Clinical Practice Guidelines:  1)  Recommend ice and anti-inflammatories to reduce pain and swelling. 2)  Recommend maintaining weight in a healthy range to reduce stresses on the knee, particularly the patellofemoral compartment which sees up to 6x body weight. 3)  Physical therapy at [facility]. 4)  Intra-articular injections are an option, either corticosteroid or hyaluronic acid viscosupplementation. Deferred today.     Risks and benefits of a steroid injection were discussed with the patient, including the possibility of adverse local site reactions such as dermal atrophy and skin discoloration. Although rare, an infection is possible and may necessitate surgical treatment if it occurs in a joint or develops into an abscess. Finally, a rise in blood sugar levels is anticipated, particularly in diabetics. Diabetic patients were instructed to monitor their blood glucose levels after the injection and to adjust their insulin regimen as appropriate. The patient elected to proceed, and after verbal consent was obtained and drug allergies were reviewed, the injection site was prepped with alcohol and ChloraPrep. 40mg of Kenalog mixed with lidocaine and marcaine (no epinephrine) was injected into the right knee(s). There were no immediate complications after the procedure. The patient was advised to ice the area intermittently over the next 24-48 hours until the corticosteroid becomes effective.     Naun Baltazar

## 2019-05-21 NOTE — ED NOTES
ED SBAR report provider to [facility]. Patient to be transported to Room 5127 via stretcher by ED tech. Patient transported with bedside cardiac monitor and with IV medications infusing. IV site clean, dry, and intact. MEWS score and pain assessed and documented. Updated patient on plan of care.      Shayan Gasca RN  05/21/19 [id number]

## 2019-05-21 NOTE — CARE COORDINATION
Noted that Pt is from [facility]. Met with Pt, reports she lives at [facility] for about 6 months now and plans to return upon d.c/     Will follow. Thank You.    Electronically signed by TRAN Gonzales, LSW on 5/21/2019 at 1:58 PM

## 2019-05-21 NOTE — PROGRESS NOTES
Pt arrived via stretcher from ED to room 8534. Heart monitor connected and verified with [facility]. VS, assessment, and admission complete. 4 eyes assessment complete. Pt oriented to unit and room. Call light and bedside table in reach. All questions answered. Pt resting quietly in bed with no complaints or voiced needs at this time. Will continue to monitor.     Gabe Barr RN

## 2019-05-21 NOTE — ED TRIAGE NOTES
Pt to ED from [address] [place] [address] Road home via [facility] with c/o sob. Pt states her sob started this evening, hx chf.  EMS placed pt on 4L o2/nc upon arrival and gave 1 duoneb en route. O2 sat 97% on 4L o2/nc upon ED arrival.  Audible wheezing noted. Pt with some drooling noted to left side of mouth, pt states this is chronic.

## 2019-05-21 NOTE — ED PROVIDER NOTES
**EVALUATED BY ADVANCED PRACTICE PROVIDER**        [facility] ENCOUNTER      Pt Name: Luz Frank  PFD:[id number]  Vidalgfgina 1950  Date of evaluation: 5/20/2019  Provider: HOLLIE King CNP      Chief Complaint:    Chief Complaint   Patient presents with    Shortness of Breath     given breathing treatments at nursing home without improvement to SOB. Nursing Notes, Past Medical Hx, Past Surgical Hx, Social Hx, Allergies, and Family Hx were all reviewed and agreed with or any disagreements were addressed in the HPI.    HPI:  (Location, Duration, Timing, Severity,Quality, Assoc Sx, Context, Modifying factors)  This is a  78 y.o. female with PMH significant for HLD, HTN, DM, CHF, and CKD who presents to the emergency department today via EMS from nursing home complaining of shortness of breath and cough. Onset was today. Started spontaneously and then constant. Cough is dry and nonproductive. She has chronic leg swelling. She is alert and oriented. No measured fevers at home.     PastMedical/Surgical History:      Diagnosis Date    Acid reflux     Arthritis     Balance problem     Blood circulation, collateral     Cellulitis of both lower extremities     CHF (congestive heart failure) (HCC)     Chronic kidney disease     Chronic renal failure    Chronic venous hypertension (idiopathic) with ulcer and inflammation of bilateral lower extremity ([address] 1/9/2017    Community acquired bacterial pneumonia 7/26/2018    Depression     Diabetes mellitus (HCC)     GERD (gastroesophageal reflux disease)     Hyperlipidemia     Hypertension     Movement disorder     Murmur     Neuromuscular disorder ([address]     Restless leg syndrome     Status epilepticus ([address] 11/12/2018    Ulcer of right lower leg ([address] 7/14/2017    Urinary frequency     Urinary incontinence          Procedure Laterality Date    APPENDECTOMY      BLADDER SUSPENSION      CHOLECYSTECTOMY      COLONOSCOPY      CYSTOSCOPY      EYE SURGERY      HYSTERECTOMY         Medications:  Previous Medications    ACETAMINOPHEN (TYLENOL) 325 MG TABLET    Take 650 mg by mouth every 6 hours as needed for Pain    AMLODIPINE (NORVASC) 5 MG TABLET    Take 1 tablet by mouth 2 times daily    ASPIRIN 81 MG TABLET    Take 81 mg by mouth daily. CARVEDILOL (COREG) 12.5 MG TABLET    Take 1 tablet by mouth 2 times daily (with meals)    CHOLECALCIFEROL (VITAMIN D PO)    Take 50,000 Units by mouth every 30 days     FERROUS SULFATE 325 (65 FE) MG TABLET    Take 325 mg by mouth 2 times daily. HYDRALAZINE (APRESOLINE) 50 MG TABLET    Take 50 mg by mouth 3 times daily     INSULIN GLARGINE (LANTUS) 100 UNIT/ML INJECTION VIAL    Inject 10 Units into the skin nightly    LEVETIRACETAM (KEPPRA) 1000 MG TABLET    Take 1 tablet by mouth 2 times daily    PANTOPRAZOLE (PROTONIX) 40 MG TABLET    Take 40 mg by mouth every morning (before breakfast)     POLYETHYLENE GLYCOL (GLYCOLAX) POWDER    Take 17 g by mouth daily as needed    ROPINIROLE (REQUIP) 1 MG TABLET    Take 0.5 mg by mouth daily     SODIUM BICARBONATE 650 MG TABLET    Take 650 mg by mouth 2 times daily    TORSEMIDE (DEMADEX) 10 MG TABLET    Take 10 mg by mouth daily    VALPROIC ACID (DEPAKENE) 250 MG CAPSULE    Take 2 capsules by mouth 2 times daily         Review of Systems:  Review of Systems   Constitutional: Negative for chills, diaphoresis and fever. HENT: Negative. Eyes: Negative for visual disturbance. Respiratory: Positive for cough and shortness of breath. Cardiovascular: Positive for leg swelling. Negative for chest pain. Gastrointestinal: Negative for abdominal distention, abdominal pain, diarrhea, nausea and vomiting. Musculoskeletal: Negative for back pain. Skin: Negative for pallor and rash. Allergic/Immunologic: Negative for immunocompromised state.    Neurological: Negative for dizziness, syncope and headaches. Hematological: Negative for adenopathy. Psychiatric/Behavioral: Negative for confusion. All other systems reviewed and are negative. Positives and Pertinent negatives as per HPI. Except as noted above in the ROS, problem specific ROS was completed and is negative. Physical Exam:  Physical Exam   Constitutional: She is oriented to person, place, and time. She appears well-developed and well-nourished. Non-toxic appearance. No distress. Chronic-appearing 41-year-old female lying in bed in no acute distress   HENT:   Head: Normocephalic and atraumatic. Eyes: Conjunctivae are normal. No scleral icterus. Neck: Full passive range of motion without pain. Neck supple. No JVD present. No neck rigidity. Cardiovascular: Regular rhythm. Tachycardia present. Exam reveals no gallop and no friction rub. No murmur heard. Pulmonary/Chest: Effort normal. No respiratory distress. She has rhonchi. Abdominal: Soft. Normal appearance. She exhibits no distension. There is no tenderness. There is no rigidity. Musculoskeletal: Normal range of motion. Neurological: She is alert and oriented to person, place, and time. No cranial nerve deficit. Skin: Skin is warm, dry and intact. No rash noted. Psychiatric: She has a normal mood and affect. Nursing note and vitals reviewed.       MEDICAL DECISION MAKING    Vitals:    Vitals:    05/21/19 0000 05/21/19 0015 05/21/19 0032 05/21/19 0046   BP: (!) 185/80 (!) 189/75 (!) 190/72 (!) 187/70   Pulse: 104 104 100 100   Resp: 27 (!) 31 28 23   Temp:       TempSrc:       SpO2: 98% 98% 98% 93%       LABS:  Labs Reviewed   CBC WITH AUTO DIFFERENTIAL - Abnormal; Notable for the following components:       Result Value    RBC 3.55 (*)     Hemoglobin 11.1 (*)     Hematocrit 32.6 (*)     RDW 16.4 (*)     Lymphocytes # 0.6 (*)     All other components within normal limits    Narrative:     Performed at:  [facility]  [address] [place] [address] Naveed Perdomo  [place] [place] [facility] [employer] [address]   Phone [phone]   BASIC METABOLIC PANEL - Abnormal; Notable for the following components:    Glucose 196 (*)     BUN 26 (*)     CREATININE 1.6 (*)     GFR Non- 32 (*)     GFR  39 (*)     All other components within normal limits    Narrative:     Performed at:  [facility]  [address] [facility] [employer] [address]   Phone [phone]   BRAIN NATRIURETIC PEPTIDE - Abnormal; Notable for the following components:    Pro-BNP 4,249 (*)     All other components within normal limits    Narrative:     Performed at:  [address] [employer] [address]   Phone [phone]   TROPONIN - Abnormal; Notable for the following components:    Troponin 0.04 (*)     All other components within normal limits    Narrative:     Performed at:  [address] [facility] [employer] [address]   Phone [phone]   COMPREHENSIVE METABOLIC PANEL W/ REFLEX TO MG FOR LOW K - Abnormal; Notable for the following components:    Anion Gap 17 (*)     Glucose 191 (*)     BUN 28 (*)     CREATININE 2.0 (*)     GFR Non- 25 (*)     GFR African American 30 (*)     Albumin/Globulin Ratio 1.0 (*)     All other components within normal limits    Narrative:     Performed at:  [address] [facility] [facility] [employer] [address]   Phone [phone]   CULTURE BLOOD #1   CULTURE BLOOD #2   LACTIC ACID, PLASMA    Narrative:     Performed at:  [address] [facility] [employer] [address]   Phone [phone] labs reviewed and werenegative at this time or not returned at the time of this note.     RADIOLOGY:   Non-plain film images such as CT, Ultrasound and MRI are read by the radiologist. HOLLIE Lopez CNP have directly visualized the radiologic plain film image(s) with the below findings:        Interpretation per the Radiologist below, if available at the time of thisnote:    CT CHEST WO CONTRAST   Final Result   1. Ill-defined centrilobular nodules in the left lower lobe are likely   infectious or inflammatory. 2. Coronary artery disease. XR CHEST PORTABLE   Final Result   Negative portable chest.              Xr Chest Portable    Result Date: 5/21/2019  EXAMINATION: ONE XRAY VIEW OF THE CHEST 5/21/2019 12:00 am COMPARISON: 11/25/2018 HISTORY: ORDERING SYSTEM PROVIDED HISTORY: sob TECHNOLOGIST PROVIDED HISTORY: Reason for exam:->sob Ordering Physician Provided Reason for Exam: sob Acuity: Acute Relevant Medical/Surgical History: hx chf, pneumonia, diabetes FINDINGS: The lungs are clear. The costophrenic angles are sharp. The cardiomediastinal silhouette is within normal limits. There is no discernible pneumothorax. Negative portable chest.         MEDICAL DECISION MAKING / ED COURSE:      PROCEDURES:   Procedures    None    Patient was given:  Medications   methylPREDNISolone sodium (SOLU-MEDROL) injection 125 mg (has no administration in time range)   ipratropium-albuterol (DUONEB) nebulizer solution 1 ampule (has no administration in time range)   acetaminophen (TYLENOL) tablet 650 mg (650 mg Oral Given 5/21/19 0157)       Differential Diagnosis: Acute Coronary Syndrome, Congestive Heart Failure, Myocardial Infarction, Pulmonary Embolus, Pneumonia, Pneumothorax, other    Patient presents from nursing home with cough and shortness of breath. See HPI for presentation. Physical exam as above. Chronic-appearing 43-year-old female lying in bed in no acute stress. She is tachycardic and has a temperature of 99.5. She is tachypneic. CT of the chest shows centrilobular nodules in the left lower lobe that are likely infectious or inflammatory. Metabolic panel shows an acute kidney injury with a creatinine of 2.0 and GFR 25. Glucose is 190 with no evidence of DKA. No electrolyte abnormality or liver dysfunction. Troponin 0.04. BNP elevated to 4200. Lactic acid normal.  Complete blood counts shows no leukocytosis. Hemoglobin 11.1. Emergency Justino Friday course included Tylenol for elevated temperature as well as breathing treatments and steroids to cover for possible bronchoconstriction. Patient continued to be tachycardic and tachypneic. Due to the elevated BNP and kidney injury she'll be admitted for further workup. She was given all test results and given an opportunity to ask and have any questions answered. She was agreeable to admission. The hospitalist was consulted and agreed to admit patient and write orders. The patient tolerated their visit well. I evaluated the patient. The physician was available for consultation as needed. The patient and / or the family were informed of the results of anytests, a time was given to answer questions, a plan was proposed and they agreed with plan. CLINICAL IMPRESSION:  1. Shortness of breath    2. Elevated brain natriuretic peptide (BNP) level    3. Tachycardia    4.  ROSANNA (acute kidney injury) ([facility]        DISPOSITION Admitted 05/21/2019 02:06:54 AM      PATIENT REFERRED TO:  [place] [address]  [phone]            DISCHARGE MEDICATIONS:  New Prescriptions    No medications on file       DISCONTINUED MEDICATIONS:  Discontinued Medications    No medications on file              (Please note the MDM and HPI sections of this note were completed with a voice recognition program.  Efforts weremade to edit the dictations but occasionally words are mis-transcribed.)    Electronically signed, HOLLIE Sevilla CNP,           HOLLIE Sevilla CNP  05/21/19 7857

## 2019-05-21 NOTE — CONSULTS
Clinical Pharmacy Note  Vancomycin Consult    Caryle Em is a 78 y.o. female ordered Vancomycin for pneumonia; consult received from Dr. Jordan Feliciano to manage therapy. Also receiving Cefepime.     Patient Active Problem List   Diagnosis    Diastolic CHF ([facility]    Cellulitis    Chest pain    DM [facility]diabetes mellitus) [facility]    HTN (hypertension)    Cellulitis of both lower extremities    Morbid obesity due to excess calories ([facility]    Atherosclerosis of native artery of both lower extremities with bilateral ulceration of calves ([facility]    Chronic venous hypertension (idiopathic) with ulcer and inflammation of bilateral lower extremity (HCC)    Ulcer of left lower leg, limited to breakdown of skin (HCC)    Ulcer of right lower leg (HCC)    Leg swelling    Pruritic condition    Excoriation of left lower leg    Excoriation of right lower leg    Venous stasis dermatitis of both lower extremities    Lower leg edema    Venous ulcer ([facility]    Venous hypertension, chronic, with ulcer, left ([facility]    Community acquired bacterial pneumonia    Hypertensive urgency    Stage 3 chronic kidney disease ([facility]    Pneumonia due to organism    Acute pulmonary edema (HCC)    Acute respiratory failure with hypoxia (HCC)    Blister of lower leg, right, initial encounter    Status epilepticus ([facility]    Acute metabolic encephalopathy    Dysphagia    Moderate protein-calorie malnutrition (HCC)    Hypernatremia       Allergies:  Benadryl [diphenhydramine]     Temp max:  Temp (24hrs), Av °F (37.2 °C), Min:98.1 °F (36.7 °C), Max:99.8 °F (37.7 °C)      Recent Labs     19  2357   WBC 6.6       Recent Labs     19  2357   BUN 28*  26*   CREATININE 2.0*  1.6*         Intake/Output Summary (Last 24 hours) at 2019 1444  Last data filed at 2019 0946  Gross per 24 hour   Intake 240 ml   Output --   Net 240 ml       Culture Results:  pending    Ht Readings from Last 1 Encounters:   19 4' 11\" (1.499 m)        Wt Readings from Last 1 Encounters:   05/21/19 178 lb 5.6 oz (80.9 kg)         CrCl cannot be calculated (Unknown ideal weight. ). Assessment/Plan:  Will initiate vancomycin 1000 mg IV once. Will get a random level tomorrow. Regimen projects a trough level of 15-20 mg/L. Timing of trough level will be determined based on culture results, renal function, and clinical response. Thank you for the consult. Will continue to follow.   Candice Yoo [facility],5/21/2019,2:45 PM

## 2019-05-21 NOTE — H&P
Hospital Medicine History & Physical      PCP: Shawn Moeller II    Date of Admission: 5/20/2019    Date of Service: Pt seen/examined on 5/20/2019    Chief Complaint:      Chief Complaint   Patient presents with    Shortness of Breath     given breathing treatments at nursing home without improvement to SOB. History Of Present Illness: The patient is a 78 y.o. female with a past medical history of diabetes, GERD, hypertension, chronic renal disease, CHF and venous insufficiency who presents to [facility] with SOB. Patient states since the last 2 days she thought she was going down the upper respiratory tract infection but then had worsening cough and shortness of breath. No sputum production. Mild swelling of the legs. Past Medical History:        Diagnosis Date    Acid reflux     Arthritis     Balance problem     Blood circulation, collateral     Cellulitis of both lower extremities     CHF (congestive heart failure) (HCC)     Chronic kidney disease     Chronic renal failure    Chronic venous hypertension (idiopathic) with ulcer and inflammation of bilateral lower extremity ([address] 1/9/2017    Community acquired bacterial pneumonia 7/26/2018    Depression     Diabetes mellitus (HCC)     GERD (gastroesophageal reflux disease)     Hyperlipidemia     Hypertension     Movement disorder     Murmur     Neuromuscular disorder ([address]     Restless leg syndrome     Status epilepticus ([address] 11/12/2018    Ulcer of right lower leg ([address] 7/14/2017    Urinary frequency     Urinary incontinence        Past Surgical History:        Procedure Laterality Date    APPENDECTOMY      BLADDER SUSPENSION      CHOLECYSTECTOMY      COLONOSCOPY      CYSTOSCOPY      EYE SURGERY      HYSTERECTOMY         Medications Prior to Admission:    Prior to Admission medications    Medication Sig Start Date End Date Taking?  Authorizing Provider   oxyCODONE-acetaminophen (PERCOCET) 5-325 MG per tablet Take bowel sounds, no masses or organomegaly  Extremities: no cyanosis, clubbing , + edema  Musculoskeletal: normal range of motion, no joint swelling, deformity or tenderness  Neurologic: reflexes normal and symmetric, no cranial nerve deficit, gait, coordination and speech normal      LABS:    CXR:  I have reviewed the CXR with the following interpretation: ill defined nodules    EKG:  I have reviewed the EKG with the following interpretation: 106 non sp t wave changes  CBC   Recent Labs     05/20/19 2357   WBC 6.6   HGB 11.1*   HCT 32.6*         RENAL  Recent Labs     05/20/19 2357     141   K 4.6  4.6     105   CO2 21  24   BUN 28*  26*   CREATININE 2.0*  1.6*     LFT'S  Recent Labs     05/20/19 2357   AST 15   ALT 10   BILITOT 0.4   ALKPHOS 125     COAG  No results for input(s): INR in the last 72 hours. CARDIAC ENZYMES  Recent Labs     05/20/19 2357 05/21/19 0519   TROPONINI 0.04* 0.04*       U/A:    Lab Results   Component Value Date    COLORU Yellow 11/26/2018    WBCUA 20 11/26/2018    RBCUA 7 11/26/2018    MUCUS 1+ 11/30/2013    BACTERIA Rare 11/26/2018    CLARITYU Clear 11/26/2018    SPECGRAV 1.020 11/26/2018    LEUKOCYTESUR SMALL 11/26/2018    BLOODU TRACE-INTACT 11/26/2018    GLUCOSEU Negative 11/26/2018    GLUCOSEU NEGATIVE 02/26/2012       ABG    Lab Results   Component Value Date    QVH9STX 25.4 11/25/2018    BEART 1.5 11/25/2018    O0VWURKD 96.4 11/25/2018    PHART 7.429 11/25/2018    FQK3SAK 39.0 11/25/2018    PO2ART 76.3 11/25/2018    QAY8MII 26.6 11/25/2018       UA:No results for input(s): NITRITE, COLORU, PHUR, LABCAST, WBCUA, RBCUA, MUCUS, TRICHOMONAS, YEAST, BACTERIA, CLARITYU, SPECGRAV, LEUKOCYTESUR, UROBILINOGEN, BILIRUBINUR, BLOODU, GLUCOSEU, KETUA, AMORPHOUS in the last 72 hours. Microbiology:  No results for input(s): LABGRAM, LABANAE, ORG, CXSURG in the last 72 hours. Nasal Culture: No results for input(s): ORG, MRSAPCR in the last 72 hours.   Blood Culture: No results for input(s): BC, BLOODCULT2, ORG in the last 72 hours. Fungal Culture:   No results for input(s): FUNGSM in the last 72 hours. No results for input(s): FUNCXBLD in the last 72 hours. CSF Culture:  No results for input(s): COLORCSF, APPEARCSF, CFTUBE, CLOTCSF, WBCCSF, RBCCSF, NEUTCSF, NUMCELLSCSF, LYMPHSCSF, MONOCSF, GLUCCSF, VOLCSF in the last 72 hours. Respiratory Culture:  No results for input(s): Mendez Dominguez in the last 72 hours. AFB:No results for input(s): AFBSMEAR in the last 72 hours. Urine Culture  No results for input(s): LABURIN in the last 72 hours. RADIOLOGY:    CT CHEST WO CONTRAST   Final Result   1. Ill-defined centrilobular nodules in the left lower lobe are likely   infectious or inflammatory. 2. Coronary artery disease. XR CHEST PORTABLE   Final Result   Negative portable chest.             Previous medical records personally reviewed and analyzed         PHYSICIAN CERTIFICATION    I certify that Gi Chowdhury is expected to be hospitalized for >2 midnights based on the following assessment and plan:    ASSESSMENT/PLAN:  Active Hospital Problems    Diagnosis Date Noted    Hypertensive urgency [I16.0]        Pneumonia  - ct shows ill defined nodules, possibly infectious/inflamatory  -treat as MRSA/ gram neg  -sputum c/s  -pro arun         h/o diastolic CHF- appears compensated     ROSANNA on CKD 3  -baseline creat 1.5  - monitor     Dm2  -ct home meds   -SSI    HTN  -ct home meds    H/o seizure  - ct home meds    Anemia sec to CKD     DVT Prophylaxis: heparin   Diet: DIET RENAL; Carb Control: 4 carb choices (60 gms)/meal  Code Status: Full Code      Dispo -ct care        Aimee Floyd MD  The note was completed using EMR. Every effort was made to ensure accuracy; however, inadvertent computerized transcription errors may be present. Thank you Angle Andres II for the opportunity to be involved in this patient's care.  If you have any questions or concerns please feel free to contact me at [phone].

## 2019-05-22 NOTE — PROGRESS NOTES
Progress Note  Admit Date: 2019      PCP: John Morgan II     CC: F/U for SOB     SUBJECTIVE / Interval History:  Feels better, SOB improved   no leg swelling         Allergies  Benadryl [diphenhydramine]    Medications    Scheduled Meds:   levETIRAcetam  1,000 mg Oral BID    insulin glargine  10 Units Subcutaneous Nightly    hydrALAZINE  50 mg Oral TID    ferrous sulfate  324 mg Oral BID WC    carvedilol  12.5 mg Oral BID WC    aspirin  81 mg Oral Daily    amLODIPine  5 mg Oral Daily    rOPINIRole  0.5 mg Oral Daily    valproic acid  500 mg Oral BID    sodium bicarbonate  650 mg Oral BID    sodium chloride flush  10 mL Intravenous 2 times per day    heparin (porcine)  5,000 Units Subcutaneous 3 times per day    insulin lispro  0-12 Units Subcutaneous TID WC    insulin lispro  0-6 Units Subcutaneous Nightly    ipratropium-albuterol  1 ampule Inhalation Q4H WA    cefepime  2 g Intravenous Q24H    vancomycin (VANCOCIN) intermittent dosing (placeholder)   Other RX Placeholder     Continuous Infusions:   dextrose         PRN Meds:  acetaminophen, sodium chloride flush, ondansetron, acetaminophen, polyethylene glycol, labetalol, glucose, dextrose, glucagon (rDNA), dextrose, oxyCODONE-acetaminophen    Vitals    TEMPERATURE:  Current - Temp: 98.3 °F (36.8 °C); Max - Temp  Av.5 °F (36.9 °C)  Min: 98.1 °F (36.7 °C)  Max: 99 °F (37.2 °C)  RESPIRATIONS RANGE: Resp  Av.6  Min: 16  Max: 20  PULSE RANGE: Pulse  Av.3  Min: 82  Max: 105  BLOOD PRESSURE RANGE:  Systolic (14HLD), PSW:387 , Min:106 , HPF:191   ; Diastolic (39MNU), XND:29, Min:61, Max:104    PULSE OXIMETRY RANGE: SpO2  Av.6 %  Min: 94 %  Max: 97 %  24HR INTAKE/OUTPUT:      Intake/Output Summary (Last 24 hours) at 2019 0751  Last data filed at 2019 0946  Gross per 24 hour   Intake 240 ml   Output --   Net 240 ml       Exam:    Gen: No distress. Eyes: PERRL. No sclera icterus. No conjunctival injection.    ENT: No discharge. Pharynx clear. External appearance of ears and nose normal.  Neck: Trachea midline. No obvious mass. Resp: No accessory muscle use. No crackles. No wheezes. No rhonchi. No dullness on percussion. CV: Regular rate. Regular rhythm. No murmur or rub. No edema. GI: Non-tender. Non-distended. No hernia. Skin: Warm, dry, normal texture and turgor. No nodule on exposed extremities. Lymph: No cervical LAD. No supraclavicular LAD. M/S: No cyanosis. No clubbing. No joint deformity. Neuro: Moves all four extremities. CN 2-12 tested, no defect noted. Psych: Oriented x 3. No anxiety. Awake. Alert. Intact judgement and insight. Data    LABS  CBC:   Recent Labs     05/20/19 2357 05/22/19  0533   WBC 6.6 5.6   HGB 11.1* 8.6*   HCT 32.6* 25.4*   MCV 92.0 91.6    128*     BMP:   Recent Labs     05/20/19 2357 05/22/19  0533     141 138   K 4.6  4.6 4.2     105 107   CO2 21  24 21   BUN 28*  26* 40*   CREATININE 2.0*  1.6* 2.1*     POC GLUCOSE:    Recent Labs     05/21/19  0929 05/21/19  1144 05/21/19  1604 05/21/19  2049 05/22/19  0729   POCGLU 244* 254* 150* 147* 106*     LIVER PROFILE:   Recent Labs     05/20/19  2357   AST 15   ALT 10   LABALBU 3.7   BILITOT 0.4   ALKPHOS 125     PT/INR: No results for input(s): PROTIME, INR in the last 72 hours. APTT: No results for input(s): APTT in the last 72 hours. UA:No results for input(s): NITRITE, COLORU, PHUR, LABCAST, WBCUA, RBCUA, MUCUS, TRICHOMONAS, YEAST, BACTERIA, CLARITYU, SPECGRAV, LEUKOCYTESUR, UROBILINOGEN, BILIRUBINUR, BLOODU, GLUCOSEU, KETUA, AMORPHOUS in the last 72 hours. Microbiology:  Wound Culture: No results for input(s): WNDABS, ORG in the last 72 hours. Invalid input(s):  LABGRAM  Nasal Culture: No results for input(s): ORG, MRSAPCR in the last 72 hours. Blood Culture:   Recent Labs     05/20/19  2357 05/21/19  0200   BC No Growth to date. Any change in status will be called.   --    BLOODCULT2  --  No Growth to date. Any change in status will be called. Fungal Culture:   No results for input(s): FUNGSM in the last 72 hours. No results for input(s): FUNCXBLD in the last 72 hours. CSF Culture:  No results for input(s): COLORCSF, APPEARCSF, CFTUBE, CLOTCSF, WBCCSF, RBCCSF, NEUTCSF, NUMCELLSCSF, LYMPHSCSF, MONOCSF, GLUCCSF, VOLCSF in the last 72 hours. Respiratory Culture:  No results for input(s): Estela Miss in the last 72 hours. AFB:No results for input(s): AFBSMEAR in the last 72 hours. Urine Culture  No results for input(s): LABURIN in the last 72 hours. RADIOLOGY:    CT CHEST WO CONTRAST   Final Result   1. Ill-defined centrilobular nodules in the left lower lobe are likely   infectious or inflammatory. 2. Coronary artery disease. XR CHEST PORTABLE   Final Result   Negative portable chest.             CONSULTS:    IP CONSULT TO HOSPITALIST  PHARMACY TO DOSE VANCOMYCIN    ASSESSMENT AND PLAN:      Active Problems:    Hypertensive urgency  Resolved Problems:    * No resolved hospital problems. *      Pneumonia  - ct shows ill defined nodules, possibly infectious/inflamatory  -treat as MRSA/ gram neg  -sputum c/s  -pro arun ok             h/o diastolic CHF- appears compensated    - weight at baseline    ROSANNA on CKD 3-creat still high   -baseline creat 1.5  - monitor      Dm2  -ct home meds   -SSI     HTN- uncontrolled, monitor Bp  -ct home meds     H/o seizure  - ct home meds     Anemia sec to CKD         DVT Prophylaxis: heparin   Diet: DIET RENAL; Carb Control: 4 carb choices (60 gms)/meal  Code Status: Full Code    PT/OT Eval Status:ordered    Discharge plan - ct care    The patient and / or the family were informed of the results of any tests, a time was given to answer questions, a plan was proposed and they agreed with plan. Discussed with consulting physicians, nursing and social work     The note was completed using EMR.  Every effort was made to ensure accuracy; however, inadvertent computerized transcription errors may be present.        Doug Buckley MD

## 2019-05-22 NOTE — PROGRESS NOTES
Physical Therapy    Facility/Department: [address] PROGRESSIVE CARE  Initial Assessment    NAME: Caryle Em  : 1950  MRN: [id number]    Date of Service: 2019    Discharge Recommendations:  Patient would benefit from continued therapy after discharge, 3-5 sessions per week   PT Equipment Recommendations  Equipment Needed: No  Caryle Em scored a 14/24 on the AM-PAC short mobility form. Current research shows that an AM-PAC score of 17 or less is typically not associated with a discharge to the patient's home setting. Based on the patients AM-PAC score and their current functional mobility deficits, it is recommended that the patient have 3-5 sessions per week of Physical Therapy at d/c to increase the patients independence. Assessment   Assessment: pt is a 79 yo female who was adm to hosp with SOB and pneumonia; pt currently on O2 which she was not on prior. Pt is needing assist for all mobility tasks and is below her baseline for bed mob, transfers and gait. Feel pt is an increased fall risk and will benefit from continued therapy at discharge to assist pt in attaining her premorbid status  Prognosis: Good  Decision Making: Medium Complexity  Patient Education: role and purpose of PT  Barriers to Learning: none  REQUIRES PT FOLLOW UP: Yes  Activity Tolerance  Activity Tolerance: Patient limited by endurance       Patient Diagnosis(es): The primary encounter diagnosis was Shortness of breath. Diagnoses of Elevated brain natriuretic peptide (BNP) level, Tachycardia, and ROSANNA (acute kidney injury) ([address] were also pertinent to this visit.      has a past medical history of Acid reflux, Arthritis, Balance problem, Blood circulation, collateral, Cellulitis of both lower extremities, CHF (congestive heart failure) ([address] Chronic kidney disease, Chronic venous hypertension (idiopathic) with ulcer and inflammation of bilateral lower extremity ([address] Community acquired bacterial pneumonia, Depression, Diabetes mellitus ([address] GERD (gastroesophageal reflux disease), Hyperlipidemia, Hypertension, Movement disorder, Murmur, Neuromuscular disorder ([address] Restless leg syndrome, Status epilepticus ([address] Ulcer of right lower leg ([address] Urinary frequency, and Urinary incontinence. has a past surgical history that includes Appendectomy; Hysterectomy; Cholecystectomy; bladder suspension; Colonoscopy; Cystoscopy; and eye surgery. Restrictions  Restrictions/Precautions  Restrictions/Precautions: Fall Risk  Position Activity Restriction  Other position/activity restrictions: O2 NC; IV   Vision/Hearing  Vision: Within Functional Limits  Hearing: Within functional limits     Subjective  General  Chart Reviewed: Yes  Patient assessed for rehabilitation services?: Yes  Additional Pertinent Hx: per MD note: The patient is a 78 y.o. female with a past medical history of diabetes, GERD, hypertension, chronic renal disease, CHF and venous insufficiency who presents to [facility] with SOB. Patient states since the last 2 days she thought she was going down the upper respiratory tract infection but then had worsening cough and shortness of breath. No sputum production. Mild swelling of the legs.   Response To Previous Treatment: Not applicable  Family / Caregiver Present: No  Follows Commands: Within Functional Limits  Subjective  Subjective: pt agreeable to therapy; pt denies pain  Pain Screening  Patient Currently in Pain: Yes          Orientation  Orientation  Overall Orientation Status: Within Functional Limits  Social/Functional History  Social/Functional History  Lives With: Alone  Type of Home: Facility([facility] )  Home Layout: One level  Home Access: Elevator, Level entry  Bathroom Shower/Tub: Walk-in shower  Bathroom Toilet: Handicap height  Bathroom Equipment: Grab bars in shower, Grab bars around toilet, Shower chair  Bathroom Accessibility: Accessible, Walker accessible  Home Equipment: 4 wheeled walker, Alert Button, Lift chair, Reacher, Sock aid, Long-handled shoehorn  Receives Help From: Home health([facility] 7 days/week for 4hrs/day)  ADL Assistance: Needs assistance(assist for showering, able to dressing, groom and feed self )  Homemaking Responsibilities: (Aide does laundry and cleaning for pt, as well as minimal cooking and shopping; pt can get light meal for herself but doesn't cook; gets MOW )  Ambulation Assistance: Needs assistance(SBA with RW with assist of another following with w/c)  Transfer Assistance: Independent  Active : No  Patient's  Info: Medical transportation to [place]. Olivia Sharpe does grocery shopping. Cognition   Cognition  Overall Cognitive Status: WFL    Objective          AROM RLE (degrees)  RLE AROM: WFL  AROM LLE (degrees)  LLE AROM : WFL  Strength RLE  Strength RLE: WFL  Strength LLE  Strength LLE: WFL     Sensation  Overall Sensation Status: WFL  Bed mobility  Supine to Sit: Contact guard assistance;Minimal assistance  Sit to Supine:  Moderate assistance  Transfers  Sit to Stand: Contact guard assistance;Minimal Assistance  Stand to sit: Contact guard assistance  Ambulation  Ambulation?: Yes  Ambulation 1  Surface: level tile  Device: Rolling Walker  Assistance: Minimal assistance;2 Person assistance  Quality of Gait: slightly unsteady; short shuffling steps with increased time to take steps  Distance: 3-4 side steps at EOB     Balance  Comments: SBA for sitting balance; CGA for static standing with walker        Plan   Plan  Times per week: 3-5  Current Treatment Recommendations: Functional Mobility Training  Safety Devices  Type of devices: Call light within reach, Bed alarm in place, Left in bed, Nurse notified      AM-PAC Score  AM-PAC Inpatient Mobility Raw Score : 14  AM-PAC Inpatient T-Scale Score : 38.1  Mobility Inpatient CMS 0-100% Score: 61.29  Mobility Inpatient CMS G-Code Modifier : CL          Goals  Short term goals  Time Frame for Short term goals: by discharge  Short term goal 1: bed mob SBA  Short term goal 2: transfers SBA  Short term goal 3: amb 48' with RW SBA  Patient Goals   Patient goals : to return to [facility]   Time In [address] [place] [address]         Minutes 30              If patient is discharged prior to the next physical therapy visit;  Please see last written PT note for discharge status.     Kiki Silver, PT, [id number]    KIKI SILVER, PT

## 2019-05-22 NOTE — PROGRESS NOTES
Occupational Therapy   Occupational Therapy Initial Assessment and Daily Txt Note  Date: 2019   Patient Name: Millie Pierce  MRN: [id number]     : 1950    Date of Service: 2019    Discharge David scored a 16/24 on the AM-PAC ADL Inpatient form. Current research shows that an AM-PAC score of 17 or less is typically not associated with a discharge to the patient's home setting. Based on the patients AM-PAC score and their current ADL deficits, it is recommended that the patient have 3-5 sessions per week of Occupational Therapy at d/c to increase the patients independence. 3-5 sessions per week  OT Equipment Recommendations  Other: TBD     Assessment   Performance deficits / Impairments: Decreased functional mobility ; Decreased ADL status; Decreased strength;Decreased endurance;Decreased balance  Assessment: Patient is a 78 y.o. female who presents d/t SOB; admitted for management of PNA. PTA pt was SBA for short fxl mobility and had assist prn for ADLs at [facility]. Currently, pt is functioning below baseline secondary to decreased balance, strength, and activity tolerance. Pt would benefit from con't therapy when returning back to [facility] to assist pt back to baseline. Prognosis: Good  Decision Making: Medium Complexity  History: See above  Exam: ADLs, transfers  Assistance / Modification: min A x 2 with RW; up to mod-max A for ADLs likely  Patient Education: Role of OT, d/c planning, safety  REQUIRES OT FOLLOW UP: Yes  Activity Tolerance  Activity Tolerance: Patient limited by fatigue  Safety Devices  Safety Devices in place: Yes  Type of devices: Nurse notified;Call light within reach; Bed alarm in place; Left in bed           Patient Diagnosis(es): The primary encounter diagnosis was Shortness of breath. Diagnoses of Elevated brain natriuretic peptide (BNP) level, Tachycardia, and ROSANNA (acute kidney injury) ([facility] [address] were also pertinent to this visit.      has a past medical history of Acid reflux, Arthritis, Balance problem, Blood circulation, collateral, Cellulitis of both lower extremities, CHF (congestive heart failure) ([address] Chronic kidney disease, Chronic venous hypertension (idiopathic) with ulcer and inflammation of bilateral lower extremity ([address] Community acquired bacterial pneumonia, Depression, Diabetes mellitus ([address] GERD (gastroesophageal reflux disease), Hyperlipidemia, Hypertension, Movement disorder, Murmur, Neuromuscular disorder ([address] Restless leg syndrome, Status epilepticus ([address] Ulcer of right lower leg ([address] Urinary frequency, and Urinary incontinence. has a past surgical history that includes Appendectomy; Hysterectomy; Cholecystectomy; bladder suspension; Colonoscopy; Cystoscopy; and eye surgery. Restrictions  Restrictions/Precautions  Restrictions/Precautions: Fall Risk  Position Activity Restriction  Other position/activity restrictions: O2 NC; IV     Subjective   General  Chart Reviewed: Yes  Patient assessed for rehabilitation services?: Yes  Additional Pertinent Hx: Patient is a 78 y.o. female who presents d/t SOB; admitted for management of PNA. Family / Caregiver Present: No  Referring Practitioner: Pedro Lim MD  Subjective  Subjective: Patient presents in the bed and agreeable to OT eval. Denies pain.    Oxygen Therapy  SpO2: 98 %  O2 Device: Nasal cannula  Social/Functional History  Social/Functional History  Lives With: Alone  Type of Home: Facility([facility] )  Home Layout: One level  Home Access: Elevator, Level entry  Bathroom Shower/Tub: Walk-in shower  Bathroom Toilet: Handicap height  Bathroom Equipment: Grab bars in shower, Grab bars around toilet, Shower chair  Bathroom Accessibility: Accessible, Walker accessible  Home Equipment: 4 wheeled walker, Alert Button, Lift chair, Reacher, Sock aid, Long-handled shoehorn  Receives Help From: Home health([facility] 7 days/week for 4hrs/day)  ADL Assistance: Needs assistance(assist for showering, able to dressing, groom and feed self )  Homemaking Responsibilities: (Aide does laundry and cleaning for pt, as well as minimal cooking and shopping; pt can get light meal for herself but doesn't cook; gets MOW )  Ambulation Assistance: Needs assistance(SBA with RW with assist of another following with w/c)  Transfer Assistance: Independent  Active : No  Patient's  Info: Medical transportation to [place]. Milady Sims does grocery shopping. Objective        Orientation  Overall Orientation Status: Within Functional Limits     Balance  Sitting Balance: Stand by assistance  Standing Balance: Minimal assistance(RW )  ADL  Feeding: Setup(able to bring cup to mouth )  Additional Comments: Anticipate pt to require up to mod-max A for bathing, mod A for toileting and dressing, CGA for grooming and feeding. Bed mobility  Supine to Sit: Contact guard assistance;Minimal assistance  Sit to Supine:  Moderate assistance  Transfers  Stand Step Transfers: Minimal assistance;2 Person assistance(side stepping up 4-5 small shuffling steps to [facility] )  Sit to stand: Minimal assistance;Contact guard assistance  Stand to sit: Contact guard assistance  Transfer Comments: RW     Cognition  Overall Cognitive Status: WFL        Sensation  Overall Sensation Status: WFL        LUE AROM (degrees)  LUE AROM : WFL  Left Hand AROM (degrees)  Left Hand AROM: WFL  RUE AROM (degrees)  RUE AROM : WFL  Right Hand AROM (degrees)  Right Hand AROM: WFL  LUE Strength  Gross LUE Strength: Exceptions to [facility]  L Shoulder Flex: 4/5  L Elbow Flex: 4+/5  L Hand Grasp: 4+/5  RUE Strength  Gross RUE Strength: Exceptions to [facility]  R Shoulder Flex: 4/5  R Elbow Flex: 4+/5  R Hand Grasp: 4+/5                   Plan   Plan  Times per week: 3-5  Times per day: Daily  Current Treatment Recommendations: Strengthening, ROM, Balance Training, Functional Mobility Training, Endurance Training, Safety Education & Training, Patient/Caregiver Education & Training, Equipment Evaluation, Education, & procurement, Self-Care / ADL    AM-PAC Score        AM-PAC Inpatient Daily Activity Raw Score: 16  AM-PAC Inpatient ADL T-Scale Score : 35.96  ADL Inpatient CMS 0-100% Score: 53.32  ADL Inpatient CMS G-Code Modifier : CK    Goals  Short term goals  Time Frame for Short term goals: until d/c:   Short term goal 1: Fxl mobility/transfers with RW with SBA. Short term goal 2: UE dressing/bathing with SBA. Short term goal 3: LE dressing/bathing with min A. Short term goal 4: Toileting with min A. Short term goal 5: Grooming with setup and SPV. Patient Goals   Patient goals : \"I want to con't therapy at [facility]". Therapy Time   Individual Concurrent Group Co-treatment   Time In 1505         Time Out 1534         Minutes 29         Timed Code Treatment Minutes: 14 Minutes(15 min eval )     If pt is discharged prior to next OT session, this note will serve as the discharge summary.   Horan Short OTR/L #[id number]

## 2019-05-22 NOTE — PROGRESS NOTES
Clinical Pharmacy Note  Vancomycin Consult    Cathleen Argueta is a 78 y.o. female ordered Vancomycin for pneumonia; consult received from Dr. Paulina Billings to manage therapy. Also receiving Cefepime.     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 ([facility]    Chronic venous hypertension (idiopathic) with ulcer and inflammation of bilateral lower extremity (HCC)    Ulcer of left lower leg, limited to breakdown of skin (HCC)    Ulcer of right lower leg (HCC)    Leg swelling    Pruritic condition    Excoriation of left lower leg    Excoriation of right lower leg    Venous stasis dermatitis of both lower extremities    Lower leg edema    Venous ulcer (HCC)    Venous hypertension, chronic, with ulcer, left ([facility]    Community acquired bacterial pneumonia    Hypertensive urgency    Stage 3 chronic kidney disease ([facility]    Pneumonia due to organism    Acute pulmonary edema (HCC)    Acute respiratory failure with hypoxia (HCC)    Blister of lower leg, right, initial encounter    Status epilepticus ([facility]    Acute metabolic encephalopathy    Dysphagia    Moderate protein-calorie malnutrition (HCC)    Hypernatremia       Allergies:  Benadryl [diphenhydramine]     Temp max:  Temp (24hrs), Av.2 °F (36.8 °C), Min:97.6 °F (36.4 °C), Max:98.8 °F (37.1 °C)      Recent Labs     19  0533   WBC 6.6 5.6       Recent Labs     19  0533   BUN 28*  26* 40*   CREATININE 2.0*  1.6* 2.1*         Intake/Output Summary (Last 24 hours) at 2019 1615  Last data filed at 2019 1217  Gross per 24 hour   Intake 240 ml   Output --   Net 240 ml       Culture Results:  pending    Ht Readings from Last 1 Encounters:   05/21/19 4' 11\" (1.499 m)        Wt Readings from Last 1 Encounters:   05/22/19 178 lb 9.2 oz (81 kg)         CrCl cannot be calculated (Unknown ideal weight. ). Assessment/Plan:  Day 2  Vancomycin random level 12.5mg/L 21 hours post 1gm dose    Will repeat vancomycin 1000 mg IV once. Will get a random level tomorrow pending MRSA nasal probe. Thank you for the consult. Will continue to follow.   Luzmaria Kwon RPH,5/22/2019,4:15 PM

## 2019-05-22 NOTE — PLAN OF CARE
Problem: Falls - Risk of:  Goal: Will remain free from falls  Description  Will remain free from falls  Outcome: Ongoing  Goal: Absence of physical injury  Description  Absence of physical injury  Outcome: Ongoing   Patient has remained free from falls and physical injury this shift. Patient calls appropriately for help. Call light within reach, bed in lowest position with brakes on, fall socks on and other fall visuals posted. Will continue to monitor.

## 2019-05-23 NOTE — PLAN OF CARE
Problem: Falls - Risk of:  Goal: Will remain free from falls  Description  Will remain free from falls  5/23/2019 1149 by Priya Sal RN  Outcome: Ongoing     Problem: Risk for Impaired Skin Integrity  Goal: Tissue integrity - skin and mucous membranes  Description  Structural intactness and normal physiological function of skin and  mucous membranes. Outcome: Ongoing     Problem: Pain:  Goal: Pain level will decrease  Description  Pain level will decrease  5/23/2019 1149 by Priya Sal RN  Outcome: Ongoing   Fall prevention and skin protection measures in place. Remains free of falls, new skin issues, physical injury. Pain managed by rest and PRN medication. Patient satisfied with pain management.

## 2019-05-23 NOTE — PROGRESS NOTES
Clinical Pharmacy Note  Vancomycin Consult    Alma Caro is a 78 y.o. female ordered Vancomycin for pneumonia; consult received from Dr. Kisha Rivera to manage therapy. Also receiving cefepime.     Patient Active Problem List   Diagnosis    Diastolic CHF ([address]    Cellulitis    Chest pain    DM (diabetes mellitus) ([address]    HTN (hypertension)    Cellulitis of both lower extremities    Morbid obesity due to excess calories ([address]    Atherosclerosis of native artery of both lower extremities with bilateral ulceration of calves ([address]    Chronic venous hypertension (idiopathic) with ulcer and inflammation of bilateral lower extremity (HCC)    Ulcer of left lower leg, limited to breakdown of skin ([address]    Ulcer of right lower leg (HCC)    Leg swelling    Pruritic condition    Excoriation of left lower leg    Excoriation of right lower leg    Venous stasis dermatitis of both lower extremities    Lower leg edema    Venous ulcer (HCC)    Venous hypertension, chronic, with ulcer, left ([address]    Community acquired bacterial pneumonia    Hypertensive urgency    Stage 3 chronic kidney disease ([facility]    Pneumonia due to organism    Acute pulmonary edema (HCC)    Acute respiratory failure with hypoxia (HCC)    Blister of lower leg, right, initial encounter    Status epilepticus ([facility] [address] [facility]    Acute metabolic encephalopathy    Dysphagia    Moderate protein-calorie malnutrition (HCC)    Hypernatremia       Allergies:  Benadryl [diphenhydramine]     Temp max:  Temp (24hrs), Av.4 °F (36.9 °C), Min:97.8 °F (36.6 °C), Max:98.7 °F (37.1 °C)      Recent Labs     19  1150   WBC 6.6 5.6 4.3       Recent Labs     1933 19  1150   BUN 28*  26* 40* 39*   CREATININE 2.0*  1.6* 2.1* 2.1*       No intake or output data in the 24 hours ending 19      Ht Readings from Last 1 Encounters:   19 4' 11\" (1.499 m)        Wt Readings from Last 1 Encounters:   05/23/19 182 lb 5.1 oz (82.7 kg)         CrCl cannot be calculated (Unknown ideal weight. ). Assessment/Plan:  Will give vancomycin 750 mg IV tonight. Timing of trough level will be determined based on culture results, renal function, and clinical response. Thank you for the consult. Will continue to follow.   Félix Werner [facility]  5/23/2019  8:01 PM

## 2019-05-23 NOTE — PROGRESS NOTES
Occupational Therapy  Facility/Department: [facility]  Daily Treatment Note  NAME: Sherwin Gonzales  : 1950  MRN: [id number]    Date of Service: 2019    Discharge Recommendations:  3-5 sessions per week     Sherwin Gonzales scored a 16/24 on the AM-PAC ADL Inpatient form. Current research shows that an AM-PAC score of 17 or less is typically not associated with a discharge to the patient's home setting. Based on the patients AM-PAC score and their current ADL deficits, it is recommended that the patient have 3-5 sessions per week of Occupational Therapy at d/c to increase the patients independence. Assessment   Performance deficits / Impairments: Decreased functional mobility ; Decreased ADL status; Decreased strength;Decreased endurance;Decreased balance  Assessment: Discussed with OTR: pt tolerated session fair, requiring encouragement to participate in therapy and requiring up to Mod A for grooming (max/dep for rest of ADL's-(recieves assist at [facility]); pt CG/Yesy x1 for stand-pivot transfers, bed to recliner. Ptlimited by decreased balance, strength and activity tolerance. Pt would benefit from cont OT when return back to [facility]. Prognosis: Good  Patient Education: Role of OT, ADL's, safety  REQUIRES OT FOLLOW UP: Yes  Activity Tolerance  Activity Tolerance: Patient limited by fatigue  Safety Devices  Safety Devices in place: Junior Ze riggs)  Type of devices: Nurse notified;Call light within reach;Gait belt;Left in chair;Chair alarm in place         Patient Diagnosis(es): The primary encounter diagnosis was Shortness of breath. Diagnoses of Elevated brain natriuretic peptide (BNP) level, Tachycardia, and ROSANNA (acute kidney injury) ([address] were also pertinent to this visit.       has a past medical history of Acid reflux, Arthritis, Balance problem, Blood circulation, collateral, Cellulitis of both lower extremities, CHF (congestive heart failure) ([address] Chronic kidney disease, Chronic venous hypertension (idiopathic) with ulcer and inflammation of bilateral lower extremity ([address] Community acquired bacterial pneumonia, Depression, Diabetes mellitus ([address] GERD (gastroesophageal reflux disease), Hyperlipidemia, Hypertension, Movement disorder, Murmur, Neuromuscular disorder ([address] Restless leg syndrome, Status epilepticus ([address] Ulcer of right lower leg ([address] Urinary frequency, and Urinary incontinence. has a past surgical history that includes Appendectomy; Hysterectomy; Cholecystectomy; bladder suspension; Colonoscopy; Cystoscopy; and eye surgery. Restrictions  Restrictions/Precautions  Restrictions/Precautions: Fall Risk  Position Activity Restriction  Other position/activity restrictions: O2 NC  Subjective   General  Chart Reviewed: Yes  Patient assessed for rehabilitation services?: Yes  Additional Pertinent Hx: Patient is a 78 y.o. female who presents d/t SOB; admitted for management of PNA. Family / Caregiver Present: No  Referring Practitioner: Rachele Deras MD  Subjective  Subjective: Pt met BS, in bed. Pt initially c/o nausea and \"not feeling well\". Pt agreeable to OT with min encouragement, for sitting up to EOB then OOB to recliner. Pt denied pain. Orientation  Orientation  Overall Orientation Status: Within Functional Limits  Objective    ADL  Grooming: Setup; Moderate assistance(declines to attempt to wash face; combed hair with assist for back)  UE Bathing: Maximum assistance;Dependent/Total(reacdhes to wash under L arms; assist for R arm and drying.)  LE Bathing: Unable to assess(comment)(declines and nursing washed posterior earlier, changed brief. )  UE Dressing: Maximum assistance(hospital gown changed)  LE Dressing: Dependent/Total(for footies doffed/donned (to apply lotion to LE's, per pt request)        Balance  Sitting Balance: Stand by assistance(at EOB for ADL's, x10-15 min)  Standing Balance  Activity: up less than 1 min for stand-pivoting to recliner from bed. Pt CGA for brief static standing balance. CG/Kayla A for pivoting to L and UE's holding to chair arm and bed rail. Wheelchair Bed Transfers  Wheelchair/Bed - Technique: Stand pivot  Equipment Used: Bed(recliner)  Level of Asssistance: Minimal assistance;Contact guard assistance(x1)  Wheelchair Transfers Comments: pt stand-pivoted bed to recliner, to her L side, and used bed rail, arm of chair as support as pivoted  Bed mobility  Supine to Sit: Contact guard assistance;Minimal assistance(HOB raised, use of rail, increased time to complete)  Sit to Supine: Unable to assess(up to chair)         Cognition  Overall Cognitive Status: [facility]         Plan   Plan  Times per week: 3-5  Times per day: Daily  Current Treatment Recommendations: Strengthening, ROM, Balance Training, Functional Mobility Training, Endurance Training, Safety Education & Training, Patient/Caregiver Education & Training, Equipment Evaluation, Education, & procurement, Self-Care / ADL    AM-PAC Score        AM-PAC Inpatient Daily Activity Raw Score: 16  AM-PAC Inpatient ADL T-Scale Score : 35.96  ADL Inpatient CMS 0-100% Score: 53.32  ADL Inpatient CMS G-Code Modifier : CK    Goals  Short term goals  Time Frame for Short term goals: Status: goals ongoing  Short term goal 1: Fxl mobility/transfers with RW with SBA. Short term goal 2: UE dressing/bathing with SBA. Short term goal 3: LE dressing/bathing with min A. Short term goal 4: Toileting with min A. Short term goal 5: Grooming with setup and SPV. Patient Goals   Patient goals : \"I [facility] to con't therapy at [facility]".        Therapy Time   Individual Concurrent Group Co-treatment   Time In 1401         Time Out 1445         Minutes 40              Carron Elser DURBIN/L,[id number]    This note to serve as discharge summary if pt d/c'd prior to next session

## 2019-05-23 NOTE — PROGRESS NOTES
Hospitalist Progress Note    CC:     Shortness of Breath (given breathing treatments at nursing home without improvement to SOB. )      Admit date: 5/20/2019  Days in hospital:  2    Subjective:     She was feeling well and she denies  any new complaints. breathing is improving     ROS:     Constitutional: Negative for chills and fever. Respiratory: Positive for cough and shortness of breath. Cardiovascular: Negative for chest pain and palpitations. Gastrointestinal: Negative for abdominal pain, constipation and diarrhea. Neurological: Negative for headaches. Objective:    BP (!) 146/71   Pulse 78   Temp 98.1 °F (36.7 °C) (Oral)   Resp 20   Ht 4' 11\" (1.499 m)   Wt 182 lb 5.1 oz (82.7 kg)   SpO2 97%   BMI 36.82 kg/m²     Gen: alert, NAD  HEENT: NC/AT, moist mucous membranes, no oropharyngeal erythema or exudate  Neck: supple, trachea midline, no anterior cervical or SC LAD  Heart: Normal s1/s2, RRR, no murmurs, gallops, or rubs. Lungs: CTA bilaterally, no wheezing, no rales, no rhonchi, no use of accessory muscles  Abd: bowel sounds present, soft, nondistended, none tender  Extrem: No clubbing, cyanosis, no edema  Psych: A & O x3  , affect appropriate  Neuro: grossly intact, moves all four extremities spontaneously.       Medications:  Scheduled Meds:   lactobacillus  1 capsule Oral Daily with breakfast    insulin glargine  8 Units Subcutaneous Nightly    levETIRAcetam  1,000 mg Oral BID    hydrALAZINE  50 mg Oral TID    ferrous sulfate  324 mg Oral BID WC    carvedilol  12.5 mg Oral BID     aspirin  81 mg Oral Daily    amLODIPine  5 mg Oral Daily    rOPINIRole  0.5 mg Oral Daily    valproic acid  500 mg Oral BID    sodium bicarbonate  650 mg Oral BID    sodium chloride flush  10 mL Intravenous 2 times per day    heparin (porcine)  5,000 Units Subcutaneous 3 times per day    insulin lispro  0-12 Units Subcutaneous TID     insulin lispro  0-6 Units Subcutaneous Nightly    ipratropium-albuterol  1 ampule Inhalation Q4H WA    cefepime  2 g Intravenous Q24H    vancomycin (VANCOCIN) intermittent dosing (placeholder)   Other RX Placeholder       PRN Meds:  acetaminophen, sodium chloride flush, ondansetron, acetaminophen, polyethylene glycol, labetalol, glucose, dextrose, glucagon (rDNA), dextrose, oxyCODONE-acetaminophen    IV:   dextrose           Intake/Output Summary (Last 24 hours) at 5/23/2019 1144  Last data filed at 5/22/2019 1826  Gross per 24 hour   Intake 300 ml   Output --   Net 300 ml       Results:  CBC:   Recent Labs     05/20/19 2357 05/22/19  0533   WBC 6.6 5.6   HGB 11.1* 8.6*   HCT 32.6* 25.4*   MCV 92.0 91.6    128*     BMP:   Recent Labs     05/20/19 2357 05/22/19  0533     141 138   K 4.6  4.6 4.2     105 107   CO2 21  24 21   BUN 28*  26* 40*   CREATININE 2.0*  1.6* 2.1*     Mag: No results for input(s): MAG in the last 72 hours. Phos:   Lab Results   Component Value Date    PHOS 3.3 11/21/2018     No components found for: GLU    LIVER PROFILE:   Recent Labs     05/20/19 2357   AST 15   ALT 10   BILITOT 0.4   ALKPHOS 125     PT/INR: No results for input(s): PROTIME, INR in the last 72 hours. APTT: No results for input(s): APTT in the last 72 hours. UA:No results for input(s): NITRITE, COLORU, PHUR, LABCAST, WBCUA, RBCUA, MUCUS, TRICHOMONAS, YEAST, BACTERIA, CLARITYU, SPECGRAV, LEUKOCYTESUR, UROBILINOGEN, BILIRUBINUR, BLOODU, GLUCOSEU, AMORPHOUS in the last 72 hours.     Invalid input(s): Paul Hatfield input(s): ABG  Lab Results   Component Value Date    CALCIUM 8.1 (L) 05/22/2019    PHOS 3.3 11/21/2018       Assessment and Plan:    Pneumonia  CT showed ill defined nodules, possibly infectious/inflamatory  Treating gram negative bacteria pneumonia   Continue with Vanco and D/C if MRSA nasal swab is negative   Continue with Cefepime   Add Lactobacillus   Sputum c/s: Pending   Blood Cx is NTD             Chronic diastolic CHF- appears compensated   Weight is up today   Torsemide is on hold sec to ROSANNA     ROSANNA on CKD 3-  Cr is stable but still high   -baseline creat 1.5  Avoid nephrotoxic medications  Continue to hold torsemide     Dm2  Decrease Lantus   SSI      HTN  Continue  home meds     H/o seizure  - ct home meds     Anemia sec to CKD         Code status:  Full   DVT prophylaxis: Heparin   Disposition:   Pending clinical improvement.      Electronically signed by Ye Dodge MD on 5/23/2019 at 11:44 AM

## 2019-05-23 NOTE — PLAN OF CARE
Problem: Falls - Risk of:  Goal: Will remain free from falls  Description  Will remain free from falls  5/23/2019 0820 by Michel Perdue RN  Outcome: Ongoing     Problem: Falls - Risk of:  Goal: Absence of physical injury  Description  Absence of physical injury  5/23/2019 0820 by Michel Perdue RN  Outcome: Ongoing  5/23/2019 0820 by Michel Perdue RN  Outcome: Ongoing   Bed alarm kept on. Call light in reach. Side rails up x2. Pt reminded to use call light for assistance getting out of bed. Hourly rounding done to anticipate pt needs. Problem: Pain:  Goal: Pain level will decrease  Description  Pain level will decrease  Outcome: Ongoing   Pain/discomfort being managed with PRN analgesics per MD orders. Pt able to express presence and absence of pain and rate pain appropriately using numerical scale.

## 2019-05-23 NOTE — PROGRESS NOTES
Occupational Therapy  Pt requesting for OT to return later, \"after I eat my sherbet\". Will follow and re-attempt as schedule permits.   Zaki DURBIN/ULISES,745

## 2019-05-23 NOTE — DISCHARGE INSTR - COC
Continuity of Care Form    Patient Name: Judson Lim   :  1950  MRN:  [id number]    Admit date:  2019  Discharge date:  2019    Code Status Order: Full Code   Advance Directives:   [facility] Documentation     Date/Time Healthcare Directive Type of Healthcare Directive Copy in [address] Agent's Name Healthcare Agent's Phone Number    [phone]  Yes, patient has an advance directive for healthcare treatment  Durable power of  for health care;Living will  Yes, copy in chart  --  --  --          Admitting Physician:  Jam Medeiros MD  PCP: Camille Mercer II    Discharging Nurse: [facility] Unit/Room#: D5Y-9098/5127-01  Discharging Unit Phone Number: [phone]    Emergency Contact:   Extended Emergency Contact Information  Primary Emergency Contact: Chela Dobbs [facility] [employer] Phone: [phone]  Relation: Brother/Sister  Secondary Emergency Contact: Leonard Willis of [address] Phone: [phone]  Relation: Other    Past Surgical History:  Past Surgical History:   Procedure Laterality Date    APPENDECTOMY      BLADDER SUSPENSION      CHOLECYSTECTOMY      COLONOSCOPY      CYSTOSCOPY      EYE SURGERY      HYSTERECTOMY         Immunization History:   Immunization History   Administered Date(s) Administered    Influenza Vaccine, unspecified formulation 10/17/2016    Pneumococcal Vaccine, unspecified formulation 10/17/2016       Active Problems:  Patient Active Problem List   Diagnosis Code    Diastolic CHF ([facility] N02.23    Cellulitis L03.90    Chest pain R07.9    DM (diabetes mellitus) ([facility] E11.9    HTN (hypertension) I10    Cellulitis of both lower extremities L03.115, L03. 80    Morbid obesity due to excess calories (HCC) E66.01    Atherosclerosis of native artery of both lower extremities with bilateral ulceration of calves (HCC) I70.232, I70.242    Chronic venous hypertension (idiopathic) with ulcer and inflammation of bilateral lower extremity (HCC) I87.333, L97.919, L97.929    Ulcer of left lower leg, limited to breakdown of skin (HCC) L97.921    Ulcer of right lower leg (HCC) L97.919    Leg swelling M79.89    Pruritic condition L29.9    Excoriation of left lower leg S80.812A    Excoriation of right lower leg S80.811A    Venous stasis dermatitis of both lower extremities I87.2    Lower leg edema R60.0    Venous ulcer (HCC) I83.009, L97.909    Venous hypertension, chronic, with ulcer, left ([facility] I87.312, L97.929    Community acquired bacterial pneumonia J15.9    Hypertensive urgency I16.0    Stage 3 chronic kidney disease (HCC) N18.3    Pneumonia due to organism J18.9    Acute pulmonary edema ([facility]) J81.0    Acute respiratory failure with hypoxia ([facility]) J96.01    Blister of lower leg, right, initial encounter K66.771O    Status epilepticus ([facility] G40.901    Acute metabolic encephalopathy U33.29    Dysphagia R13.10    Moderate protein-calorie malnutrition (HCC) E44.0    Hypernatremia E87.0       Isolation/Infection:   Isolation          No Isolation            Nurse Assessment:  Last Vital Signs: BP (!) 151/71   Pulse 67   Temp 98.7 °F (37.1 °C) (Oral)   Resp 20   Ht 4' 11\" (1.499 m)   Wt 182 lb 5.1 oz (82.7 kg)   SpO2 97%   BMI 36.82 kg/m²     Last documented pain score (0-10 scale): Pain Level: 7  Last Weight:   Wt Readings from Last 1 Encounters:   05/23/19 182 lb 5.1 oz (82.7 kg)     Mental Status:  oriented, alert and able to concentrate and follow conversation    IV Access:  - None    Nursing Mobility/ADLs:  Walking   Assisted  Transfer  Assisted  Bathing  Assisted  Dressing  Assisted  Toileting  Assisted  Feeding  Independent  Med Admin  Independent  Med Delivery   whole    Wound Care Documentation and Therapy:        Elimination:  Continence:   · Bowel: No  · Bladder: No  Urinary Catheter: None   Colostomy/Ileostomy/Ileal select all that are sent with patient):  clothing    RN SIGNATURE:  Electronically signed by Aurora Randhawa RN on 5/27/19 at 10:37 AM    CASE MANAGEMENT/SOCIAL WORK SECTION    Inpatient Status Date: 5/21/19    Readmission Risk Assessment Score:  Readmission Risk              Risk of Unplanned Readmission:        36           Discharging to Facility/ Agency   · Name: [facility]  · Address:  · Phone: [phone]  · Fax: [phone]      / signature:  Electronically signed by TRAN Blackman LSW on 5/23/19 at 2:46 PM   [place], [place]     Case Management   [phone]    5/27/2019  10:43 AM        PHYSICIAN SECTION    Prognosis: Fair    Condition at Discharge: Stable    Rehab Potential (if transferring to Rehab): Fair    Recommended Labs or Other Treatments After Discharge: BMP in 1 week    Physician Certification: I certify the above information and transfer of Asya Alejandro  is necessary for the continuing treatment of the diagnosis listed and that she requires [place] for greater 30 days.      Update Admission H&P: No change in H&P    PHYSICIAN SIGNATURE:  Electronically signed by Hoda Busby MD on 5/27/19 at 10:18 AM

## 2019-05-24 NOTE — PLAN OF CARE
Fall risk assessment completed every shift. All precautions in place. Pt has call light within reach at all times. Room clear of clutter. Pt aware to call for assistance when getting up. Bed locked in lowest position, alarm engaged, call light within reach, will continue to monitor. Pain/discomfort being managed with PRN analgesics per MD orders. Pt able to express presence and absence of pain and rate pain appropriately using numerical scale. Skin assessment completed every shift. Pt assessed for incontinence, appropriate barrier cream applied prn. Pt encouraged to turn/rotate every 2 hours. Assistance provided if pt unable to do so themselves. Patient's ability assessed to perform self care and independent activity encouraged according to that ability. Assisted with ADL's as needed. Risk for skin breakdown assessed. Potential discharge needs assessed. Patient and family included in daily care decisions. Vitals completed q4h and assessed by RN. I&O charted and assessed per MD order. Pt tolerating adequate fluid intake. Pt electrolytes drawn and assessed per MD order. Replaced as needed per orders. I&O charted and assessed.   Pt tolerating adequate fluid intake

## 2019-05-24 NOTE — PROGRESS NOTES
Occupational Therapy  Facility/Department: [facility] PROGRESSIVE CARE  Daily Treatment Note  NAME: Manuela Mccray  : 1950  MRN: [id number]    Date of Service: 2019    Discharge David scored a 16/24 on the AM-PAC ADL Inpatient form. Current research shows that an AM-PAC score of 17 or less is typically not associated with a discharge to the patient's home setting. Based on the patients AM-PAC score and their current ADL deficits, it is recommended that the patient have 3-5 sessions per week of Occupational Therapy at d/c to increase the patients independence. 3-5 sessions per week  OT Equipment Recommendations  Other: TBD     Assessment   Performance deficits / Impairments: Decreased functional mobility ; Decreased ADL status; Decreased strength;Decreased endurance;Decreased balance  Assessment: Patient tolerated therapy session fairly well progressing to min A x 1-2 with RW for fxl mobility. Pt will likely require up to mod A for ADLs although completed with PCA prior to therapist arrival. Pt tolerated BUE therex to increase activity tolerance. Pt would benefit from con't therapy when returning back to [facility] ([place], [place]) when medically ready for d/c. Con't OT poc. Prognosis: Good  Patient Education: fxl mobility, BUE therex, safety  REQUIRES OT FOLLOW UP: Yes  Activity Tolerance  Activity Tolerance: Patient limited by fatigue  Safety Devices  Safety Devices in place: Yes  Type of devices: Nurse notified;Call light within reach;Gait belt;Left in chair;Chair alarm in place         Patient Diagnosis(es): The primary encounter diagnosis was Shortness of breath. Diagnoses of Elevated brain natriuretic peptide (BNP) level, Tachycardia, and ROSANNA (acute kidney injury) ([facility] were also pertinent to this visit.       has a past medical history of Acid reflux, Arthritis, Balance problem, Blood circulation, collateral, Cellulitis of both lower extremities, CHF (congestive heart failure) ([address] Chronic kidney disease, Chronic venous hypertension (idiopathic) with ulcer and inflammation of bilateral lower extremity ([address] Community acquired bacterial pneumonia, Depression, Diabetes mellitus ([address] GERD (gastroesophageal reflux disease), Hyperlipidemia, Hypertension, Movement disorder, Murmur, Neuromuscular disorder ([address] Restless leg syndrome, Status epilepticus ([address] Ulcer of right lower leg ([address] Urinary frequency, and Urinary incontinence. has a past surgical history that includes Appendectomy; Hysterectomy; Cholecystectomy; bladder suspension; Colonoscopy; Cystoscopy; and eye surgery. Restrictions  Restrictions/Precautions  Restrictions/Precautions: Fall Risk  Position Activity Restriction  Other position/activity restrictions: O2 NC  Subjective   General  Chart Reviewed: Yes  Patient assessed for rehabilitation services?: Yes  Additional Pertinent Hx: Patient is a 78 y.o. female who presents d/t SOB; admitted for management of PNA. Response to previous treatment: Patient with no complaints from previous session  Family / Caregiver Present: No  Referring Practitioner: Francisco J Gonzáles MD  Subjective  Subjective: Patient presents in the recliner and agreeable to OT txt. Denies pain however reports feeling \"tired\". Orientation  Orientation  Overall Orientation Status: Within Functional Limits  Objective    ADL  Feeding: Setup  Additional Comments: Completed ADLs with PCA assist prior to therapist arrival.         Balance  Sitting Balance: Stand by assistance  Standing Balance: Minimal assistance(1-2 with RW )  Functional Mobility  Functional - Mobility Device: Rolling Walker  Activity: Other  Assist Level: Minimal assistance(x1-2)  Functional Mobility Comments: Ambulated in room of 40 ft with min A x 1-2. Increased assist for turns and walker management intermittently. Pt needing mod vc's. Easily fatigues.       Transfers  Sit to stand: Minimal assistance;Contact guard assistance  Stand to

## 2019-05-24 NOTE — PLAN OF CARE
Patient free from falls this shift. Fall precautions in place at all times. Bed in lowest position with two side rails up and wheels locked. Call light within reach. Patient able and agreeable to contact for safety appropriately. Skin assessment performed each shift per protocol. Patient turned and repositioned every two hours and prn with pillow support. Patient checked for incontence every two hours. Pt able to express presence/absence of pain and rate pain appropriately using numerical scale. Pain/discomfort being managed with PRN analgesics per MD orders (see MAR). Pain assessed every shift and after interventions.     Problem: HEMODYNAMIC STATUS  Goal: Patient has stable vital signs and fluid balance  Outcome: Ongoing     Problem: FLUID AND ELECTROLYTE IMBALANCE  Goal: Fluid and electrolyte balance are achieved/maintained  Outcome: Ongoing     Problem: ACTIVITY INTOLERANCE/IMPAIRED MOBILITY  Goal: Mobility/activity is maintained at optimum level for patient  Outcome: Ongoing

## 2019-05-24 NOTE — PROGRESS NOTES
Physical Therapy  Facility/Department: [address] PROGRESSIVE CARE  Daily Treatment Note  NAME: Sangeetha Delgado  : 1950  MRN: [id number]    Date of Service: 2019    Discharge Recommendations:  Patient would benefit from continued therapy after discharge, 3-5 sessions per week   PT Equipment Recommendations  Equipment Needed: No  Sangeetha Delgado scored a 14/24 on the AM-PAC short mobility form. Current research shows that an AM-PAC score of 17 or less is typically not associated with a discharge to the patient's home setting. Based on the patients AM-PAC score and their current functional mobility deficits, it is recommended that the patient have 3-5 sessions per week of Physical Therapy at d/c to increase the patients independence. Patient Diagnosis(es): The primary encounter diagnosis was Shortness of breath. Diagnoses of Elevated brain natriuretic peptide (BNP) level, Tachycardia, and ROSANNA (acute kidney injury) ([address] were also pertinent to this visit. has a past medical history of Acid reflux, Arthritis, Balance problem, Blood circulation, collateral, Cellulitis of both lower extremities, CHF (congestive heart failure) ([address] Chronic kidney disease, Chronic venous hypertension (idiopathic) with ulcer and inflammation of bilateral lower extremity ([address] Community acquired bacterial pneumonia, Depression, Diabetes mellitus ([address] GERD (gastroesophageal reflux disease), Hyperlipidemia, Hypertension, Movement disorder, Murmur, Neuromuscular disorder ([address] Restless leg syndrome, Status epilepticus ([address] Ulcer of right lower leg ([address] Urinary frequency, and Urinary incontinence. has a past surgical history that includes Appendectomy; Hysterectomy; Cholecystectomy; bladder suspension; Colonoscopy; Cystoscopy; and eye surgery.   Social/Functional History  Lives With: Alone  Type of Home: Facility([facility] )  Home Layout: One level  Home Access: Elevator, Level entry  Bathroom Shower/Tub: Walk-in shower  Bathroom Toilet: Handicap height  Bathroom Equipment: Grab bars in shower, Grab bars around toilet, Shower chair  Bathroom Accessibility: Accessible, Walker accessible  Home Equipment: 4 wheeled walker, Alert Button, Lift chair, Reacher, Sock aid, Long-handled shoehorn  Receives Help From: Home health([facility] 7 days/week for 4hrs/day)  ADL Assistance: Needs assistance(assist for showering, able to dressing, groom and feed self )  Homemaking Responsibilities: (Aide does laundry and cleaning for pt, as well as minimal cooking and shopping; pt can get light meal for herself but doesn't cook; gets MOW )  Ambulation Assistance: Needs assistance(SBA with RW with assist of another following with w/c)  Transfer Assistance: Independent  Active : No  Patient's  Info: Medical transportation to MD. Юлия Cunningham does grocery shopping. Restrictions  Restrictions/Precautions  Restrictions/Precautions: Fall Risk  Position Activity Restriction  Other position/activity restrictions: O2 NC  Subjective   General  Chart Reviewed: Yes  Additional Pertinent Hx: per MD note: The patient is a 78 y.o. female with a past medical history of diabetes, GERD, hypertension, chronic renal disease, CHF and venous insufficiency who presents to [facility] with SOB. Patient states since the last 2 days she thought she was going down the upper respiratory tract infection but then had worsening cough and shortness of breath. No sputum production. Mild swelling of the legs. Response To Previous Treatment: Patient with no complaints from previous session.   Family / Caregiver Present: No  Subjective  Subjective: pt agreeable to therapy; pt denies pain; states she has restless leg syndrome and her legs were aching last night          Orientation  Orientation  Overall Orientation Status: Within Functional Limits  Cognition   Cognition  Overall Cognitive Status: WFL  Objective   Bed mobility  Comment: pt up in chair at beginning and end of session  Transfers  Sit to Stand: Contact guard assistance;Minimal Assistance  Stand to sit: Contact guard assistance  Ambulation  Ambulation?: Yes  Ambulation 1  Surface: level tile  Device: Rolling Walker  Assistance: Minimal assistance;2 Person assistance;Contact guard assistance  Quality of Gait: slightly unsteady; short shuffling steps with increased time to take steps; needed assist to maneuver walker at times especially on turns  Distance: 40'     Balance  Comments: SBA for sitting balance; CGA for static standing with walker  Exercises  Comments: encouraged LE ex while sitting in chair         Comment: assisted with positioning in chair for comfort and pressure relief              Assessment   Assessment: pt is a 79 yo female who was adm to hosp with SOB and pneumonia; pt currently on O2 which she was not on prior. Pt is needing assist for all mobility tasks and is below her baseline for bed mob, transfers and gait.  Feel pt is an increased fall risk and will benefit from continued therapy at discharge to assist pt in attaining her premorbid status  Prognosis: Good  Patient Education: reviewed call light and not getting up without assistance  REQUIRES PT FOLLOW UP: Yes  Activity Tolerance  Activity Tolerance: Patient limited by endurance       -PAC Score  AM-PAC Inpatient Mobility Raw Score : 14  AM-PAC Inpatient T-Scale Score : 38.1  Mobility Inpatient CMS 0-100% Score: 61.29  Mobility Inpatient CMS G-Code Modifier : CL          Goals  Short term goals  Time Frame for Short term goals: by discharge - all goals ongoing 5-24 F  Short term goal 1: bed mob SBA  Short term goal 2: transfers SBA  Short term goal 3: amb 48' with RW SBA  Patient Goals   Patient [facility] : [facility]  Times per week: 3-5  Current Treatment Recommendations: Functional Mobility Training  Safety Devices  Type of devices: Call light within reach, Nurse notified, Chair alarm in place, Left in chair Therapy Time   Individual Concurrent Group Co-treatment   Time In 1140         Time Out 0562         Minutes 26              If patient is discharged prior to the next physical therapy visit;  Please see last written PT note for discharge status.     Kiki Silver, PT, [id number]   KIKI SILVER, PT

## 2019-05-24 NOTE — PROGRESS NOTES
External appearance of ears and nose normal.  Neck: Trachea midline. No obvious mass. Resp: No accessory muscle use. No crackles. No wheezes. No rhonchi. No dullness on percussion. CV: Regular rate. Regular rhythm. No murmur or rub. No edema. GI: Non-tender. Non-distended. No hernia. Skin: Warm, dry, normal texture and turgor. No nodule on exposed extremities. Lymph: No cervical LAD. No supraclavicular LAD. M/S: No cyanosis. No clubbing. No joint deformity. Neuro: Moves all four extremities. CN 2-12 tested, no defect noted. Psych: Oriented x 3. No anxiety. Awake. Alert. Intact judgement and insight. Data    LABS  CBC:   Recent Labs     05/22/19  0533 05/23/19  1150 05/24/19  0420   WBC 5.6 4.3 4.2   HGB 8.6* 9.3* 9.3*   HCT 25.4* 28.6* 28.1*   MCV 91.6 93.6 91.6   * 137 136     BMP:   Recent Labs     05/22/19  0533 05/23/19  1150 05/24/19  0420    140 139   K 4.2 3.9 4.2    105 106   CO2 21 23 20*   BUN 40* 39* 43*   CREATININE 2.1* 2.1* 2.0*     POC GLUCOSE:    Recent Labs     05/23/19  1129 05/23/19  1632 05/23/19  2043 05/24/19  0214 05/24/19  0722   POCGLU 100* 216* 95 102* 105*     LIVER PROFILE:   No results for input(s): AST, ALT, LIPASE, AMYLASE, LABALBU, BILIDIR, BILITOT, ALKPHOS in the last 72 hours. PT/INR: No results for input(s): PROTIME, INR in the last 72 hours. APTT: No results for input(s): APTT in the last 72 hours. UA:No results for input(s): NITRITE, COLORU, PHUR, LABCAST, WBCUA, RBCUA, MUCUS, TRICHOMONAS, YEAST, BACTERIA, CLARITYU, SPECGRAV, LEUKOCYTESUR, UROBILINOGEN, BILIRUBINUR, BLOODU, GLUCOSEU, KETUA, AMORPHOUS in the last 72 hours.   Microbiology:  Wound Culture:   Recent Labs     05/22/19 2020   ORG Staph aureus MRSA*     Nasal Culture:   Recent Labs     05/22/19 2020   ORG Staph aureus MRSA*   MRSAPCR POSITIVE for  Normal Range: Not detected  CONTACT PRECAUTIONS INDICATED  *     Blood Culture:   No results for input(s): BC, BLOODCULT2 in the last 72 hours. Fungal Culture:   No results for input(s): FUNGSM in the last 72 hours. No results for input(s): FUNCXBLD in the last 72 hours. CSF Culture:  No results for input(s): COLORCSF, APPEARCSF, CFTUBE, CLOTCSF, WBCCSF, RBCCSF, NEUTCSF, NUMCELLSCSF, LYMPHSCSF, MONOCSF, GLUCCSF, VOLCSF in the last 72 hours. Respiratory Culture:  Recent Labs     05/21/19 2045   Lesleigh Dust Further report to follow  Ruling out pathogens     LABGRAM 4+ WBC's (Polymorphonuclear)  1+ Gram positive cocci  1+ Gram negative rods  1+ Epithelial Cells       AFB:No results for input(s): AFBSMEAR in the last 72 hours. Urine Culture  No results for input(s): LABURIN in the last 72 hours. RADIOLOGY:    CT CHEST WO CONTRAST   Final Result   1. Ill-defined centrilobular nodules in the left lower lobe are likely   infectious or inflammatory. 2. Coronary artery disease. XR CHEST PORTABLE   Final Result   Negative portable chest.             CONSULTS:    IP CONSULT TO HOSPITALIST  PHARMACY TO DOSE VANCOMYCIN    ASSESSMENT AND PLAN:      Active Problems:    Hypertensive urgency  Resolved Problems:    * No resolved hospital problems.  *      Pneumonia  - ct shows ill defined nodules, possibly infectious/inflamatory  -treat as MRSA/ gram neg  -MRSA swab + will need to finish vanco course ( day 3 )   -sputum c/s  -pro arun ok       Acute hypoxic resp failure  -poa, needed 4 L o2 to maintain sats > 90 % with symptomatic tachypnea and high RR   - wean o2 as tolerated      h/o diastolic CHF- appears compensated    - weight at baseline    ROSANNA on CKD 3-creat still high   -baseline creat 1.5  - monitor     MRSA colonization  - mupirocin     Dm2  -ct home meds   -SSI     HTN- uncontrolled, monitor Bp  -ct home meds     H/o seizure  - ct home meds     Anemia sec to CKD         DVT Prophylaxis: heparin   Diet: DIET RENAL; Daily Fluid Restriction: 2000 ml  Code Status: Full Code    PT/OT Eval Status:ordered    Discharge plan -2-3 days    The

## 2019-05-25 NOTE — PROGRESS NOTES
Progress Note  Admit Date: 2019      PCP: Ramiro Oswald II     CC: F/U for SOB     SUBJECTIVE / Interval History:  Feels better, SOB improved. Hoping to go home tomorrow  Still has cough    no leg swelling         Allergies  Benadryl [diphenhydramine]    Medications    Scheduled Meds:   mupirocin   Nasal BID    lactobacillus  1 capsule Oral Daily with breakfast    insulin glargine  8 Units Subcutaneous Nightly    levETIRAcetam  1,000 mg Oral BID    hydrALAZINE  50 mg Oral TID    ferrous sulfate  324 mg Oral BID WC    carvedilol  12.5 mg Oral BID WC    aspirin  81 mg Oral Daily    amLODIPine  5 mg Oral Daily    rOPINIRole  0.5 mg Oral Daily    valproic acid  500 mg Oral BID    sodium bicarbonate  650 mg Oral BID    sodium chloride flush  10 mL Intravenous 2 times per day    heparin (porcine)  5,000 Units Subcutaneous 3 times per day    insulin lispro  0-12 Units Subcutaneous TID WC    insulin lispro  0-6 Units Subcutaneous Nightly    ipratropium-albuterol  1 ampule Inhalation Q4H WA    cefepime  2 g Intravenous Q24H    vancomycin (VANCOCIN) intermittent dosing (placeholder)   Other RX Placeholder     Continuous Infusions:   dextrose         PRN Meds:  benzonatate, guaiFENesin-dextromethorphan, acetaminophen, sodium chloride flush, ondansetron, acetaminophen, polyethylene glycol, labetalol, glucose, dextrose, glucagon (rDNA), dextrose, oxyCODONE-acetaminophen    Vitals    TEMPERATURE:  Current - Temp: 98.2 °F (36.8 °C);  Max - Temp  Av.9 °F (36.6 °C)  Min: 97.4 °F (36.3 °C)  Max: 98.3 °F (36.8 °C)  RESPIRATIONS RANGE: Resp  Av.3  Min: 16  Max: 20  PULSE RANGE: Pulse  Av.8  Min: 70  Max: 87  BLOOD PRESSURE RANGE:  Systolic (23EHW), ASP:971 , Min:99 , LPB:214   ; Diastolic (97GCI), TOW:62, Min:56, Max:72    PULSE OXIMETRY RANGE: SpO2  Av.9 %  Min: 92 %  Max: 96 %  24HR INTAKE/OUTPUT:      Intake/Output Summary (Last 24 hours) at 2019 0949  Last data filed at 2019 1400  Gross per 24 hour   Intake 240 ml   Output --   Net 240 ml       Exam:    Gen: No distress. Eyes: PERRL. No sclera icterus. No conjunctival injection. ENT: No discharge. Pharynx clear. External appearance of ears and nose normal.  Neck: Trachea midline. No obvious mass. Resp: No accessory muscle use. No crackles. No wheezes. No rhonchi. No dullness on percussion. CV: Regular rate. Regular rhythm. No murmur or rub. No edema. GI: Non-tender. Non-distended. No hernia. Skin: Warm, dry, normal texture and turgor. No nodule on exposed extremities. Lymph: No cervical LAD. No supraclavicular LAD. M/S: No cyanosis. No clubbing. No joint deformity. Neuro: Moves all four extremities. CN 2-12 tested, no defect noted. Psych: Oriented x 3. No anxiety. Awake. Alert. Intact judgement and insight. Data    LABS  CBC:   Recent Labs     05/23/19  1150 05/24/19  0420   WBC 4.3 4.2   HGB 9.3* 9.3*   HCT 28.6* 28.1*   MCV 93.6 91.6    136     BMP:   Recent Labs     05/23/19  1150 05/24/19  0420    139   K 3.9 4.2    106   CO2 23 20*   BUN 39* 43*   CREATININE 2.1* 2.0*     POC GLUCOSE:    Recent Labs     05/24/19  0722 05/24/19  1120 05/24/19  1627 05/24/19  2300 05/25/19  0730   POCGLU 105* 141* 154* 184* 96     LIVER PROFILE:   No results for input(s): AST, ALT, LIPASE, AMYLASE, LABALBU, BILIDIR, BILITOT, ALKPHOS in the last 72 hours. PT/INR: No results for input(s): PROTIME, INR in the last 72 hours. APTT: No results for input(s): APTT in the last 72 hours. UA:No results for input(s): NITRITE, COLORU, PHUR, LABCAST, WBCUA, RBCUA, MUCUS, TRICHOMONAS, YEAST, BACTERIA, CLARITYU, SPECGRAV, LEUKOCYTESUR, UROBILINOGEN, BILIRUBINUR, BLOODU, GLUCOSEU, KETUA, AMORPHOUS in the last 72 hours.   Microbiology:  Wound Culture:   Recent Labs     05/22/19 2020   ORG Staph aureus MRSA*     Nasal Culture:   Recent Labs     05/22/19 2020   ORG Staph aureus MRSA*   MRSAPCR POSITIVE for  Normal Range: Not detected  CONTACT PRECAUTIONS INDICATED  *     Blood Culture:   No results for input(s): BC, BLOODCULT2 in the last 72 hours. Fungal Culture:   No results for input(s): FUNGSM in the last 72 hours. No results for input(s): FUNCXBLD in the last 72 hours. CSF Culture:  No results for input(s): COLORCSF, APPEARCSF, CFTUBE, CLOTCSF, WBCCSF, RBCCSF, NEUTCSF, NUMCELLSCSF, LYMPHSCSF, MONOCSF, GLUCCSF, VOLCSF in the last 72 hours. Respiratory Culture:  No results for input(s): Landry Sat in the last 72 hours. AFB:No results for input(s): AFBSMEAR in the last 72 hours. Urine Culture  No results for input(s): LABURIN in the last 72 hours. RADIOLOGY:    CT CHEST WO CONTRAST   Final Result   1. Ill-defined centrilobular nodules in the left lower lobe are likely   infectious or inflammatory. 2. Coronary artery disease. XR CHEST PORTABLE   Final Result   Negative portable chest.             CONSULTS:    IP CONSULT TO HOSPITALIST  PHARMACY TO DOSE VANCOMYCIN    ASSESSMENT AND PLAN:      Active Problems:    Hypertensive urgency  Resolved Problems:    * No resolved hospital problems.  *      Pneumonia  - ct shows ill defined nodules, possibly infectious/inflamatory  -treat as MRSA/ gram neg  -MRSA swab + will need to finish vanco course ( day 4 )   -sputum c/s pending   -pro arun ok       Acute hypoxic resp failure  -poa, needed 4 L o2 to maintain sats > 90 % with symptomatic tachypnea and high RR   - wean o2 as tolerated      h/o diastolic CHF- appears compensated    - weight at baseline    ROSANNA on CKD 3-creat still high , repeat labs tomorrow  -baseline creat 1.5  - monitor     MRSA colonization  - mupirocin     Dm2  -ct home meds   -SSI     HTN- uncontrolled, monitor Bp  -ct home meds     H/o seizure  - ct home meds     Anemia sec to CKD         DVT Prophylaxis: heparin   Diet: DIET RENAL; Daily Fluid Restriction: 2000 ml  Code Status: Full Code    PT/OT Eval Status:ordered    Discharge plan -possibly tomorrow    The patient and / or the family were informed of the results of any tests, a time was given to answer questions, a plan was proposed and they agreed with plan. Discussed with consulting physicians, nursing and social work     The note was completed using EMR. Every effort was made to ensure accuracy; however, inadvertent computerized transcription errors may be present.        Therecristian Floyd MD

## 2019-05-25 NOTE — PLAN OF CARE
Problem: Falls - Risk of:  Goal: Will remain free from falls  Description  Will remain free from falls  Outcome: Ongoing  Note:   Pt makes no attempts to get out of bed on her own. Pt makes very little movement on her own. Fall risk assessment completed every shift. All precautions in place. Pt has call light within reach at all times. Room clear of clutter. Pt aware to call for assistance when getting up. Problem: Falls - Risk of:  Goal: Absence of physical injury  Description  Absence of physical injury  Outcome: Ongoing  Note:   No signs of physical injury. Problem: Risk for Impaired Skin Integrity  Goal: Tissue integrity - skin and mucous membranes  Description  Structural intactness and normal physiological function of skin and  mucous membranes. Outcome: Ongoing  Note:   Pt is reluctant to turn on her side off of her back. Nurse was able to get pt to turn a couple times throughout the shift. Chair cushion in use. Problem: Pain:  Goal: Pain level will decrease  Description  Pain level will decrease  Outcome: Ongoing  Note:   Pt has complaints of chronic knee pain. Nurse offered pt ice packs and she refused. Problem: Pain:  Goal: Control of acute pain  Description  Control of acute pain  Outcome: Ongoing  Note:   Pain is chronic not acute. Problem: Pain:  Goal: Control of chronic pain  Description  Control of chronic pain  Outcome: Ongoing  Note:   Pain/discomfort being managed with PRN analgesics per MD orders. Pt able to express presence and absence of pain and rate pain appropriately using numerical scale. Problem: Pain:  Goal: Control of chronic pain  Description  Control of chronic pain  Outcome: Ongoing  Note:   Pain/discomfort being managed with PRN analgesics per MD orders. Pt able to express presence and absence of pain and rate pain appropriately using numerical scale.       Problem: OXYGENATION/RESPIRATORY FUNCTION  Goal: Patient will maintain patent airway  Outcome: Ongoing  Note:   Airway is patent. Lung sounds are diminished throughout. Occasional cough noted. No sputum noted. Problem: OXYGENATION/RESPIRATORY FUNCTION  Goal: Patient will achieve/maintain normal respiratory rate/effort  Description  Respiratory rate and effort will be within normal limits for the patient  Outcome: Ongoing  Note:   Respirations easy even no distress. No shortness of breath. Problem: HEMODYNAMIC STATUS  Goal: Patient has stable vital signs and fluid balance  Outcome: Ongoing  Note:   VSS. Labs being monitored. I/O being monitored. Difficult to measure output due to pt being incontinent. Problem: FLUID AND ELECTROLYTE IMBALANCE  Goal: Fluid and electrolyte balance are achieved/maintained  Outcome: Ongoing  Note:   Labs being monitored.

## 2019-05-25 NOTE — PROGRESS NOTES
Clinical Pharmacy Note  Vancomycin Consult    Jason Andrade is a 78 y.o. female ordered Vancomycin for pneumonia; consult received from Dr. Alberto Greene to manage therapy. Also receiving Cefepime.     Patient Active Problem List   Diagnosis    Diastolic CHF ([address]    Cellulitis    Chest pain    DM [facility]diabetes mellitus) [facility]    HTN (hypertension)    Cellulitis of both lower extremities    Morbid obesity due to excess calories ([address]    Atherosclerosis of native artery of both lower extremities with bilateral ulceration of calves ([address]    Chronic venous hypertension (idiopathic) with ulcer and inflammation of bilateral lower extremity (HCC)    Ulcer of left lower leg, limited to breakdown of skin ([address]    Ulcer of right lower leg (HCC)    Leg swelling    Pruritic condition    Excoriation of left lower leg    Excoriation of right lower leg    Venous stasis dermatitis of both lower extremities    Lower leg edema    Venous ulcer ([address]    Venous hypertension, chronic, with ulcer, left ([address]    Community acquired bacterial pneumonia    Hypertensive urgency    Stage 3 chronic kidney disease ([facility] [facility]    Pneumonia due to organism    Acute pulmonary edema (HCC)    Acute respiratory failure with hypoxia (HCC)    Blister of lower leg, right, initial encounter    Status epilepticus ([facility] [facility]    Acute metabolic encephalopathy    Dysphagia    Moderate protein-calorie malnutrition (HCC)    Hypernatremia       Allergies:  Benadryl [diphenhydramine]     Temp max:  Temp (24hrs), Av.9 °F (36.6 °C), Min:97.6 °F (36.4 °C), Max:98.2 °F (36.8 °C)      Recent Labs     19  0533 19  1150 19  0420   WBC 5.6 4.3 4.2       Recent Labs     19  0533 19  1150 19  0420   BUN 40* 39* 43*   CREATININE 2.1* 2.1* 2.0*         Intake/Output Summary (Last 24 hours) at 2019  Last data filed at 2019 1400  Gross per 24 hour   Intake 240 ml   Output --   Net 240 ml       Culture Results:  pending    Ht Readings from Last 1 Encounters:   05/21/19 4' 11\" (1.499 m)        Wt Readings from Last 1 Encounters:   05/24/19 181 lb (82.1 kg)         CrCl cannot be calculated (Unknown ideal weight. ). Assessment/Plan:  Day #4 of Vancomycin  Random Vancomycin level = 20.8 ug/mL (~23 hours post-dose)  Target Vancomycin trough = 15-20 ug/mL  Will hold Vancomycin and allow the level to trend down, then will re-dose Vancomycin 750 mg IVPB x 1 on 05/25/19 at 0500. Will continue to dose based on random levels in the setting of ROSANNA on CKD. Thank you for the consult. Will continue to follow.     Kj Garcia, PharmD, BCPS  5/24/2019 8:45 PM

## 2019-05-25 NOTE — PLAN OF CARE
Problem: Falls - Risk of:  Goal: Will remain free from falls  Description  Will remain free from falls  5/25/2019 0302 by Unruly Nunn RN  Outcome: Ongoing  5/24/2019 1713 by Jesusita Perez RN  Outcome: Ongoing  Goal: Absence of physical injury  Description  Absence of physical injury  5/25/2019 0302 by Unruly Nunn RN  Outcome: Ongoing  5/24/2019 1713 by Jesusita Perez RN  Outcome: Ongoing     Problem: Risk for Impaired Skin Integrity  Goal: Tissue integrity - skin and mucous membranes  Description  Structural intactness and normal physiological function of skin and  mucous membranes.   5/25/2019 0302 by Unruly Nunn RN  Outcome: Ongoing  5/24/2019 1713 by Jesusita Perez RN  Outcome: Ongoing     Problem: Pain:  Goal: Pain level will decrease  Description  Pain level will decrease  5/25/2019 0302 by Unruly Nunn RN  Outcome: Ongoing  5/24/2019 1713 by Jesusita Perez RN  Outcome: Ongoing  Goal: Control of acute pain  Description  Control of acute pain  5/25/2019 0302 by Unruly Nunn RN  Outcome: Ongoing  5/24/2019 1713 by Jesusita Perez RN  Outcome: Ongoing  Goal: Control of chronic pain  Description  Control of chronic pain  5/25/2019 0302 by Unruly Nunn RN  Outcome: Ongoing  5/24/2019 1713 by Jesusita Perez RN  Outcome: Ongoing     Problem: OXYGENATION/RESPIRATORY FUNCTION  Goal: Patient will maintain patent airway  5/25/2019 0302 by Unruly Nunn RN  Outcome: Ongoing  5/24/2019 1713 by Jesusita Perez RN  Outcome: Ongoing  Goal: Patient will achieve/maintain normal respiratory rate/effort  Description  Respiratory rate and effort will be within normal limits for the patient  5/25/2019 0302 by Unruly Nunn RN  Outcome: Ongoing  5/24/2019 1713 by Jesusita Perez RN  Outcome: Ongoing     Problem: HEMODYNAMIC STATUS  Goal: Patient has stable vital signs and fluid balance  5/25/2019 0302 by Unruly Nunn RN  Outcome: Ongoing  5/24/2019 1713 by Sweta Jin RN  Outcome: Ongoing     Problem: FLUID AND ELECTROLYTE IMBALANCE  Goal: Fluid and electrolyte balance are achieved/maintained  5/25/2019 0302 by Jelena Moore RN  Outcome: Ongoing  5/24/2019 1713 by Sweta Jin RN  Outcome: Ongoing     Problem: ACTIVITY INTOLERANCE/IMPAIRED MOBILITY  Goal: Mobility/activity is maintained at optimum level for patient  5/25/2019 0302 by Jelena Moore RN  Outcome: Ongoing  5/24/2019 1713 by Sweta Jin RN  Outcome: Ongoing

## 2019-05-26 NOTE — PROGRESS NOTES
Progress Note  Admit Date: 2019      PCP: Humberto Kim II     CC: F/U for SOB     SUBJECTIVE / Interval History:  Feels better, SOB improved. Hoping to go home tomorrow  Still has cough    no leg swelling         Allergies  Benadryl [diphenhydramine]    Medications    Scheduled Meds:   mupirocin   Nasal BID    lactobacillus  1 capsule Oral Daily with breakfast    insulin glargine  8 Units Subcutaneous Nightly    levETIRAcetam  1,000 mg Oral BID    hydrALAZINE  50 mg Oral TID    ferrous sulfate  324 mg Oral BID WC    carvedilol  12.5 mg Oral BID WC    aspirin  81 mg Oral Daily    amLODIPine  5 mg Oral Daily    rOPINIRole  0.5 mg Oral Daily    valproic acid  500 mg Oral BID    sodium bicarbonate  650 mg Oral BID    sodium chloride flush  10 mL Intravenous 2 times per day    heparin (porcine)  5,000 Units Subcutaneous 3 times per day    insulin lispro  0-12 Units Subcutaneous TID WC    insulin lispro  0-6 Units Subcutaneous Nightly    ipratropium-albuterol  1 ampule Inhalation Q4H WA    cefepime  2 g Intravenous Q24H    vancomycin (VANCOCIN) intermittent dosing (placeholder)   Other RX Placeholder     Continuous Infusions:   dextrose         PRN Meds:  benzocaine-menthol, benzonatate, guaiFENesin-dextromethorphan, acetaminophen, sodium chloride flush, ondansetron, acetaminophen, polyethylene glycol, labetalol, glucose, dextrose, glucagon (rDNA), dextrose, oxyCODONE-acetaminophen    Vitals    TEMPERATURE:  Current - Temp: 98.3 °F (36.8 °C);  Max - Temp  Av.2 °F (36.8 °C)  Min: 97.3 °F (36.3 °C)  Max: 98.6 °F (37 °C)  RESPIRATIONS RANGE: Resp  Av.7  Min: 16  Max: 20  PULSE RANGE: Pulse  Av.7  Min: 71  Max: 103  BLOOD PRESSURE RANGE:  Systolic (61ALE), JQZ:220 , Min:111 , VLP:166   ; Diastolic (46QCW), FJA:88, Min:53, Max:77    PULSE OXIMETRY RANGE: SpO2  Av.4 %  Min: 92 %  Max: 96 %  24HR INTAKE/OUTPUT:      Intake/Output Summary (Last 24 hours) at 2019 1153  Last data filed at 5/26/2019 0918  Gross per 24 hour   Intake 770 ml   Output --   Net 770 ml       Exam:    Gen: No distress. Eyes: PERRL. No sclera icterus. No conjunctival injection. ENT: No discharge. Pharynx clear. External appearance of ears and nose normal.  Neck: Trachea midline. No obvious mass. Resp: No accessory muscle use. No crackles. No wheezes. No rhonchi. No dullness on percussion. CV: Regular rate. Regular rhythm. No murmur or rub. No edema. GI: Non-tender. Non-distended. No hernia. Skin: Warm, dry, normal texture and turgor. No nodule on exposed extremities. Lymph: No cervical LAD. No supraclavicular LAD. M/S: No cyanosis. No clubbing. No joint deformity. Neuro: Moves all four extremities. CN 2-12 tested, no defect noted. Psych: Oriented x 3. No anxiety. Awake. Alert. Intact judgement and insight. Data    LABS  CBC:   Recent Labs     05/24/19  0420   WBC 4.2   HGB 9.3*   HCT 28.1*   MCV 91.6        BMP:   Recent Labs     05/24/19  0420 05/26/19  0455    140   K 4.2 4.4    108   CO2 20* 22   BUN 43* 37*   CREATININE 2.0* 1.9*     POC GLUCOSE:    Recent Labs     05/25/19  1117 05/25/19  1636 05/25/19  2109 05/26/19  0736 05/26/19  1131   POCGLU 138* 140* 163* 107* 213*     LIVER PROFILE:   No results for input(s): AST, ALT, LIPASE, AMYLASE, LABALBU, BILIDIR, BILITOT, ALKPHOS in the last 72 hours. PT/INR: No results for input(s): PROTIME, INR in the last 72 hours. APTT: No results for input(s): APTT in the last 72 hours. UA:No results for input(s): NITRITE, COLORU, PHUR, LABCAST, WBCUA, RBCUA, MUCUS, TRICHOMONAS, YEAST, BACTERIA, CLARITYU, SPECGRAV, LEUKOCYTESUR, UROBILINOGEN, BILIRUBINUR, BLOODU, GLUCOSEU, KETUA, AMORPHOUS in the last 72 hours. Microbiology:  Wound Culture:   No results for input(s): WNDABS, ORG in the last 72 hours. Invalid input(s):  LABGRAM  Nasal Culture:   No results for input(s): ORG, MRSAPCR in the last 72 hours. Blood Culture:    No results for input(s): BC, Tricia Alu in the last 72 hours. Fungal Culture:   No results for input(s): FUNGSM in the last 72 hours. No results for input(s): FUNCXBLD in the last 72 hours. CSF Culture:  No results for input(s): COLORCSF, APPEARCSF, CFTUBE, CLOTCSF, WBCCSF, RBCCSF, NEUTCSF, NUMCELLSCSF, LYMPHSCSF, MONOCSF, GLUCCSF, VOLCSF in the last 72 hours. Respiratory Culture:  No results for input(s): Lormarkoe Mikecker in the last 72 hours. AFB:No results for input(s): AFBSMEAR in the last 72 hours. Urine Culture  No results for input(s): LABURIN in the last 72 hours. RADIOLOGY:    CT CHEST WO CONTRAST   Final Result   1. Ill-defined centrilobular nodules in the left lower lobe are likely   infectious or inflammatory. 2. Coronary artery disease. XR CHEST PORTABLE   Final Result   Negative portable chest.             CONSULTS:    IP CONSULT TO HOSPITALIST  PHARMACY TO DOSE VANCOMYCIN    ASSESSMENT AND PLAN:      Active Problems:    Hypertensive urgency  Resolved Problems:    * No resolved hospital problems. *       The patient is a 78 y.o. female with a past medical history of diabetes, GERD, hypertension, chronic renal disease, CHF and venous insufficiency who presents to [facility] with SOB. She was admitted with pneumonia. As she is from a nursing home and her MRSA swab was positive she was treated as possible MRSA/gram-negative pneumonia. On admission patient had acute hypoxic respiratory failure which resolved at the time of discharge. She had some acute kidney injury but creatinine is stable at around 2.       Pneumonia  - ct shows ill defined nodules, possibly infectious/inflamatory  -treat as MRSA/ gram neg  -MRSA swab + will need to finish vanco course ( day 5 )   -sputum c/s pending   -pro arun ok       Acute hypoxic resp failure  -poa, needed 4 L o2 to maintain sats > 90 % with symptomatic tachypnea and high RR   - wean o2 as tolerated      h/o diastolic CHF- appears compensated    -

## 2019-05-26 NOTE — PLAN OF CARE
Problem: Falls - Risk of:  Goal: Will remain free from falls  Description  Will remain free from falls  5/26/2019 0005 by Riki Sanchez RN  Outcome: Ongoing  5/25/2019 1903 by Elmira Edwards RN  Outcome: Ongoing  Note:   Pt makes no attempts to get out of bed on her own. Pt makes very little movement on her own. Fall risk assessment completed every shift. All precautions in place. Pt has call light within reach at all times. Room clear of clutter. Pt aware to call for assistance when getting up. Goal: Absence of physical injury  Description  Absence of physical injury  5/26/2019 0005 by Riki Sanchez RN  Outcome: Ongoing  5/25/2019 1903 by Elmira Edwards RN  Outcome: Ongoing  Note:   No signs of physical injury. Problem: Pain:  Goal: Pain level will decrease  Description  Pain level will decrease  5/26/2019 0005 by Riki Sanchez RN  Outcome: Ongoing  5/25/2019 1903 by Elmira Edwards RN  Outcome: Ongoing  Note:   Pt has complaints of chronic knee pain. Nurse offered pt ice packs and she refused. Goal: Control of acute pain  Description  Control of acute pain  5/26/2019 0005 by Riki Sanchez RN  Outcome: Ongoing  5/25/2019 1903 by Elmira Edwards RN  Outcome: Ongoing  Note:   Pain is chronic not acute. Goal: Control of chronic pain  Description  Control of chronic pain  5/26/2019 0005 by Riki Sanchez RN  Outcome: Ongoing  5/25/2019 1903 by Elmira Edwards RN  Outcome: Ongoing  Note:   Pain/discomfort being managed with PRN analgesics per MD orders. Pt able to express presence and absence of pain and rate pain appropriately using numerical scale. Problem: OXYGENATION/RESPIRATORY FUNCTION  Goal: Patient will maintain patent airway  5/26/2019 0005 by Riki Sanchez RN  Outcome: Ongoing  5/25/2019 1903 by Elmira Edwards RN  Outcome: Ongoing  Note:   Airway is patent. Lung sounds are diminished throughout. Occasional cough noted. No sputum noted.   Goal: Patient will achieve/maintain normal respiratory rate/effort  Description  Respiratory rate and effort will be within normal limits for the patient  5/26/2019 0005 by Dionisio Guerra RN  Outcome: Ongoing  5/25/2019 1903 by Danny Sethi RN  Outcome: Ongoing  Note:   Respirations easy even no distress. No shortness of breath. Problem: HEMODYNAMIC STATUS  Goal: Patient has stable vital signs and fluid balance  5/26/2019 0005 by Dionisio Guerra RN  Outcome: Ongoing  5/25/2019 1903 by Danny Sethi RN  Outcome: Ongoing  Note:   VSS. Labs being monitored. I/O being monitored. Difficult to measure output due to pt being incontinent. Problem: FLUID AND ELECTROLYTE IMBALANCE  Goal: Fluid and electrolyte balance are achieved/maintained  5/26/2019 0005 by Dionisio Guerra RN  Outcome: Ongoing  5/25/2019 1903 by Danny Sethi RN  Outcome: Ongoing  Note:   Labs being monitored. Problem: ACTIVITY INTOLERANCE/IMPAIRED MOBILITY  Goal: Mobility/activity is maintained at optimum level for patient  5/26/2019 0005 by Dionisio Guerra RN  Outcome: Ongoing  5/25/2019 1903 by Danny Sethi RN  Outcome: Ongoing  Note:   Pt does not make attempts to ambulate, staff is using the stedy to transfer pt to bathroom and bed.

## 2019-05-26 NOTE — PLAN OF CARE
Problem: Falls - Risk of:  Goal: Will remain free from falls  Description  Will remain free from falls  Outcome: Ongoing  Note:   Pt makes no attempts to get out of bed on her own. She is very reluctant to move even with help. Bed/chair alarms are in use. Problem: Falls - Risk of:  Goal: Absence of physical injury  Description  Absence of physical injury  Outcome: Ongoing  Note:   No signs of physical injury. Problem: Risk for Impaired Skin Integrity  Goal: Tissue integrity - skin and mucous membranes  Description  Structural intactness and normal physiological function of skin and  mucous membranes. Outcome: Ongoing  Note:   Skin assessment completed every shift. Pt assessed for incontinence, appropriate barrier cream applied prn. Pt encouraged to turn/rotate every 2 hours. Assistance provided if pt unable to do so themselves. Problem: Pain:  Goal: Pain level will decrease  Description  Pain level will decrease  Outcome: Ongoing  Note:   Pt has issues with chronic knee pain. Pt refuses to allow nurse to apply ice pack. Problem: Pain:  Goal: Control of acute pain  Description  Control of acute pain  Outcome: Ongoing  Note:   Pain is chronic not acute. Problem: Pain:  Goal: Control of chronic pain  Description  Control of chronic pain  Outcome: Ongoing  Note:   Pain/discomfort being managed with PRN analgesics per MD orders. Pt able to express presence and absence of pain and rate pain appropriately using numerical scale. Problem: OXYGENATION/RESPIRATORY FUNCTION  Goal: Patient will maintain patent airway  Outcome: Ongoing  Note:   Airway is patent. Respirations easy even at rest. No shortness of breath noted, even with activity.      Problem: OXYGENATION/RESPIRATORY FUNCTION  Goal: Patient will achieve/maintain normal respiratory rate/effort  Description  Respiratory rate and effort will be within normal limits for the patient  Outcome: Ongoing  Note:   Respirations easy even no distress. Lung sounds are diminished. Problem: HEMODYNAMIC STATUS  Goal: Patient has stable vital signs and fluid balance  Outcome: Ongoing  Note:   VSS. Labs being monitored. Pt is incontinent of urine so it is difficult to measure her output. Problem: FLUID AND ELECTROLYTE IMBALANCE  Goal: Fluid and electrolyte balance are achieved/maintained  Outcome: Ongoing  Note:   Labs being monitored. Pt does not ambulate much normally. She was able to ambulate a couple steps up to the chair today. Normally pt is transferred using the stedy. Problem: ACTIVITY INTOLERANCE/IMPAIRED MOBILITY  Goal: Mobility/activity is maintained at optimum level for patient  Outcome: Ongoing  Note:   Pt tolerates sitting up in the chair for a couple hours.

## 2019-05-26 NOTE — PROGRESS NOTES
Patient refused breathing treatment because she is tired and just wanted to sleep. RT told her it would help her breathing since she is here for SOB but the patient ignored this RT and went back to sleep.      Electronically signed by Cher Mcguire on 5/25/2019 at 8:42 PM

## 2019-05-27 NOTE — DISCHARGE SUMMARY
Hospital Medicine Discharge Summary      Patient ID: Mishel Velez      Patient's PCP: Rita Gong    Admit Date: 5/20/2019     Discharge Date: 5/27/2019  The patient was seen and examined on day of discharge and this discharge summary is in conjunction with any daily progress note from day of discharge. Admitting Physician: Evie Hollins MD    Discharge Physician: Sangeeta Bean MD     Admitted for   Chief Complaint   Patient presents with    Shortness of Breath     given breathing treatments at nursing home without improvement to SOB. Admitting Diagnosis Hypertensive urgency [I16.0]    Discharge Diagnoses: Active Hospital Problems    Diagnosis Date Noted    Hypertensive urgency [I16.0]        Follow Up: Primary Care Physician in one week    PCP to Follow up on   Monitor weight and BMP          Hospital Course: The patient is a 74 y.o. female with a past medical history of diabetes, GERD, hypertension, chronic renal disease, CHF and venous insufficiency who presents to [address] [place] [address]. She was admitted with pneumonia. As she is from a nursing home and her MRSA swab was positive she was treated as possible MRSA/gram-negative pneumonia. Patient is finished her antibiotic course at the time of discharge On admission patient had acute hypoxic respiratory failure which resolved at the time of discharge. She had some acute kidney injury but creatinine is stable at around 2. She is on Demadex every other day at the nursing home. With her creatinine being slightly high her Demadex has been switched to as needed. Nursing home will need to monitor her weight and signs of CHF exacerbation before administering it.  Patient's weight on discharge is 175 pounds.        Pneumonia- finished anx course  - ct shows ill defined nodules, possibly infectious/inflamatory  -treat as MRSA/ gram neg  -MRSA swab + will need to finish vanco course ( day 5 ) , mupirocin for decolonization   -sputum c/s pending   -pro arun ok       Acute hypoxic resp failure-resolved   -poa, needed 4 L o2 to maintain sats > 90 % with symptomatic tachypnea and high RR   - wean o2 as tolerated       h/o diastolic CHF- appears compensated    - weight at baseline     ROSANNA on CKD 3-creat still high , stable around 2   -baseline creat 1.5  - monitor      MRSA colonization  - mupirocin     Dm2  -ct home meds   -SSI     HTN- uncontrolled, monitor Bp  -ct home meds     H/o seizure  - ct home meds     Anemia sec to CKD               Consults:     IP CONSULT TO HOSPITALIST  PHARMACY TO DOSE VANCOMYCIN        Disposition: home    Discharged Condition: Stable    Code Status: Full Code    Activity: activity as tolerated    Diet: cardiac diet          Labs: For convenience and continuity at follow-up the following most recent labs are provided:    CBC:   Lab Results   Component Value Date    WBC 4.2 05/24/2019    HGB 9.3 05/24/2019    HCT 28.1 05/24/2019     05/24/2019       RENAL:   Lab Results   Component Value Date     05/26/2019    K 4.4 05/26/2019    K 4.2 05/24/2019     05/26/2019    CO2 22 05/26/2019    BUN 37 05/26/2019    CREATININE 1.9 05/26/2019           Discharge Medications:    Joe Poster   Home Medication Instructions KNV:[id number]    Printed on:05/27/19 6812   Medication Information                      acetaminophen (TYLENOL) 325 MG tablet  Take 650 mg by mouth every 6 hours as needed for Pain             aspirin 81 MG tablet  Take 81 mg by mouth daily. benzonatate (TESSALON) 100 MG capsule  Take 1 capsule by mouth 3 times daily as needed for Cough             carvedilol (COREG) 12.5 MG tablet  Take 1 tablet by mouth 2 times daily (with meals)             Cholecalciferol (VITAMIN D PO)  Take 50,000 Units by mouth every 30 days              clotrimazole-betamethasone (LOTRISONE) 1-0.05 % cream  Apply topically nightly Apply topically 2 times daily.  To abd             DULoxetine (CYMBALTA) 30 MG extended release capsule  Take 60 mg by mouth nightly             ferrous sulfate 325 (65 FE) MG tablet  Take 325 mg by mouth 2 times daily. hydrALAZINE (APRESOLINE) 50 MG tablet  Take 50 mg by mouth 3 times daily              insulin glargine (LANTUS) 100 UNIT/ML injection vial  Inject 10 Units into the skin nightly             ipratropium-albuterol (DUONEB) 0.5-2.5 (3) MG/3ML SOLN nebulizer solution  Inhale 1 vial into the lungs every 4 hours as needed for Shortness of Breath             levETIRAcetam (KEPPRA) 1000 MG tablet  Take 1 tablet by mouth 2 times daily             mupirocin (BACTROBAN) 2 % ointment  Apply topically 2 times daily for 3 days Apply topically 3 times daily. oxyCODONE-acetaminophen (PERCOCET) 5-325 MG per tablet  Take 1 tablet by mouth every 8 hours as needed for Pain for up to 3 days. pantoprazole (PROTONIX) 40 MG tablet  Take 40 mg by mouth every morning (before breakfast)              polyethylene glycol (GLYCOLAX) powder  Take 17 g by mouth daily as needed             rOPINIRole (REQUIP) 1 MG tablet  Take 0.5 mg by mouth daily              rOPINIRole (REQUIP) 2 MG tablet  Take 3 mg by mouth nightly             sodium bicarbonate 650 MG tablet  Take 650 mg by mouth 2 times daily             torsemide (DEMADEX) 10 MG tablet  Take 1 tablet by mouth as needed (weight gain/ leg swelling)             Trolamine Salicylate (ASPERCREME) 10 % LOTN  Apply topically 2 times daily as needed for Pain (Right knee)             valproic acid (DEPAKENE) 250 MG capsule  Take 2 capsules by mouth 2 times daily                 Future Appointments   Date Time Provider Jourdan Ojedai   7/29/2019  9:30 AM Marsha Real MD [facility]       Time Spent on discharge is more than 45 minutes in the examination, evaluation, counseling and review of medications and discharge plan. Signed:  Jennifer Owens MD   5/27/2019    The note was completed using EMR. Every effort was made to ensure accuracy; however, inadvertent computerized transcription errors may be present. Thank you Angle Andres II for the opportunity to be involved in this patient's care. If you have any questions or concerns please feel free to contact me at [phone].

## 2019-05-27 NOTE — PROGRESS NOTES
Progress Note  Admit Date: 2019      PCP: Celina Collins II     CC: F/U for SOB     SUBJECTIVE / Interval History:  Feels better, SOB resolved . Hoping to go home tomorrow  Still has cough    no leg swelling         Allergies  Benadryl [diphenhydramine]    Medications    Scheduled Meds:   mupirocin   Nasal BID    lactobacillus  1 capsule Oral Daily with breakfast    insulin glargine  8 Units Subcutaneous Nightly    levETIRAcetam  1,000 mg Oral BID    hydrALAZINE  50 mg Oral TID    ferrous sulfate  324 mg Oral BID WC    carvedilol  12.5 mg Oral BID WC    aspirin  81 mg Oral Daily    amLODIPine  5 mg Oral Daily    rOPINIRole  0.5 mg Oral Daily    valproic acid  500 mg Oral BID    sodium bicarbonate  650 mg Oral BID    sodium chloride flush  10 mL Intravenous 2 times per day    heparin (porcine)  5,000 Units Subcutaneous 3 times per day    insulin lispro  0-12 Units Subcutaneous TID WC    insulin lispro  0-6 Units Subcutaneous Nightly    ipratropium-albuterol  1 ampule Inhalation Q4H WA    cefepime  2 g Intravenous Q24H    vancomycin (VANCOCIN) intermittent dosing (placeholder)   Other RX Placeholder     Continuous Infusions:   dextrose         PRN Meds:  benzocaine-menthol, benzonatate, guaiFENesin-dextromethorphan, acetaminophen, sodium chloride flush, ondansetron, acetaminophen, polyethylene glycol, labetalol, glucose, dextrose, glucagon (rDNA), dextrose, oxyCODONE-acetaminophen    Vitals    TEMPERATURE:  Current - Temp: 97.6 °F (36.4 °C);  Max - Temp  Av °F (36.7 °C)  Min: 97.6 °F (36.4 °C)  Max: 98.2 °F (36.8 °C)  RESPIRATIONS RANGE: Resp  Av.4  Min: 16  Max: 18  PULSE RANGE: Pulse  Av  Min: 69  Max: 81  BLOOD PRESSURE RANGE:  Systolic (30SIJ), TNI:310 , Min:110 , JNR:881   ; Diastolic (01CIV), IPC:45, Min:64, Max:85    PULSE OXIMETRY RANGE: SpO2  Av.9 %  Min: 92 %  Max: 97 %  24HR INTAKE/OUTPUT:      Intake/Output Summary (Last 24 hours) at 2019 1133  Last data filed at 5/27/2019 1032  Gross per 24 hour   Intake 780 ml   Output --   Net 780 ml       Exam:    Gen: No distress. Eyes: PERRL. No sclera icterus. No conjunctival injection. ENT: No discharge. Pharynx clear. External appearance of ears and nose normal.  Neck: Trachea midline. No obvious mass. Resp: No accessory muscle use. No crackles. No wheezes. No rhonchi. No dullness on percussion. CV: Regular rate. Regular rhythm. No murmur or rub. No edema. GI: Non-tender. Non-distended. No hernia. Skin: Warm, dry, normal texture and turgor. No nodule on exposed extremities. Lymph: No cervical LAD. No supraclavicular LAD. M/S: No cyanosis. No clubbing. No joint deformity. Neuro: Moves all four extremities. CN 2-12 tested, no defect noted. Psych: Oriented x 3. No anxiety. Awake. Alert. Intact judgement and insight. Data    LABS  CBC:   No results for input(s): WBC, HGB, HCT, MCV, PLT in the last 72 hours. BMP:   Recent Labs     05/26/19  0455      K 4.4      CO2 22   BUN 37*   CREATININE 1.9*     POC GLUCOSE:    Recent Labs     05/26/19  1638 05/26/19  2114 05/27/19  0200 05/27/19  0733 05/27/19  1122   POCGLU 223* 77 98 93 114*     LIVER PROFILE:   No results for input(s): AST, ALT, LIPASE, AMYLASE, LABALBU, BILIDIR, BILITOT, ALKPHOS in the last 72 hours. PT/INR: No results for input(s): PROTIME, INR in the last 72 hours. APTT: No results for input(s): APTT in the last 72 hours. UA:No results for input(s): NITRITE, COLORU, PHUR, LABCAST, WBCUA, RBCUA, MUCUS, TRICHOMONAS, YEAST, BACTERIA, CLARITYU, SPECGRAV, LEUKOCYTESUR, UROBILINOGEN, BILIRUBINUR, BLOODU, GLUCOSEU, KETUA, AMORPHOUS in the last 72 hours. Microbiology:  Wound Culture:   No results for input(s): WNDABS, ORG in the last 72 hours. Invalid input(s):  LABGRAM  Nasal Culture:   No results for input(s): ORG, MRSAPCR in the last 72 hours. Blood Culture:   No results for input(s): BC, BLOODCULT2 in the last 72 hours.   Fungal as MRSA/ gram neg  -MRSA swab + will need to finish vanco course ( day 5 ) , mupirocin for decolonization   -sputum c/s pending   -pro arun ok       Acute hypoxic resp failure-resolved   -poa, needed 4 L o2 to maintain sats > 90 % with symptomatic tachypnea and high RR   - wean o2 as tolerated      h/o diastolic CHF- appears compensated    - weight at baseline    ROSANNA on CKD 3-creat still high , stable around 2   -baseline creat 1.5  - monitor     MRSA colonization  - mupirocin     Dm2  -ct home meds   -SSI     HTN- uncontrolled, monitor Bp  -ct home meds     H/o seizure  - ct home meds     Anemia sec to CKD         DVT Prophylaxis: heparin   Diet: DIET RENAL; Daily Fluid Restriction: 2000 ml  Code Status: Full Code    PT/OT Eval Status:ordered    Discharge plan -today    The patient and / or the family were informed of the results of any tests, a time was given to answer questions, a plan was proposed and they agreed with plan. Discussed with consulting physicians, nursing and social work     The note was completed using EMR. Every effort was made to ensure accuracy; however, inadvertent computerized transcription errors may be present.        Jimmie Mays MD

## 2019-05-27 NOTE — CARE COORDINATION
SOCIAL WORK DISCHARGE SUMMARY:      DISCHARGE DATE:                 Monday, 5-      DISCHARGE PLACE:                Return to [facility].                REPORT NUMBER:       [phone]             FAX NUMBER:                [phone]        [facility]                [facility]            TIME:                                  1pm          INSURANCE PRECERT OBTAINED:       return    RAMIRO/MARCELINO COMPLETED:                 Return.     [place], [place]     Case Management   [phone]    5/27/2019  10:55 AM

## 2019-05-27 NOTE — CARE COORDINATION
LSW called to speak with Dean Earl of [facility] who approves of this transfer today.   [facility]     Case Management   [phone]    5/27/2019  10:59 AM

## 2019-05-27 NOTE — CARE COORDINATION
[facility] rec'd dc order. Call placed to dax miller of [facility]; message left to inform.   Call placed to  to arrange transport for [facility]     Case Management   [phone]    5/27/2019  10:54 AM

## 2019-05-27 NOTE — PROGRESS NOTES
Discharge  Order noted. IV removed without complication. Patient updated on POC. Discharge instructions explained to patient. Report called to Nathan Lindquist at [facility], [facility]. Patient sent with all belongings.

## 2019-05-27 NOTE — PLAN OF CARE
Problem: Falls - Risk of:  Goal: Will remain free from falls  Description  Will remain free from falls  5/27/2019 0013 by Paddy Burgos RN  Outcome: Ongoing  5/26/2019 1535 by Jayda Clarke RN  Outcome: Ongoing  Note:   Pt makes no attempts to get out of bed on her own. She is very reluctant to move even with help. Bed/chair alarms are in use. Goal: Absence of physical injury  Description  Absence of physical injury  5/27/2019 0013 by Paddy Burgos RN  Outcome: Ongoing  5/26/2019 1535 by Jayda Clarke RN  Outcome: Ongoing  Note:   No signs of physical injury. Problem: Risk for Impaired Skin Integrity  Goal: Tissue integrity - skin and mucous membranes  Description  Structural intactness and normal physiological function of skin and  mucous membranes. 5/27/2019 0013 by Paddy Burgos RN  Outcome: Ongoing  5/26/2019 1535 by Jayda Clarke RN  Outcome: Ongoing  Note:   Skin assessment completed every shift. Pt assessed for incontinence, appropriate barrier cream applied prn. Pt encouraged to turn/rotate every 2 hours. Assistance provided if pt unable to do so themselves. Problem: Pain:  Goal: Pain level will decrease  Description  Pain level will decrease  5/27/2019 0013 by Paddy Burgos RN  Outcome: Ongoing  5/26/2019 1535 by Jayda Clarke RN  Outcome: Ongoing  Note:   Pt has issues with chronic knee pain. Pt refuses to allow nurse to apply ice pack. Goal: Control of acute pain  Description  Control of acute pain  5/27/2019 0013 by Paddy Burgos RN  Outcome: Ongoing  5/26/2019 1535 by Jayda Clarke RN  Outcome: Ongoing  Note:   Pain is chronic not acute. Goal: Control of chronic pain  Description  Control of chronic pain  5/27/2019 0013 by Paddy Burgos RN  Outcome: Ongoing  5/26/2019 1535 by Jayda Clarke RN  Outcome: Ongoing  Note:   Pain/discomfort being managed with PRN analgesics per MD orders.  Pt able to express presence and absence of pain and rate pain appropriately using numerical scale. Problem: OXYGENATION/RESPIRATORY FUNCTION  Goal: Patient will maintain patent airway  5/27/2019 0013 by Dionisio Guerra RN  Outcome: Ongoing  5/26/2019 1535 by Danny Sethi RN  Outcome: Ongoing  Note:   Airway is patent. Respirations easy even at rest. No shortness of breath noted, even with activity. Goal: Patient will achieve/maintain normal respiratory rate/effort  Description  Respiratory rate and effort will be within normal limits for the patient  5/27/2019 0013 by Dionisio Guerra RN  Outcome: Ongoing  5/26/2019 1535 by Danny Sethi RN  Outcome: Ongoing  Note:   Respirations easy even no distress. Lung sounds are diminished. Problem: HEMODYNAMIC STATUS  Goal: Patient has stable vital signs and fluid balance  5/27/2019 0013 by Dionisio Guerra RN  Outcome: Ongoing  5/26/2019 1535 by Danny Sethi RN  Outcome: Ongoing  Note:   VSS. Labs being monitored. Pt is incontinent of urine so it is difficult to measure her output. Problem: FLUID AND ELECTROLYTE IMBALANCE  Goal: Fluid and electrolyte balance are achieved/maintained  5/27/2019 0013 by Dionisio Guerra RN  Outcome: Ongoing  5/26/2019 1535 by Danny Sethi RN  Outcome: Ongoing  Note:   Labs being monitored. Pt does not ambulate much normally. She was able to ambulate a couple steps up to the chair today. Normally pt is transferred using the stedy. Problem: ACTIVITY INTOLERANCE/IMPAIRED MOBILITY  Goal: Mobility/activity is maintained at optimum level for patient  5/27/2019 0013 by Dionisio Guerra RN  Outcome: Ongoing  5/26/2019 1535 by Danny Sethi RN  Outcome: Ongoing  Note:   Pt tolerates sitting up in the chair for a couple hours.

## 2019-06-01 PROBLEM — J96.21 ACUTE AND CHRONIC RESPIRATORY FAILURE WITH HYPOXIA (HCC): Status: ACTIVE | Noted: 2019-01-01

## 2019-06-01 PROBLEM — J96.22 ACUTE ON CHRONIC RESPIRATORY FAILURE WITH HYPERCAPNIA (HCC): Status: ACTIVE | Noted: 2019-01-01

## 2019-06-01 NOTE — PROGRESS NOTES
Pt currently off bipap on 2l./m nasal cannula. Pt was asleep but will wake up and responed.  Crackles and rhonchi are heard bilaterally

## 2019-06-01 NOTE — PLAN OF CARE
1000 pt. Admitted to room 2108. VSS. Tolerating BIPAP. Full assessment done. SR on the monitor. Denies pain. oriented to room and call light. Bed alarm on  1055 dr. Moreno Means rounding. Seen and talked to pt. Plan of care discussed. Will try to take off bipap later. RT aware of plan. 1203 taken off bipap, placed on 2L NC. Even respiration noted, denies SOB. Due meds taken with sips of water. Tolerating PO. Family at bedside, admission questions asked. Requested to bring pts. Living will. Plan of care discussed. 1300 pt. Had BM. pericare done. Alfonzo area reddened. Nystatin powder applied to abdominal pannus. Pt. Turned and repositioned. 1500 pt. Turned, zinc ointment applied on the alfonzo area. mepilex sacrum intact. Kept comfortable. Heels floated with a pillow. 1653 VSS. no change in assessment. Pt. Turned and repositioned. 1820 pt. repositioned sitting up on the bed, dinner tray set up. Encouraged to eat. Roro Romero report given to zack WATTS. Pt. Repositioned.

## 2019-06-01 NOTE — ED NOTES
[facility]  Respiratory Therapy  Arterial Blood Gas    Name:  Joycelyn Record Number:  [id number]  Age: 78 y.o.   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 (HCC)    Chronic venous hypertension (idiopathic) with ulcer and inflammation of bilateral lower extremity (HCC)    Ulcer of left lower leg, limited to breakdown of skin (HCC)    Ulcer of right lower leg (HCC)    Leg swelling    Pruritic condition    Excoriation of left lower leg    Excoriation of right lower leg    Venous stasis dermatitis of both lower extremities    Lower leg edema    Venous ulcer ([address]    Venous hypertension, chronic, with ulcer, left ([address]    Community acquired bacterial pneumonia    Hypertensive urgency    Stage 3 chronic kidney disease ([address]    Pneumonia due to organism    Acute pulmonary edema (HCC)    Acute respiratory failure with hypoxia (HCC)    Blister of lower leg, right, initial encounter    Status epilepticus ([address]    Acute metabolic encephalopathy    Dysphagia    Moderate protein-calorie malnutrition (HCC)    Hypernatremia       Social History  Social History     Tobacco Use    Smoking status: Former Smoker     Packs/day: 0.50     Years: 10.00     Pack years: 5.00     Types: Cigarettes     Last attempt to quit: 3/21/1990     Years since quittin.2    Smokeless tobacco: Never Used   Substance Use Topics    Alcohol use: No    Drug use: No         ABG drawn from right radial site. Current O2 settings: 100% O2 via BIPAP. An Easton's test was performed prior to draw and was positive for collateral blood flow. The draw site was then prepped with an alcohol pad and the specimen was collected on the second attempt. Pressure was held from the puncture site until any signs of bleeding had stopped and the site was bandaged with a bandaid. The specimen was then sent to the lab for analysis.         Patient/caregiver was educated on the proper method of use:  Yes      Level of patient/caregiver understanding able to:   [] Verbalize understanding   [] Demonstrate understanding       [] Teach back        [] Needs reinforcement        []  No available caregiver               []  Other:     Response to education:  Good     Teaching Time:  5  minutes     Electronically signed by Luther Christine RCP on 6/1/2019 at 9:01 AM

## 2019-06-01 NOTE — PROGRESS NOTES
4 Eyes Skin Assessment     The patient is being assess for  Admission    I agree that 2 RN's have performed a thorough Head to Toe Skin Assessment on the patient. ALL assessment sites listed below have been assessed. Areas assessed by both nurses: [facility]  [x]   Head, Face, and Ears   [x]   Shoulders, Back, and Chest  [x]   Arms, Elbows, and Hands   [x]   Coccyx, Sacrum, and IschIum  [x]   Legs, Feet, and Heels        Does the Patient have Skin Breakdown?   Yes LDA WOUND CARE was Initiated documentation include the Carol-wound, Wound Assessment, Measurements, Dressing Treatment, Drainage, and Color\",  CORDELIA        Jasbir Prevention initiated:  Yes   Wound Care Orders initiated:  Yes      [id number] nurse consulted for Pressure Injury (Stage 3,4, Unstageable, DTI, NWPT, and Complex wounds), New and Established Ostomies:  NA      Nurse 1 eSignature: Electronically signed by Cynthia Thompson RN on 6/1/19 at 10:12 AM    **SHARE this note so that the co-signing nurse is able to place an eSignature**    Nurse 2 eSignature: Electronically signed by Robbie Sweet RN on 6/1/19 at 10:18 AM

## 2019-06-01 NOTE — ED PROVIDER NOTES
(CYMBALTA) 30 MG extended release capsule Take 60 mg by mouth nightly    Historical Provider, MD   ipratropium-albuterol (DUONEB) 0.5-2.5 (3) MG/3ML SOLN nebulizer solution Inhale 1 vial into the lungs every 4 hours as needed for Shortness of Breath    Historical Provider, MD   rOPINIRole (REQUIP) 2 MG tablet Take 3 mg by mouth nightly    Historical Provider, MD   insulin glargine (LANTUS) 100 UNIT/ML injection vial Inject 10 Units into the skin nightly    Historical Provider, MD   acetaminophen (TYLENOL) 325 MG tablet Take 650 mg by mouth every 6 hours as needed for Pain    Historical Provider, MD   polyethylene glycol (GLYCOLAX) powder Take 17 g by mouth daily as needed    Historical Provider, MD   levETIRAcetam (KEPPRA) 1000 MG tablet Take 1 tablet by mouth 2 times daily 11/28/18   Kiki Gallardo DO   Cholecalciferol (VITAMIN D PO) Take 50,000 Units by mouth every 30 days     Historical Provider, MD   carvedilol (COREG) 12.5 MG tablet Take 1 tablet by mouth 2 times daily (with meals) 8/1/18   Jorge L Beckford MD   pantoprazole (PROTONIX) 40 MG tablet Take 40 mg by mouth every morning (before breakfast)     Historical Provider, MD   sodium bicarbonate 650 MG tablet Take 650 mg by mouth 2 times daily    Historical Provider, MD   hydrALAZINE (APRESOLINE) 50 MG tablet Take 50 mg by mouth 3 times daily     Historical Provider, MD   rOPINIRole (REQUIP) 1 MG tablet Take 0.5 mg by mouth daily     Historical Provider, MD   ferrous sulfate 325 (65 FE) MG tablet Take 325 mg by mouth 2 times daily. Historical Provider, MD   aspirin 81 MG tablet Take 81 mg by mouth daily.     Historical Provider, MD       Allergies as of 06/01/2019 - Review Complete 06/01/2019   Allergen Reaction Noted    Benadryl [diphenhydramine] Itching 10/23/2015       Past Medical History:   Diagnosis Date    Acid reflux     Arthritis     Balance problem     Blood circulation, collateral     Cellulitis of both lower extremities     CHF (congestive heart failure) (HCC)     Chronic kidney disease     Chronic renal failure    Chronic venous hypertension (idiopathic) with ulcer and inflammation of bilateral lower extremity ([facility] [facility] 2017    Community acquired bacterial pneumonia 2018    Depression     Diabetes mellitus (HCC)     GERD (gastroesophageal reflux disease)     Hyperlipidemia     Hypertension     Movement disorder     MRSA colonization 2019    Murmur     Neuromuscular disorder ([address]     Restless leg syndrome     Status epilepticus ([facility] 2018    Ulcer of right lower leg ([facility] [address] 2017    Urinary frequency     Urinary incontinence         Surgical History:   Past Surgical History:   Procedure Laterality Date    APPENDECTOMY      BLADDER SUSPENSION      CHOLECYSTECTOMY      COLONOSCOPY      CYSTOSCOPY      EYE SURGERY      HYSTERECTOMY          Family History:    Family History   Problem Relation Age of Onset    Heart Disease Mother     Heart Disease Father     Diabetes Father     Kidney Disease Father        Social History     Socioeconomic History    Marital status: Single     Spouse name: Not on file    Number of children: 1    Years of education: 15    Highest education level: Not on file   Occupational History    Not on file   Social Needs    Financial resource strain: Not on file    Food insecurity:     Worry: Not on file     Inability: Not on file    Transportation needs:     Medical: Not on file     Non-medical: Not on file   Tobacco Use    Smoking status: Former Smoker     Packs/day: 0.50     Years: 10.00     Pack years: 5.00     Types: Cigarettes     Last attempt to quit: 3/21/1990     Years since quittin.2    Smokeless tobacco: Never Used   Substance and Sexual Activity    Alcohol use: No    Drug use: No    Sexual activity: Never   Lifestyle    Physical activity:     Days per week: Not on file     Minutes per session: Not on file    Stress: Not on file   Relationships  Social connections:     Talks on phone: Not on file     Gets together: Not on file     Attends Latter day service: Not on file     Active member of club or organization: Not on file     Attends meetings of clubs or organizations: Not on file     Relationship status: Not on file    Intimate partner violence:     Fear of current or ex partner: Not on file     Emotionally abused: Not on file     Physically abused: Not on file     Forced sexual activity: Not on file   Other Topics Concern    Not on file   Social History Narrative    Not on file       Nursing notes reviewed. ED Triage Vitals   Enc Vitals Group      BP 06/01/19 0700 (!) 203/110      Pulse 06/01/19 0653 138      Resp 06/01/19 0700 24      Temp 06/01/19 0700 97.5 °F (36.4 °C)      Temp Source 06/01/19 0700 Axillary      SpO2 06/01/19 0653 96 %      Weight 06/01/19 0653 194 lb 14.2 oz (88.4 kg)      Height --       Head Circumference --       Peak Flow --       Pain Score --       Pain Loc --       Pain Edu? --       Excl. in GC? --        GENERAL:  Somnolent, but will open eyes to my command. Well developed, well nourished with apparent distress. HENT:  Normocephalic, Atraumatic, no lacerations. EYES:  Conjunctiva normal, Pupils equal round and reactive to light, extraocular movements normal.  NECK:  Trachea is midline. No stridor. CHEST:  Increased rate and regular rhythm,  normal S1/S2, chest wall non-tender. LUNGS:  Severe respiratory distress. +abdominal retractions, +sternal retractions. Diminished breath sounds diffusely with minimal air movement, unable to assist her in any wheezing, no rhochi, no rales  ABDOMEN:  Soft, non-tender, non-distended. No guarding and no rebound. Normal BS, no organomegaly, no abdominal masses  EXTREMITIES:  Normal range of motion, mild pitting edema of calves in bilateral lower extremities, no tenderness, no deformity, distal pulses present and equal bilaterally.   Moves extremities x4 with purpose. SKIN: Warm, dry and intact. NEUROLOGIC: Normal mental status. Moving all extremities to command. Alert and oriented  without focal deficit or gross sensory deficit. Unable to speak, is able to nod and shake her head in response to my questions   PSYCHIATRIC: anxious      LABS and DIAGNOSTIC RESULTS  EKG  The Ekg interpreted by me shows  sinus tachycardia, zvel=678 with a rate of 136  Axis is   Left axis deviation  QTc is  within an acceptable range  Intervals and Durations are unremarkable. ST Segments: no acute change and normal  Delta waves, Brugada Syndrome, and Short MS are not present. Prior EKG to compare with was available. No significant changes compared to prior EKG from May 20, 2019      Cardiac Monitor Strip Interpretation    Interpreted by me  Monitor strip interpreted for greater than 10 seconds    Rhythm: sinus tachycardia  Rate: tachycardia  Ectopy: none  ST Segments: normal    RADIOLOGY  X-RAYS:  I have reviewed radiologic plain film image(s). ALL OTHER NON-PLAIN FILM IMAGES SUCH AS CT, ULTRASOUND AND MRI HAVE BEEN READ BY THE RADIOLOGIST. XR CHEST PORTABLE   Final Result   Mildly increased perihilar opacities may represent vascular congestion or a   developing viral infection.               LABS  Results for orders placed or performed during the hospital encounter of 06/01/19   CBC Auto Differential   Result Value Ref Range    WBC 18.5 (H) 4.0 - 11.0 K/uL    RBC 3.66 (L) 4.00 - 5.20 M/uL    Hemoglobin 11.2 (L) 12.0 - 16.0 g/dL    Hematocrit 35.0 (L) 36.0 - 48.0 %    MCV 95.7 80.0 - 100.0 fL    MCH 30.5 26.0 - 34.0 pg    MCHC 31.9 31.0 - 36.0 g/dL    RDW 16.9 (H) 12.4 - 15.4 %    Platelets 712 920 - 400 K/uL    MPV 8.5 5.0 - 10.5 fL    Neutrophils % 78.0 %    Lymphocytes % 14.0 %    Monocytes % 4.7 %    Eosinophils % 2.5 %    Basophils % 0.8 %    Neutrophils # 14.4 (H) 1.7 - 7.7 K/uL    Lymphocytes # 2.6 1.0 - 5.1 K/uL    Monocytes # 0.9 0.0 - 1.3 K/uL    Eosinophils # 0.5 0.0 - 0.6 K/uL    Basophils # 0.1 0.0 - 0.2 K/uL   Comprehensive Metabolic Panel w/ Reflex to MG   Result Value Ref Range    Sodium 141 136 - 145 mmol/L    Potassium reflex Magnesium 5.8 (H) 3.5 - 5.1 mmol/L    Chloride 106 99 - 110 mmol/L    CO2 22 21 - 32 mmol/L    Anion Gap 13 3 - 16    Glucose 253 (H) 70 - 99 mg/dL    BUN 27 (H) 7 - 20 mg/dL    CREATININE 1.3 (H) 0.6 - 1.2 mg/dL    GFR Non- 40 (A) >60    GFR  49 (A) >60    Calcium 8.6 8.3 - 10.6 mg/dL    Total Protein 7.8 6.4 - 8.2 g/dL    Alb 3.5 3.4 - 5.0 g/dL    Albumin/Globulin Ratio 0.8 (L) 1.1 - 2.2    Total Bilirubin 0.3 0.0 - 1.0 mg/dL    Alkaline Phosphatase 114 40 - 129 U/L    ALT 11 10 - 40 U/L    AST 17 15 - 37 U/L    Globulin 4.3 g/dL   Brain Natriuretic Peptide   Result Value Ref Range    Pro-BNP 6,470 (H) 0 - 124 pg/mL   Blood Gas, Arterial   Result Value Ref Range    pH, Arterial 7.132 (LL) 7.350 - 7.450    pCO2, Arterial 74.9 (HH) 35.0 - 45.0 mmHg    pO2, Arterial 63.9 (L) 75.0 - 108.0 mmHg    HCO3, Arterial 24.0 21.0 - 29.0 mmol/L    Base Excess, Arterial -5.7 (L) -3.0 - 3.0 mmol/L    Hemoglobin, Art, Extended 10.1 (L) 12.0 - 16.0 g/dL    O2 Sat, Arterial 87.0 (L) >92 %    Carboxyhgb, Arterial 1.0 0.0 - 1.5 %    Methemoglobin, Arterial 1.0 <1.5 %    TCO2, Arterial 26.3 Not Established mmol/L    O2 Content, Arterial 12 Not Established mL/dL    O2 Therapy Unknown    Urine, reflex to culture   Result Value Ref Range    Color, UA YELLOW Straw/Yellow    Clarity, UA TURBID (A) Clear    Glucose, Ur Negative Negative mg/dL    Bilirubin Urine Negative Negative    Ketones, Urine Negative Negative mg/dL    Specific Gravity, UA 1.015 1.005 - 1.030    Blood, Urine SMALL (A) Negative    pH, UA 6.0 5.0 - 8.0    Protein,  (A) Negative mg/dL    Urobilinogen, Urine 0.2 <2.0 E.U./dL    Nitrite, Urine Negative Negative    Leukocyte Esterase, Urine SMALL (A) Negative    Microscopic Examination YES     Urine Reflex to Culture Yes Urine Type Not Specified        PROCEDURES      MEDICAL DECISION MAKING  8:05 AM  Went back in and check on the patient she still continues to have tachypnea at about a rate of 30 but she is no longer diaphoretic and is deathly more alert and keeping her eyes open more often. I asked her again if she wanted to be intubated and she said no. I did receive a phone call from [address] practitioner. She states that on Monday when she was discharged to patient had a torsemide discontinued and was put on a fluid restricted diet. 9:24 AM  Should vitals continued to slowly improve, she is much more alert, no longer diaphoretic or drooling. At this time I do not feel is necessary to intubate her and that BiPAP is sufficient for her respiratory distress. Procedures/interventions/images ordered for this visit  Orders Placed This Encounter   Procedures    CULTURE BLOOD #1    CULTURE BLOOD #2    Urine Culture    XR CHEST PORTABLE    CBC Auto Differential    Comprehensive Metabolic Panel w/ Reflex to MG    Brain Natriuretic Peptide    Blood Gas, Arterial    Urine, reflex to culture    Microscopic Urinalysis    Initiate Oxygen Therapy Protocol    BIPAP    EKG 12 Lead    Insert/Charge Gamble Catheter - Simple       Medications ordered for this visit  Orders Placed This Encounter   Medications    nitroglycerin (NITRO-BID) 2 % ointment 0.5 inch    furosemide (LASIX) injection 60 mg       A secure text message was sent to the [facility] hospitalist, Dr. Marcie Salazar. I've decided to admit Sangeetha Delgado for further observation and evaluation of Heaven Salazar's dyspnea.   As I have deemed necessary from their history, physical, and studies, I have considered and evaluated Sangeetha Delgado for the following diagnoses:  ACUTE CORONARY SYNDROME, CHRONIC OBSTRUCTIVE PULMONARY DISEASE, CONGESTIVE HEART FAILURE, PERICARDIAL TAMPONADE, PNEUMONIA, PNEUMOTHORAX, PULMONARY EMBOLISM, SEPSIS, and THORACIC DISSECTION. FINAL IMPRESSION  1. Acute on chronic respiratory failure with hypercapnia (HCC)    2. Acute on chronic congestive heart failure, unspecified heart failure type (HCC)        Vitals:  Blood pressure (!) 180/119, pulse 127, temperature 97.5 °F (36.4 °C), temperature source Axillary, resp. rate 27, weight 194 lb 14.2 oz (88.4 kg), SpO2 100 %, not currently breastfeeding. Patient and/or companions verbalized understanding of the ED workup, any relevant findings as well as any incidental findings, and the disposition and plan. Questions sought and answered with the patient and/or family members. They voice understanding and agree with plan. Disposition  Pt is in stable condition upon Admit to CCU/ICU. Please note, critical care time was 60 minutes, obtaining history, conducting a physical exam, performing and monitoring interventions, ordering, collecting and interpreting tests, and establishing medical decision-making and discussion with the patient and/or family, specifically for management of the presenting complaint and symptoms initially, direct bedside care, reevaluation, review of records, and consultation. There was a high probability of clinically significant life-threatening deterioration in the patient's condition, which required my urgent intervention. This time does not include separately billable procedures. The note was completed using Dragon voice recognition transcription. Every effort was made to ensure accuracy; however, inadvertent transcription errors may be present despite my best efforts to edit errors.     Lakeisha Arrington MD  [address] [place]  06/01/19 2372

## 2019-06-01 NOTE — PROGRESS NOTES
Occupational Therapy  Chart reviewed and OT eval attempted. Patient currently on Bi-pap and RN requesting to attempt at a later time. Will follow up as therapist schedule permits.      Thank you,     Anna Meade OTR/L #[id number]

## 2019-06-01 NOTE — ED NOTES
Pt resting in bed at this time. Call light remains in reach no distress noted. RR even and unlabored on bipap, skin warm and dry. Gamble Catheter in place. No needs at this time. Will continue to monitor closely.          Ti Javier RN  06/01/19 0491

## 2019-06-01 NOTE — H&P
Hospital Medicine History & Physical      PCP: Annel Sawyer II    Date of Admission: 6/1/2019    Date of Service: Pt seen/examined on 6/1/2019    Chief Complaint:      Chief Complaint   Patient presents with    Shortness of Breath       History Of Present Illness: The patient is a 78 y.o. 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 Patient was discharged 4 days ago to the nursing home and at that admission was admitted for possible pneumonia. Patient states since today morning she felt very short of breath was found to be hypoxic in the nursing home and was placed on the BiPAP and sent to the ER. No fever or chills. Patient has gained 6 pounds of fluid in the last 4 days. In the ER she was found to be in respiratory distress with hypercapnic respiratory failure and was placed on the BiPAP.     Past Medical History:        Diagnosis Date    Acid reflux     Arthritis     Balance problem     Blood circulation, collateral     Cellulitis of both lower extremities     CHF (congestive heart failure) (HCC)     Chronic kidney disease     Chronic renal failure    Chronic venous hypertension (idiopathic) with ulcer and inflammation of bilateral lower extremity ([address] 1/9/2017    Community acquired bacterial pneumonia 7/26/2018    Depression     Diabetes mellitus (HCC)     GERD (gastroesophageal reflux disease)     Hyperlipidemia     Hypertension     Movement disorder     MRSA colonization 05/22/2019    Murmur     Neuromuscular disorder ([address]     Restless leg syndrome     Status epilepticus ([address] 11/12/2018    Ulcer of right lower leg ([address] 7/14/2017    Urinary frequency     Urinary incontinence        Past Surgical History:        Procedure Laterality Date    APPENDECTOMY      BLADDER SUSPENSION      CHOLECYSTECTOMY      COLONOSCOPY      by mouth every morning (before breakfast)     Historical Provider, MD   sodium bicarbonate 650 MG tablet Take 650 mg by mouth 2 times daily    Historical Provider, MD   hydrALAZINE (APRESOLINE) 50 MG tablet Take 50 mg by mouth 3 times daily     Historical Provider, MD   rOPINIRole (REQUIP) 1 MG tablet Take 0.5 mg by mouth daily     Historical Provider, MD   ferrous sulfate 325 (65 FE) MG tablet Take 325 mg by mouth 2 times daily. Historical Provider, MD   aspirin 81 MG tablet Take 81 mg by mouth daily. Historical Provider, MD       Allergies:  Benadryl [diphenhydramine]    Social History:       reports that she quit smoking about 29 years ago. Her smoking use included cigarettes. She has a 5.00 pack-year smoking history. She has never used smokeless tobacco. She reports that she does not drink alcohol or use drugs. Family History:  Reviewed in detail and negative for DM, Early CAD, Cancer, CVA. Positive as follows:        Problem Relation Age of Onset    Heart Disease Mother     Heart Disease Father     Diabetes Father     Kidney Disease Father        REVIEW OF SYSTEMS:   Positive review  noted in the HPI. All other systems reviewed and negative.     PHYSICAL EXAM:    BP (!) 111/57   Pulse 97   Temp 98.3 °F (36.8 °C) (Axillary)   Resp 22   Ht 4' 10\" (1.473 m)   Wt 181 lb 7 oz (82.3 kg)   SpO2 100%   BMI 37.92 kg/m²   General Appearance: alert and oriented to person, place and time, well developed and well- nourished, in no acute distress  Skin: warm and dry, no rash or erythema  Head: normocephalic and atraumatic  Eyes: pupils equal, round, and reactive to light, extraocular eye movements intact, conjunctivae normal  ENT: tympanic membrane, external ear and ear canal normal bilaterally, nose without deformity, nasal mucosa and turbinates normal without polyps  Neck: supple and non-tender without mass, no thyromegaly or thyroid nodules, no cervical lymphadenopathy  Pulmonary/Chest:  no wheezes, Few  Rales. Ae decreased  Cardiovascular: normal rate, regular rhythm, normal S1 and S2, no murmurs, rubs, clicks, or gallops, Peripheral pulses good, Cap refill <3 sec, no carotid bruits  Abdomen: soft, non-tender, non-distended, normal bowel sounds, no masses or organomegaly  Extremities: no cyanosis, clubbing 1 +  edema  Musculoskeletal: normal range of motion, no joint swelling, deformity or tenderness  Neurologic: reflexes normal and symmetric, no cranial nerve deficit, gait, coordination and speech normal      LABS:    CXR:  I have reviewed the CXR with the following interpretation: pulm edema    EKG:  Done in the ER. Not updated in the system     CBC   Recent Labs     06/01/19  0711   WBC 18.5*   HGB 11.2*   HCT 35.0*         RENAL  Recent Labs     06/01/19  0711      K 5.8*      CO2 22   BUN 27*   CREATININE 1.3*     LFT'S  Recent Labs     06/01/19  0711   AST 17   ALT 11   BILITOT 0.3   ALKPHOS 114     COAG  No results for input(s): INR in the last 72 hours. CARDIAC ENZYMES  No results for input(s): CKTOTAL, CKMB, CKMBINDEX, TROPONINI in the last 72 hours.     U/A:    Lab Results   Component Value Date    COLORU YELLOW 06/01/2019    WBCUA 49 06/01/2019    RBCUA 5-10 06/01/2019    MUCUS 1+ 11/30/2013    BACTERIA 1+ 06/01/2019    CLARITYU TURBID 06/01/2019    SPECGRAV 1.015 06/01/2019    LEUKOCYTESUR SMALL 06/01/2019    BLOODU SMALL 06/01/2019    GLUCOSEU Negative 06/01/2019    GLUCOSEU NEGATIVE 02/26/2012       ABG    Lab Results   Component Value Date    WJI7PZV 24.6 06/01/2019    BEART -2.4 06/01/2019    F2RWSNOI 99.9 06/01/2019    PHART 7.258 06/01/2019    BOD7DBO 57.0 06/01/2019    PO2ART 444.0 06/01/2019    FBT8TUX 26.4 06/01/2019       UA:  Recent Labs     06/01/19  0733   COLORU YELLOW   PHUR 6.0   WBCUA 49*   RBCUA 5-10*   BACTERIA 1+*   CLARITYU TURBID*   SPECGRAV 1.015   LEUKOCYTESUR SMALL*   UROBILINOGEN 0.2   BILIRUBINUR Negative   BLOODU SMALL*   GLUCOSEU Negative   Ellene Grounds Negative       Microbiology:  No results for input(s): LABGRAM, LABANAE, ORG, CXSURG in the last 72 hours. Nasal Culture: No results for input(s): ORG, MRSAPCR in the last 72 hours. Blood Culture: No results for input(s): BC, BLOODCULT2, ORG in the last 72 hours. Fungal Culture:   No results for input(s): FUNGSM in the last 72 hours. No results for input(s): FUNCXBLD in the last 72 hours. CSF Culture:  No results for input(s): COLORCSF, APPEARCSF, CFTUBE, CLOTCSF, WBCCSF, RBCCSF, NEUTCSF, NUMCELLSCSF, LYMPHSCSF, MONOCSF, GLUCCSF, VOLCSF in the last 72 hours. Respiratory Culture:  No results for input(s): Darryl Schlatter in the last 72 hours. AFB:No results for input(s): AFBSMEAR in the last 72 hours. Urine Culture  No results for input(s): LABURIN in the last 72 hours. RADIOLOGY:    XR CHEST PORTABLE   Final Result   Mildly increased perihilar opacities may represent vascular congestion or a   developing viral infection.              Previous medical records personally reviewed and analyzed         PHYSICIAN CERTIFICATION    I certify that Jorge Jurado is expected to be hospitalized for > 2  midnights based on the following assessment and plan:    ASSESSMENT/PLAN:  Active Hospital Problems    Diagnosis Date Noted    Acute and chronic respiratory failure with hypoxia ([facility] [J96.21] 06/01/2019     Acute hypoxic and hypercapnic respiratory failure  -Most likely secondary to fluid overload/diastolic CHF exacerbation  -Patient is on BiPAP, repeat ABG shows improvement  -We'll wean off BiPAP    Acute on chronic diastolic CHF exacerbation  -Most likely secondary to uncontrolled hypertension  -Patient's weight on discharge was 175 pounds and on admission is 181 pounds  -Continue IV Lasix  -monitor I/o and weight  - pro arun as she recently has pneumonia and repeat MRSA swab    CKD 3  - baseline creat 1.6 - 2  - monitor with diuresis     Uncontrolled HTN  - resume home meds  - will optimize based on BP reading    MRSA colonization  -repeat swab    DM2  -ct home meds  -ssi    H/o seizure  - ct home meds    Goals of care. Patient stated to the ER doctor that she did not want to be intubated but when I asked her about resuscitation and intubation patient said she wanted everything done. We'll get palliative care involved    DVT Prophylaxis: lovenox  Diet: Diet NPO Effective Now  Code Status: Full Code  PT/OT Eval Status: ordered   Due to the immediate potential for life-threatening deterioration due to resp failure/ chf  , I spent 45  minutes providing critical care. This time is excluding time spent performing procedures. Cherelle Duggan MD  The note was completed using EMR. Every effort was made to ensure accuracy; however, inadvertent computerized transcription errors may be present. Thank you Farzad Ramos II for the opportunity to be involved in this patient's care. If you have any questions or concerns please feel free to contact me at [phone].

## 2019-06-01 NOTE — CONSULTS
PATIENT IS SEEN AT THE REQUEST OF DR. Ivory for Respiratory Failure    CONSULTING PHYSICIAN: Dianelys    HISTORY OF PRESENT ILLNESS:  This is a 78 y.o. female who presented to the ED on 6/1 with a CC of SOb. Per ED notes she came from nursing facility for SOB. She was placed on NIV in the ED due to work of breathing. She was noted to be hypoxic and hypercapnic. Very high blood pressure in the ED. He says she has been slightly SOB for the past day or so. Denies lung issues. Does not use oxygen at nursing home. Admits to CHF. Is coughing but is feeling better since being on BIPAP      Established Pulmonologist:  None    PAST MEDICAL HISTORY:  Past Medical History:   Diagnosis Date    Acid reflux     Arthritis     Balance problem     Blood circulation, collateral     Cellulitis of both lower extremities     CHF (congestive heart failure) (HCC)     Chronic kidney disease     Chronic renal failure    Chronic venous hypertension (idiopathic) with ulcer and inflammation of bilateral lower extremity ([address] 1/9/2017    Community acquired bacterial pneumonia 7/26/2018    Depression     Diabetes mellitus (HCC)     GERD (gastroesophageal reflux disease)     Hyperlipidemia     Hypertension     Movement disorder     MRSA colonization 05/22/2019    Murmur     Neuromuscular disorder ([address]     Restless leg syndrome     Status epilepticus ([address] 11/12/2018    Ulcer of right lower leg ([address] 7/14/2017    Urinary frequency     Urinary incontinence        PAST SURGICAL HISTORY:  Past Surgical History:   Procedure Laterality Date    APPENDECTOMY      BLADDER SUSPENSION      CHOLECYSTECTOMY      COLONOSCOPY      CYSTOSCOPY      EYE SURGERY      HYSTERECTOMY         FAMILY HISTORY:  family history includes Diabetes in her father; Heart Disease in her father and mother; Kidney Disease in her father. SOCIAL HISTORY:   reports that she quit smoking about 29 years ago. Her smoking use included cigarettes. 52*   RBCUA 5-10*   BACTERIA 1+*   CLARITYU TURBID*   SPECGRAV 1.015   LEUKOCYTESUR SMALL*   UROBILINOGEN 0.2   BILIRUBINUR Negative   BLOODU SMALL*   GLUCOSEU Negative     Recent Labs     19  0715 19  0855   PHART 7.132* 7.258*   AHC1KZH 74.9* 57.0*   PO2ART 63.9* 444.0*       Cultures:  Pending    PFTs:  None      ECHO: 2019  Normal left ventricular wall thickness, size and systolic function.   Ejection fraction is visually estimated to be 60-65%.   The right ventricle is normal in size and function.   The left atrium is moderately dilated.   Moderate mitral regurgitation.   Mild aortic stenosis with a peak velocity of 314m/s and a mean pressure   gradient of 19mmHg.   Trivial aortic regurgitation. AB.13/75/64  7.26/57/444    Chest X-ray:  Chest imaging was reviewed by me and showed mild vascular congestion    Chest CT: 2019  Chest imaging was reviewed by me and showed mild nodular opacities with inflammatory changes     I reviewed all the above labs and studies pertaining to this visit. ASSESSMENT:  · Acute Hypoxic/Hypercapnic Respiratory Failure  · Mild pulmonary Edema, may be precipitated by BP  · Hypertensive urgency   · CKD  · Hyperglycemia    PLAN:  · BIPAP continuously.   Decrease FIO2 to 40%  · May be able to come off BIPAP in an hour or so  · Diuresis as is  · BP control  · Check procalcitonin level  · Home medications  · DVT prophylaxis with Lovenox  · Glycemic control    Thanks    DO [facility]

## 2019-06-01 NOTE — PLAN OF CARE
4 Eyes Skin Assessment     The patient is being assess for  Shift Handoff    I agree that 2 RN's have performed a thorough Head to Toe Skin Assessment on the patient. ALL assessment sites listed below have been assessed. Areas assessed by both nurses: zack rn/selina rn  [x]   Head, Face, and Ears   [x]   Shoulders, Back, and Chest  [x]   Arms, Elbows, and Hands   [x]   Coccyx, Sacrum, and IschIum  [x]   Legs, Feet, and Heels        Does the Patient have Skin Breakdown?   Yes LDA WOUND CARE was Initiated documentation include the Carol-wound, Wound Assessment, Measurements, Dressing Treatment, Drainage, and Color\",         Jasbir Prevention initiated:  Yes   Wound Care Orders initiated:  NA      WOC nurse consulted for Pressure Injury (Stage 3,4, Unstageable, DTI, NWPT, and Complex wounds), New and Established Ostomies:  No      Nurse 1 eSignature: Electronically signed by Elis Taylor RN on 6/1/19 at 7:21 PM    **SHARE this note so that the co-signing nurse is able to place an eSignature**    Nurse 2 eSignature: Electronically signed by Avis Ramirez RN on 6/1/19 at 7:22 PM

## 2019-06-02 NOTE — PROGRESS NOTES
4 Eyes Skin Assessment     The patient is being assess for  Shift Handoff    I agree that 2 RN's have performed a thorough Head to Toe Skin Assessment on the patient. ALL assessment sites listed below have been assessed. Areas assessed by both nurses:   [x]   Head, Face, and Ears   [x]   Shoulders, Back, and Chest  [x]   Arms, Elbows, and Hands   [x]   Coccyx, Sacrum, and IschIum  [x]   Legs, Feet, and Heels        Does the Patient have Skin Breakdown?   Yes LDA WOUND CARE was Initiated documentation include the Carol-wound, Wound Assessment, Measurements, Dressing Treatment, Drainage, and Color\",         Jasbir Prevention initiated:  Yes   Wound Care Orders initiated:  NA      [facility] nurse consulted for Pressure Injury (Stage 3,4, Unstageable, DTI, NWPT, and Complex wounds), New and Established Ostomies:  No      Nurse 1 eSignature: Electronically signed by Kirstie Camilo RN on 6/2/19 at 7:12 AM    **SHARE this note so that the co-signing nurse is able to place an eSignature**    Nurse 2 eSignature: Electronically signed by Osman Cross RN on 6/2/19 at 7:58 AM

## 2019-06-02 NOTE — PROGRESS NOTES
Pulmonary Progress Note    CC:  Follow up respiratory failure    Subjective: Wore bipap up until early afternoon yesterday  Currently on 2 liters of oxygen  Moderate diuresis  Afebrile  Less SOB  Vaginal itching        Intake/Output Summary (Last 24 hours) at 6/2/2019 0718  Last data filed at 6/2/2019 0600  Gross per 24 hour   Intake 460 ml   Output 1500 ml   Net -1040 ml         PHYSICAL EXAM:  Blood pressure (!) 141/63, pulse 84, temperature 98.4 °F (36.9 °C), temperature source Oral, resp. rate 27, height 4' 10\" (1.473 m), weight 177 lb 0.5 oz (80.3 kg), SpO2 99 %, not currently breastfeeding.'  Gen: No distress. Eyes: PERRL. No sclera icterus. No conjunctival injection. ENT: No discharge. Pharynx clear. External appearance of ears and nose normal.  Neck: Trachea midline. No obvious mass. Resp: Diminished mainly  CV: Regular rate. Regular rhythm. No murmur or rub. GI: Non-tender. Non-distended. No hernia. Skin: Warm, dry, normal texture and turgor. No nodule on exposed extremities. Lymph: No cervical LAD. No supraclavicular LAD. M/S: No cyanosis. No clubbing. No joint deformity. Neuro: Moves all four extremities. CN 2-12 tested, no defect noted.   Ext:   no edema    Medications:    Scheduled Meds:   aspirin  81 mg Oral Daily    carvedilol  12.5 mg Oral BID WC    DULoxetine  60 mg Oral Nightly    ferrous sulfate  325 mg Oral BID    hydrALAZINE  50 mg Oral TID    insulin glargine  10 Units Subcutaneous Nightly    levETIRAcetam  1,000 mg Oral BID    pantoprazole  40 mg Oral QAM AC    polyethylene glycol  17 g Oral Daily    rOPINIRole  0.5 mg Oral Daily    rOPINIRole  3 mg Oral Nightly    sodium bicarbonate  650 mg Oral BID    valproic acid  500 mg Oral BID    sodium chloride flush  10 mL Intravenous 2 times per day    enoxaparin  30 mg Subcutaneous Daily    furosemide  40 mg Intravenous BID    miconazole   Topical BID    insulin lispro  0-12 Units Subcutaneous TID WC    insulin

## 2019-06-02 NOTE — PROGRESS NOTES
Progress Note  Admit Date: 2019      PCP: Annel Sawyer II     CC: F/U for resp distress    SUBJECTIVE / Interval History:  Feels better, SOB improved   has cough   Neg 1 L  Wt  194 > 177      Allergies  Benadryl [diphenhydramine]    Medications    Scheduled Meds:   aspirin  81 mg Oral Daily    carvedilol  12.5 mg Oral BID WC    DULoxetine  60 mg Oral Nightly    ferrous sulfate  325 mg Oral BID    hydrALAZINE  50 mg Oral TID    insulin glargine  10 Units Subcutaneous Nightly    levETIRAcetam  1,000 mg Oral BID    pantoprazole  40 mg Oral QAM AC    polyethylene glycol  17 g Oral Daily    rOPINIRole  0.5 mg Oral Daily    rOPINIRole  3 mg Oral Nightly    sodium bicarbonate  650 mg Oral BID    valproic acid  500 mg Oral BID    sodium chloride flush  10 mL Intravenous 2 times per day    enoxaparin  30 mg Subcutaneous Daily    furosemide  40 mg Intravenous BID    miconazole   Topical BID    insulin lispro  0-12 Units Subcutaneous TID WC    insulin lispro  0-6 Units Subcutaneous Nightly     Continuous Infusions:   dextrose         PRN Meds:  acetaminophen, benzonatate, ipratropium-albuterol, sodium chloride flush, ondansetron, glucose, dextrose, glucagon (rDNA), dextrose    Vitals    TEMPERATURE:  Current - Temp: 98.4 °F (36.9 °C); Max - Temp  Av.4 °F (36.9 °C)  Min: 98.1 °F (36.7 °C)  Max: 98.6 °F (37 °C)  RESPIRATIONS RANGE: Resp  Av.8  Min: 18  Max: 31  PULSE RANGE: Pulse  Av.8  Min: 74  Max: 135  BLOOD PRESSURE RANGE:  Systolic (99GEX), TPK:935 , Min:103 , JOM:069   ; Diastolic (30PWI), PJA:99, Min:51, Max:209    PULSE OXIMETRY RANGE: SpO2  Av.9 %  Min: 98 %  Max: 100 %  24HR INTAKE/OUTPUT:      Intake/Output Summary (Last 24 hours) at 2019 0707  Last data filed at 2019 0600  Gross per 24 hour   Intake 460 ml   Output 1500 ml   Net -1040 ml       Exam:    Gen: No distress. Eyes: PERRL. No sclera icterus. No conjunctival injection. ENT: No discharge.  Pharynx clear. External appearance of ears and nose normal.  Neck: Trachea midline. No obvious mass. Resp: No accessory muscle use. No crackles. No wheezes. No rhonchi. No dullness on percussion. CV: Regular rate. Regular rhythm. No murmur or rub. trace edema. GI: Non-tender. Non-distended. No hernia. Skin: Warm, dry, normal texture and turgor. No nodule on exposed extremities. Lymph: No cervical LAD. No supraclavicular LAD. M/S: No cyanosis. No clubbing. No joint deformity. Neuro: Moves all four extremities. CN 2-12 tested, no defect noted. Psych: Oriented x 3. No anxiety. Awake. Alert. .    Data    LABS  CBC:   Recent Labs     06/01/19  0711 06/02/19  0405   WBC 18.5* 5.3   HGB 11.2* 8.4*   HCT 35.0* 25.4*   MCV 95.7 94.1    142     BMP:   Recent Labs     06/01/19  0711 06/02/19  0405    142   K 5.8* 4.7    104   CO2 22 25   BUN 27* 30*   CREATININE 1.3* 1.6*     POC GLUCOSE:    Recent Labs     06/01/19  1159 06/01/19  1647 06/01/19  1949   POCGLU 94 86 98     LIVER PROFILE:   Recent Labs     06/01/19  0711   AST 17   ALT 11   LABALBU 3.5   BILITOT 0.3   ALKPHOS 114     PT/INR: No results for input(s): PROTIME, INR in the last 72 hours. APTT: No results for input(s): APTT in the last 72 hours. UA:  Recent Labs     06/01/19  0733   COLORU YELLOW   PHUR 6.0   WBCUA 49*   RBCUA 5-10*   BACTERIA 1+*   CLARITYU TURBID*   SPECGRAV 1.015   LEUKOCYTESUR SMALL*   UROBILINOGEN 0.2   BILIRUBINUR Negative   BLOODU SMALL*   GLUCOSEU Negative   KETUA Negative     Microbiology:  Wound Culture: No results for input(s): WNDABS, ORG in the last 72 hours. Invalid input(s):  LABGRAM  Nasal Culture: No results for input(s): ORG, MRSAPCR in the last 72 hours. Blood Culture: No results for input(s): BC, BLOODCULT2 in the last 72 hours. Fungal Culture:   No results for input(s): FUNGSM in the last 72 hours. No results for input(s): FUNCXBLD in the last 72 hours.   CSF Culture:  No results for input(s): COLORCSF, APPEARCSF, CFTUBE, CLOTCSF, WBCCSF, RBCCSF, NEUTCSF, NUMCELLSCSF, LYMPHSCSF, MONOCSF, GLUCCSF, VOLCSF in the last 72 hours. Respiratory Culture:  No results for input(s): Lay Collar in the last 72 hours. AFB:No results for input(s): AFBSMEAR in the last 72 hours. Urine Culture  No results for input(s): LABURIN in the last 72 hours. RADIOLOGY:    XR CHEST PORTABLE   Final Result   Mildly increased perihilar opacities may represent vascular congestion or a   developing viral infection. CONSULTS:    IP CONSULT TO HOSPITALIST  IP CONSULT TO PULMONOLOGY  IP CONSULT TO PALLIATIVE CARE    ASSESSMENT AND PLAN:      Active Problems:    CKD (chronic kidney disease), stage III ([facility]    Acute on chronic respiratory failure with hypercapnia ([facility] [facility]  Resolved Problems:    * No resolved hospital problems. *    Acute hypoxic and hypercapnic respiratory failure- improving   -Most likely secondary to fluid overload/diastolic CHF exacerbation  -Patient is on BiPAP, repeat ABG shows improvement, weaned off BIAPA       Acute on chronic diastolic CHF exacerbation  -Most likely secondary to uncontrolled hypertension  -Patient's weight on discharge was 175 pounds and on admission is 181 pounds  -Continue IV Lasix> po tomorrow   -monitor I/o and weight  - pro arun ok      CKD 3  - baseline creat 1.6 - 2  - monitor with diuresis      Uncontrolled HTN- now controlled  - resume home meds  - will optimize based on BP reading     MRSA colonization  -repeat swab     DM2  -ct home meds  -ssi     H/o seizure  - ct home meds     Goals of care. Patient stated to the ER doctor that she did not want to be intubated but when I asked her about resuscitation and intubation patient said she wanted everything done. We'll get palliative care involved             DVT Prophylaxis: lovenox  Diet: DIET CARB CONTROL; Low Sodium (2 GM);  Daily Fluid Restriction: 1500 ml  Code Status: Full Code    PT/OT Eval Status:ordered Discharge plan - transfer out of ICU     The patient and / or the family were informed of the results of any tests, a time was given to answer questions, a plan was proposed and they agreed with plan. Discussed with consulting physicians, nursing and social work     The note was completed using EMR. Every effort was made to ensure accuracy; however, inadvertent computerized transcription errors may be present.        Robbie Hamilton MD

## 2019-06-02 NOTE — PROGRESS NOTES
Physical Therapy    Facility/Department: [facility] 9Y ICU  Initial Assessment    NAME: Valdez Watkins  : 1950  MRN: [id number]    Date of Service: 2019    Discharge Recommendations:  Continue to assess pending progress   PT Equipment Recommendations  Other: Defer to next level of care. Valdez Watkins scored a 14/24 on the AM-PAC short mobility form. Current research shows that an AM-PAC score of 17 or less is typically not associated with a discharge to the patient's home setting. Based on the patients AM-PAC score and their current functional mobility deficits, it is recommended that the patient have 3-5 sessions per week of Physical Therapy at d/c to increase the patients independence. Assessment   Body structures, Functions, Activity limitations: Decreased functional mobility ; Decreased strength;Decreased safe awareness;Decreased endurance  Assessment: 77 y/o female admit 19 from SNF setting with Acute Respiratory Failure, Mild Pulm Edema, Hypertensive Urgency and CKD. Recent Hospital admit -19 with d/c to [facility] (there ~ past 6 mos). PMH as noted including CKD, Cellulitis B LEs, Status Epilepticus (2018). Pt bertin OOB short distance with Walker Min/Mod assist.  Anticipate return to SNF setting with cont PT Services. Prognosis: Fair;Good  Decision Making: Medium Complexity  History: 77 y/o female admit 19 from SNF setting with Acute Respiratory Failure, Mild Pulm Edema, Hypertensive Urgency and CKD. Recent Hospital admit -19 with d/c to [facility] (there ~ past 6 mos). PMH as noted including CKD, Cellulitis B LEs, Status Epilepticus (2018). Exam: See above. Clinical Presentation: See above. Patient Education: Role of PT, POC, Need to call for assist.   Barriers to Learning: Safety awareness.   REQUIRES PT FOLLOW UP: Yes  Activity Tolerance  Activity Tolerance: Patient limited by fatigue;Patient limited by endurance       Patient Diagnosis(es): The primary encounter diagnosis was Acute on chronic respiratory failure with hypercapnia ([facility] [address] A diagnosis of Acute on chronic congestive heart failure, unspecified heart failure type [facility]) was also pertinent to this visit. has a past medical history of Acid reflux, Arthritis, Balance problem, Blood circulation, collateral, Cellulitis of both lower extremities, CHF (congestive heart failure) ([address] Chronic kidney disease, Chronic venous hypertension (idiopathic) with ulcer and inflammation of bilateral lower extremity ([address] Community acquired bacterial pneumonia, Depression, Diabetes mellitus ([address] GERD (gastroesophageal reflux disease), Hyperlipidemia, Hypertension, Movement disorder, MRSA colonization, Murmur, Neuromuscular disorder ([address] Restless leg syndrome, Status epilepticus ([address] Ulcer of right lower leg ([address] Urinary frequency, and Urinary incontinence. has a past surgical history that includes Appendectomy; Hysterectomy; Cholecystectomy; bladder suspension; Colonoscopy; Cystoscopy; and eye surgery. Restrictions  Restrictions/Precautions  Restrictions/Precautions: Fall Risk  Position Activity Restriction  Other position/activity restrictions: O2 2L via NC. Fluid Restriction 1500 ml/day. Contact Precautions +MRSA Nares. Vision/Hearing  Vision: Within Functional Limits  Hearing: Within functional limits     Subjective  General  Chart Reviewed: Yes  Patient assessed for rehabilitation services?: Yes  Additional Pertinent Hx: 79 y/o female admit 6/1/19 from SNF setting with Acute Respiratory Failure, Mild Pulm Edema, Hypertensive Urgency and CKD. Recent Hospital admit 5/20-5/27/19 with d/c to [facility] (there ~ past 6 mos). PMH as noted including CKD, Cellulitis B LEs, Status Epilepticus (11/2018). Family / Caregiver Present: No  Referring Practitioner: Dr. Kisha Rivera.     Diagnosis:  Acute Respiratory Failure, Mild assistance  Rolling to Right: Minimal assistance  Supine to Sit: Moderate assistance(HOB elevated. Use of Bedrail. )  Transfers  Sit to Stand: Minimal Assistance(With Walker from EOB, ongoing cues for safe hand placement. )  Stand to sit: Minimal Assistance(With Walker, ongoing cues for safe hand placement. )  Ambulation  Ambulation?: Yes  Ambulation 1  Surface: level tile  Device: Rolling Walker  Distance: 4-5 steps bed to chair with [employer] assist.  Flexed posture, short/shuffling steps with limited endurance. Cues for safe transition returning to chair. Comments: Sign difficulty scooting back into chair due to weak/short stature and limited ability wgt bear LEs to assist.      Balance  Sitting - Static: Good  Sitting - Dynamic: Good; -(Difficulty scooting in chair. )  Standing - Static: Good;-  Standing - Dynamic: Fair;+        Plan   Plan  Times per week: 3-5x week while in acute care setting. Current Treatment Recommendations: Strengthening, Functional Mobility Training, Transfer Training, Gait Training, Safety Education & Training, Patient/Caregiver Education & Training  Safety Devices  Type of devices: Call light within reach, Chair alarm in place, Left in chair, Nurse notified    G-Code       OutComes Score                                                  AM-PAC Score  AM-PAC Inpatient Mobility Raw Score : 14  AM-PAC Inpatient T-Scale Score : 38.1  Mobility Inpatient CMS 0-100% Score: 61.29  Mobility Inpatient CMS G-Code Modifier : CL          Goals  Short term goals  Time Frame for Short term goals: Upon d/c acute care setting. Short term goal 1: Bed Mob CGA. Short term goal 2: Transfers with assist device CGA. Short term goal 3: Amb with assist device 25' CGA/Min assist.  Patient Goals   Patient goals : Get better, take care of self and walk again.         Therapy Time   Individual Concurrent Group Co-treatment   Time In 0740         Time Out 0820         Minutes 8 [facility] Pravin Pineda

## 2019-06-03 NOTE — PROGRESS NOTES
Patient alert and oriented, withdrawn, VSS, laying in bed. Pt denies n/v, diarrhea, SOB at this time. States chronic right knee pain, prn given. Pt states last BM was yesterday and was loose. States no further needs at this time. Bed in lowest position, bed alarm on, call light within reach. Will continue to monitor pt needs.

## 2019-06-03 NOTE — PROGRESS NOTES
Physical Therapy    Facility/Department: [facility] 4W MED SURG  Daily Note  (cotx)  If pt. is D/C'd prior to next visit please refer back to last daily progress note for D/C status. NAME: Gi Chowdhury  : 1950  MRN: [id number]    Date of Service: 6/3/2019    Discharge Recommendations:  Patient would benefit from continued therapy after discharge, 3-5 sessions per week   Gi Chowdhury scored a 13/24 on the AM-PAC short mobility form. Current research shows that an AM-PAC score of 17 or less is typically not associated with a discharge to the patient's home setting. Based on the patients AM-PAC score and their current functional mobility deficits, it is recommended that the patient have 3-5 sessions per week of Physical Therapy at d/c to increase the patients independence. PT Equipment Recommendations  Other: Defer to next level of care. Assessment   Body structures, Functions, Activity limitations: Decreased functional mobility ; Decreased strength;Decreased safe awareness;Decreased endurance  Assessment: 79 y/o female admit 19 from SNF setting with Acute Respiratory Failure, Mild Pulm Edema, Hypertensive Urgency and CKD. Recent Hospital admit -19 with d/c to [facility] (there ~ past 6 mos). PMH as noted including CKD, Cellulitis B LEs, Status Epilepticus (2018). Today, the pt required mod A for bed mobility, mod A for transfer and for taking a couple of steps with the walker to the chair. The pt limited by pain today. Anticipate return to SNF setting with cont PT Services. Treatment Diagnosis: weakness, decreased B LE ROM at the knees and impaired mobility/balance  Prognosis: Fair;Good  Patient Education: Role of PT, POC, Need to call for assist.   Barriers to Learning: cog, decreased insight and safety awareness  REQUIRES PT FOLLOW UP: Yes  Activity Tolerance  Activity Tolerance: Patient limited by fatigue;Patient limited by pain; Patient limited by endurance       Patient Diagnosis(es): The primary encounter diagnosis was Acute on chronic respiratory failure with hypercapnia ([address] A diagnosis of Acute on chronic congestive heart failure, unspecified heart failure type [facility]) was also pertinent to this visit. has a past medical history of Acid reflux, Arthritis, Balance problem, Blood circulation, collateral, Cellulitis of both lower extremities, CHF (congestive heart failure) ([address] Chronic kidney disease, Chronic venous hypertension (idiopathic) with ulcer and inflammation of bilateral lower extremity ([address] Community acquired bacterial pneumonia, Depression, Diabetes mellitus ([address] ESBL (extended spectrum beta-lactamase) producing bacteria infection, GERD (gastroesophageal reflux disease), Hyperlipidemia, Hypertension, Movement disorder, MRSA colonization, Murmur, Neuromuscular disorder ([address] Restless leg syndrome, Status epilepticus ([address] Ulcer of right lower leg ([address] Urinary frequency, and Urinary incontinence. has a past surgical history that includes Appendectomy; Hysterectomy; Cholecystectomy; bladder suspension; Colonoscopy; Cystoscopy; and eye surgery. Restrictions  Restrictions/Precautions  Restrictions/Precautions: Fall Risk, Contact Precautions  Position Activity Restriction  Other position/activity restrictions: Fluid Restriction 1500 ml/day. Contact Precautions +MRSA Nares and ESBL urine  Vision/Hearing  Vision: Impaired  Vision Exceptions: Wears glasses at all times  Hearing: Exceptions to [facility]  Hearing Exceptions: Hard of hearing/hearing concerns;Right hearing aid     Subjective  General  Chart Reviewed: Yes  Patient assessed for rehabilitation services?: Yes  Additional Pertinent Hx: 77 y/o female admit 6/1/19 from SNF setting with Acute Respiratory Failure, Mild Pulm Edema, Hypertensive Urgency and CKD. Recent Hospital admit 5/20-5/27/19 with d/c to [facility] (there ~ past 6 mos).   PMH as noted including CKD, Cellulitis B LEs, Status Epilepticus (11/2018). Response To Previous Treatment: Patient unable to report, no changes reported from family or staff  Family / Caregiver Present: No  Referring Practitioner: Dr. Aaron Dai. Referral Date : 06/01/19  Diagnosis:  Acute Respiratory Failure, Mild Pulm Edema, Hypertensive Urgency and CKD. Follows Commands: Within Functional Limits  Subjective  Subjective: the pt reports 10/10 pain R knee, reports this is chronic and at end of session she was c/o of severe pain at the catheter but reported it as a \"burning pain\"  Pain Screening  Patient Currently in Pain: Yes          Orientation  Orientation  Overall Orientation Status: Impaired  Orientation Level: Oriented to person;Oriented to time;Oriented to place; Disoriented to situation  Social/Functional History  Social/Functional History  Type of Home: Facility([facility] since last October)  Home Layout: One level  Home Access: Level entry  Gregory Shower/Tub: Walk-in shower  Bathroom Toilet: Handicap height  Bathroom Equipment: Grab bars in shower, Shower chair, Grab bars around toilet  Bathroom Accessibility: [facility]: Lift chair, Rolling walker  Receives Help From: (the pt reports getting PT and OT 5x/week)  ADL Assistance: Needs assistance(Assist bathing/dressing/toileting)  Homemaking Assistance: (goes to dining room for meals; laundry and cleaning provided; meds are provided)  Ambulation Assistance: Needs assistance(With Duane Clos. )  Transfer Assistance: Independent(sleeps in adjustable bed w/o rail)  Active : No  Patient's  Info: Medical transportation to MD  Additional Comments: the pt reports no recent falls  Cognition   Cognition  Overall Cognitive Status: Exceptions  Arousal/Alertness: Delayed responses to stimuli  Following Commands:  Follows one step commands with increased time  Attention Span: Attends with cues to redirect  Memory: Decreased recall of recent events  Safety Judgement: Decreased awareness of need for safety;Decreased awareness of need for assistance  Problem Solving: Assistance required to generate solutions;Decreased awareness of errors  Insights: Decreased awareness of deficits  Initiation: Requires cues for some  Sequencing: Requires cues for some    Objective  Bed mobility  Rolling to Left: Moderate assistance(with the rail)  Rolling to Right: Moderate assistance(with the rail)  Supine to Sit: Moderate assistance(HOB elevated and thept reached for the rail)  Sit to Supine: Unable to assess(the pt up to the chair at end of session)  Transfers  Sit to Stand: Moderate Assistance(the pt tends to pull up on the walker )  Stand to sit: Minimal Assistance(cues to reach hands back to the chair)  Ambulation  Ambulation?: Yes  Ambulation 1  Surface: level tile  Device: Rolling Walker  Assistance: Moderate assistance  Quality of Gait: slow paced, flexed posture, decreased B step length and clearance, significant assist for walker management  Gait Deviations: Slow Dottie;Decreased step length;Decreased step height  Distance: 4-5 small steps from bed > chair  Stairs/Curb  Stairs?: No     Balance  Posture: Fair  Sitting - Static: Good;-  Standing - Static: Fair;-(with walker)  Comments: mod A with the walker for a few steps from bed > chair; posterior lean        Plan   Plan  Times per week: 3-5x week while in acute care setting.    Current Treatment Recommendations: Strengthening, Functional Mobility Training, Transfer Training, Gait Training, Safety Education & Training, Patient/Caregiver Education & Training  Safety Devices  Type of devices: Call light within reach, Chair alarm in place, Patient at risk for falls, Gait belt, Left in chair, Nurse notified(STEFANIE Vega notified)      AM-PAC Score  AM-PAC Inpatient Mobility Raw Score : 13  AM-PAC Inpatient T-Scale Score : 36.74  Mobility Inpatient CMS 0-100% Score: 64.91  Mobility Inpatient CMS G-Code Modifier : CL Goals  Short term goals  Time Frame for Short term goals: Upon d/c acute care setting.  (goals are ongoing)  Short term goal 1: Bed Mob CGA. Short term goal 2: Transfers with assist device CGA. Short term goal 3: Amb with assist device 25' CGA/Min assist.  Patient Goals   Patient goals : Get better, take care of self and walk again.         Therapy Time   Individual Concurrent Group Co-treatment   Time In 0925         Time Out 1005         Minutes 40               Electronically signed by Rishi Lowe, PT [id number] on 6/3/2019 at 10:11 AM

## 2019-06-03 NOTE — PROGRESS NOTES
Occupational Therapy   Occupational Therapy Initial Assessment  Date: 6/3/2019   Patient Name: Hazle Dubin  MRN: [id number]     : 1950    Date of Service: 6/3/2019    Discharge Recommendations:  Patient would benefit from continued therapy after discharge, 3-5 sessions per week  OT Equipment Recommendations  Other: TBD by  next LOC     Hazle Dubin scored a 12/24 on the AM-PAC ADL Inpatient form. Current research shows that an AM-PAC score of 17 or less is typically not associated with a discharge to the patient's home setting. Based on the patients AM-PAC score and their current ADL deficits, it is recommended that the patient have 3-5 sessions per week of Occupational Therapy at d/c to increase the patients independence. Assessment   Performance deficits / Impairments: Decreased functional mobility ; Decreased ADL status; Decreased strength;Decreased safe awareness;Decreased cognition;Decreased endurance;Decreased balance  Assessment: Pt presents with impaired self-care d/t decreased balance/fxl mobility, endurance, strength, and cognition. This date, pt required mod A for bed mobility, mod A for fxl mobility/transfers with walker limited to short distances (bed-chair), total A toileting, and anticipate pt would require overall max A for ADLs. Pt with delayed processing/responses and decreased memory, needed verbal cues for safety with fxl mobility. Will cont to see on acute to address the above limitations and maximize pt's independence. Pt's AM-PAC score indicates need for ongoing skilled OT at d/c.    Prognosis: Good  Decision Making: Medium Complexity  History: see above  Exam: decreased ADL status, endurance, balance/fxl mobility, strength/ROM B UE's, cognition  Assistance / Modification: anticipate overall max A for ADLs  Patient Education: Pt educated on the role of OT, POC, safety/transfers, orientation information   Barriers to Learning: cognition, hearing  REQUIRES OT FOLLOW UP: [facility] Hospital Medicine  Progress Note    Patient Name: Vaughn Retana  MRN: [id number]  Patient Class: IP- Inpatient   Admission Date: 3/15/2018  Length of Stay: 0 days  Attending Physician: LESTER Marks MD  Primary Care Provider: Yvette Zelaya NP    [facility] Medicine Team: [facility] HOSP MED E Rancho Carvalho PA-C    Subjective:     Principal Problem:Gastrointestinal hemorrhage    HPI:  Patient is a 52 year old gentleman with a h/o DM II, HTN, HLD, ESRD on HD, and esophagitis.  He presents with nausea and dark-colored emesis.  He also notes epigastric pain.  Patient was admitted on 3/7/3018 with similar symptoms and was diagnosed with esophagitis and started on a PPI.  He denies chest pain, SOB, dizziness, palpitations, fever/chills, diarrhea.    Hospital Course:  Vaughn Retana was placed in observation for evaluation for GI bleed. Gastroenterology consulted and patient scheduled for EGD today. NPO.    No new subjective & objective note has been filed under this hospital service since the last note was generated.    Assessment/Plan:      * Gastrointestinal hemorrhage    Esophagitis determined by endoscopy  Anemia in chronic kidney disease    EGD today per gastroenterology. NPO.  - Guaiac positive stool in ER  - Recent admission for similar symptoms with EGD on 3/8/2018 showed LA Grade B reflux esophagitis, 3cm sliding hiatal hernia, and a single gastric polyp.  - H/H stable at 10.9/32.2.  - Protonix 40mg IV daily.  - Supportive care.        Renovascular hypertension    - Continue lisinopril 40mg dialy, Coreg 25mg BID, and amlodipine 10mg daily.          ESRD on hemodialysis    Secondary hyperparathyroidism of renal origin  Chronic kidney disease-mineral and bone disorder    - Nephrology following--- HD today   - Patient on MWF schedule.  Last HD session 3/14 with no complications.  - Continue cinacalcet 60mg daily.        Dyslipidemia    - Continue Lipitor 80mg daily.             VTE Risk Mitigation         Ordered     IP VTE HIGH RISK PATIENT  Once      03/15/18 2321     Place sequential compression device  Until discontinued      03/15/18 2321              Rancho Carvalho PA-C  Department of Hospital Medicine   [facility]   Yes  Activity Tolerance  Activity Tolerance: Patient limited by fatigue;Patient limited by pain  Safety Devices  Safety Devices in place: Yes  Type of devices: Chair alarm in place;Call light within reach; Left in chair;Nurse notified;Gait belt;Patient at risk for falls           Patient Diagnosis(es): The primary encounter diagnosis was Acute on chronic respiratory failure with hypercapnia ([facility] [address] A diagnosis of Acute on chronic congestive heart failure, unspecified heart failure type [facility]) was also pertinent to this visit. has a past medical history of Acid reflux, Arthritis, Balance problem, Blood circulation, collateral, Cellulitis of both lower extremities, CHF (congestive heart failure) ([address] Chronic kidney disease, Chronic venous hypertension (idiopathic) with ulcer and inflammation of bilateral lower extremity ([address] Community acquired bacterial pneumonia, Depression, Diabetes mellitus ([address] ESBL (extended spectrum beta-lactamase) producing bacteria infection, GERD (gastroesophageal reflux disease), Hyperlipidemia, Hypertension, Movement disorder, MRSA colonization, Murmur, Neuromuscular disorder ([address] Restless leg syndrome, Status epilepticus ([address] Ulcer of right lower leg ([address] Urinary frequency, and Urinary incontinence. has a past surgical history that includes Appendectomy; Hysterectomy; Cholecystectomy; bladder suspension; Colonoscopy; Cystoscopy; and eye surgery. Restrictions  Restrictions/Precautions  Restrictions/Precautions: Fall Risk, Contact Precautions  Position Activity Restriction  Other position/activity restrictions: Fluid Restriction 1500 ml/day.   Contact Precautions +MRSA Nares and ESBL urine    Subjective   General  Chart Reviewed: Yes  Patient assessed for rehabilitation services?: Yes  Additional Pertinent Hx: Per H&P: \"The patient is a 78 y.o. female with a past medical history of diabetes, GERD, hypertension, chronic radicular in her disease, diastolic CHF and venous insufficiency who presents to [facility] with respiratory distress. Patient was discharged 4 days ago to the nursing home and at that admission was admitted for possible pneumonia. Patient states since today morning she felt very short of breath was found to be hypoxic in the nursing home and was placed on the BiPAP and sent to the ER. Patient has gained 6 pounds of fluid in the last 4 days. In the ER she was found to be in respiratory distress with hypercapnic respiratory failure and was placed on the BiPAP. \"   Family / Caregiver Present: No  Referring Practitioner: Isi Rodriguez MD  Diagnosis: Acute hypoxic and hypercapnic respiratory failure, Acute on chronic diastolic CHF exacerbation  Subjective  Subjective: Pt met b/s for OT eval/cotx with PT. Pt supine in bed on arrival agreeable to participate in therapy. Pt c/o significant pain at catheter site, did not rate. Pt with delayed processing/responses.      Social/Functional History  Social/Functional History  Type of Home: Facility([facility] since [facility] October)  Home Layout: One level  Home Access: Level entry  Bathroom Shower/Tub: Walk-in shower  Bathroom Toilet: Handicap height  Bathroom Equipment: Grab bars in shower, Shower chair, Grab bars around toilet  Bathroom Accessibility: [facility]: Lift chair, Rolling walker  Receives Help From: (the pt reports getting PT and OT 5x/week)  ADL Assistance: Needs assistance(Assist bathing/dressing/toileting)  [address]: (goes to dining room for meals; laundry and cleaning provided; meds are provided)  Ambulation Assistance: Needs assistance(With Valere Client. )  Transfer Assistance: Independent(sleeps in adjustable bed w/o rail)  Active : No  Patient's  Info: Medical transportation to MD  Additional Comments: the pt reports no recent falls       Objective   Vision: Impaired  Vision Exceptions: Wears glasses at all times  Hearing: Exceptions to [address] Exceptions: Hard of hearing/hearing concerns;Right hearing aid      Orientation  Overall Orientation Status: Impaired  Orientation Level: Disoriented to situation;Oriented to person;Oriented to time;Oriented to place(looked to board for time)     Balance  Sitting Balance: Stand by assistance  Standing Balance: Moderate assistance  Functional Mobility  Functional - Mobility Device: Rolling Walker  Assist Level: Moderate assistance  Functional Mobility Comments: Pt completed fxl mobility ~3-4 steps bed-chair with RW and Mod A. ADL  Grooming: Stand by assistance(SBA to wash face in recliner with prepared clothing, anticipate min A for complete grooming)  LE Dressing: Dependent/Total(to don depends rolling in bed)  Toileting: Dependent/Total(catheter, Pt found to be soiled with loose BM at on arrival, then had another episode of incontinent during pericare/changing of linen. Total A x2)  Additional Comments: Anticipate pt is min A feeding, max A UB dressing/bathing, total A LB bathing based on ROM/strength, balance, endurance, cognition. Coordination  Movements Are Fluid And Coordinated: No  Coordination and Movement description: Fine motor impairments;Right UE;Left UE;Decreased speed     Bed mobility  Rolling to Left: Moderate assistance(with the rail)  Rolling to Right: Moderate assistance(with the rail)  Supine to Sit: Moderate assistance(HOB elevated and thept reached for the rail)  Sit to Supine: Unable to assess(the pt up to the chair at end of session)     Transfers  Stand Pivot Transfers: Moderate assistance(with walker)  Sit to stand: Moderate assistance  Stand to sit: Minimal assistance  Transfer Comments: verbal cues for hand placement with fxl transfers      Cognition  Overall Cognitive Status: Exceptions  Arousal/Alertness: Delayed responses to stimuli  Following Commands:  Follows one step commands with increased time  Attention Span: Attends with cues to redirect  Memory: Decreased recall of recent events;Decreased short term memory  Safety Judgement: Decreased awareness of need for safety;Decreased awareness of need for assistance  Problem Solving: Assistance required to generate solutions;Decreased awareness of errors;Assistance required to identify errors made  Insights: Decreased awareness of deficits  Initiation: Requires cues for some  Sequencing: Requires cues for some     LUE AROM (degrees)  LUE AROM : Exceptions  LUE General AROM: AROM shoulder flexion limited to ~80*, grossly WFL distally. RUE AROM (degrees)  RUE AROM : Exceptions  RUE General AROM: AROM shoulder flexion limited to ~80*, grossly WFL distally.      LUE Strength  Gross LUE Strength: Exceptions to [facility]  L Shoulder Flex: 3-/5  L Elbow Flex: 3+/5  L Hand Grasp: 3+/5  LUE Strength Comment: pt had difficulty following commands for MMT   RUE Strength  Gross RUE Strength: Exceptions to [facility]  R Shoulder Flex: 3-/5  R Elbow Flex: 3+/5  R Hand Grasp: 3+/5  RUE Strength Comment: pt had difficulty following commands for MMT                    Plan   Plan  Times per week: 3-5  Times per day: Daily  Current Treatment Recommendations: Balance Training, Functional Mobility Training, Endurance Training, Strengthening, Safety Education & Training, Self-Care / ADL, Cognitive/Perceptual Training, Cognitive Reorientation         OutComes Score    How much help for putting on and taking off regular lower body clothing?: Total  How much help for Bathing?: A Lot  How much help for Toileting?: Total  How much help for putting on and taking off regular upper body clothing?: A Lot  How much help for taking care of personal grooming?: A Little  How much help for eating meals?: A Little  AM-PAC Inpatient Daily Activity Raw Score: 12  AM-PAC Inpatient ADL T-Scale Score : 30.6  ADL Inpatient CMS 0-100% Score: 66.57  ADL Inpatient CMS G-Code Modifier : CL                                               AM-PAC Score        AM-PAC Inpatient Daily Activity Raw

## 2019-06-03 NOTE — PROGRESS NOTES
Patient resting well. Physical assessment completed. See flow sheets. Patient medicated for pain earlier with good relief. Plan of care discussed and all questions answered. Patient educated on importance of eating. BS have been running low and will need close monitoring. patient denies needs at this time. Will continue to monitor.

## 2019-06-03 NOTE — PROGRESS NOTES
Patient states has had 2 bouts of diarrhea since this AM, MD to hold stool softeners for 24 hours. Will continue to monitor.

## 2019-06-03 NOTE — PROGRESS NOTES
Progress Note  Admit Date: 2019      PCP: Ramiro Oswald II     CC: F/U for resp distress    SUBJECTIVE / Interval History:  Feels better, SOB improved  Having a lot of diarrhea   Neg 3 L  Wt  194 > 177 > 175       Allergies  Benadryl [diphenhydramine]    Medications    Scheduled Meds:   torsemide  20 mg Oral Daily    heparin (porcine)  5,000 Units Subcutaneous 3 times per day    aspirin  81 mg Oral Daily    carvedilol  12.5 mg Oral BID WC    DULoxetine  60 mg Oral Nightly    ferrous sulfate  325 mg Oral BID    hydrALAZINE  50 mg Oral TID    insulin glargine  10 Units Subcutaneous Nightly    levETIRAcetam  1,000 mg Oral BID    pantoprazole  40 mg Oral QAM AC    polyethylene glycol  17 g Oral Daily    rOPINIRole  0.5 mg Oral Daily    rOPINIRole  3 mg Oral Nightly    sodium bicarbonate  650 mg Oral BID    valproic acid  500 mg Oral BID    sodium chloride flush  10 mL Intravenous 2 times per day    miconazole   Topical BID    insulin lispro  0-12 Units Subcutaneous TID WC    insulin lispro  0-6 Units Subcutaneous Nightly     Continuous Infusions:   dextrose         PRN Meds:  oxyCODONE-acetaminophen, acetaminophen, benzonatate, ipratropium-albuterol, sodium chloride flush, ondansetron, glucose, dextrose, glucagon (rDNA), dextrose    Vitals    TEMPERATURE:  Current - Temp: 98.6 °F (37 °C); Max - Temp  Av.4 °F (36.9 °C)  Min: 98 °F (36.7 °C)  Max: 98.9 °F (37.2 °C)  RESPIRATIONS RANGE: Resp  Av  Min: 16  Max: 32  PULSE RANGE: Pulse  Av.7  Min: 86  Max: 109  BLOOD PRESSURE RANGE:  Systolic (98XRT), LYO:388 , Min:121 , IFA:430   ; Diastolic (38ACV), UCO:53, Min:60, Max:83    PULSE OXIMETRY RANGE: SpO2  Av.4 %  Min: 90 %  Max: 98 %  24HR INTAKE/OUTPUT:      Intake/Output Summary (Last 24 hours) at 6/3/2019 0922  Last data filed at 6/3/2019 0600  Gross per 24 hour   Intake 120 ml   Output 2215 ml   Net -2095 ml       Exam:    Gen: No distress. Eyes: PERRL. No sclera icterus.  No conjunctival injection. ENT: No discharge. Pharynx clear. External appearance of ears and nose normal.  Neck: Trachea midline. No obvious mass. Resp: No accessory muscle use. No crackles. No wheezes. No rhonchi. No dullness on percussion. CV: Regular rate. Regular rhythm. No murmur or rub. trace edema. GI: Non-tender. Non-distended. No hernia. Skin: Warm, dry, normal texture and turgor. No nodule on exposed extremities. Lymph: No cervical LAD. No supraclavicular LAD. M/S: No cyanosis. No clubbing. No joint deformity. Neuro: Moves all four extremities. CN 2-12 tested, no defect noted. Psych: Oriented x 3. No anxiety. Awake. Alert. .    Data    LABS  CBC:   Recent Labs     06/01/19  0711 06/02/19  0405 06/03/19  0624   WBC 18.5* 5.3 4.9   HGB 11.2* 8.4* 8.7*   HCT 35.0* 25.4* 25.9*   MCV 95.7 94.1 93.3    142 145     BMP:   Recent Labs     06/01/19  0711 06/02/19  0405 06/03/19  0624    142 142   K 5.8* 4.7 4.2    104 104   CO2 22 25 27   BUN 27* 30* 31*   CREATININE 1.3* 1.6* 1.8*     POC GLUCOSE:    Recent Labs     06/02/19  1136 06/02/19  1623 06/02/19  2134 06/03/19  0156 06/03/19  0730   POCGLU 105* 74 89 88 73     LIVER PROFILE:   Recent Labs     06/01/19  0711   AST 17   ALT 11   LABALBU 3.5   BILITOT 0.3   ALKPHOS 114     PT/INR: No results for input(s): PROTIME, INR in the last 72 hours. APTT: No results for input(s): APTT in the last 72 hours.   UA:  Recent Labs     06/01/19  0733   COLORU YELLOW   PHUR 6.0   WBCUA 49*   RBCUA 5-10*   BACTERIA 1+*   CLARITYU TURBID*   SPECGRAV 1.015   LEUKOCYTESUR SMALL*   UROBILINOGEN 0.2   BILIRUBINUR Negative   BLOODU SMALL*   GLUCOSEU Negative   KETUA Negative     Microbiology:  Wound Culture:   Recent Labs     06/01/19  1305   ORG Staph aureus MRSA*     Nasal Culture:   Recent Labs     06/01/19  1305   ORG Staph aureus MRSA*   MRSAPCR POSITIVE for  Normal Range: Not detected  CONTACT PRECAUTIONS INDICATED  *     Blood Culture: uncontrolled hypertension  -Patient's weight on discharge was 175 pounds and on admission is 181 pounds  -Continue IV Lasix> po today   -monitor I/o and weight  - pro arun ok      CKD 3  - baseline creat 1.6 - 2  - monitor with diuresis      Uncontrolled HTN- now controlled  - resume home meds  - will optimize based on BP reading     MRSA colonization  -repeat swab     DM2  -ct home meds  -ssi     H/o seizure  - ct home meds     Goals of care. Patient stated to the ER doctor that she did not want to be intubated but when I asked her about resuscitation and intubation patient said she wanted everything done. We'll get palliative care involved             DVT Prophylaxis: lovenox  Diet: DIET CARB CONTROL; Low Sodium (2 GM); Daily Fluid Restriction: 1500 ml  Code Status: Full Code    PT/OT Eval Status:ordered     Discharge plan - transfer out of ICU     The patient and / or the family were informed of the results of any tests, a time was given to answer questions, a plan was proposed and they agreed with plan. Discussed with consulting physicians, nursing and social work     The note was completed using EMR. Every effort was made to ensure accuracy; however, inadvertent computerized transcription errors may be present.        Aristeo Self MD

## 2019-06-03 NOTE — CARE COORDINATION
DISCHARGE PLANNING:   Noted that Pt is a resident at [facility]. Spoke with admissions, they are aware of admission and will plan for Pt to return to facility on d/c.      SW following. Thank You.    Electronically signed by TRAN Singh, JOEYW on 6/3/2019 at 12:29 PM   Phone: [phone]

## 2019-06-04 NOTE — PROGRESS NOTES
Occupational Therapy  Facility/Department: [facility] MED SURG  Daily Treatment Note  NAME: Julia Blackwell  : 1950  MRN: [id number]    Date of Service: 2019    Discharge Recommendations:  Patient would benefit from continued therapy after discharge, 3-5 sessions per week  OT Equipment Recommendations  Other: TBD by  next LOC     Julia Blackwell scored a  on the AM-PAC ADL Inpatient form. Current research shows that an AM-PAC score of 17 or less is typically not associated with a discharge to the patient's home setting. Based on the patients AM-PAC score and their current ADL deficits, it is recommended that the patient have 3-5 sessions per week of Occupational Therapy at d/c to increase the patients independence. Assessment   Performance deficits / Impairments: Decreased functional mobility ; Decreased ADL status; Decreased strength;Decreased safe awareness;Decreased cognition;Decreased endurance;Decreased balance  Assessment: Pt tolerated treatment fair/poor, limited by lethargy and decreased cognition. This date, pt mod/max A bed mobility, SBA sitting balance, min A feeding at EOB. Pt required use of mely stedy for transfer d/t lethargy with mod A x2. Cont per OT POC to address the above limitations and maximize pt's independence. Pt's AM-PAC score indicates need for ongoing skilled OT at d/c. Prognosis: Good  Patient Education: Pt educated on the role of OT, POC, orientation information, ADL retraining  Barriers to Learning: cognition, hearing  REQUIRES OT FOLLOW UP: Yes  Activity Tolerance  Activity Tolerance: Patient limited by fatigue;Treatment limited secondary to decreased cognition  Safety Devices  Safety Devices in place: Yes  Type of devices: Call light within reach; Bed alarm in place; Left in bed;Nurse notified; Patient at risk for falls         Patient Diagnosis(es): The primary encounter diagnosis was Acute on chronic respiratory failure with hypercapnia ([facility] [address]  A diagnosis of Acute on respiratory distress with hypercapnic respiratory failure and was placed on the BiPAP. \"   Family / Caregiver Present: No  Referring Practitioner: Abelino Opitz, MD  Diagnosis: Acute hypoxic and hypercapnic respiratory failure, Acute on chronic diastolic CHF exacerbation  Subjective  Subjective: Pt met b/s for OT cotx with PT. Pt supine in bed sleeping soundly on arrival, arouses to call of name and agreeable to paticipate in therapy, but confused and lethargic. Pt did not report or indicate any pain. Orientation  Orientation  Overall Orientation Status: Impaired     Objective    ADL  Feeding: Minimal assistance;Verbal cueing(seated EOB, pt lethargic and needs verbal/tactile cues to awaken and redirect attention to task, occasional assist to scoop food)  Toileting: (external purewick catheter)     Balance  Sitting Balance: Stand by assistance(seated EOB)  Standing Balance: Dependent/Total(CGA for static standing stance in mely stedy)     Bed mobility  Rolling to Left: Moderate assistance  Rolling to Right: Moderate assistance  Supine to Sit: Moderate assistance;2 Person assistance  Sit to Supine: Maximum assistance;2 Person assistance  Scooting: Maximal assistance;2 Person assistance     Transfers  Sit to stand:  Moderate assistance;2 Person assistance(EOB>mely stedy)  Stand to sit: Moderate assistance(mely stedy>EOB)     Cognition  Overall Cognitive Status: Exceptions  Arousal/Alertness: Delayed responses to stimuli;Inconsistent responses to stimuli  Following Commands: Inconsistently follows commands  Attention Span: Difficulty attending to directions  Memory: Decreased recall of recent events;Decreased short term memory  Safety Judgement: Decreased awareness of need for safety;Decreased awareness of need for assistance  Problem Solving: Decreased awareness of errors  Insights: Not aware of deficits  Initiation: Requires cues for all  Sequencing: Requires cues for all  Cognition Comment: the pt with increased lethargy today        Plan   Plan  Times per week: 3-5  Times per day: Daily  Current Treatment Recommendations: Balance Training, Functional Mobility Training, Endurance Training, Strengthening, Safety Education & Training, Self-Care / ADL, Cognitive/Perceptual Training, Cognitive Reorientation       OutComes Score    How much help for putting on and taking off regular lower body clothing?: Total  How much help for Bathing?: A Lot  How much help for Toileting?: Total  How much help for putting on and taking off regular upper body clothing?: A Lot  How much help for taking care of personal grooming?: A Lot  How much help for eating meals?: A Little  AM-PAC Inpatient Daily Activity Raw Score: 11  AM-PAC Inpatient ADL T-Scale Score : 29.04  ADL Inpatient CMS 0-100% Score: 70.42  ADL Inpatient CMS G-Code Modifier : CL                                               AM-PAC Score        AM-PAC Inpatient Daily Activity Raw Score: 11 (06/04/19 1354)  AM-PAC Inpatient ADL T-Scale Score : 29.04 (06/04/19 1354)  ADL Inpatient CMS 0-100% Score: 70.42 (06/04/19 1354)  ADL Inpatient CMS G-Code Modifier : CL (06/04/19 1354)    Goals  Short term goals  Time Frame for Short term goals: Prior to d/c: STATUS GOALS 6/4/19: ALL GOALS ONGOING AND NOT MET. Short term goal 1: Pt will complete grooming at sink with SBA. Short term goal 2: Pt will complete UB dressing/bathing with SBA. Short term goal 3: Pt will complete toileting with max A. Short term goal 4: Pt will complete fxl mobility and fxl transfers to/from ADL surfaces with CGA using AD. Short term goal 5: Pt will tolerate 10-15 minutes of B UE therex to improve strength/endurance for ADLs. Long term goals  Time Frame for Long term goals : STG=LTG  Patient Goals   Patient goals : \"to get back to where I was. \"       Therapy Time   Individual Concurrent Group Co-treatment   Time In 8071         Time Out 1337         Minutes 32               This note to serve as OT

## 2019-06-04 NOTE — PROGRESS NOTES
Physical Therapy    Facility/Department: [facility] 4W MED SURG  Daily Note  (cotx)  If pt. is D/C'd prior to next visit please refer back to last daily progress note for D/C status. NAME: Jason Andrade  : 1950  MRN: [id number]    Date of Service: 2019    Discharge Recommendations:  Patient would benefit from continued therapy after discharge, 3-5 sessions per week   Jason Andrade scored a  on the AM-PAC short mobility form. Current research shows that an AM-PAC score of 17 or less is typically not associated with a discharge to the patient's home setting. Based on the patients AM-PAC score and their current functional mobility deficits, it is recommended that the patient have 3-5 sessions per week of Physical Therapy at d/c to increase the patients independence. PT Equipment Recommendations  Other: Defer to next level of care. Assessment   Body structures, Functions, Activity limitations: Decreased functional mobility ; Decreased strength;Decreased safe awareness;Decreased endurance  Assessment: 77 y/o female admit 19 from SNF setting with Acute Respiratory Failure, Mild Pulm Edema, Hypertensive Urgency and CKD. Recent Hospital admit -19 with d/c to [facility] (there ~ past 6 mos). PMH as noted including CKD, Cellulitis B LEs, Status Epilepticus (2018). Today, the pt required mod/max A of 2 for bed mobility, mod A of 2 for transfer sit <> stand. The pt did sit on the EOB x 15 minutes with SBA. The pt did not get to the chair due to lethargy. The pt limited by her lethargy and inability to follow direction today. Anticipate return to SNF setting with cont PT Services. Will con't to follow.    Treatment Diagnosis: weakness, decreased B LE ROM at the knees and impaired mobility/balance  Prognosis: Fair;Good  Patient Education: Role of PT, POC, Need to call for assist.   Barriers to Learning: decreased alertness, difficulty following direction, cog  REQUIRES PT FOLLOW UP: Yes  Activity Tolerance  Activity Tolerance: Patient limited by cognitive status       Patient Diagnosis(es): The primary encounter diagnosis was Acute on chronic respiratory failure with hypercapnia ([address] A diagnosis of Acute on chronic congestive heart failure, unspecified heart failure type [facility]) was also pertinent to this visit. has a past medical history of Acid reflux, Arthritis, Balance problem, Blood circulation, collateral, Cellulitis of both lower extremities, CHF (congestive heart failure) ([address] Chronic kidney disease, Chronic venous hypertension (idiopathic) with ulcer and inflammation of bilateral lower extremity ([address] Community acquired bacterial pneumonia, Depression, Diabetes mellitus ([address] ESBL (extended spectrum beta-lactamase) producing bacteria infection, GERD (gastroesophageal reflux disease), Hyperlipidemia, Hypertension, Movement disorder, MRSA colonization, Murmur, Neuromuscular disorder ([address] Restless leg syndrome, Status epilepticus ([address] Ulcer of right lower leg ([address] Urinary frequency, and Urinary incontinence. has a past surgical history that includes Appendectomy; Hysterectomy; Cholecystectomy; bladder suspension; Colonoscopy; Cystoscopy; and eye surgery. Restrictions  Restrictions/Precautions  Restrictions/Precautions: Fall Risk, Contact Precautions  Position Activity Restriction  Other position/activity restrictions: Fluid Restriction 1500 ml/day. Contact Precautions +MRSA Nares and ESBL urine    Subjective  General  Chart Reviewed: Yes  Additional Pertinent Hx: 79 y/o female admit 6/1/19 from SNF setting with Acute Respiratory Failure, Mild Pulm Edema, Hypertensive Urgency and CKD. Recent Hospital admit 5/20-5/27/19 with d/c to [facility] (there ~ past 6 mos). PMH as noted including CKD, Cellulitis B LEs, Status Epilepticus (11/2018).     Response To Previous Treatment: Patient unable to report, no changes reported from family or staff  Family / Caregiver Present: No  Referring Practitioner: Dr. Paulina Billings. Subjective  Subjective:  The pt was found to be in the bed, asleep and difficult to arouse; the pt's nurse, Silverio Jeffery requested no out of bed due to pt's lethargy today  Pain Screening  Patient Currently in Pain: Yes          Orientation  Orientation  Overall Orientation Status: Impaired  Orientation Level: (responds to name only)  Social/Functional History  Social/Functional History  Type of Home: Facility([address] since last October)  Home Layout: One level  Home Access: Level entry  Lee Shower/Tub: Walk-in shower  Bathroom Toilet: Handicap height  Bathroom Equipment: Grab bars in shower, Shower chair, Grab bars around toilet  Bathroom Accessibility: [place] Brown: Lift chair, Rolling walker  Receives Help From: (the pt reports getting PT and OT 5x/week)  ADL Assistance: Needs assistance(Assist bathing/dressing/toileting)  Homemaking Assistance: (goes to dining room for meals; laundry and cleaning provided; meds are provided)  Ambulation Assistance: Needs assistance(With Aj Jose. )  Transfer Assistance: Independent(sleeps in adjustable bed w/o rail)  Active : No  Patient's  Info: Medical transportation to MD  Additional Comments: the pt reports no recent falls  Cognition   Cognition  Overall Cognitive Status: Exceptions  Arousal/Alertness: Delayed responses to stimuli;Inconsistent responses to stimuli  Following Commands: Inconsistently follows commands  Attention Span: Difficulty attending to directions  Memory: Decreased recall of recent events;Decreased short term memory  Safety Judgement: Decreased awareness of need for safety;Decreased awareness of need for assistance  Problem Solving: Decreased awareness of errors  Insights: Not aware of deficits  Initiation: Requires cues for all  Sequencing: Requires cues for all  Cognition Comment: the pt with increased lethargy today    Objective  Bed mobility  Rolling to Left: Moderate assistance  Rolling to Right: Moderate assistance  Supine to Sit: Moderate assistance;2 Person assistance  Sit to Supine: Maximum assistance;2 Person assistance  Scooting: Maximal assistance;2 Person assistance  Transfers  Sit to Stand: Moderate Assistance;2 Person Assistance  Stand to sit: Moderate Assistance  Bed to Chair: Unable to assess(nursing requested not to get out of bed)  Ambulation  Ambulation?: No     Balance  Posture: Fair  Sitting - Static: Good  Standing - Static: Fair  Comments: the pt sat on the EOB x 15 minutes with SBA; stood in the stedy x 1 minute with CGA/min A        Plan   Plan  Times per week: 3-5x week while in acute care setting. Current Treatment Recommendations: Strengthening, Functional Mobility Training, Transfer Training, Gait Training, Safety Education & Training, Patient/Caregiver Education & Training  Safety Devices  Type of devices: Call light within reach, Bed alarm in place, Patient at risk for falls, Left in bed, Nurse notified(STEFANIE Vega aware)      AM-PAC Score  AM-PAC Inpatient Mobility Raw Score : 12 (06/04/19 1334)  AM-PAC Inpatient T-Scale Score : 35.33 (06/04/19 1334)  Mobility Inpatient CMS 0-100% Score: 68.66 (06/04/19 1334)  Mobility Inpatient CMS G-Code Modifier : CL (06/04/19 1334)          Goals  Short term goals  Time Frame for Short term goals: Upon d/c acute care setting.  (goals are ongoing)  Short term goal 1: Bed Mob CGA. Short term goal 2: Transfers with assist device CGA. Short term goal 3: Amb with assist device 25' CGA/Min assist.  Patient Goals   Patient goals : Get better, take care of self and walk again.         Therapy Time   Individual Concurrent Group Co-treatment   Time In 9301         Time Out 1337         Minutes 32                 Electronically signed by Sarah Newman, PT [id number] on 6/4/2019 at 1:54 PM

## 2019-06-04 NOTE — PLAN OF CARE
Problem: Risk for Impaired Skin Integrity  Goal: Tissue integrity - skin and mucous membranes  Description  Structural intactness and normal physiological function of skin and  mucous membranes.   Outcome: Ongoing     Problem: Falls - Risk of:  Goal: Will remain free from falls  Description  Will remain free from falls  Outcome: Ongoing  Goal: Absence of physical injury  Description  Absence of physical injury  Outcome: Ongoing     Problem: Urinary Elimination:  Goal: Signs and symptoms of infection will decrease  Description  Signs and symptoms of infection will decrease  Outcome: Ongoing  Goal: Complications related to the disease process, condition or treatment will be avoided or minimized  Description  Complications related to the disease process, condition or treatment will be avoided or minimized  Outcome: Ongoing     Problem: Pain:  Goal: Pain level will decrease  Description  Pain level will decrease  Outcome: Ongoing  Goal: Control of acute pain  Description  Control of acute pain  Outcome: Ongoing  Goal: Control of chronic pain  Description  Control of chronic pain  Outcome: Ongoing     Problem: OXYGENATION/RESPIRATORY FUNCTION  Goal: Patient will maintain patent airway  Outcome: Ongoing  Goal: Patient will achieve/maintain normal respiratory rate/effort  Description  Respiratory rate and effort will be within normal limits for the patient  Outcome: Ongoing     Problem: HEMODYNAMIC STATUS  Goal: Patient has stable vital signs and fluid balance  Outcome: Ongoing     Problem: FLUID AND ELECTROLYTE IMBALANCE  Goal: Fluid and electrolyte balance are achieved/maintained  Outcome: Ongoing     Problem: ACTIVITY INTOLERANCE/IMPAIRED MOBILITY  Goal: Mobility/activity is maintained at optimum level for patient  Outcome: Ongoing

## 2019-06-04 NOTE — PROGRESS NOTES
discharge. Pharynx clear. External appearance of ears and nose normal.  Neck: Trachea midline. No obvious mass. Resp: No accessory muscle use. No crackles. No wheezes. No rhonchi. No dullness on percussion. CV: Regular rate. Regular rhythm. No murmur or rub. trace edema. GI: Non-tender. Non-distended. No hernia. Skin: Warm, dry, normal texture and turgor. No nodule on exposed extremities. Lymph: No cervical LAD. No supraclavicular LAD. M/S: No cyanosis. No clubbing. No joint deformity. Neuro: Moves all four extremities. CN 2-12 tested, no defect noted. Psych: Oriented x 2. No anxiety. Awake. Alert. .    Data    LABS  CBC:   Recent Labs     06/02/19  0405 06/03/19  0624   WBC 5.3 4.9   HGB 8.4* 8.7*   HCT 25.4* 25.9*   MCV 94.1 93.3    145     BMP:   Recent Labs     06/02/19  0405 06/03/19  0624    142   K 4.7 4.2    104   CO2 25 27   BUN 30* 31*   CREATININE 1.6* 1.8*     POC GLUCOSE:    Recent Labs     06/03/19  0730 06/03/19  1145 06/03/19  1646 06/03/19  2033 06/04/19  0503   POCGLU 73 124* 90 93 92     LIVER PROFILE:   No results for input(s): AST, ALT, LIPASE, AMYLASE, LABALBU, BILIDIR, BILITOT, ALKPHOS in the last 72 hours. PT/INR: No results for input(s): PROTIME, INR in the last 72 hours. APTT: No results for input(s): APTT in the last 72 hours. UA:  Recent Labs     06/01/19  0733   COLORU YELLOW   PHUR 6.0   WBCUA 49*   RBCUA 5-10*   BACTERIA 1+*   CLARITYU TURBID*   SPECGRAV 1.015   LEUKOCYTESUR SMALL*   UROBILINOGEN 0.2   BILIRUBINUR Negative   BLOODU SMALL*   GLUCOSEU Negative   KETUA Negative     Microbiology:  Wound Culture:   Recent Labs     06/01/19  1305   ORG Staph aureus MRSA*     Nasal Culture:   Recent Labs     06/01/19  1305   ORG Staph aureus MRSA*   MRSAPCR POSITIVE for  Normal Range: Not detected  CONTACT PRECAUTIONS INDICATED  *     Blood Culture:   Recent Labs     06/01/19  1140   BLOODCULT2 No Growth to date. Any change in status will be called.    CKD 3  - baseline creat 1.6 - 2  - monitor with diuresis      Uncontrolled HTN- now controlled  - resume home meds  - will optimize based on BP reading     MRSA colonization  -repeat swab     DM2  -ct home meds  -ssi     H/o seizure  - ct home meds    esbl in urine - repeat      Goals of care.      Discussed with the patient's brother. He understands the condition is deteriorating. He states that the last time he spoke to she wanted everything to be done. Explained to him that it seems like she has some underlying dementia and has a poor quality of life. Will get palliative care to see the patient      DVT Prophylaxis: lovenox  Diet: DIET CARB CONTROL; Low Sodium (2 GM); Daily Fluid Restriction: 1500 ml  Code Status: Full Code    PT/OT Eval Status:ordered     Discharge plan - ct care    The patient and / or the family were informed of the results of any tests, a time was given to answer questions, a plan was proposed and they agreed with plan. Discussed with consulting physicians, nursing and social work     The note was completed using EMR. Every effort was made to ensure accuracy; however, inadvertent computerized transcription errors may be present.        Aristeo Self MD

## 2019-06-04 NOTE — PROGRESS NOTES
PT/OT called this RT informed that the nurse would like this patient to go on BiPAP. There was a BiPAP in the room not set up since this patient has been refusing to wear it. Informed patient of what I was doing and she was agreeable but when asked if she knew where she was she kept stating \" [facility]\" redirected that she was not at [place] but was at [facility] patient on the BiPAP no complications. 18/6 RR20 Fio2 30. VT from 300-500. RR 20 sats 96%  RN aware.   Electronically signed by Phoenix Mcneil on 6/4/2019 at 1:47 PM

## 2019-06-04 NOTE — PLAN OF CARE
Problem: Risk for Impaired Skin Integrity  Goal: Tissue integrity - skin and mucous membranes  Description  Structural intactness and normal physiological function of skin and  mucous membranes.   Outcome: Ongoing     Problem: Falls - Risk of:  Goal: Will remain free from falls  Description  Will remain free from falls  Outcome: Ongoing     Problem: Pain:  Goal: Pain level will decrease  Description  Pain level will decrease  Outcome: Ongoing

## 2019-06-04 NOTE — PROGRESS NOTES
Patient laying in bed, alert with more confusion today, brother at bedside. Pt denies n/v, diarrhea, SOB, states chronic pain in right knee, prn pain medication given. Gamble cath in place draining clear, yellow urine to gravity. Brother states concerns about patients increased confusion, notified MD and let MD know that brother would like a call. Pt denies further needs at this time. Bed in lowest position, bed alarm on, call light within reach. Will continue to monitor pt needs.

## 2019-06-05 NOTE — PROGRESS NOTES
falls(STEFANIE Weinstein notified)         Patient Diagnosis(es): The primary encounter diagnosis was Acute on chronic respiratory failure with hypercapnia ([facility] [address] A diagnosis of Acute on chronic congestive heart failure, unspecified heart failure type [facility]) was also pertinent to this visit. has a past medical history of Acid reflux, Arthritis, Balance problem, Blood circulation, collateral, Cellulitis of both lower extremities, CHF (congestive heart failure) ([address] Chronic kidney disease, Chronic venous hypertension (idiopathic) with ulcer and inflammation of bilateral lower extremity ([address] Community acquired bacterial pneumonia, Depression, Diabetes mellitus ([address] ESBL (extended spectrum beta-lactamase) producing bacteria infection, GERD (gastroesophageal reflux disease), Hyperlipidemia, Hypertension, Movement disorder, MRSA colonization, Murmur, Neuromuscular disorder ([address] Restless leg syndrome, Status epilepticus ([address] Ulcer of right lower leg ([address] Urinary frequency, and Urinary incontinence. has a past surgical history that includes Appendectomy; Hysterectomy; Cholecystectomy; bladder suspension; Colonoscopy; Cystoscopy; and eye surgery. Restrictions  Restrictions/Precautions  Restrictions/Precautions: Fall Risk, Contact Precautions  Position Activity Restriction  Other position/activity restrictions: Fluid Restriction 1500 ml/day. Contact Precautions +MRSA Nares and ESBL urine  Subjective   General  Chart Reviewed: Yes  Patient assessed for rehabilitation services?: Yes  Additional Pertinent Hx: Per H&P: \"The patient is a 78 y.o. female with a past medical history of diabetes, GERD, hypertension, chronic radicular in her disease, diastolic CHF and venous insufficiency who presents to [facility] with respiratory distress. Patient was discharged 4 days ago to the nursing home and at that admission was admitted for possible pneumonia.  Patient states since today morning she felt very short of breath was found to be hypoxic in the nursing home and was placed on the BiPAP and sent to the ER. Patient has gained 6 pounds of fluid in the last 4 days. In the ER she was found to be in respiratory distress with hypercapnic respiratory failure and was placed on the BiPAP. \"   Family / Caregiver Present: No  Referring Practitioner: Abelino Opitz, MD  Diagnosis: Acute hypoxic and hypercapnic respiratory failure, Acute on chronic diastolic CHF exacerbation  Subjective  Subjective: Pt met b/s for OT tx. Pt supine in bed on arrival, agreeable to participate in therapy. Pt reports 10/10 R knee pain. Pt more alert and conversive, asking why she is here and able to relay that to brother over phone. Orientation  Orientation  Overall Orientation Status: Impaired  Orientation Level: Oriented to person;Oriented to place;Oriented to time;Disoriented to situation(looked to board for time)     Objective    ADL  Grooming: Minimal assistance(set-up to wash face in seated, OT assisted pt with shampoo cap and pt combed hair with min A)  UE Bathing: Maximum assistance(sponge bath seated on toilet, poor coverage, verbal cues for sequencing)  LE Bathing: Dependent/Total(sponge bath at toilet, used mely wellington to assist with standing for periarea/buttocks)  UE Dressing: (assist to change hospital gown)  LE Dressing: Dependent/Total(to change depends)  Toileting: Dependent/Total(x2 trials, voided on first trial and BM on second trial. Total A with mely eliz)   Maribeth wellington used to transport pt [id number]    Balance  Sitting Balance: Stand by assistance  Standing Balance: Minimal assistance  Functional Mobility  Functional - Mobility Device: Rolling Walker  Assist Level: Minimal assistance  Functional Mobility Comments: Pt completed stand pivot transfer ~3-4 steps bed-chair with RW and Min A. Pt needed assist with walker management, verbal cues to direct.  Pt easily fatigued, and mely wellington was used [id number] for help for eating meals?: A Little  AM-PAC Inpatient Daily Activity Raw Score: 12  AM-PAC Inpatient ADL T-Scale Score : 30.6  ADL Inpatient CMS 0-100% Score: 66.57  ADL Inpatient CMS G-Code Modifier : CL                                               AM-PAC Score        AM-PAC Inpatient Daily Activity Raw Score: 12 (06/05/19 0919)  AM-PAC Inpatient ADL T-Scale Score : 30.6 (06/05/19 0919)  ADL Inpatient CMS 0-100% Score: 66.57 (06/05/19 0919)  ADL Inpatient CMS G-Code Modifier : CL (06/05/19 0919)    Goals  Short term goals  Time Frame for Short term goals: Prior to d/c: STATUS GOALS 6/5/19: ALL GOALS ONGOING  Short term goal 1: Pt will complete grooming at sink with SBA. Short term goal 2: Pt will complete UB dressing/bathing with SBA. Short term goal 3: Pt will complete toileting with max A. Short term goal 4: Pt will complete fxl mobility and fxl transfers to/from ADL surfaces with CGA using AD. Short term goal 5: Pt will tolerate 10-15 minutes of B UE therex to improve strength/endurance for ADLs. Long term goals  Time Frame for Long term goals : STG=LTG  Patient Goals   Patient goals : \"to get back to where I was. \"       Therapy Time   Individual Concurrent Group Co-treatment   Time In 7686         Time Out 0910         Minutes 60               This note to serve as OT d/c summary if pt is d/c-ed prior to next therapy session.     Rivas Harrison, OTR/L [id number]

## 2019-06-05 NOTE — PROGRESS NOTES
Progress Note  Admit Date: 2019      PCP: Lee Willson II     CC: F/U for resp distress    SUBJECTIVE / Interval History:  Feels better, SOB improved  No more diarrhea   Confused ( per family )  Neg 3 L  Wt  194 > 177 > 171       Allergies  Benadryl [diphenhydramine]    Medications    Scheduled Meds:   torsemide  20 mg Oral Daily    heparin (porcine)  5,000 Units Subcutaneous 3 times per day    aspirin  81 mg Oral Daily    carvedilol  12.5 mg Oral BID WC    DULoxetine  60 mg Oral Nightly    ferrous sulfate  325 mg Oral BID    hydrALAZINE  50 mg Oral TID    insulin glargine  10 Units Subcutaneous Nightly    levETIRAcetam  1,000 mg Oral BID    pantoprazole  40 mg Oral QAM AC    rOPINIRole  0.5 mg Oral Daily    rOPINIRole  3 mg Oral Nightly    sodium bicarbonate  650 mg Oral BID    valproic acid  500 mg Oral BID    sodium chloride flush  10 mL Intravenous 2 times per day    miconazole   Topical BID    insulin lispro  0-12 Units Subcutaneous TID WC    insulin lispro  0-6 Units Subcutaneous Nightly     Continuous Infusions:   dextrose         PRN Meds:  oxyCODONE-acetaminophen, acetaminophen, benzonatate, ipratropium-albuterol, sodium chloride flush, ondansetron, glucose, dextrose, glucagon (rDNA), dextrose    Vitals    TEMPERATURE:  Current - Temp: 97.9 °F (36.6 °C); Max - Temp  Av.6 °F (36.4 °C)  Min: 96.1 °F (35.6 °C)  Max: 98.3 °F (36.8 °C)  RESPIRATIONS RANGE: Resp  Av.9  Min: 15  Max: 24  PULSE RANGE: Pulse  Av.2  Min: 77  Max: 90  BLOOD PRESSURE RANGE:  Systolic (56QKY), CNL:015 , Min:117 , GYT:466   ; Diastolic (76FCQ), DZQ:92, Min:54, Max:85    PULSE OXIMETRY RANGE: SpO2  Av.3 %  Min: 90 %  Max: 97 %  24HR INTAKE/OUTPUT:      Intake/Output Summary (Last 24 hours) at 2019 1100  Last data filed at 2019 0833  Gross per 24 hour   Intake 480 ml   Output 725 ml   Net -245 ml       Exam:    Gen: No distress. Eyes: PERRL. No sclera icterus.  No conjunctival injection. ENT: No discharge. Pharynx clear. External appearance of ears and nose normal.  Neck: Trachea midline. No obvious mass. Resp: No accessory muscle use. No crackles. No wheezes. No rhonchi. No dullness on percussion. CV: Regular rate. Regular rhythm. No murmur or rub. trace edema. GI: Non-tender. Non-distended. No hernia. Skin: Warm, dry, normal texture and turgor. No nodule on exposed extremities. Lymph: No cervical LAD. No supraclavicular LAD. M/S: No cyanosis. No clubbing. No joint deformity. Neuro: Moves all four extremities. CN 2-12 tested, no defect noted. Psych: Oriented x 2. No anxiety. Awake. Alert. .    Data    LABS  CBC:   Recent Labs     06/03/19  0624 06/04/19  1017 06/05/19  0735   WBC 4.9 4.6 4.1   HGB 8.7* 9.2* 9.2*   HCT 25.9* 27.3* 27.8*   MCV 93.3 93.7 93.7    143 150     BMP:   Recent Labs     06/03/19  0624 06/04/19  1017 06/05/19  0735    141 142   K 4.2 4.5 4.1    103 103   CO2 27 29 30   BUN 31* 28* 28*   CREATININE 1.8* 1.7* 1.6*     POC GLUCOSE:    Recent Labs     06/04/19  0744 06/04/19  1159 06/04/19  1656 06/04/19  2113 06/05/19  0743   POCGLU 84 108* 92 138* 91     LIVER PROFILE:   No results for input(s): AST, ALT, LIPASE, AMYLASE, LABALBU, BILIDIR, BILITOT, ALKPHOS in the last 72 hours. PT/INR: No results for input(s): PROTIME, INR in the last 72 hours. APTT: No results for input(s): APTT in the last 72 hours. UA:  Recent Labs     06/04/19  1308   COLORU YELLOW   PHUR 5.0   WBCUA 101*   RBCUA 3   BACTERIA 2+*   CLARITYU CLOUDY*   SPECGRAV 1.010   LEUKOCYTESUR MODERATE*   UROBILINOGEN 0.2   BILIRUBINUR Negative   BLOODU Negative   GLUCOSEU Negative   KETUA Negative     Microbiology:  Wound Culture:   No results for input(s): WNDABS, ORG in the last 72 hours. Invalid input(s):  LABGRAM  Nasal Culture:   No results for input(s): ORG, MRSAPCR in the last 72 hours.   Blood Culture:   No results for input(s): BC, BLOODCULT2 in the last 72 colonization  -repeat swab     DM2  -ct home meds  -ssi     H/o seizure  - ct home meds    esbl in urine - repeat      Goals of care.      Discussed with the patient's brother. He understands the condition is deteriorating. He states that the last time he spoke to she wanted everything to be done. Explained to him that it seems like she has some underlying dementia and has a poor quality of life. Will get palliative care to see the patient      DVT Prophylaxis: lovenox  Diet: DIET CARB CONTROL; Low Sodium (2 GM); Daily Fluid Restriction: 1500 ml  Code Status: Full Code    PT/OT Eval Status:ordered     Discharge plan -tomorrow, if urine c/sis ok and mentation improves    The patient and / or the family were informed of the results of any tests, a time was given to answer questions, a plan was proposed and they agreed with plan. Discussed with consulting physicians, nursing and social work     The note was completed using EMR. Every effort was made to ensure accuracy; however, inadvertent computerized transcription errors may be present.        Jesús Jay MD

## 2019-06-05 NOTE — PROGRESS NOTES
Regular  Current Liquid Diet : ThinRegular textures and thin liquids    Primary Complaint  Patient Complaint: Pt denies any swallowing concerns and states that she currently has a cough History Of Present Illness: The patient is a 78 y.o. female with a past medical history of diabetes, GERD, hypertension, chronic radicular in her disease, diastolic CHF and venous insufficiency who presents to [facility] with respiratory distress. Patient was discharged 4 days ago to the nursing home and at that admission was admitted for possible pneumonia. Patient states since today morning she felt very short of breath was found to be hypoxic in the nursing home and was placed on the BiPAP and sent to the ER. No fever or chills. Patient has gained 6 pounds of fluid in the last 4 days. In the ER she was found to be in respiratory distress with hypercapnic respiratory failure and was placed on the BiPAP. Pain:  Pain Assessment  Pain Assessment: 0-10  Pain Level: Denies  Patient's Stated Pain Goal: No pain    Reason for Referral  Comfort Gonzalez was referred for a bedside swallow evaluation to assess the efficiency of her swallow function, identify signs and symptoms of aspiration and make recommendations regarding safe dietary consistencies, effective compensatory strategies, and safe eating environment. Impression  Dysphagia Diagnosis: Mild oral stage dysphagia;Mild pharyngeal stage dysphagia  Dysphagia Impression : Mild oropharyngeal dysphagia characterized by prolonged mastication with regular solids, decreased bolus formation and lingual coordination, decreased oral transit, suspected premature loss to pharynx, delayed swallow, decreased elevation for all trials, cough x2 in between trials which appears to be unrelated to PO If MD has concerns for aspiration, a MBS may be completed. Please write order for MBS if deemed warranted by MD  Dysphagia Outcome Severity Scale: Level 5: Mild dysphagia- Distant supervision.  May need one diet consistency restricted     Treatment Plan  Requires SLP Intervention: Yes  Duration/Frequency of Treatment: 3-5x/wk  D/C Recommendations: SNF     Recommended Diet and Intervention  Diet Solids Recommendation: Dysphagia III Advanced  Liquid Consistency Recommendation: Thin  Recommended Form of Meds: Whole with water  Recommendations: Dysphagia treatment  Therapeutic Interventions: Patient/Family education; Effortful swallow; Diet tolerance monitoring    Compensatory Swallowing Strategies  Compensatory Swallowing Strategies: Small bites/sips; Eat/Feed slowly; Swallow 2 times per bite/sip; Remain upright for 30-45 minutes after meals; Upright as possible for all oral intake    Treatment/Goals  Short-term Goals  Timeframe for Short-term Goals: 2-3 wks  Dysphagia Goals: The patient will tolerate recommended diet without observed clinical signs of aspiration -ongoing  The patient will tolerate thin liquids without signs and symptoms of aspiration 10/10 via cup. -ongoing  The patient will tolerate mechanical soft foods 10/10. -ongoing  The patient/caregiver will demonstrate understanding of compensatory strategies for improved swallowing safety. -ongoing  The patient will improve oral preparation phase via bolus control/manipulation exercises to 5/5 each trial. -ongoing    General  Chart Reviewed: Yes  Subjective  Subjective: Pt asleep in bedside chair upon arrival but aroused easily. Patient can follow one step directions but appears to have decerased cognition. Behavior/Cognition: Alert; Requires cueing; Cooperative  Respiratory Status: Room air  Follows Directions: Simple  Dentition: Edentulous  Patient Positioning: Upright in chair  Baseline Vocal Quality: Normal  Prior Dysphagia History: MBS 11/19/18 at [facility] recommedned puree with thin s/p extubation. Pt is reporting she recovered quickly and has been on a regular diet since then. Diet at [facility] not currently on file. Consistencies Administered: Reg solid; Soft 6/5/2019 3:20 PM    Therapist was present, directed the patient's care, made skilled judgement, and was responsible for assessment and treatment of the patient.         Deliliah Bumpers, MS, CCC-SLP #[id number]

## 2019-06-05 NOTE — PLAN OF CARE
Problem: Risk for Impaired Skin Integrity  Goal: Tissue integrity - skin and mucous membranes  Description  Structural intactness and normal physiological function of skin and  mucous membranes.   6/4/2019 2312 by Bridger Erazo RN  Outcome: Ongoing     Problem: Falls - Risk of:  Goal: Will remain free from falls  Description  Will remain free from falls  6/4/2019 2312 by Bridger Erazo RN  Outcome: Ongoing     Problem: Falls - Risk of:  Goal: Absence of physical injury  Description  Absence of physical injury  6/4/2019 2312 by Bridger Erazo RN  Outcome: Ongoing     Problem: Urinary Elimination:  Goal: Signs and symptoms of infection will decrease  Description  Signs and symptoms of infection will decrease  6/4/2019 2312 by Bridger Erazo RN  Outcome: Ongoing     Problem: Urinary Elimination:  Goal: Complications related to the disease process, condition or treatment will be avoided or minimized  Description  Complications related to the disease process, condition or treatment will be avoided or minimized  6/4/2019 2312 by Bridger Erazo RN  Outcome: Ongoing     Problem: Pain:  Goal: Pain level will decrease  Description  Pain level will decrease  6/4/2019 2312 by Bridger Erazo RN  Outcome: Ongoing     Problem: Pain:  Goal: Control of acute pain  Description  Control of acute pain  6/4/2019 2312 by Bridger Erazo RN  Outcome: Ongoing     Problem: Pain:  Goal: Control of chronic pain  Description  Control of chronic pain  6/4/2019 2312 by Bridger Erazo RN  Outcome: Ongoing     Problem: OXYGENATION/RESPIRATORY FUNCTION  Goal: Patient will maintain patent airway  6/4/2019 2312 by Bridger Erazo RN  Outcome: Ongoing     Problem: OXYGENATION/RESPIRATORY FUNCTION  Goal: Patient will achieve/maintain normal respiratory rate/effort  Description  Respiratory rate and effort will be within normal limits for the patient  6/4/2019 2312 by Bridger Erazo RN  Outcome: Ongoing     Problem: HEMODYNAMIC STATUS  Goal: Patient has stable vital signs and fluid balance  6/4/2019 2312 by Facunod Conway RN  Outcome: Ongoing     Problem: FLUID AND ELECTROLYTE IMBALANCE  Goal: Fluid and electrolyte balance are achieved/maintained  6/4/2019 2312 by Facundo Conway RN  Outcome: Ongoing     Problem: ACTIVITY INTOLERANCE/IMPAIRED MOBILITY  Goal: Mobility/activity is maintained at optimum level for patient  6/4/2019 2312 by Facundo Conway RN  Outcome: Ongoing

## 2019-06-05 NOTE — PROGRESS NOTES
Patient alert and oriented. Assessment completed. Medications administered. Patient has no complaints at this present time. PCA states that O2 sat @88%, patient placed on 2L of oxygen and her O2 sat increased to 100%. Patient sleeping at this time, respiratory asked to place BiPap on patient. Patient on room air at this present time. SpO2 @96%, oncoming RN notified of concerns. Monitor patient progress.

## 2019-06-06 NOTE — PROGRESS NOTES
[facility]   Speech Therapy  Daily Dysphagia Treatment Note    Jd Linton  AGE: 78 y.o.    GENDER: female  : 1950  [id number]  EPISODE DATE:  2019     Patient Active Problem List   Diagnosis    Diastolic CHF ([address]    Cellulitis    Chest pain    DM (diabetes mellitus) ([address]    HTN (hypertension)    Cellulitis of both lower extremities    Morbid obesity due to excess calories ([address]    Atherosclerosis of native artery of both lower extremities with bilateral ulceration of calves ([address]    Chronic venous hypertension (idiopathic) with ulcer and inflammation of bilateral lower extremity (HCC)    Ulcer of left lower leg, limited to breakdown of skin (HCC)    Ulcer of right lower leg (HCC)    Leg swelling    Pruritic condition    Excoriation of left lower leg    Excoriation of right lower leg    Venous stasis dermatitis of both lower extremities    Lower leg edema    Venous ulcer ([address]    Venous hypertension, chronic, with ulcer, left ([address]    Community acquired bacterial pneumonia    Hypertensive urgency    CKD (chronic kidney disease), stage III ([address]    Pneumonia due to organism    Acute pulmonary edema (HCC)    Acute and chronic respiratory failure with hypoxia (HCC)    Blister of lower leg, right, initial encounter    Status epilepticus ([address]    Acute metabolic encephalopathy    Dysphagia    Moderate protein-calorie malnutrition (HCC)    Hypernatremia    Acute on chronic respiratory failure with hypercapnia (HCC)     Allergies   Allergen Reactions    Benadryl [Diphenhydramine] Itching     Treatment Diagnosis: Dysphagia     Chart review:   2019 admitted with Acute and chronic respiratory failure with hypoxia ([address] [J96.21] with bipap requirement      Subjective:     Current Diet : Dysphagia III Mechanically Advanced  Current Liquid Diet : Thin    Comments regarding tolerating Current Diet:   Good toleration reported; patient requesting regular      Objective:     Pain   Patient Currently in Pain: No    Cognitive/Behavior   Behavior/Cognition: Alert, Cooperative    Presentations   Consistencies Administered: Reg solid, Thin - straw    Positioning   Fully upright in chair    PO Trials:  · Thin Liquids: prolonged oral transit; concern for premature loss to the pharynx; delayed swallow; decreased laryngeal elevation   · Regular food: prolonged mastication, but does not appear labored; prolonged oral transit; concern for premature loss to the pharynx; delayed swallow; decreased laryngeal elevation    Dysphagia Tx:   PO trials:  Appears safe for upgrade to regular/thin  Comp strats: set-up    Goals: The patient will tolerate recommended diet without observed clinical signs of aspiration, met  The patient will tolerate thin liquids without signs and symptoms of aspiration 10/10 via cup.,  met  The patient will tolerate mechanical soft foods 10/10., met  The patient/caregiver will demonstrate understanding of compensatory strategies for improved swallowing safety. , needs reinforcement  The patient will improve oral preparation phase via bolus control/manipulation exercises to 5/5 each trial. NA    Assessment:   Impressions:   Dysphagia Diagnosis: Mild oral stage dysphagia, Mild pharyngeal stage dysphagia   · Accepted and tolerated tx feed at bedside. · Patient alert and cooperative without the nohemi for cueing this date. · Mild oral stage dysphagia characterized by prolonged but adequate mastication and decreased lingual manipulation for bolus movement with all trials. concern for premature bolus loss to the pharynx with all trials. Mild pharyngeal stage dysphagia characterized by delayed swallow and decreased laryngeal elevation without overt signs/symptoms of penetration/aspiration. · Recommend resumption of regular/thin. · D/C planned to ECF today.     Diet Recommendations:  Upgrade to regular  Continue thin  Recommended Form of Meds: Whole with

## 2019-06-06 NOTE — PROGRESS NOTES
belt;Patient at risk for falls(RN Nkoyo aware)         Patient Diagnosis(es): The primary encounter diagnosis was Acute on chronic respiratory failure with hypercapnia ([facility] A diagnosis of Acute on chronic congestive heart failure, unspecified heart failure type [facility]) was also pertinent to this visit. has a past medical history of Acid reflux, Arthritis, Balance problem, Blood circulation, collateral, Cellulitis of both lower extremities, CHF (congestive heart failure) ([address] Chronic kidney disease, Chronic venous hypertension (idiopathic) with ulcer and inflammation of bilateral lower extremity ([address] Community acquired bacterial pneumonia, Depression, Diabetes mellitus ([address] ESBL (extended spectrum beta-lactamase) producing bacteria infection, GERD (gastroesophageal reflux disease), Hyperlipidemia, Hypertension, Movement disorder, MRSA colonization, Murmur, Neuromuscular disorder ([address] Restless leg syndrome, Status epilepticus ([address] Ulcer of right lower leg ([address] Urinary frequency, and Urinary incontinence. has a past surgical history that includes Appendectomy; Hysterectomy; Cholecystectomy; bladder suspension; Colonoscopy; Cystoscopy; and eye surgery. Restrictions  Restrictions/Precautions  Restrictions/Precautions: Fall Risk, Contact Precautions  Position Activity Restriction  Other position/activity restrictions: Fluid Restriction 1500 ml/day. Contact Precautions +MRSA Nares and ESBL urine  Subjective   General  Chart Reviewed: Yes  Patient assessed for rehabilitation services?: Yes  Additional Pertinent Hx: Per H&P: \"The patient is a 78 y.o. female with a past medical history of diabetes, GERD, hypertension, chronic radicular in her disease, diastolic CHF and venous insufficiency who presents to [facility] with respiratory distress. Patient was discharged 4 days ago to the nursing home and at that admission was admitted for possible pneumonia.  Patient states since today morning she felt very short of breath was found to be hypoxic in the nursing home and was placed on the BiPAP and sent to the ER. Patient has gained 6 pounds of fluid in the last 4 days. In the ER she was found to be in respiratory distress with hypercapnic respiratory failure and was placed on the BiPAP. \"   Family / Caregiver Present: No  Referring Practitioner: Hoda Busby MD  Diagnosis: Acute hypoxic and hypercapnic respiratory failure, Acute on chronic diastolic CHF exacerbation  Subjective  Subjective: Pt met b/s for OT tx. Pt supine in bed on arrival, agreeable to participate in therapy. Pt c/o nausea and R 10/10 R knee pain. Orientation  Orientation  Overall Orientation Status: Impaired  Orientation Level: Disoriented to situation;Oriented to place;Oriented to time;Oriented to person     Objective    ADL  LE Dressing: Dependent/Total(to change depends)  Toileting: Dependent/Total(pt's depends saturated with urine, total A for pericare/changing depends rolling in bed.  Pt then c/o need to have BM, stedy used to toilet, no BM but voided more, total A for pericare/managing depends in stedy)     Balance  Sitting Balance: Stand by assistance(seated EOB)  Standing Balance: Minimal assistance(static standing stance at walker with min A, unsteady with posterior lean and B LE's tremulous)  Functional Mobility  Functional Mobility Comments: total A via stedy for bed-BR-recliner    Bed mobility  Rolling to Left: Minimal assistance  Rolling to Right: Minimal assistance  Supine to Sit: Minimal assistance(HOB elevated, use of rail, increased time)  Sit to Supine: Unable to assess(pt seated in recliner at end of session)  Scooting: Minimal assistance(min A to scoot to EOB, min A x2 to scoot back into chair)     Transfers  Sit to stand: Minimal assistance(EOB>RW, EOB>stedy)  Stand to sit: Minimal assistance(RW>EOB, stedy>recliner)  Transfer Comments: total A via stedy for bed-BR-recliner   Toilet Transfers  Equipment Used: Standard sink with SBA. Short term goal 2: Pt will complete UB dressing/bathing with SBA. Short term goal 3: Pt will complete toileting with max A. Short term goal 4: Pt will complete fxl mobility and fxl transfers to/from ADL surfaces with CGA using AD. Short term goal 5: Pt will tolerate 10-15 minutes of B UE therex to improve strength/endurance for ADLs. Long term goals  Time Frame for Long term goals : STG=LTG  Patient Goals   Patient goals : \"to get back to where I was. \"       Therapy Time   Individual Concurrent Group Co-treatment   Time In 3540         Time Out 1200         Minutes 43               This note to serve as OT d/c summary if pt is d/c-ed prior to next therapy session.     Flavio Pollock, OTR/L [id number]

## 2019-06-06 NOTE — PROGRESS NOTES
Pt has dc order. DC Pharm med rec line notified. Pt will be returning to [facility] where she resides. HF dc instructions have been placed on AVS and KELLY for facility to follow. Pt with confusion this admission (has mild dementia at baseline). Pt unable to retain education. Please continue HF education when/ if appropriate. Pt has seen [address] for cardiology in the past (Dr Jack Jacinto, NP). They were not consulted this admission. I did inform Daiana Jerry of pt's present admission, her status, and plan of care. At this time, no card f/u appt is warranted (per Daiana Jerry) but [facility] number was placed on KELLY for reference for facility.

## 2019-06-06 NOTE — PROGRESS NOTES
Spoke with Dr. Latha Taylor. He confirms torsemide 10mg daily as needed for weight gain and swelling.     Bry Maddox, PharmD  Heart Failure Discharge Medication Reconciliation Program  [phone]

## 2019-06-06 NOTE — DISCHARGE INSTR - COC
Continuity of Care Form    Patient Name: Judson Lim   :  1950  MRN:  [id number]    Admit date:  2019  Discharge date:  2019    Code Status Order: Full Code   Advance Directives:   [facility] Documentation     Date/Time Healthcare Directive Type of Healthcare Directive Copy in [address] Agent's Name Healthcare Agent's Phone Number    19 1044  Other (Comment) gave pt AD docs per pt request  --  --  --  --  --    19 1205  Yes, patient has an advance directive for healthcare treatment  Living will  No, copy requested from family  --  --  --          Admitting Physician:  Odell Mart MD  PCP: Camille Mercer II    Discharging Nurse: Ethelda Meckel RN, MSN  [address] Unit/Room#: B4G-3980/1672-51  Discharging Unit Phone Number: [phone]    Emergency Contact:   Extended Emergency Contact Information  Primary Emergency Contact: [address] Phone: [phone]  Relation: Other    Past Surgical History:  Past Surgical History:   Procedure Laterality Date    APPENDECTOMY      BLADDER SUSPENSION      CHOLECYSTECTOMY      COLONOSCOPY      CYSTOSCOPY      EYE SURGERY      HYSTERECTOMY         Immunization History:   Immunization History   Administered Date(s) Administered    Influenza Vaccine, unspecified formulation 10/17/2016    Pneumococcal Vaccine, unspecified formulation 10/17/2016       Active Problems:  Patient Active Problem List   Diagnosis Code    Diastolic CHF ([facility] T12.95    Cellulitis L03.90    Chest pain R07.9    DM (diabetes mellitus) ([facility] E11.9    HTN (hypertension) I10    Cellulitis of both lower extremities L03.115, L03. 80    Morbid obesity due to excess calories (HCC) E66.01    Atherosclerosis of native artery of both lower extremities with bilateral ulceration of calves (HCC) I70.232, I70.242    Chronic venous hypertension (idiopathic) with ulcer and inflammation of bilateral lower extremity (HCC) I87.333, L97.919, L97.929    Ulcer of left lower leg, limited to breakdown of skin (HCC) L97.921    Ulcer of right lower leg (HCC) L97.919    Leg swelling M79.89    Pruritic condition L29.9    Excoriation of left lower leg S80.812A    Excoriation of right lower leg S80.811A    Venous stasis dermatitis of both lower extremities I87.2    Lower leg edema R60.0    Venous ulcer ([facility]) I83.009, L97.909    Venous hypertension, chronic, with ulcer, left ([facility] I87.312, L97.929    Community acquired bacterial pneumonia J15.9    Hypertensive urgency I16.0    CKD (chronic kidney disease), stage III ([facility]) N18.3    Pneumonia due to organism J18.9    Acute pulmonary edema ([facility]) J81.0    Acute and chronic respiratory failure with hypoxia ([facility]) J96.21    Blister of lower leg, right, initial encounter S80.821A    Status epilepticus ([facility]) G40.901    Acute metabolic encephalopathy G81.55    Dysphagia R13.10    Moderate protein-calorie malnutrition ([facility]) E44.0    Hypernatremia E87.0    Acute on chronic respiratory failure with hypercapnia ([facility]) J96.22       Isolation/Infection:   Isolation          Contact        Patient Infection Status     Infection Encounter Level?  Onset Date Added Added By Resolved Resolved By Review Date    ESBL (Extended Spectrum Beta Lactamase) No 06/01/19 06/03/19 Urine Culture       MRSA No 05/22/19 05/23/19 MRSA DNA Probe, Nasal             Nurse Assessment:  Last Vital Signs: /77   Pulse 75   Temp 97.9 °F (36.6 °C)   Resp 17   Ht 4' 10\" (1.473 m)   Wt 172 lb 9.9 oz (78.3 kg)   SpO2 92%   BMI 36.08 kg/m²     Last documented pain score (0-10 scale): Pain Level: 8  Last Weight:   Wt Readings from Last 1 Encounters:   06/06/19 172 lb 9.9 oz (78.3 kg)     Mental Status:  oriented and alert, some confusion noted    IV Access:  - None    Nursing Mobility/ADLs:  Walking   Dependent  Transfer  Dependent  Bathing  Dependent  Dressing intolerance to usual activity, cough especially at night. Pls report these findings even if wt not up. Pt to follow a carb control, low salt diet, with no more than 48 to 64 ounces of fluids total in a day. Pt follows with [facility] [place] # [phone] Anitha Roberson NP). [facility] HF Resource line # [phone] (VM checked Mon-Fri during business hours). Patient's personal belongings (please select all that are sent with patient):  Glasses    RN SIGNATURE:  Electronically signed by Alison Espitia RN on 6/6/19 at 2:20 PM    CASE MANAGEMENT/SOCIAL WORK SECTION    Inpatient Status Date: 06/01/2019    Readmission Risk Assessment Score:    Discharging to Facility/ Agency   · Name: [facility]  · [address] [place] [address]  · RAAYS:[phone]  · Fax:    / signature: Electronically signed by Eben Dominguez RN on 6/6/19 at 3:01 PM    PHYSICIAN SECTION    Prognosis: Fair    Condition at Discharge: Stable    Rehab Potential (if transferring to Rehab): Fair    Recommended Labs or Other Treatments After Discharge:   - PT/OT    Physician Certification: I certify the above information and transfer of Janay Mitchell  is necessary for the continuing treatment of the diagnosis listed and that she requires [facility] for less 30 days.      Update Admission H&P: No change in H&P    PHYSICIAN SIGNATURE:  Electronically signed by Tito Lui MD on 6/6/19 at 1:14 PM

## 2019-06-06 NOTE — CONSULTS
Palliative care note      Patient out of room, will try to see later.      Electronically signed by Tirso Downey RN,CHPN on 6/6/2019 at 5:38 PM  Palliative Care Nurse  Phone [phone]

## 2019-06-06 NOTE — PROGRESS NOTES
Pharmacy Heart Failure Medication Reconciliation Note    Pt discharged from [facility] today after admission for respiratory failure. Kareem Tristan has a diagnosis of diastolic heart failure (last EF = 60-65% on 5/22/19). Patient taking an ACEI / ARB / Entresto:  No: HFpEF      Patient taking a BETA BLOCKER:  No: HFpEF   Patient taking a LOOP DIURETIC: Yes  Patient taking a ALDOSTERONE RECEPTOR ANTAGONIST: No: HFpEF     Corrections to discharge medications include:   Added as needed indication for Miralax  Will clarify torsemide dose for discharge with Dr. Roby Navarro     Discharge Medications:         Medication List        START taking these medications      ciprofloxacin 500 MG tablet  Commonly known as:  CIPRO  Take 1 tablet by mouth 2 times daily for 5 days            CONTINUE taking these medications      acetaminophen 325 MG tablet  Commonly known as:  TYLENOL     * albuterol 1.25 MG/3ML nebulizer solution  Commonly known as:  ACCUNEB     * albuterol 1.25 MG/3ML nebulizer solution  Commonly known as:  ACCUNEB     aspirin 81 MG tablet     benzonatate 100 MG capsule  Commonly known as:  TESSALON  Take 1 capsule by mouth 3 times daily as needed for Cough     carvedilol 12.5 MG tablet  Commonly known as:  COREG  Take 1 tablet by mouth 2 times daily (with meals)     clotrimazole-betamethasone 1-0.05 % cream  Commonly known as:  LOTRISONE     DULoxetine 60 MG extended release capsule  Commonly known as:  CYMBALTA     ferrous sulfate 325 (65 Fe) MG tablet     guaiFENesin 600 MG extended release tablet  Commonly known as:  MUCINEX     hydrALAZINE 50 MG tablet  Commonly known as:  APRESOLINE     insulin glargine 100 UNIT/ML injection vial  Commonly known as:  LANTUS     ipratropium-albuterol 0.5-2.5 (3) MG/3ML Soln nebulizer solution  Commonly known as:  DUONEB     levETIRAcetam 1000 MG tablet  Commonly known as:  KEPPRA  Take 1 tablet by mouth 2 times daily     miconazole 2 % powder  Commonly known as:  MICOTIN oxyCODONE-acetaminophen 5-325 MG per tablet  Commonly known as:  PERCOCET  Take 1 tablet by mouth every 8 hours as needed for Pain for up to 3 days. pantoprazole 40 MG tablet  Commonly known as:  PROTONIX     polyethylene glycol powder  Commonly known as:  GLYCOLAX     * rOPINIRole 0.5 MG tablet  Commonly known as:  REQUIP     * rOPINIRole 3 MG tablet  Commonly known as:  REQUIP     sodium bicarbonate 650 MG tablet     torsemide 10 MG tablet  Commonly known as:  DEMADEX  Take 1 tablet by mouth as needed (weight gain/ leg swelling)     Trolamine Salicylate 10 % Lotn  Commonly known as:  ASPERCREME     valproic acid 250 MG capsule  Commonly known as:  DEPAKENE  Take 2 capsules by mouth 2 times daily     VITAMIN D PO           * This list has 4 medication(s) that are the same as other medications prescribed for you. Read the directions carefully, and ask your doctor or other care provider to review them with you.                    Where to Get Your Medications        You can get these medications from any pharmacy    Bring a paper prescription for each of these medications  oxyCODONE-acetaminophen 5-325 MG per tablet       Information about where to get these medications is not yet available    Ask your nurse or doctor about these medications  benzonatate 100 MG capsule  ciprofloxacin 500 MG tablet         Kiki Shepard, PharmD  Heart Failure Discharge Medication Reconciliation Program  [phone]

## 2019-06-06 NOTE — PLAN OF CARE
Problem: Risk for Impaired Skin Integrity  Goal: Tissue integrity - skin and mucous membranes  Description  Structural intactness and normal physiological function of skin and  mucous membranes.   6/6/2019 1701 by Raquel Mata RN  Outcome: Ongoing  6/6/2019 0539 by Delmar Saint, RN  Outcome: Ongoing     Problem: Falls - Risk of:  Goal: Will remain free from falls  Description  Will remain free from falls  6/6/2019 1701 by Raquel Mata RN  Outcome: Ongoing  6/6/2019 0539 by Delmar Saint, RN  Outcome: Ongoing  Goal: Absence of physical injury  Description  Absence of physical injury  6/6/2019 1701 by Raquel Mata RN  Outcome: Ongoing  6/6/2019 0539 by Delmar Saint, RN  Outcome: Ongoing     Problem: Urinary Elimination:  Goal: Signs and symptoms of infection will decrease  Description  Signs and symptoms of infection will decrease  6/6/2019 1701 by Raquel Mata RN  Outcome: Ongoing  6/6/2019 0539 by Delmar Saint, RN  Outcome: Ongoing  Goal: Complications related to the disease process, condition or treatment will be avoided or minimized  Description  Complications related to the disease process, condition or treatment will be avoided or minimized  6/6/2019 1701 by Raquel Mata RN  Outcome: Ongoing  6/6/2019 0539 by Delmar Saint, RN  Outcome: Ongoing     Problem: Pain:  Goal: Pain level will decrease  Description  Pain level will decrease  6/6/2019 1701 by Raquel Mata RN  Outcome: Ongoing  6/6/2019 0539 by Delmar Saint, RN  Outcome: Ongoing  Goal: Control of acute pain  Description  Control of acute pain  6/6/2019 1701 by Raquel Mata RN  Outcome: Ongoing  6/6/2019 0539 by Delmar Saint, RN  Outcome: Ongoing  Goal: Control of chronic pain  Description  Control of chronic pain  6/6/2019 1701 by Raquel Mata RN  Outcome: Ongoing  6/6/2019 0539 by Delmar Saint, RN  Outcome: Ongoing     Problem: OXYGENATION/RESPIRATORY FUNCTION  Goal: Patient will maintain patent airway  6/6/2019 1701 by Leti Dave RN  Outcome: Ongoing  6/6/2019 0539 by Hazel Collet, RN  Outcome: Ongoing  Goal: Patient will achieve/maintain normal respiratory rate/effort  Description  Respiratory rate and effort will be within normal limits for the patient  6/6/2019 1701 by Leti Dave RN  Outcome: Ongoing  6/6/2019 0539 by Hazel Collet, RN  Outcome: Ongoing     Problem: HEMODYNAMIC STATUS  Goal: Patient has stable vital signs and fluid balance  6/6/2019 1701 by Leti Dave RN  Outcome: Ongoing  6/6/2019 0539 by Hazel Collet, RN  Outcome: Ongoing     Problem: FLUID AND ELECTROLYTE IMBALANCE  Goal: Fluid and electrolyte balance are achieved/maintained  6/6/2019 1701 by Leti Dave RN  Outcome: Ongoing  6/6/2019 0539 by Hazel Collet, RN  Outcome: Ongoing     Problem: ACTIVITY INTOLERANCE/IMPAIRED MOBILITY  Goal: Mobility/activity is maintained at optimum level for patient  6/6/2019 1701 by Leti Dave RN  Outcome: Ongoing  6/6/2019 0539 by Hazel Collet, RN  Outcome: Ongoing

## 2019-06-06 NOTE — FLOWSHEET NOTE
06/06/19 1046   Encounter Summary   Services provided to: Patient   Referral/Consult From: Nurse   Support System Children   Continue Visiting   (visit, AD docs discussion 6/6 CL)   Complexity of Encounter Moderate   Length of Encounter 15 minutes   Spiritual/Denominational   Type Spiritual support   Assessment Calm; Approachable   Intervention Active listening   Outcome Engaged in conversation   Advance Directives (For Healthcare)   Healthcare Directive Other (Comment)  (gave pt AD docs per pt request)   Electronically signed by Mohan Espino on 6/6/2019 at 10:49 AM

## 2019-06-06 NOTE — PROGRESS NOTES
Patient alert and oriented, some periods of confusion noted. Assessment completed. Medications administered per MD orders. Patient blood sugar not checked prior to discharge. She states that they are able to check her when she gets home. Patient refusing food, she states she will eat at the nursing home. Patient PIV removed without difficulty. Patient has no complaints of pain at this present time. Report called to [facility]. In-facility nurse states that they do not have a BiPap and she will call me back if she needs to.  states that the facility has to provide a BiPap for the patient. Discharge information completed.

## 2019-06-19 ENCOUNTER — TELEPHONE (OUTPATIENT)
Dept: CARDIOLOGY CLINIC | Age: 69
End: 2019-06-19
